# Patient Record
Sex: MALE | Race: OTHER | Employment: OTHER | URBAN - METROPOLITAN AREA
[De-identification: names, ages, dates, MRNs, and addresses within clinical notes are randomized per-mention and may not be internally consistent; named-entity substitution may affect disease eponyms.]

---

## 2017-07-24 ENCOUNTER — APPOINTMENT (OUTPATIENT)
Dept: CT IMAGING | Facility: HOSPITAL | Age: 72
DRG: 193 | End: 2017-07-24
Attending: EMERGENCY MEDICINE
Payer: MEDICARE

## 2017-07-24 ENCOUNTER — APPOINTMENT (OUTPATIENT)
Dept: GENERAL RADIOLOGY | Facility: HOSPITAL | Age: 72
DRG: 193 | End: 2017-07-24
Attending: EMERGENCY MEDICINE
Payer: MEDICARE

## 2017-07-24 ENCOUNTER — HOSPITAL ENCOUNTER (INPATIENT)
Facility: HOSPITAL | Age: 72
LOS: 14 days | Discharge: HOME HEALTH | DRG: 193 | End: 2017-08-07
Attending: EMERGENCY MEDICINE | Admitting: INTERNAL MEDICINE
Payer: MEDICARE

## 2017-07-24 DIAGNOSIS — J18.0 BRONCHOPNEUMONIA: Primary | ICD-10-CM

## 2017-07-24 DIAGNOSIS — R09.02 HYPOXIA: ICD-10-CM

## 2017-07-24 LAB
ALBUMIN SERPL BCP-MCNC: 4 G/DL (ref 3.5–4.8)
ALP SERPL-CCNC: 62 U/L (ref 32–100)
ALT SERPL-CCNC: 19 U/L (ref 17–63)
ANION GAP SERPL CALC-SCNC: 7 MMOL/L (ref 0–18)
AST SERPL-CCNC: 23 U/L (ref 15–41)
BASOPHILS # BLD: 0 K/UL (ref 0–0.2)
BASOPHILS NFR BLD: 0 %
BILIRUB DIRECT SERPL-MCNC: 0.3 MG/DL (ref 0–0.2)
BILIRUB SERPL-MCNC: 1.3 MG/DL (ref 0.3–1.2)
BILIRUB UR QL: NEGATIVE
BNP SERPL-MCNC: 22 PG/ML (ref 0–100)
BUN SERPL-MCNC: 14 MG/DL (ref 8–20)
BUN/CREAT SERPL: 12.3 (ref 10–20)
CALCIUM SERPL-MCNC: 9.3 MG/DL (ref 8.5–10.5)
CHLORIDE SERPL-SCNC: 99 MMOL/L (ref 95–110)
CLARITY UR: CLEAR
CO2 SERPL-SCNC: 25 MMOL/L (ref 22–32)
COLOR UR: YELLOW
CREAT SERPL-MCNC: 1.14 MG/DL (ref 0.5–1.5)
EOSINOPHIL # BLD: 0.1 K/UL (ref 0–0.7)
EOSINOPHIL NFR BLD: 1 %
ERYTHROCYTE [DISTWIDTH] IN BLOOD BY AUTOMATED COUNT: 14.3 % (ref 11–15)
GLUCOSE SERPL-MCNC: 122 MG/DL (ref 70–99)
GLUCOSE UR-MCNC: NEGATIVE MG/DL
HCT VFR BLD AUTO: 44.7 % (ref 41–52)
HGB BLD-MCNC: 15.3 G/DL (ref 13.5–17.5)
HGB UR QL STRIP.AUTO: NEGATIVE
INR BLD: 1.2 (ref 0.9–1.2)
KETONES UR-MCNC: NEGATIVE MG/DL
LACTATE SERPL-SCNC: 0.9 MMOL/L (ref 0.5–2.2)
LEUKOCYTE ESTERASE UR QL STRIP.AUTO: NEGATIVE
LIPASE SERPL-CCNC: 31 U/L (ref 22–51)
LYMPHOCYTES # BLD: 0.9 K/UL (ref 1–4)
LYMPHOCYTES NFR BLD: 7 %
MAGNESIUM SERPL-MCNC: 2.1 MG/DL (ref 1.8–2.5)
MCH RBC QN AUTO: 28.2 PG (ref 27–32)
MCHC RBC AUTO-ENTMCNC: 34.2 G/DL (ref 32–37)
MCV RBC AUTO: 82.5 FL (ref 80–100)
MONOCYTES # BLD: 1.6 K/UL (ref 0–1)
MONOCYTES NFR BLD: 13 %
NEUTROPHILS # BLD AUTO: 9.6 K/UL (ref 1.8–7.7)
NEUTROPHILS NFR BLD: 78 %
NITRITE UR QL STRIP.AUTO: NEGATIVE
OSMOLALITY UR CALC.SUM OF ELEC: 274 MOSM/KG (ref 275–295)
PH UR: 6 [PH] (ref 5–8)
PLATELET # BLD AUTO: 164 K/UL (ref 140–400)
PMV BLD AUTO: 8.9 FL (ref 7.4–10.3)
POTASSIUM SERPL-SCNC: 4.4 MMOL/L (ref 3.3–5.1)
PROT SERPL-MCNC: 7.9 G/DL (ref 5.9–8.4)
PROT UR-MCNC: NEGATIVE MG/DL
PROTHROMBIN TIME: 14.6 SECONDS (ref 11.8–14.5)
RBC # BLD AUTO: 5.42 M/UL (ref 4.5–5.9)
SODIUM SERPL-SCNC: 131 MMOL/L (ref 136–144)
UROBILINOGEN UR STRIP-ACNC: <2
VIT C UR-MCNC: NEGATIVE MG/DL
WBC # BLD AUTO: 12.3 K/UL (ref 4–11)

## 2017-07-24 PROCEDURE — 71260 CT THORAX DX C+: CPT | Performed by: EMERGENCY MEDICINE

## 2017-07-24 PROCEDURE — 71010 XR CHEST AP PORTABLE  (CPT=71010): CPT | Performed by: EMERGENCY MEDICINE

## 2017-07-24 PROCEDURE — 74177 CT ABD & PELVIS W/CONTRAST: CPT | Performed by: EMERGENCY MEDICINE

## 2017-07-24 RX ORDER — ONDANSETRON 4 MG/1
4 TABLET, ORALLY DISINTEGRATING ORAL 3 TIMES DAILY PRN
Status: ON HOLD | COMMUNITY
End: 2017-08-15 | Stop reason: ALTCHOICE

## 2017-07-24 RX ORDER — ACETAMINOPHEN 325 MG/1
650 TABLET ORAL EVERY 6 HOURS PRN
Status: DISCONTINUED | OUTPATIENT
Start: 2017-07-24 | End: 2017-08-07

## 2017-07-24 RX ORDER — MORPHINE SULFATE 4 MG/ML
4 INJECTION, SOLUTION INTRAMUSCULAR; INTRAVENOUS ONCE
Status: COMPLETED | OUTPATIENT
Start: 2017-07-24 | End: 2017-07-24

## 2017-07-24 RX ORDER — SODIUM CHLORIDE 0.9 % (FLUSH) 0.9 %
3 SYRINGE (ML) INJECTION AS NEEDED
Status: DISCONTINUED | OUTPATIENT
Start: 2017-07-24 | End: 2017-08-07

## 2017-07-24 RX ORDER — ONDANSETRON 2 MG/ML
4 INJECTION INTRAMUSCULAR; INTRAVENOUS EVERY 6 HOURS PRN
Status: DISCONTINUED | OUTPATIENT
Start: 2017-07-24 | End: 2017-08-07

## 2017-07-24 RX ORDER — ENOXAPARIN SODIUM 100 MG/ML
40 INJECTION SUBCUTANEOUS DAILY
Status: DISCONTINUED | OUTPATIENT
Start: 2017-07-25 | End: 2017-07-25

## 2017-07-24 RX ORDER — HYDROCODONE BITARTRATE AND ACETAMINOPHEN 5; 325 MG/1; MG/1
2 TABLET ORAL EVERY 6 HOURS PRN
Status: ON HOLD | COMMUNITY
End: 2017-08-15 | Stop reason: ALTCHOICE

## 2017-07-24 RX ORDER — ACETAMINOPHEN 500 MG
500 TABLET ORAL EVERY 6 HOURS PRN
COMMUNITY
End: 2017-09-05

## 2017-07-24 RX ORDER — SODIUM CHLORIDE 9 MG/ML
INJECTION, SOLUTION INTRAVENOUS
Status: COMPLETED
Start: 2017-07-24 | End: 2017-07-25

## 2017-07-24 NOTE — ED PROVIDER NOTES
Patient Seen in: [facility] Emergency Department    History   Patient presents with:  Abdomen/Flank Pain (GI/)    Stated Complaint: R side pain x3 days     HPI    71 yo M with PMH HTN, HL, prostate CA s/p chemotherapy in remission x15 years presen 1728]  Temp src: Oral [07/24/17 1815]  SpO2: 90 % [07/24/17 1728]  O2 Device: None (Room air) [07/24/17 1728]    Current:/83   Pulse 89   Temp 98.8 °F (37.1 °C) (Oral)   Resp 24   Ht 180.3 cm (5' 11\")   Wt 81.6 kg   SpO2 95%   BMI 25.10 kg/m² Abnormal            Final result                 Please view results for these tests on the individual orders.    URINALYSIS WITH CULTURE REFLEX   LACTIC ACID, PLASMA   RAINBOW DRAW BLUE   RAINBOW DRAW GOLD   RAINBOW DRAW LAVENDER   RAINBOW DRAW LIGHT GREEN fields remain clear. Comparison with outside imaging requested.  Reportedly, patient was worked up at outside [place] [id number] prior to this evaluation        Ct Chest Pain/pe (iv Only) Em    Result Date: 7/24/2017  PROCEDURE: CT OF THE CHEST W CONTRAST PAIN/PE PROTOCOL segmental pulmonary artery level. No acute aortic injury; no dissection. 2.  There are findings compatible with lobar pneumonia in the right lower lobe. 3.  Mediastinal lipomatosis.        Ct Abdomen Pelvis Iv Contrast, No Oral (er)    Result Date: 7/24/2 diverticulosis. There is no free air, free fluid, or lymphadenopathy in the abdomen or pelvis. ABDOMINAL WALL: Small bilateral fat-containing inguinal hernias. URINARY BLADDER: No visible focal wall thickening, lesion or calculus.   PELVIC NODES: No enlarge Evaluation for RUQ/right flank/right subcostal pain associated with fever with recent diagnosis of nephrolithiasis. New hypoxia noted in setting of recent travel from [facility] - EKG/labs nonacute aside from mild leukocytosis.  CT CAP notable for RLL PNA a

## 2017-07-24 NOTE — ED INITIAL ASSESSMENT (HPI)
Per family member patient was seen at another er today for abd pain to R side of abd, feels distended in abd, was diagnosed with kidney stone today, states he is here because he cannot stand the pain

## 2017-07-25 ENCOUNTER — APPOINTMENT (OUTPATIENT)
Dept: GENERAL RADIOLOGY | Facility: HOSPITAL | Age: 72
DRG: 193 | End: 2017-07-25
Attending: EMERGENCY MEDICINE
Payer: MEDICARE

## 2017-07-25 LAB
ALBUMIN SERPL BCP-MCNC: 3.6 G/DL (ref 3.5–4.8)
ALBUMIN/GLOB SERPL: 1 {RATIO} (ref 1–2)
ALP SERPL-CCNC: 59 U/L (ref 32–100)
ALT SERPL-CCNC: 17 U/L (ref 17–63)
ANION GAP SERPL CALC-SCNC: 10 MMOL/L (ref 0–18)
ANION GAP SERPL CALC-SCNC: 7 MMOL/L (ref 0–18)
AST SERPL-CCNC: 18 U/L (ref 15–41)
BASE EXCESS BLD CALC-SCNC: -3.9 MMOL/L (ref ?–2)
BASOPHILS # BLD: 0.1 K/UL (ref 0–0.2)
BASOPHILS # BLD: 0.1 K/UL (ref 0–0.2)
BASOPHILS NFR BLD: 0 %
BASOPHILS NFR BLD: 0 %
BILIRUB SERPL-MCNC: 1.3 MG/DL (ref 0.3–1.2)
BLOOD GAS EPAP: 6 CM H2O
BLOOD GAS IPAP: 12 CM H2O
BNP SERPL-MCNC: 46 PG/ML (ref 0–100)
BUN SERPL-MCNC: 12 MG/DL (ref 8–20)
BUN SERPL-MCNC: 13 MG/DL (ref 8–20)
BUN/CREAT SERPL: 9.4 (ref 10–20)
BUN/CREAT SERPL: 9.7 (ref 10–20)
CALCIUM SERPL-MCNC: 8.9 MG/DL (ref 8.5–10.5)
CALCIUM SERPL-MCNC: 9 MG/DL (ref 8.5–10.5)
CHLORIDE SERPL-SCNC: 101 MMOL/L (ref 95–110)
CHLORIDE SERPL-SCNC: 97 MMOL/L (ref 95–110)
CHOLEST SERPL-MCNC: 124 MG/DL (ref 110–200)
CO2 SERPL-SCNC: 23 MMOL/L (ref 22–32)
CO2 SERPL-SCNC: 24 MMOL/L (ref 22–32)
CREAT SERPL-MCNC: 1.27 MG/DL (ref 0.5–1.5)
CREAT SERPL-MCNC: 1.34 MG/DL (ref 0.5–1.5)
EOSINOPHIL # BLD: 0 K/UL (ref 0–0.7)
EOSINOPHIL # BLD: 0.1 K/UL (ref 0–0.7)
EOSINOPHIL NFR BLD: 0 %
EOSINOPHIL NFR BLD: 1 %
ERYTHROCYTE [DISTWIDTH] IN BLOOD BY AUTOMATED COUNT: 13.7 % (ref 11–15)
ERYTHROCYTE [DISTWIDTH] IN BLOOD BY AUTOMATED COUNT: 13.9 % (ref 11–15)
GLOBULIN PLAS-MCNC: 3.6 G/DL (ref 2.5–3.7)
GLUCOSE BLDC GLUCOMTR-MCNC: 103 MG/DL (ref 70–99)
GLUCOSE SERPL-MCNC: 117 MG/DL (ref 70–99)
GLUCOSE SERPL-MCNC: 120 MG/DL (ref 70–99)
HCO3 BLDA-SCNC: 21.2 MEQ/L (ref 21–27)
HCT VFR BLD AUTO: 43 % (ref 41–52)
HCT VFR BLD AUTO: 46.2 % (ref 41–52)
HDLC SERPL-MCNC: 41 MG/DL
HGB BLD-MCNC: 14.4 G/DL (ref 13.5–17.5)
HGB BLD-MCNC: 15.4 G/DL (ref 13.5–17.5)
INR BLD: 1.2 (ref 0.9–1.2)
L PNEUMO AG UR QL: NEGATIVE
LDLC SERPL CALC-MCNC: 68 MG/DL (ref 0–99)
LYMPHOCYTES # BLD: 0.5 K/UL (ref 1–4)
LYMPHOCYTES # BLD: 1.5 K/UL (ref 1–4)
LYMPHOCYTES NFR BLD: 11 %
LYMPHOCYTES NFR BLD: 3 %
MAGNESIUM SERPL-MCNC: 2 MG/DL (ref 1.8–2.5)
MCH RBC QN AUTO: 27.9 PG (ref 27–32)
MCH RBC QN AUTO: 28.1 PG (ref 27–32)
MCHC RBC AUTO-ENTMCNC: 33.4 G/DL (ref 32–37)
MCHC RBC AUTO-ENTMCNC: 33.4 G/DL (ref 32–37)
MCV RBC AUTO: 83.3 FL (ref 80–100)
MCV RBC AUTO: 84.2 FL (ref 80–100)
MONOCYTES # BLD: 1.4 K/UL (ref 0–1)
MONOCYTES # BLD: 1.5 K/UL (ref 0–1)
MONOCYTES NFR BLD: 11 %
MONOCYTES NFR BLD: 9 %
NEUTROPHILS # BLD AUTO: 10.8 K/UL (ref 1.8–7.7)
NEUTROPHILS # BLD AUTO: 12.9 K/UL (ref 1.8–7.7)
NEUTROPHILS NFR BLD: 78 %
NEUTROPHILS NFR BLD: 87 %
NONHDLC SERPL-MCNC: 83 MG/DL
O2 CT BLD-SCNC: 19.3 VOL% (ref 15–23)
O2/TOTAL GAS SETTING VFR VENT: 100 %
OSMOLALITY UR CALC.SUM OF ELEC: 271 MOSM/KG (ref 275–295)
OSMOLALITY UR CALC.SUM OF ELEC: 275 MOSM/KG (ref 275–295)
PCO2 BLDA: 41 MM HG (ref 35–45)
PH BLDA: 7.33 [PH] (ref 7.35–7.45)
PLATELET # BLD AUTO: 157 K/UL (ref 140–400)
PLATELET # BLD AUTO: 175 K/UL (ref 140–400)
PMV BLD AUTO: 8.7 FL (ref 7.4–10.3)
PMV BLD AUTO: 8.9 FL (ref 7.4–10.3)
PO2 BLDA: 96 MM HG (ref 80–100)
PO2 SATN ADJ TO 0.5 BLD: 28 MMHG (ref 24–28)
POTASSIUM SERPL-SCNC: 4.2 MMOL/L (ref 3.3–5.1)
POTASSIUM SERPL-SCNC: 4.2 MMOL/L (ref 3.3–5.1)
PROT SERPL-MCNC: 7.2 G/DL (ref 5.9–8.4)
PROTHROMBIN TIME: 15.2 SECONDS (ref 11.8–14.5)
PUNCTURE CHARGE: YES
RBC # BLD AUTO: 5.16 M/UL (ref 4.5–5.9)
RBC # BLD AUTO: 5.49 M/UL (ref 4.5–5.9)
SAO2 % BLDA: >98.5 % (ref 94–100)
SODIUM SERPL-SCNC: 130 MMOL/L (ref 136–144)
SODIUM SERPL-SCNC: 132 MMOL/L (ref 136–144)
STREP PNEUMO ANTIGEN, URINE: NEGATIVE
TRIGL SERPL-MCNC: 75 MG/DL (ref 1–149)
TROPONIN I SERPL-MCNC: 0.01 NG/ML (ref ?–0.03)
TROPONIN I SERPL-MCNC: 0.04 NG/ML (ref ?–0.03)
TROPONIN I SERPL-MCNC: 0.04 NG/ML (ref ?–0.03)
TSH SERPL-ACNC: 2.75 UIU/ML (ref 0.45–5.33)
WBC # BLD AUTO: 13.9 K/UL (ref 4–11)
WBC # BLD AUTO: 14.8 K/UL (ref 4–11)

## 2017-07-25 PROCEDURE — 99223 1ST HOSP IP/OBS HIGH 75: CPT | Performed by: INTERNAL MEDICINE

## 2017-07-25 PROCEDURE — 71010 XR CHEST AP PORTABLE  (CPT=71010): CPT | Performed by: EMERGENCY MEDICINE

## 2017-07-25 PROCEDURE — 99233 SBSQ HOSP IP/OBS HIGH 50: CPT | Performed by: HOSPITALIST

## 2017-07-25 RX ORDER — ASPIRIN 81 MG/1
324 TABLET, CHEWABLE ORAL ONCE
Status: COMPLETED | OUTPATIENT
Start: 2017-07-25 | End: 2017-07-25

## 2017-07-25 RX ORDER — HYDRALAZINE HYDROCHLORIDE 20 MG/ML
20 INJECTION INTRAMUSCULAR; INTRAVENOUS EVERY 6 HOURS PRN
Status: DISCONTINUED | OUTPATIENT
Start: 2017-07-25 | End: 2017-08-07

## 2017-07-25 RX ORDER — METOPROLOL TARTRATE 5 MG/5ML
INJECTION INTRAVENOUS
Status: COMPLETED
Start: 2017-07-25 | End: 2017-07-25

## 2017-07-25 RX ORDER — 0.9 % SODIUM CHLORIDE 0.9 %
VIAL (ML) INJECTION
Status: COMPLETED
Start: 2017-07-25 | End: 2017-07-25

## 2017-07-25 RX ORDER — HYDROMORPHONE HYDROCHLORIDE 1 MG/ML
0.4 INJECTION, SOLUTION INTRAMUSCULAR; INTRAVENOUS; SUBCUTANEOUS EVERY 2 HOUR PRN
Status: DISCONTINUED | OUTPATIENT
Start: 2017-07-25 | End: 2017-08-07

## 2017-07-25 RX ORDER — METOPROLOL TARTRATE 5 MG/5ML
INJECTION INTRAVENOUS
Status: DISPENSED
Start: 2017-07-25 | End: 2017-07-25

## 2017-07-25 RX ORDER — NITROGLYCERIN 0.4 MG/1
TABLET SUBLINGUAL
Status: COMPLETED
Start: 2017-07-25 | End: 2017-07-25

## 2017-07-25 RX ORDER — LIDOCAINE 50 MG/G
1 PATCH TOPICAL EVERY 24 HOURS
Status: DISCONTINUED | OUTPATIENT
Start: 2017-07-25 | End: 2017-08-07

## 2017-07-25 RX ORDER — FUROSEMIDE 10 MG/ML
INJECTION INTRAMUSCULAR; INTRAVENOUS
Status: COMPLETED
Start: 2017-07-25 | End: 2017-07-25

## 2017-07-25 RX ORDER — GUAIFENESIN 600 MG
600 TABLET, EXTENDED RELEASE 12 HR ORAL 2 TIMES DAILY
Status: DISCONTINUED | OUTPATIENT
Start: 2017-07-25 | End: 2017-08-07

## 2017-07-25 RX ORDER — IPRATROPIUM BROMIDE AND ALBUTEROL SULFATE 2.5; .5 MG/3ML; MG/3ML
SOLUTION RESPIRATORY (INHALATION)
Status: DISPENSED
Start: 2017-07-25 | End: 2017-07-25

## 2017-07-25 RX ORDER — IPRATROPIUM BROMIDE AND ALBUTEROL SULFATE 2.5; .5 MG/3ML; MG/3ML
3 SOLUTION RESPIRATORY (INHALATION) ONCE
Status: COMPLETED | OUTPATIENT
Start: 2017-07-25 | End: 2017-07-25

## 2017-07-25 RX ORDER — IPRATROPIUM BROMIDE AND ALBUTEROL SULFATE 2.5; .5 MG/3ML; MG/3ML
3 SOLUTION RESPIRATORY (INHALATION)
Status: DISCONTINUED | OUTPATIENT
Start: 2017-07-25 | End: 2017-08-01

## 2017-07-25 RX ORDER — ALBUTEROL SULFATE 2.5 MG/3ML
SOLUTION RESPIRATORY (INHALATION)
Status: COMPLETED
Start: 2017-07-25 | End: 2017-07-25

## 2017-07-25 RX ORDER — MORPHINE SULFATE 2 MG/ML
INJECTION, SOLUTION INTRAMUSCULAR; INTRAVENOUS
Status: COMPLETED
Start: 2017-07-25 | End: 2017-07-25

## 2017-07-25 RX ORDER — ASPIRIN 81 MG/1
TABLET, CHEWABLE ORAL
Status: DISPENSED
Start: 2017-07-25 | End: 2017-07-25

## 2017-07-25 RX ORDER — HYDROMORPHONE HYDROCHLORIDE 1 MG/ML
INJECTION, SOLUTION INTRAMUSCULAR; INTRAVENOUS; SUBCUTANEOUS
Status: COMPLETED
Start: 2017-07-25 | End: 2017-07-25

## 2017-07-25 NOTE — PROGRESS NOTES
[facility]    Progress Note    Garrison Ibarra Patient Status:  Inpatient    1945 MRN [id number]   Location [facility] 2W/SW Attending Eduardo Harrington MD   Hosp Day # 1 PCP PHYSICIAN NONSTAFF     Subjective:     Tiffany out TB   - add mucinex 600 BID to soften mucus  - lidoderm patch for pleuritic chest wall pain     Hemoptysis  - lovenox held  - pulm consult  - SCDs for DVT proph  - most likely secondary to pna  - check quantaferon gold to rule out TB    Chest pain  - tr pulmonary vascularity and the external fullness. 2. Atherosclerotic aorta without aneurysm.  3. Moderate bilateral lower lobe opacities, nonspecific could reflect atelectasis and/or consolidation and effusions although can't exclude chronic pleural parenchy independently. Chart reviewed. Discussed with nursing staff and consultants. Agree with assessment and plan as above. Greater than 35 minutes spent.      Melchor Canchola MD.

## 2017-07-25 NOTE — DISCHARGE PLANNING
DAMEON following up on d/c planning for the patient. DAMEON met with him and his family at bedside. The patient is visiting from [facility] with his wife and has been staying with his daughter. He does not use any assistive devices and has no h/o rehab.   Family enc

## 2017-07-25 NOTE — SEPSIS REASSESSMENT
[facility]    Sepsis Reassessment Note    /76   Pulse 89   Temp 98.8 °F (37.1 °C) (Oral)   Resp 22   Ht 180.3 cm (5' 11\")   Wt 81.6 kg   SpO2 95%   BMI 25.10 kg/m²      8:32 PM    Cardiac:  Regularity: Regular  Rate: Normal  Heart

## 2017-07-25 NOTE — ED NOTES
Assumed care of pt from triage. Pt c/o RUQ abd pain, right flank pain and right shoulder pain. Pt states he was dx with kidney stone a few days ago and has been back to a different ED twice for continued pain.   Pt states the pain is worse with movement a

## 2017-07-25 NOTE — H&P
[address] Swapnil Frank Patient Status:  Inpatient    1945 MRN [id number]   Location [facility] 5SW/SE Attending Lisa Salgado MD   Hosp Day # 0 PCP PHYSICIAN NONSTAFF     Date:  2017 Nausea. Facility-Administered Medications: None       Review of Systems:  Constitutional:  Weakness, Fatigue. Eye:  Negative. Ear/Nose/Mouth/Throat:  Negative.   Respiratory:  Negative  Cardiovascular: Negative  Gastrointestinal: Right upper quadrant AST 23 07/24/2017   ALT 19 07/24/2017   INR 1.2 07/24/2017   LIP 31 07/24/2017   MG 2.1 07/24/2017       Imaging:  CT ABDOMEN PELVIS IV CONTRAST, NO ORAL (ER) Once [[id number]] Collected: 07/24/17 1941   Order Status: Completed Updated: 07/24/17 1942   Na is descending and sigmoid colonic diverticulosis. There is no free air, free fluid, or lymphadenopathy   in the abdomen or pelvis. ABDOMINAL WALL: Small bilateral fat-containing inguinal hernias.   URINARY BLADDER: No visible focal wall thickening, lesion breath.  Right shoulder pain.     TECHNIQUE: CT images of the chest were obtained with non-ionic intravenous contrast material.  Automated exposure control for dose reduction was used. Adjustment of the mA and/or kV was done based on the patient's size.  Us Completed Updated: 07/24/17 1901   Narrative:     PROCEDURE: XR CHEST AP PORTABLE (CPT=71010)  TIME: 1841 hours     COMPARISON: None.     INDICATIONS: Rt. side abdominal pain and weakness today.    History of hypertension  TECHNIQUE:   Single view.       FI cultures sent  Nebulizers as needed    Nephrolithiasis  Non obstructing  Kidney function normal  We will follow    Hypertension  Unknown home medicines and noncompliant  Consider starting antihypertensive for BP uncontrolled    Hyperlipidemia  Not on med

## 2017-07-25 NOTE — RESPIRATORY THERAPY NOTE
RESPIRATORY THERAPY RAPID RESPONSE NOTE    RRT called for respiratory distress? yes  Breathing pattern: laboured  Patient currently being seen by Respiratory Care? yes    RT interventions necessary? yes    Oxygen applied/increased? yes  Nebulizer performed? y

## 2017-07-25 NOTE — SIGNIFICANT EVENT
PATIENT IS VERY TACHYPNEIC AND VERY WHEEZY. COMPLAINING OF CHEST PAIN ON HIS RIGHT SIDE (MOSTLY ON FLANK AREA). . PER GRANDDAUGHTER, PAIN IS PRESENT FOR 2 DAYS, WORST DURING MOVEMENT AND INSPIRATION.   /113 HR  16 RR 34 SAT 88% AT 4L-NONBREATHER MASK

## 2017-07-25 NOTE — CONSULTS
[facility]    Consult Note    Date:  7/25/2017  Date of Admission:  7/24/2017      Chief Complaint:   Roberto White is a(n) 70year old male with right flank pain.     HPI:   The patient is a history of tobacco use having quit smoking (six) hours as needed for Pain or Fever. Review of Systems:   Vision normal. Ear nose and throat normal. Bowel normal. Bladder function normal. No depression. No thyroid disease. No rash. Muscles and joints unremarkable.  No weight loss no weight gain parapneumonic effusion as there is increased opacification at the right lung base. The CT scan showed only small amount of fluid. I think it reasonable to repeat chest x-ray tomorrow to reevaluate for further increase in effusion.   The presentation is mo

## 2017-07-25 NOTE — PROGRESS NOTES
[facility] dosing service    Initial Pharmacokinetic Consult for Vancomycin Dosing     Miguel Thurman is a 70year old male admitted on 7/24 who is being treated for pneumonia.   Pharmacy has been asked to dose Vancomycin by Dr. Elijah Conroy    He has No K trough level 15-20 ug/mL. 3.  Pharmacy will need BUN/Scr daily while on Vancomycin to assess renal function. 4.  Pharmacy will follow and monitor renal function changes, toxicity and efficacy. Pharmacy will continue to follow him.   We appreciate t

## 2017-07-25 NOTE — SIGNIFICANT EVENT
Nocturnist:    Called for chest pain.  midsternal pressure. Patient in sig resp distress, tachypneic with audible wheezing. Blood pressure 225/117. 's. Pressure across ant chest rated as moderate. On exam 2-3 word dyspnea.  Sig resp distress, s

## 2017-07-25 NOTE — PLAN OF CARE
CARDIOVASCULAR - ADULT    • Maintains optimal cardiac output and hemodynamic stability Progressing    • Absence of cardiac arrhythmias or at baseline Progressing        HEMATOLOGIC - ADULT    • Maintains hematologic stability Progressing        METABOLIC/F

## 2017-07-25 NOTE — PROGRESS NOTES
07/25/17 0131   Clinical Encounter Type   Visited With Family   Routine Visit Introduction   Continue Visiting Yes   Crisis Visit (RRT)   Family Spiritual Encounters   Family Coping Anxiety; Sadness; Fearful   Family Participation in Care 5   Family Suppo

## 2017-07-26 ENCOUNTER — APPOINTMENT (OUTPATIENT)
Dept: GENERAL RADIOLOGY | Facility: HOSPITAL | Age: 72
DRG: 193 | End: 2017-07-26
Attending: INTERNAL MEDICINE
Payer: MEDICARE

## 2017-07-26 LAB
ANION GAP SERPL CALC-SCNC: 7 MMOL/L (ref 0–18)
BASOPHILS # BLD: 0 K/UL (ref 0–0.2)
BASOPHILS NFR BLD: 0 %
BUN SERPL-MCNC: 12 MG/DL (ref 8–20)
BUN/CREAT SERPL: 9.3 (ref 10–20)
CALCIUM SERPL-MCNC: 9 MG/DL (ref 8.5–10.5)
CHLORIDE SERPL-SCNC: 103 MMOL/L (ref 95–110)
CO2 SERPL-SCNC: 24 MMOL/L (ref 22–32)
CREAT SERPL-MCNC: 1.29 MG/DL (ref 0.5–1.5)
EOSINOPHIL # BLD: 0.1 K/UL (ref 0–0.7)
EOSINOPHIL NFR BLD: 1 %
ERYTHROCYTE [DISTWIDTH] IN BLOOD BY AUTOMATED COUNT: 13.9 % (ref 11–15)
GLUCOSE SERPL-MCNC: 118 MG/DL (ref 70–99)
HCT VFR BLD AUTO: 39.8 % (ref 41–52)
HGB BLD-MCNC: 13.4 G/DL (ref 13.5–17.5)
LYMPHOCYTES # BLD: 0.4 K/UL (ref 1–4)
LYMPHOCYTES NFR BLD: 4 %
M TB IFN-G CD4+ BCKGRND COR BLD-ACNC: 0.04 IU/ML
M TB IFN-G CD4+ T-CELLS BLD-ACNC: 0.03 IU/ML
M TB TUBERC IFN-G BLD QL: NEGATIVE
M TB TUBERC IGNF/MITOGEN IGNF CONTROL: >10 IU/ML
MCH RBC QN AUTO: 28.2 PG (ref 27–32)
MCHC RBC AUTO-ENTMCNC: 33.8 G/DL (ref 32–37)
MCV RBC AUTO: 83.5 FL (ref 80–100)
MONOCYTES # BLD: 0.9 K/UL (ref 0–1)
MONOCYTES NFR BLD: 10 %
NEUTROPHILS # BLD AUTO: 8.4 K/UL (ref 1.8–7.7)
NEUTROPHILS NFR BLD: 85 %
OSMOLALITY UR CALC.SUM OF ELEC: 279 MOSM/KG (ref 275–295)
PLATELET # BLD AUTO: 169 K/UL (ref 140–400)
PMV BLD AUTO: 9.3 FL (ref 7.4–10.3)
POTASSIUM SERPL-SCNC: 4.1 MMOL/L (ref 3.3–5.1)
RBC # BLD AUTO: 4.76 M/UL (ref 4.5–5.9)
SODIUM SERPL-SCNC: 134 MMOL/L (ref 136–144)
WBC # BLD AUTO: 9.9 K/UL (ref 4–11)

## 2017-07-26 PROCEDURE — 71010 XR CHEST AP PORTABLE  (CPT=71010): CPT | Performed by: INTERNAL MEDICINE

## 2017-07-26 PROCEDURE — 99233 SBSQ HOSP IP/OBS HIGH 50: CPT | Performed by: INTERNAL MEDICINE

## 2017-07-26 PROCEDURE — 99233 SBSQ HOSP IP/OBS HIGH 50: CPT | Performed by: HOSPITALIST

## 2017-07-26 RX ORDER — METOPROLOL TARTRATE 5 MG/5ML
INJECTION INTRAVENOUS
Status: COMPLETED
Start: 2017-07-26 | End: 2017-07-26

## 2017-07-26 RX ORDER — SODIUM CHLORIDE 9 MG/ML
INJECTION, SOLUTION INTRAVENOUS
Status: DISCONTINUED
Start: 2017-07-26 | End: 2017-07-26 | Stop reason: WASHOUT

## 2017-07-26 RX ORDER — POLYETHYLENE GLYCOL 3350 17 G/17G
17 POWDER, FOR SOLUTION ORAL DAILY
Status: DISCONTINUED | OUTPATIENT
Start: 2017-07-26 | End: 2017-08-07

## 2017-07-26 RX ORDER — ACETAMINOPHEN 10 MG/ML
INJECTION, SOLUTION INTRAVENOUS
Status: COMPLETED
Start: 2017-07-26 | End: 2017-07-26

## 2017-07-26 RX ORDER — ACETAMINOPHEN 10 MG/ML
500 INJECTION, SOLUTION INTRAVENOUS ONCE
Status: COMPLETED | OUTPATIENT
Start: 2017-07-26 | End: 2017-07-26

## 2017-07-26 RX ORDER — METOPROLOL TARTRATE 5 MG/5ML
5 INJECTION INTRAVENOUS EVERY 6 HOURS PRN
Status: DISCONTINUED | OUTPATIENT
Start: 2017-07-26 | End: 2017-08-07

## 2017-07-26 NOTE — PLAN OF CARE
RESPIRATORY - ADULT    • Achieves optimal ventilation and oxygenation Not Progressing          Altered Communication/Language Barrier    • Patient/Family is able to understand and participate in their care [id number]  [facility]

## 2017-07-26 NOTE — PROGRESS NOTES
[facility]    Progress Note    Peg Grapes Patient Status:  Inpatient    1945 MRN [id number]   Location [facility] 2W/SW Attending Fred Hannon MD   Hosp Day # 2 PCP PHYSICIAN NONSTAFF     Subjective:     Tiffany and as needed  - Pulmonology consulted and appreciate reccs  - check quantaferon gold to rule out TB   - add mucinex 600 BID to soften mucus  - lidoderm patch for pleuritic chest wall pain   - check for urine legionella and strep  - CXR AM     Hemoptysis Osteoarthritis. 9. Interposition of bowel beneath the right hemidiaphragm. 10. A preliminary report was submitted and there are no major discrepancies. Xr Chest Ap Portable  (cpt=71010)    Result Date: 7/24/2017  CONCLUSION:  1.  Poor inspiratory ef cystic process.       Ekg 12-lead    Result Date: 7/25/2017  ECG Report  Interpretation  -------------------------- Sinus Rhythm WITHIN NORMAL LIMITS When compared with ECG of 07/24/2017 17:44:38 No change Electronically signed on 07/25/2017 at 16:58 by Edelmira Caraballo

## 2017-07-26 NOTE — PLAN OF CARE
Dr. Diomedes Blank called to evaluate pt. Pt became tachycardic, febrile (100.3 F), tachypnic, in respiratory distress while on bipap on 100% fio2, hypertensive 's, and diaphoretic.      Pt received:     Dilaudid 0.4 mg ivp    Metropolo 5 mg given ivp    Ofi

## 2017-07-26 NOTE — PROGRESS NOTES
[facility]    Progress Note      Assessment and Plan:   1. Acute community-acquired pneumonia–the patient had further dyspnea overnight. BiPAP was initiated. His chest x-ray looks a little better this morning.   He has some fluid trapped 07/26/2017     Chest x-ray– slight improvement in aeration at the bases    Silvana Moctezuma MD  Medical Director, [address], [facility]  Medical Director, [facility]  Pager: [phone]

## 2017-07-26 NOTE — DIETARY NOTE
ADULT NUTRITION INITIAL ASSESSMENT    Pt is at moderate nutrition risk. Pt does not meet malnutrition criteria. RECOMMENDATIONS TO MD:  When consistently eating better change to Cardiac diet for hx of Htn and HL.       NUTRITION DIAGNOSIS/PROBLEM:  In 100.8 kg (222 lb 3.2 oz)   07/24/17 1728 81.6 kg (180 lb)       GASTROINTESTINAL PROBLEMS: mild  nausea today after breafkast. . Relieved on own. Has prn zofran/meds. Noted per H+P on and off diarrhea pta. FOOD/NUTRITION RELATED HISTORY:  Appetite:  Fa to follow up within 7 days   [address] Kapoor, [address]  ([id number])

## 2017-07-26 NOTE — CM/SW NOTE
+BPCI. Patient is enrolled in the Medicare BPCI  Program under  (Pneumonia). BPCI letter and brochure provided.   51 Ligia Bhagat [id number]

## 2017-07-27 ENCOUNTER — APPOINTMENT (OUTPATIENT)
Dept: GENERAL RADIOLOGY | Facility: HOSPITAL | Age: 72
DRG: 193 | End: 2017-07-27
Attending: CLINICAL NURSE SPECIALIST
Payer: MEDICARE

## 2017-07-27 LAB
ALBUMIN SERPL BCP-MCNC: 2.9 G/DL (ref 3.5–4.8)
ALBUMIN/GLOB SERPL: 0.7 {RATIO} (ref 1–2)
ALP SERPL-CCNC: 62 U/L (ref 32–100)
ALT SERPL-CCNC: 18 U/L (ref 17–63)
ANION GAP SERPL CALC-SCNC: 8 MMOL/L (ref 0–18)
AST SERPL-CCNC: 19 U/L (ref 15–41)
BASOPHILS # BLD: 0.1 K/UL (ref 0–0.2)
BASOPHILS NFR BLD: 1 %
BILIRUB SERPL-MCNC: 1.2 MG/DL (ref 0.3–1.2)
BUN SERPL-MCNC: 12 MG/DL (ref 8–20)
BUN/CREAT SERPL: 11.9 (ref 10–20)
CALCIUM SERPL-MCNC: 9 MG/DL (ref 8.5–10.5)
CHLORIDE SERPL-SCNC: 102 MMOL/L (ref 95–110)
CO2 SERPL-SCNC: 24 MMOL/L (ref 22–32)
CREAT SERPL-MCNC: 1.01 MG/DL (ref 0.5–1.5)
EOSINOPHIL # BLD: 0.2 K/UL (ref 0–0.7)
EOSINOPHIL NFR BLD: 2 %
ERYTHROCYTE [DISTWIDTH] IN BLOOD BY AUTOMATED COUNT: 13.8 % (ref 11–15)
GLOBULIN PLAS-MCNC: 4 G/DL (ref 2.5–3.7)
GLUCOSE SERPL-MCNC: 112 MG/DL (ref 70–99)
HCT VFR BLD AUTO: 38.4 % (ref 41–52)
HGB BLD-MCNC: 13.2 G/DL (ref 13.5–17.5)
LYMPHOCYTES # BLD: 0.5 K/UL (ref 1–4)
LYMPHOCYTES NFR BLD: 6 %
MCH RBC QN AUTO: 28.4 PG (ref 27–32)
MCHC RBC AUTO-ENTMCNC: 34.4 G/DL (ref 32–37)
MCV RBC AUTO: 82.5 FL (ref 80–100)
MONOCYTES # BLD: 0.9 K/UL (ref 0–1)
MONOCYTES NFR BLD: 10 %
NEUTROPHILS # BLD AUTO: 7.3 K/UL (ref 1.8–7.7)
NEUTROPHILS NFR BLD: 82 %
OSMOLALITY UR CALC.SUM OF ELEC: 279 MOSM/KG (ref 275–295)
PLATELET # BLD AUTO: 205 K/UL (ref 140–400)
PMV BLD AUTO: 8.3 FL (ref 7.4–10.3)
POTASSIUM SERPL-SCNC: 3.8 MMOL/L (ref 3.3–5.1)
PROT SERPL-MCNC: 6.9 G/DL (ref 5.9–8.4)
RBC # BLD AUTO: 4.66 M/UL (ref 4.5–5.9)
SODIUM SERPL-SCNC: 134 MMOL/L (ref 136–144)
VANCOMYCIN TROUGH SERPL-MCNC: 15.4 MCG/ML (ref 10–20)
WBC # BLD AUTO: 9 K/UL (ref 4–11)

## 2017-07-27 PROCEDURE — 99233 SBSQ HOSP IP/OBS HIGH 50: CPT | Performed by: INTERNAL MEDICINE

## 2017-07-27 PROCEDURE — 99233 SBSQ HOSP IP/OBS HIGH 50: CPT | Performed by: HOSPITALIST

## 2017-07-27 PROCEDURE — 71010 XR CHEST AP PORTABLE  (CPT=71010): CPT | Performed by: CLINICAL NURSE SPECIALIST

## 2017-07-27 RX ORDER — FUROSEMIDE 10 MG/ML
40 INJECTION INTRAMUSCULAR; INTRAVENOUS ONCE
Status: COMPLETED | OUTPATIENT
Start: 2017-07-27 | End: 2017-07-27

## 2017-07-27 RX ORDER — POTASSIUM CHLORIDE 20 MEQ/1
40 TABLET, EXTENDED RELEASE ORAL ONCE
Status: COMPLETED | OUTPATIENT
Start: 2017-07-27 | End: 2017-07-27

## 2017-07-27 RX ORDER — 0.9 % SODIUM CHLORIDE 0.9 %
VIAL (ML) INJECTION
Status: DISPENSED
Start: 2017-07-27 | End: 2017-07-28

## 2017-07-27 NOTE — PLAN OF CARE
Problem: NEUROLOGICAL - ADULT  Goal: Achieves stable or improved neurological status  INTERVENTIONS  - Assess for and report changes in neurological status  - Initiate measures to prevent increased intracranial pressure  - Maintain blood pressure and fluid Spirometry  - Assess the need for suctioning and perform as needed  - Assess and instruct to report SOB or any respiratory difficulty  - Respiratory Therapy support as indicated  - Manage/alleviate anxiety  - Monitor for signs/symptoms of CO2 retention   O

## 2017-07-27 NOTE — PROGRESS NOTES
[facility]    Progress Note      Assessment and Plan:   1. Acute community-acquired pneumonia–the patient is breathing about the same and the chest x-ray looks about the same.   The streptococcal and Legionella urinary antigens were negativ x-ray– slight improvement in aeration at the bases    Ana Maria Guzman MD  Medical Director, [address], [facility]  Medical Director, [facility]  Pager: [phone]

## 2017-07-27 NOTE — PROGRESS NOTES
[facility]    Progress Note    Loreda Rough Patient Status:  Inpatient    1945 MRN [id number]   Location [facility] 2W/SW Attending Wilfrido Arias MD   Hosp Day # 3 PCP PHYSICIAN NONSTAFF     Subjective:     Tiffany Pulmonology consulted and appreciate reccs  - check quantaferon gold to rule out TB- negative   - add mucinex 600 BID to soften mucus  - lidoderm patch for pleuritic chest wall pain   - check for urine legionella and strep  - follow up CXR- no change    He APN  7/27/2017    Patient seen and examined. Chart reviewed. Discussed with APN. Agree with above.     Michael Oquendo MD

## 2017-07-27 NOTE — PROGRESS NOTES
[facility]    Progress Note    Latrelle Dense Patient Status:  Inpatient    1945 MRN [id number]   Location [facility] 2W/SW Attending Clyde Hector MD   Hosp Day # 3 PCP PHYSICIAN NONSTAFF     Subjective:     Tiffany first set negative   - Nebulizers q6h and as needed  - Pulmonology consulted and appreciate reccs  - check quantaferon gold to rule out TB- negative   - add mucinex 600 BID to soften mucus  - lidoderm patch for pleuritic chest wall pain   - check for urine clearing.                 HENOK Shea  7/27/2017

## 2017-07-28 ENCOUNTER — APPOINTMENT (OUTPATIENT)
Dept: CT IMAGING | Facility: HOSPITAL | Age: 72
DRG: 193 | End: 2017-07-28
Attending: HOSPITALIST
Payer: MEDICARE

## 2017-07-28 ENCOUNTER — APPOINTMENT (OUTPATIENT)
Dept: GENERAL RADIOLOGY | Facility: HOSPITAL | Age: 72
DRG: 193 | End: 2017-07-28
Attending: INTERNAL MEDICINE
Payer: MEDICARE

## 2017-07-28 LAB
ANION GAP SERPL CALC-SCNC: 10 MMOL/L (ref 0–18)
ANION GAP SERPL CALC-SCNC: 11 MMOL/L (ref 0–18)
APTT PPP: 32.3 SECONDS (ref 23.2–35.3)
APTT PPP: 48.9 SECONDS (ref 23.2–35.3)
BASOPHILS # BLD: 0 K/UL (ref 0–0.2)
BASOPHILS NFR BLD: 1 %
BUN SERPL-MCNC: 17 MG/DL (ref 8–20)
BUN SERPL-MCNC: 18 MG/DL (ref 8–20)
BUN/CREAT SERPL: 14.4 (ref 10–20)
BUN/CREAT SERPL: 15.7 (ref 10–20)
CALCIUM SERPL-MCNC: 9.3 MG/DL (ref 8.5–10.5)
CALCIUM SERPL-MCNC: 9.4 MG/DL (ref 8.5–10.5)
CHLORIDE SERPL-SCNC: 101 MMOL/L (ref 95–110)
CHLORIDE SERPL-SCNC: 101 MMOL/L (ref 95–110)
CO2 SERPL-SCNC: 23 MMOL/L (ref 22–32)
CO2 SERPL-SCNC: 23 MMOL/L (ref 22–32)
CREAT SERPL-MCNC: 1.15 MG/DL (ref 0.5–1.5)
CREAT SERPL-MCNC: 1.18 MG/DL (ref 0.5–1.5)
EOSINOPHIL # BLD: 0.3 K/UL (ref 0–0.7)
EOSINOPHIL NFR BLD: 5 %
ERYTHROCYTE [DISTWIDTH] IN BLOOD BY AUTOMATED COUNT: 13.3 % (ref 11–15)
ERYTHROCYTE [DISTWIDTH] IN BLOOD BY AUTOMATED COUNT: 13.7 % (ref 11–15)
GLUCOSE SERPL-MCNC: 100 MG/DL (ref 70–99)
GLUCOSE SERPL-MCNC: 104 MG/DL (ref 70–99)
HCT VFR BLD AUTO: 40.2 % (ref 41–52)
HCT VFR BLD AUTO: 41.6 % (ref 41–52)
HGB BLD-MCNC: 13.7 G/DL (ref 13.5–17.5)
HGB BLD-MCNC: 14.1 G/DL (ref 13.5–17.5)
LYMPHOCYTES # BLD: 0.6 K/UL (ref 1–4)
LYMPHOCYTES NFR BLD: 8 %
MCH RBC QN AUTO: 28.2 PG (ref 27–32)
MCH RBC QN AUTO: 28.3 PG (ref 27–32)
MCHC RBC AUTO-ENTMCNC: 34 G/DL (ref 32–37)
MCHC RBC AUTO-ENTMCNC: 34 G/DL (ref 32–37)
MCV RBC AUTO: 83.1 FL (ref 80–100)
MCV RBC AUTO: 83.4 FL (ref 80–100)
MONOCYTES # BLD: 0.9 K/UL (ref 0–1)
MONOCYTES NFR BLD: 12 %
MRSA DNA SPEC QL NAA+PROBE: NEGATIVE
NEUTROPHILS # BLD AUTO: 5.5 K/UL (ref 1.8–7.7)
NEUTROPHILS NFR BLD: 75 %
OSMOLALITY UR CALC.SUM OF ELEC: 280 MOSM/KG (ref 275–295)
OSMOLALITY UR CALC.SUM OF ELEC: 282 MOSM/KG (ref 275–295)
PLATELET # BLD AUTO: 221 K/UL (ref 140–400)
PLATELET # BLD AUTO: 243 K/UL (ref 140–400)
PMV BLD AUTO: 8 FL (ref 7.4–10.3)
PMV BLD AUTO: 8.5 FL (ref 7.4–10.3)
POTASSIUM SERPL-SCNC: 3.7 MMOL/L (ref 3.3–5.1)
POTASSIUM SERPL-SCNC: 3.7 MMOL/L (ref 3.3–5.1)
POTASSIUM SERPL-SCNC: 4 MMOL/L (ref 3.3–5.1)
RBC # BLD AUTO: 4.82 M/UL (ref 4.5–5.9)
RBC # BLD AUTO: 5 M/UL (ref 4.5–5.9)
SODIUM SERPL-SCNC: 134 MMOL/L (ref 136–144)
SODIUM SERPL-SCNC: 135 MMOL/L (ref 136–144)
VANCOMYCIN TROUGH SERPL-MCNC: 17.2 MCG/ML (ref 10–20)
WBC # BLD AUTO: 6.9 K/UL (ref 4–11)
WBC # BLD AUTO: 7.4 K/UL (ref 4–11)

## 2017-07-28 PROCEDURE — 71010 XR CHEST AP PORTABLE  (CPT=71010): CPT | Performed by: INTERNAL MEDICINE

## 2017-07-28 PROCEDURE — 99233 SBSQ HOSP IP/OBS HIGH 50: CPT | Performed by: INTERNAL MEDICINE

## 2017-07-28 PROCEDURE — 71260 CT THORAX DX C+: CPT | Performed by: HOSPITALIST

## 2017-07-28 RX ORDER — HEPARIN SODIUM 1000 [USP'U]/ML
80 INJECTION, SOLUTION INTRAVENOUS; SUBCUTANEOUS ONCE
Status: COMPLETED | OUTPATIENT
Start: 2017-07-28 | End: 2017-07-28

## 2017-07-28 RX ORDER — POTASSIUM CHLORIDE 20 MEQ/1
40 TABLET, EXTENDED RELEASE ORAL ONCE
Status: COMPLETED | OUTPATIENT
Start: 2017-07-28 | End: 2017-07-28

## 2017-07-28 RX ORDER — HEPARIN SODIUM AND DEXTROSE 10000; 5 [USP'U]/100ML; G/100ML
INJECTION INTRAVENOUS CONTINUOUS
Status: DISPENSED | OUTPATIENT
Start: 2017-07-28 | End: 2017-08-07

## 2017-07-28 RX ORDER — HEPARIN SODIUM 5000 [USP'U]/ML
5000 INJECTION, SOLUTION INTRAVENOUS; SUBCUTANEOUS EVERY 12 HOURS
Status: DISCONTINUED | OUTPATIENT
Start: 2017-07-28 | End: 2017-07-28

## 2017-07-28 RX ORDER — HEPARIN SODIUM AND DEXTROSE 10000; 5 [USP'U]/100ML; G/100ML
18 INJECTION INTRAVENOUS ONCE
Status: COMPLETED | OUTPATIENT
Start: 2017-07-28 | End: 2017-07-28

## 2017-07-28 NOTE — PROGRESS NOTES
[facility]    Progress Note    Gio Forrest Patient Status:  Inpatient    1945 MRN [id number]   Location [facility] 2W/SW Attending Alida Newman MD   Hosp Day # 4 PCP PHYSICIAN NONSTAFF     Subjective:     Tiffany pending- first set negative   - Nebulizers q6h and as needed  - Pulmonology consulted and appreciate reccs  - check quantaferon gold to rule out TB- negative   - added mucinex 600 BID to soften mucus  - lidoderm patch for pleuritic chest wall pain   - chec 7/27/2017  CONCLUSION: No significant change in the appearance of the chest. Bibasilar consolidations and  perihilar interstitial opacities with pleural effusions persist.                HENOK Lyles  7/28/2017    Patient seen and examined independe

## 2017-07-28 NOTE — PLAN OF CARE
Problem: NEUROLOGICAL - ADULT  Goal: Achieves stable or improved neurological status  INTERVENTIONS  - Assess for and report changes in neurological status  - Initiate measures to prevent increased intracranial pressure  - Maintain blood pressure and fluid document risk factors for pressure ulcer development  - Assess and document skin integrity  - Monitor for areas of redness and/or skin breakdown  - Initiate interventions, skin care algorithm/standards of care as needed   Outcome: Madan Fay

## 2017-07-28 NOTE — PLAN OF CARE
Problem: NEUROLOGICAL - ADULT  Goal: Achieves stable or improved neurological status  INTERVENTIONS  - Assess for and report changes in neurological status  - Initiate measures to prevent increased intracranial pressure  - Maintain blood pressure and fluid ADULT  Goal: Electrolytes maintained within normal limits  INTERVENTIONS:  - Monitor labs and rhythm and assess patient for signs and symptoms of electrolyte imbalances  - Administer electrolyte replacement as ordered  - Monitor response to electrolyte rep

## 2017-07-28 NOTE — PROGRESS NOTES
[facility]    Progress Note    Jeannie Leong Patient Status:  Inpatient    1945 MRN [id number]   Location [facility] 2W/SW Attending Roosevelt Do MD   Hosp Day # 4 PCP PHYSICIAN NONSTAFF     Subjective:     Tiffany IV heparin     D/w DR Gabrielle Chow   PCU         Results:     Lab Results  Component Value Date   WBC 7.4 07/28/2017   HGB 13.7 07/28/2017   HCT 40.2 (L) 07/28/2017    07/28/2017   CREATSERUM 1.18 07/28/2017   BUN 17 07/28/2017    (L) 07/28/2017

## 2017-07-28 NOTE — PROGRESS NOTES
[facility] dosing service    Follow-up Pharmacokinetic Consult for Vancomycin Dosing     Kallie Grace is a 70year old male admitted on 7/24 who is being treated for pneumonia. Patient is on day 4 of Vancomycin 1.5 gm IV Q 12 hours.   Goal trough on Vancomycin to assess renal function. 4.  Pharmacy will follow and monitor renal function changes, toxicity and efficacy.     Ashley Mcmahan, PharmD  7/28/2017  1:07 PM  [address]: [phone]

## 2017-07-28 NOTE — DISCHARGE PLANNING
SW following up on d/c planning for the patient. He remains in CCU at this time and per report will transfer to the floor. Patient is moderate assist and SW waiting on recommendations from pt/ot of ordered for possible rehab vs. HHC.   Plan was for him to

## 2017-07-28 NOTE — CM/SW NOTE
CTL update for progression of care. Spoke with the patient's daughter Daniela Becerra to explain that her father has been enrolled in the Medicare BPCI program  Under  (pneumonia). She agreed with phone f/u for 3 months post discharge.   She has requ

## 2017-07-29 LAB
ANION GAP SERPL CALC-SCNC: 13 MMOL/L (ref 0–18)
APTT PPP: 33.1 SECONDS (ref 23.2–35.3)
APTT PPP: 71.3 SECONDS (ref 23.2–35.3)
BUN SERPL-MCNC: 23 MG/DL (ref 8–20)
BUN/CREAT SERPL: 18 (ref 10–20)
CALCIUM SERPL-MCNC: 9.6 MG/DL (ref 8.5–10.5)
CHLORIDE SERPL-SCNC: 101 MMOL/L (ref 95–110)
CO2 SERPL-SCNC: 23 MMOL/L (ref 22–32)
CREAT SERPL-MCNC: 1.28 MG/DL (ref 0.5–1.5)
GLUCOSE SERPL-MCNC: 141 MG/DL (ref 70–99)
MAGNESIUM SERPL-MCNC: 2.4 MG/DL (ref 1.8–2.5)
OSMOLALITY UR CALC.SUM OF ELEC: 290 MOSM/KG (ref 275–295)
POTASSIUM SERPL-SCNC: 3.8 MMOL/L (ref 3.3–5.1)
SODIUM SERPL-SCNC: 137 MMOL/L (ref 136–144)

## 2017-07-29 PROCEDURE — 99233 SBSQ HOSP IP/OBS HIGH 50: CPT | Performed by: HOSPITALIST

## 2017-07-29 PROCEDURE — 99233 SBSQ HOSP IP/OBS HIGH 50: CPT | Performed by: INTERNAL MEDICINE

## 2017-07-29 RX ORDER — HEPARIN SODIUM 1000 [USP'U]/ML
INJECTION, SOLUTION INTRAVENOUS; SUBCUTANEOUS
Status: COMPLETED
Start: 2017-07-29 | End: 2017-07-29

## 2017-07-29 RX ORDER — METHYLPREDNISOLONE SODIUM SUCCINATE 40 MG/ML
40 INJECTION, POWDER, LYOPHILIZED, FOR SOLUTION INTRAMUSCULAR; INTRAVENOUS ONCE
Status: COMPLETED | OUTPATIENT
Start: 2017-07-29 | End: 2017-07-29

## 2017-07-29 RX ORDER — HEPARIN SODIUM 1000 [USP'U]/ML
30 INJECTION, SOLUTION INTRAVENOUS; SUBCUTANEOUS ONCE
Status: COMPLETED | OUTPATIENT
Start: 2017-07-29 | End: 2017-07-29

## 2017-07-29 NOTE — OCCUPATIONAL THERAPY NOTE
OCCUPATIONAL THERAPY EVALUATION - INPATIENT     Room Number: 206/206-A  Evaluation Date: 7/29/2017  Type of Evaluation: Initial       Physician Order: IP Consult to Occupational Therapy  Reason for Therapy: ADL/IADL Dysfunction and Discharge Planning    OC Hypoxia      Past Medical History  Past Medical History:   Diagnosis Date   • Cancer [facility])    • Essential hypertension    • High blood pressure    • Prostate cancer [facility])        Past Surgical History  History reviewed. No pertinent surgical history.     LUISA washing, rinsing, drying)?: A Little  -   Toileting, which includes using toilet, bedpan or urinal? : A Little  -   Putting on and taking off regular upper body clothing?: A Little  -   Taking care of personal grooming such as brushing teeth?: None  -   Ea

## 2017-07-29 NOTE — PROGRESS NOTES
Pulmonary/Critical Care Follow Up Note    HPI:   Wade Magallanes is a 70year old male with Patient presents with:  Abdomen/Flank Pain (GI/)      PCP PHYSICIAN NONSTAFF  Admission Attending Conrad Cheadle, [address] Day #5    Less sob  Less cough 07/29/17 1322  Last data filed at 07/29/17 0500   Gross per 24 hour   Intake             1034 ml   Output              850 ml   Net              184 ml     Mil;d ill appering  No distress  Lung crackles bases  cv reg  abd soft +bs  Ext trace edema      LAB

## 2017-07-29 NOTE — PROGRESS NOTES
[facility]    Progress Note    Alvenia Angles Patient Status:  Inpatient    1945 MRN [id number]   Location [facility] 2W/SW Attending Gilda Ritchie MD   Hosp Day # 5 PCP PHYSICIAN NONSTAFF     Subjective:     Tiffany reccs  - check quantaferon gold to rule out TB- negative   - added mucinex 600 BID to soften mucus  - lidoderm patch for pleuritic chest wall pain   - check for urine legionella and strep  - d/w Dr. Laura Salazar    Hemoptysis  - secondary to pulm infarction  - o lower lobe. 2. Complete atelectasis of right middle lobe. 3. Consolidation and atelectasis in majority of both lower lobes at least partly related to pneumonia.  4. Filling defects in bronchus intermedius, and both lower lobe bronchi probably represent aspi

## 2017-07-29 NOTE — PHYSICAL THERAPY NOTE
PHYSICAL THERAPY EVALUATION - INPATIENT     Room Number: 206/206-A  Evaluation Date: 7/29/2017  Type of Evaluation: Initial  Physician Order: PT Eval and Treat    Presenting Problem:  (Bronchopneumonia, Hypoxia,H/O Prostate cancer)  Reason for Therapy: cardiopulmonary endurance, decrease activity tolerance, decrease mobility, transfers, gait and stairs due to Pneumonia, which are below the patient's pre-admission status.      Patient will benefit from continued IP PT services to address these deficits in lobar pneumonia in the right lower lobe.      3.  Mediastinal lipomatosis    Problem List  Principal Problem:    Bronchopneumonia  Active Problems:    Hypoxia      Past Medical History  Past Medical History:   Diagnosis Date   • Cancer [facility])    • Essential BASIC MOBILITY  How much difficulty does the patient currently have. ..  -   Turning over in bed (including adjusting bedclothes, sheets and blankets)?: A Little   -   Sitting down on and standing up from a chair with arms (e.g., wheelchair, bedside commode negotiate 10 steps with HR/SBAx1   Goal #4   Current Status    Goal #5 Patient to demonstrate independence with home activity/exercise instructions provided to patient in preparation for discharge.    Goal #5   Current Status    Goal #6    Goal #6  Current

## 2017-07-30 LAB
ANION GAP SERPL CALC-SCNC: 9 MMOL/L (ref 0–18)
APTT PPP: 72.3 SECONDS (ref 23.2–35.3)
BASOPHILS # BLD: 0.1 K/UL (ref 0–0.2)
BASOPHILS NFR BLD: 1 %
BUN SERPL-MCNC: 25 MG/DL (ref 8–20)
BUN/CREAT SERPL: 19.8 (ref 10–20)
CALCIUM SERPL-MCNC: 9.5 MG/DL (ref 8.5–10.5)
CHLORIDE SERPL-SCNC: 103 MMOL/L (ref 95–110)
CO2 SERPL-SCNC: 25 MMOL/L (ref 22–32)
CREAT SERPL-MCNC: 1.26 MG/DL (ref 0.5–1.5)
EOSINOPHIL # BLD: 0.3 K/UL (ref 0–0.7)
EOSINOPHIL NFR BLD: 4 %
ERYTHROCYTE [DISTWIDTH] IN BLOOD BY AUTOMATED COUNT: 13.7 % (ref 11–15)
GLUCOSE SERPL-MCNC: 104 MG/DL (ref 70–99)
HCT VFR BLD AUTO: 41 % (ref 41–52)
HGB BLD-MCNC: 14.1 G/DL (ref 13.5–17.5)
LYMPHOCYTES # BLD: 0.9 K/UL (ref 1–4)
LYMPHOCYTES NFR BLD: 12 %
MAGNESIUM SERPL-MCNC: 2.4 MG/DL (ref 1.8–2.5)
MCH RBC QN AUTO: 28.5 PG (ref 27–32)
MCHC RBC AUTO-ENTMCNC: 34.5 G/DL (ref 32–37)
MCV RBC AUTO: 82.8 FL (ref 80–100)
MONOCYTES # BLD: 0.8 K/UL (ref 0–1)
MONOCYTES NFR BLD: 11 %
NEUTROPHILS # BLD AUTO: 5 K/UL (ref 1.8–7.7)
NEUTROPHILS NFR BLD: 72 %
OSMOLALITY UR CALC.SUM OF ELEC: 289 MOSM/KG (ref 275–295)
PHOSPHATE SERPL-MCNC: 4.1 MG/DL (ref 2.4–4.7)
PLATELET # BLD AUTO: 264 K/UL (ref 140–400)
PMV BLD AUTO: 7.7 FL (ref 7.4–10.3)
POTASSIUM SERPL-SCNC: 3.4 MMOL/L (ref 3.3–5.1)
RBC # BLD AUTO: 4.95 M/UL (ref 4.5–5.9)
SODIUM SERPL-SCNC: 137 MMOL/L (ref 136–144)
WBC # BLD AUTO: 6.9 K/UL (ref 4–11)

## 2017-07-30 PROCEDURE — 99233 SBSQ HOSP IP/OBS HIGH 50: CPT | Performed by: INTERNAL MEDICINE

## 2017-07-30 PROCEDURE — 99233 SBSQ HOSP IP/OBS HIGH 50: CPT | Performed by: HOSPITALIST

## 2017-07-30 RX ORDER — POTASSIUM CHLORIDE 20 MEQ/1
40 TABLET, EXTENDED RELEASE ORAL EVERY 4 HOURS
Status: COMPLETED | OUTPATIENT
Start: 2017-07-30 | End: 2017-07-30

## 2017-07-30 RX ORDER — 0.9 % SODIUM CHLORIDE 0.9 %
VIAL (ML) INJECTION
Status: DISPENSED
Start: 2017-07-30 | End: 2017-07-31

## 2017-07-30 NOTE — PROGRESS NOTES
Pulmonary/Critical Care Follow Up Note    HPI:   Marlon Tucker is a 70year old male with Patient presents with:  Abdomen/Flank Pain (GI/)      PCP PHYSICIAN NONSTAFF  Admission Attending Dianne [address] 15 Day #6    Less sob  Less cough data in the 24 hours ending 07/30/17 1305  Mild ill apperintg and better  No distress  Lung crackles bases  CV reg  abd soft +bs  Ext trace edema      LABS:      Lab Results  Component Value Date   WBC 6.9 07/30/2017   HGB 14.1 07/30/2017   HCT 41.0 07/30/

## 2017-07-30 NOTE — PROGRESS NOTES
[facility]    Progress Note    Jeannie Leong Patient Status:  Inpatient    1945 MRN [id number]   Location [facility] 2W/SW Attending Roosevelt Do MD   Hosp Day # 6 PCP PHYSICIAN NONSTAFF     Subjective:     Tiffany to rule out TB- negative   - added mucinex 600 BID to soften mucus  - lidoderm patch for pleuritic chest wall pain   - d/w Dr. Nithin Alva    Hemoptysis  - secondary to pulm infarction  - ok to have heparin gtt   - pulm consult  - check quantaferon gold to rule bronchi probably represent aspirated or retained secretions. Correlation with bronchoscopy recommended to exclude an endobronchial lesion. 5. Atherosclerotic vascular calcification including coronary artery calcification.  6. Prior granulomatous disease in

## 2017-07-31 ENCOUNTER — APPOINTMENT (OUTPATIENT)
Dept: GENERAL RADIOLOGY | Facility: HOSPITAL | Age: 72
DRG: 193 | End: 2017-07-31
Attending: INTERNAL MEDICINE
Payer: MEDICARE

## 2017-07-31 ENCOUNTER — APPOINTMENT (OUTPATIENT)
Dept: ULTRASOUND IMAGING | Facility: HOSPITAL | Age: 72
DRG: 193 | End: 2017-07-31
Attending: CLINICAL NURSE SPECIALIST
Payer: MEDICARE

## 2017-07-31 LAB
ANION GAP SERPL CALC-SCNC: 7 MMOL/L (ref 0–18)
ANION GAP SERPL CALC-SCNC: 9 MMOL/L (ref 0–18)
APTT PPP: 77.4 SECONDS (ref 23.2–35.3)
BASOPHILS # BLD: 0.1 K/UL (ref 0–0.2)
BASOPHILS NFR BLD: 1 %
BUN SERPL-MCNC: 23 MG/DL (ref 8–20)
BUN SERPL-MCNC: 25 MG/DL (ref 8–20)
BUN/CREAT SERPL: 17.1 (ref 10–20)
BUN/CREAT SERPL: 18.5 (ref 10–20)
CALCIUM SERPL-MCNC: 9.5 MG/DL (ref 8.5–10.5)
CALCIUM SERPL-MCNC: 9.6 MG/DL (ref 8.5–10.5)
CHLORIDE SERPL-SCNC: 102 MMOL/L (ref 95–110)
CHLORIDE SERPL-SCNC: 105 MMOL/L (ref 95–110)
CO2 SERPL-SCNC: 25 MMOL/L (ref 22–32)
CO2 SERPL-SCNC: 25 MMOL/L (ref 22–32)
CREAT SERPL-MCNC: 1.24 MG/DL (ref 0.5–1.5)
CREAT SERPL-MCNC: 1.46 MG/DL (ref 0.5–1.5)
EOSINOPHIL # BLD: 0.4 K/UL (ref 0–0.7)
EOSINOPHIL NFR BLD: 5 %
ERYTHROCYTE [DISTWIDTH] IN BLOOD BY AUTOMATED COUNT: 13.7 % (ref 11–15)
ERYTHROCYTE [DISTWIDTH] IN BLOOD BY AUTOMATED COUNT: 13.7 % (ref 11–15)
GLUCOSE SERPL-MCNC: 101 MG/DL (ref 70–99)
GLUCOSE SERPL-MCNC: 107 MG/DL (ref 70–99)
HCT VFR BLD AUTO: 43.5 % (ref 41–52)
HCT VFR BLD AUTO: 44.4 % (ref 41–52)
HGB BLD-MCNC: 14.7 G/DL (ref 13.5–17.5)
HGB BLD-MCNC: 15.1 G/DL (ref 13.5–17.5)
LYMPHOCYTES # BLD: 0.8 K/UL (ref 1–4)
LYMPHOCYTES NFR BLD: 11 %
MCH RBC QN AUTO: 28.2 PG (ref 27–32)
MCH RBC QN AUTO: 28.5 PG (ref 27–32)
MCHC RBC AUTO-ENTMCNC: 33.8 G/DL (ref 32–37)
MCHC RBC AUTO-ENTMCNC: 33.9 G/DL (ref 32–37)
MCV RBC AUTO: 83.3 FL (ref 80–100)
MCV RBC AUTO: 83.9 FL (ref 80–100)
MONOCYTES # BLD: 0.9 K/UL (ref 0–1)
MONOCYTES NFR BLD: 12 %
NEUTROPHILS # BLD AUTO: 5.6 K/UL (ref 1.8–7.7)
NEUTROPHILS NFR BLD: 72 %
OSMOLALITY UR CALC.SUM OF ELEC: 287 MOSM/KG (ref 275–295)
OSMOLALITY UR CALC.SUM OF ELEC: 288 MOSM/KG (ref 275–295)
PLATELET # BLD AUTO: 283 K/UL (ref 140–400)
PLATELET # BLD AUTO: 285 K/UL (ref 140–400)
PMV BLD AUTO: 7.9 FL (ref 7.4–10.3)
PMV BLD AUTO: 8.1 FL (ref 7.4–10.3)
POTASSIUM SERPL-SCNC: 4 MMOL/L (ref 3.3–5.1)
POTASSIUM SERPL-SCNC: 4.1 MMOL/L (ref 3.3–5.1)
POTASSIUM SERPL-SCNC: 4.1 MMOL/L (ref 3.3–5.1)
RBC # BLD AUTO: 5.23 M/UL (ref 4.5–5.9)
RBC # BLD AUTO: 5.29 M/UL (ref 4.5–5.9)
SODIUM SERPL-SCNC: 136 MMOL/L (ref 136–144)
SODIUM SERPL-SCNC: 137 MMOL/L (ref 136–144)
WBC # BLD AUTO: 7.6 K/UL (ref 4–11)
WBC # BLD AUTO: 7.8 K/UL (ref 4–11)

## 2017-07-31 PROCEDURE — 93970 EXTREMITY STUDY: CPT | Performed by: CLINICAL NURSE SPECIALIST

## 2017-07-31 PROCEDURE — 99233 SBSQ HOSP IP/OBS HIGH 50: CPT | Performed by: HOSPITALIST

## 2017-07-31 PROCEDURE — 71010 XR CHEST AP PORTABLE  (CPT=71010): CPT | Performed by: INTERNAL MEDICINE

## 2017-07-31 PROCEDURE — 99233 SBSQ HOSP IP/OBS HIGH 50: CPT | Performed by: INTERNAL MEDICINE

## 2017-07-31 RX ORDER — 0.9 % SODIUM CHLORIDE 0.9 %
VIAL (ML) INJECTION
Status: COMPLETED
Start: 2017-07-31 | End: 2017-07-31

## 2017-07-31 RX ORDER — IBUPROFEN 200 MG
CAPSULE ORAL 2 TIMES DAILY
Status: DISCONTINUED | OUTPATIENT
Start: 2017-07-31 | End: 2017-08-07

## 2017-07-31 NOTE — PROGRESS NOTES
[facility]    Progress Note    Osvalodyadira Reyes Patient Status:  Inpatient    1945 MRN [id number]   Location [facility] 2W/SW Attending Luisa Fernandez MD   Hosp Day # 7 PCP PHYSICIAN NONSTAFF     Subjective:     Tiffany negative   - added mucinex 600 BID to soften mucus  - lidoderm patch for pleuritic chest wall pain   - d/w Dr. Miguelito Moreno  - will start coumadin tomorrow for PE     Hemoptysis  - secondary to pulm infarction  - ok to have heparin gtt   - pulm consult  - check above.     Lizet Smalls MD

## 2017-07-31 NOTE — PROGRESS NOTES
Pt transferred to rm 570, heparin gtt continued, report given to Elizabeth Torres, wife accompanied pt during transfer, belongings and O2 with patient.

## 2017-07-31 NOTE — PROGRESS NOTES
[facility]    Progress Note      Assessment and Plan:   1. Acute community-acquired pneumonia– the x-ray is slightly improved today. The patient is breathing more comfortably. Recommendations:  1. Ongoing empiric antibiotic  2.   Samuel [phone]

## 2017-07-31 NOTE — PLAN OF CARE
Altered Communication/Language Barrier    • Patient/Family is able to understand and participate in their care Progressing    Patient is Maltese speaking but has [address] speaking family at bedside as well as the RN is Maltese speaking    CARDIOVASCULAR - A

## 2017-07-31 NOTE — PLAN OF CARE
Problem: Patient/Family Goals  Goal: Patient/Family Long Term Goal  Patient's Long Term Goal: return to baseline of pulm status     Interventions:  - abx, on hep drip   - See additional Care Plan goals for specific interventions    Outcome: Progressing supplementation based on oxygen saturation or ABGs  - Provide Smoking Cessation handout, if applicable  - Encourage broncho-pulmonary hygiene including cough, deep breathe, Incentive Spirometry  - Assess the need for suctioning and perform as needed  - Ass and appropriate   Outcome: Progressing  Hgb stable at 14.7 this shift, one occurrence of hemoptysis MD aware, no other signs of bleeding.

## 2017-07-31 NOTE — PHYSICAL THERAPY NOTE
PHYSICAL THERAPY TREATMENT NOTE - INPATIENT    Room Number: 570/570-A       Presenting Problem:  (Bronchopneumonia, Hypoxia,H/O Prostate cancer)    Problem List  Principal Problem:    Bronchopneumonia  Active Problems:    Hypoxia      ASSESSMENT   RN noti Device: Nasal cannula  Liters of O2:  4    AM-PAC '6-Clicks' INPATIENT SHORT FORM - BASIC MOBILITY  How much difficulty does the patient currently have. ..  -   Turning over in bed (including adjusting bedclothes, sheets and blankets)?: A Little   -   Sitti independence with home activity/exercise instructions provided to patient in preparation for discharge.    Goal #5   Current Status NT focused on functional tasks   Goal #6    Goal #6  Current Status

## 2017-08-01 ENCOUNTER — APPOINTMENT (OUTPATIENT)
Dept: GENERAL RADIOLOGY | Facility: HOSPITAL | Age: 72
DRG: 193 | End: 2017-08-01
Attending: CLINICAL NURSE SPECIALIST
Payer: MEDICARE

## 2017-08-01 LAB
APTT PPP: 62.6 SECONDS (ref 23.2–35.3)
APTT PPP: 75.4 SECONDS (ref 23.2–35.3)
BASOPHILS # BLD: 0.1 K/UL (ref 0–0.2)
BASOPHILS NFR BLD: 1 %
EOSINOPHIL # BLD: 0.4 K/UL (ref 0–0.7)
EOSINOPHIL NFR BLD: 5 %
ERYTHROCYTE [DISTWIDTH] IN BLOOD BY AUTOMATED COUNT: 13.8 % (ref 11–15)
HCT VFR BLD AUTO: 42.7 % (ref 41–52)
HGB BLD-MCNC: 14.5 G/DL (ref 13.5–17.5)
INR BLD: 1.1 (ref 0.9–1.2)
L PNEUMO AG UR QL: NEGATIVE
LYMPHOCYTES # BLD: 0.8 K/UL (ref 1–4)
LYMPHOCYTES NFR BLD: 11 %
MCH RBC QN AUTO: 28.2 PG (ref 27–32)
MCHC RBC AUTO-ENTMCNC: 34 G/DL (ref 32–37)
MCV RBC AUTO: 82.8 FL (ref 80–100)
MONOCYTES # BLD: 0.7 K/UL (ref 0–1)
MONOCYTES NFR BLD: 9 %
NEUTROPHILS # BLD AUTO: 6 K/UL (ref 1.8–7.7)
NEUTROPHILS NFR BLD: 75 %
PLATELET # BLD AUTO: 291 K/UL (ref 140–400)
PMV BLD AUTO: 7.6 FL (ref 7.4–10.3)
PROTHROMBIN TIME: 13.6 SECONDS (ref 11.8–14.5)
RBC # BLD AUTO: 5.16 M/UL (ref 4.5–5.9)
STREP PNEUMO ANTIGEN, URINE: NEGATIVE
WBC # BLD AUTO: 7.9 K/UL (ref 4–11)

## 2017-08-01 PROCEDURE — 99232 SBSQ HOSP IP/OBS MODERATE 35: CPT | Performed by: INTERNAL MEDICINE

## 2017-08-01 PROCEDURE — 71010 XR CHEST AP PORTABLE  (CPT=71010): CPT | Performed by: CLINICAL NURSE SPECIALIST

## 2017-08-01 PROCEDURE — 99233 SBSQ HOSP IP/OBS HIGH 50: CPT | Performed by: HOSPITALIST

## 2017-08-01 RX ORDER — IPRATROPIUM BROMIDE AND ALBUTEROL SULFATE 2.5; .5 MG/3ML; MG/3ML
3 SOLUTION RESPIRATORY (INHALATION)
Status: DISCONTINUED | OUTPATIENT
Start: 2017-08-01 | End: 2017-08-02

## 2017-08-01 RX ORDER — IPRATROPIUM BROMIDE AND ALBUTEROL SULFATE 2.5; .5 MG/3ML; MG/3ML
3 SOLUTION RESPIRATORY (INHALATION)
Status: DISCONTINUED | OUTPATIENT
Start: 2017-08-01 | End: 2017-08-01

## 2017-08-01 RX ORDER — WARFARIN SODIUM 5 MG/1
5 TABLET ORAL NIGHTLY
Status: DISCONTINUED | OUTPATIENT
Start: 2017-08-01 | End: 2017-08-02

## 2017-08-01 RX ORDER — IPRATROPIUM BROMIDE AND ALBUTEROL SULFATE 2.5; .5 MG/3ML; MG/3ML
3 SOLUTION RESPIRATORY (INHALATION) EVERY 6 HOURS PRN
Status: DISCONTINUED | OUTPATIENT
Start: 2017-08-01 | End: 2017-08-07

## 2017-08-01 NOTE — OCCUPATIONAL THERAPY NOTE
OCCUPATIONAL THERAPY TREATMENT NOTE - INPATIENT     Room Number: 570/570-A     Presenting Problem:  (Hypoxia)    Problem List  Principal Problem:    Bronchopneumonia  Active Problems:    Hypoxia      ASSESSMENT   Patient received seated in bedside chair, a regular upper body clothing?: None  -   Taking care of personal grooming such as brushing teeth?: None  -   Eating meals?: None    AM-PAC Score:  Score: 21  Approx Degree of Impairment: 32.79%  Standardized Score (AM-PAC Scale): 44.27  CMS Modifier (G-Code

## 2017-08-01 NOTE — PROGRESS NOTES
[facility]    Progress Note    [facility] Patient Status:  Inpatient    1945 MRN [id number]   Location [facility] 2W/SW Attending Rosibel Galvan MD   Hosp Day # 8 PCP PHYSICIAN NONSTAFF     Subjective:     Tiffany pleuritic chest wall pain   - d/w Dr. Can Sevilla  - will start coumadin tomorrow for PE     Hemoptysis  - secondary to pulm infarction  - ok to have heparin gtt   - pulm consult  - quantaferon gold- negative    + PE, pulm infarction  - see above  - coumadin to normal. Persistent moderate bibasilar consolidation and suspect small bilateral pleural effusions.              Quan Contreras MD

## 2017-08-01 NOTE — PHYSICAL THERAPY NOTE
PHYSICAL THERAPY TREATMENT NOTE - INPATIENT    Room Number: 570/570-A       Presenting Problem:  (Bronchopneumonia, Hypoxia,H/O Prostate cancer)    Problem List  Principal Problem:    Bronchopneumonia  Active Problems:    Hypoxia      ASSESSMENT   Patient with activity 205    AM-PAC '6-Clicks' [address]  How much difficulty does the patient currently have. ..  -   Turning over in bed (including adjusting bedclothes, sheets and blankets)?: A Little   -   Sitting down on and standin activity/exercise instructions provided to patient in preparation for discharge.    Goal #5   Current Status NT focused on functional tasks   Goal #6     Goal #6  Current Status

## 2017-08-01 NOTE — PLAN OF CARE
Problem: Patient/Family Goals  Goal: Patient/Family Long Term Goal  Patient's Long Term Goal: return to baseline of pulm status     Interventions:  - abx, on hep drip   - See additional Care Plan goals for specific interventions    Outcome: Progressing Outcome: Progressing      Problem: RESPIRATORY - ADULT  Goal: Achieves optimal ventilation and oxygenation  INTERVENTIONS:  - Assess for changes in respiratory status  - Assess for changes in mentation and behavior  - Position to facilitate oxygenation a indicated or ordered  - Monitor response to interventions for patient's volume status, including labs, urine output, blood pressure (other measures as available)  - Encourage oral intake as appropriate  - Instruct patient on fluid and nutrition restriction

## 2017-08-01 NOTE — PROGRESS NOTES
[facility]    Progress Note      Assessment and Plan:   1. Acute community-acquired pneumonia– the patient is breathing a little bit more comfortably. Recommendations:  1. Ongoing empiric antibiotic  2. Will follow clinically.

## 2017-08-02 ENCOUNTER — APPOINTMENT (OUTPATIENT)
Dept: GENERAL RADIOLOGY | Facility: HOSPITAL | Age: 72
DRG: 193 | End: 2017-08-02
Attending: INTERNAL MEDICINE
Payer: MEDICARE

## 2017-08-02 LAB
ANION GAP SERPL CALC-SCNC: 8 MMOL/L (ref 0–18)
APTT PPP: 100.9 SECONDS (ref 23.2–35.3)
BASOPHILS # BLD: 0.1 K/UL (ref 0–0.2)
BASOPHILS NFR BLD: 1 %
BUN SERPL-MCNC: 24 MG/DL (ref 8–20)
BUN/CREAT SERPL: 16.8 (ref 10–20)
CALCIUM SERPL-MCNC: 9.6 MG/DL (ref 8.5–10.5)
CHLORIDE SERPL-SCNC: 106 MMOL/L (ref 95–110)
CO2 SERPL-SCNC: 23 MMOL/L (ref 22–32)
CREAT SERPL-MCNC: 1.43 MG/DL (ref 0.5–1.5)
EOSINOPHIL # BLD: 0.5 K/UL (ref 0–0.7)
EOSINOPHIL NFR BLD: 6 %
ERYTHROCYTE [DISTWIDTH] IN BLOOD BY AUTOMATED COUNT: 13.7 % (ref 11–15)
GLUCOSE SERPL-MCNC: 98 MG/DL (ref 70–99)
HCT VFR BLD AUTO: 43 % (ref 41–52)
HGB BLD-MCNC: 14.6 G/DL (ref 13.5–17.5)
INR BLD: 1.1 (ref 0.9–1.2)
LYMPHOCYTES # BLD: 1 K/UL (ref 1–4)
LYMPHOCYTES NFR BLD: 13 %
MCH RBC QN AUTO: 28.3 PG (ref 27–32)
MCHC RBC AUTO-ENTMCNC: 33.9 G/DL (ref 32–37)
MCV RBC AUTO: 83.5 FL (ref 80–100)
MONOCYTES # BLD: 0.7 K/UL (ref 0–1)
MONOCYTES NFR BLD: 9 %
NEUTROPHILS # BLD AUTO: 5.6 K/UL (ref 1.8–7.7)
NEUTROPHILS NFR BLD: 71 %
OSMOLALITY UR CALC.SUM OF ELEC: 288 MOSM/KG (ref 275–295)
PLATELET # BLD AUTO: 275 K/UL (ref 140–400)
PMV BLD AUTO: 7.8 FL (ref 7.4–10.3)
POTASSIUM SERPL-SCNC: 4 MMOL/L (ref 3.3–5.1)
PROTHROMBIN TIME: 14.1 SECONDS (ref 11.8–14.5)
RBC # BLD AUTO: 5.15 M/UL (ref 4.5–5.9)
SODIUM SERPL-SCNC: 137 MMOL/L (ref 136–144)
WBC # BLD AUTO: 7.8 K/UL (ref 4–11)

## 2017-08-02 PROCEDURE — 99232 SBSQ HOSP IP/OBS MODERATE 35: CPT | Performed by: INTERNAL MEDICINE

## 2017-08-02 PROCEDURE — 71010 XR CHEST AP PORTABLE  (CPT=71010): CPT | Performed by: INTERNAL MEDICINE

## 2017-08-02 PROCEDURE — 99233 SBSQ HOSP IP/OBS HIGH 50: CPT | Performed by: HOSPITALIST

## 2017-08-02 RX ORDER — WARFARIN SODIUM 10 MG/1
10 TABLET ORAL NIGHTLY
Status: DISCONTINUED | OUTPATIENT
Start: 2017-08-02 | End: 2017-08-05

## 2017-08-02 RX ORDER — IPRATROPIUM BROMIDE AND ALBUTEROL SULFATE 2.5; .5 MG/3ML; MG/3ML
3 SOLUTION RESPIRATORY (INHALATION)
Status: DISCONTINUED | OUTPATIENT
Start: 2017-08-02 | End: 2017-08-05

## 2017-08-02 NOTE — PROGRESS NOTES
[facility]  Progress Note     Carissa Simon  : 1945    Status: Inpatient  Day #: 9    Attending: Uziel Miles MD  PCP: PHYSICIAN NONSTAFF      Assessment and Plan       Acute respiratory failure with hypoxia  Acute bronchopneumo bowel sounds  Musculoskeletal:  No joint swelling  Extremities:  No edema, no cyanosis, no clubbing  Neurologic:  nonfocal  Psychiatric:  Normal affect, calm and appropriate  Skin:  No rash, no lesion      Intake/Output Summary (Last 24 hours) at 08/02/17 antibiotic   Topical BID   • piperacillin-tazobactam  4.5 g Intravenous Q8H   • PEG 3350  17 g Oral Daily   • GuaiFENesin ER  600 mg Oral BID   • lidocaine  1 patch Transdermal Q24H      PRN Meds: ipratropium-albuterol, Pneumococcal Vac Polyvalent, metopro

## 2017-08-02 NOTE — PLAN OF CARE
Problem: Patient/Family Goals  Goal: Patient/Family Long Term Goal  Patient's Long Term Goal: return to baseline of pulm status     Interventions:  - abx, on hep drip   - See additional Care Plan goals for specific interventions    Outcome: Not Progressing replacement therapy as ordered   Outcome: Progressing      Problem: RESPIRATORY - ADULT  Goal: Achieves optimal ventilation and oxygenation  INTERVENTIONS:  - Assess for changes in respiratory status  - Assess for changes in mentation and behavior  - Posit specific gravity, serum osmolarity and serum sodium as indicated or ordered  - Monitor response to interventions for patient's volume status, including labs, urine output, blood pressure (other measures as available)  - Encourage oral intake as appropriate using appropriate pain scale  - Administer analgesics based on type and severity of pain and evaluate response  - Implement non-pharmacological measures as appropriate and evaluate response  - Consider cultural and social influences on pain and pain manage

## 2017-08-02 NOTE — HOME CARE LIAISON
MET WITH PATIENT, HIS WIFE AND DAUGHTER LUIS ENRIQUE, UTILIZING [employer], #[id number]),  Ty Barkley. PATIENT IN AGREEMENT WITH SERVICES BEING PROVIDED BY [facility].  PROVIDED RESIDENTIAL BROCHURE WITH CONTACT INFORMATI

## 2017-08-02 NOTE — PROGRESS NOTES
[facility]    Progress Note      Assessment and Plan:   1. Acute community-acquired pneumonia– the patient is breathing a little bit more comfortably, but still has mild hemoptysis. His chest x-ray is improving.   I would anticipate lito  08/02/2017   CO2 23 08/02/2017   GLU 98 08/02/2017   CA 9.6 08/02/2017   .9 08/02/2017   INR 1.1 08/02/2017     Chest x-ray– improvement in aeration of the right base.     Stephanie Morales MD  Medical Director, Critical Care, [facility]

## 2017-08-02 NOTE — PHYSICAL THERAPY NOTE
PHYSICAL THERAPY TREATMENT NOTE - INPATIENT    Room Number: 570/570-A       Presenting Problem:  (Bronchopneumonia, Hypoxia,H/O Prostate cancer)    Problem List  Principal Problem:    Bronchopneumonia  Active Problems:    Hypoxia      ASSESSMENT   Patient RECOMMENDATIONS  PT Discharge Recommendations: 24 hour care/supervision;Home with home health PT     PLAN  PT Treatment Plan: Bed mobility; Body mechanics; Endurance; Patient education;Gait training;Stair training;Transfer training;Balance training    SUBJECT session/findings; All patient questions and concerns addressed; Family present    CURRENT GOALS   Goals to be met by: 8/5/2017  Patient Goal Patient's self-stated goal is: to get stronger   Goal #1 Patient is able to demonstrate supine - sit EOB @ level: mod

## 2017-08-02 NOTE — DIETARY NOTE
ADULT NUTRITION RE- ASSESSMENT    Pt is at moderate nutrition risk. Pt does not meet malnutrition criteria. RECOMMENDATIONS TO MD: When consistently eating better change to Cardiac diet for hx of HYN and HLD.       NUTRITION DIAGNOSIS/PROBLEM:  Charis Samaniego Body Wt: 90kg       Now 100% of usual weight    WEIGHT HISTORY:  Patient Weight(s) for the past 336 hrs:   Weight   07/31/17 0600 90.3 kg (199 lb)   07/26/17 0519 98.8 kg (217 lb 12.8 oz)   07/25/17 0551 100.8 kg (222 lb 3.2 oz)   07/24/17 1728 81.6 kg (18 Nutrition Goals:      halt wt loss, PO greater than 75% of meals, return to normal GI function and labs WNL      DIETITIAN FOLLOW UP: RD to follow up within 7 days   [address], [address] [facility]  Ext [phone]

## 2017-08-03 ENCOUNTER — TELEPHONE (OUTPATIENT)
Dept: INTERNAL MEDICINE CLINIC | Facility: CLINIC | Age: 72
End: 2017-08-03

## 2017-08-03 ENCOUNTER — TELEPHONE (OUTPATIENT)
Dept: INTERNAL MEDICINE UNIT | Facility: HOSPITAL | Age: 72
End: 2017-08-03

## 2017-08-03 DIAGNOSIS — I26.99 PULMONARY EMBOLISM AND INFARCTION (HCC): Primary | ICD-10-CM

## 2017-08-03 DIAGNOSIS — I26.99 PULMONARY EMBOLISM (HCC): Primary | ICD-10-CM

## 2017-08-03 LAB
ANION GAP SERPL CALC-SCNC: 10 MMOL/L (ref 0–18)
APTT PPP: 120.7 SECONDS (ref 23.2–35.3)
APTT PPP: 96.2 SECONDS (ref 23.2–35.3)
BUN SERPL-MCNC: 18 MG/DL (ref 8–20)
BUN/CREAT SERPL: 12.5 (ref 10–20)
CALCIUM SERPL-MCNC: 9.8 MG/DL (ref 8.5–10.5)
CHLORIDE SERPL-SCNC: 104 MMOL/L (ref 95–110)
CO2 SERPL-SCNC: 21 MMOL/L (ref 22–32)
CREAT SERPL-MCNC: 1.44 MG/DL (ref 0.5–1.5)
GLUCOSE SERPL-MCNC: 98 MG/DL (ref 70–99)
INR BLD: 1.2 (ref 0.9–1.2)
OSMOLALITY UR CALC.SUM OF ELEC: 282 MOSM/KG (ref 275–295)
POTASSIUM SERPL-SCNC: 3.8 MMOL/L (ref 3.3–5.1)
PROTHROMBIN TIME: 14.5 SECONDS (ref 11.8–14.5)
SODIUM SERPL-SCNC: 135 MMOL/L (ref 136–144)

## 2017-08-03 PROCEDURE — 99232 SBSQ HOSP IP/OBS MODERATE 35: CPT | Performed by: INTERNAL MEDICINE

## 2017-08-03 PROCEDURE — 99233 SBSQ HOSP IP/OBS HIGH 50: CPT | Performed by: HOSPITALIST

## 2017-08-03 RX ORDER — ECHINACEA PURPUREA EXTRACT 125 MG
1 TABLET ORAL EVERY 4 HOURS PRN
Status: DISCONTINUED | OUTPATIENT
Start: 2017-08-03 | End: 2017-08-07

## 2017-08-03 RX ORDER — POTASSIUM CHLORIDE 20 MEQ/1
40 TABLET, EXTENDED RELEASE ORAL ONCE
Status: COMPLETED | OUTPATIENT
Start: 2017-08-03 | End: 2017-08-03

## 2017-08-03 NOTE — PROGRESS NOTES
[facility] - [facility]  Progress Note     Winnie Cordova  : 1945    Status: Inpatient  Day #: 10    Attending: Tim Ferraro MD  PCP: PHYSICIAN NONSTAFF      Assessment and Plan     Acute respiratory failure with hypoxia  Acute bronchopneumon normal bowel sounds  Musculoskeletal:  No joint swelling  Extremities:  No edema, no cyanosis, no clubbing  Neurologic:  nonfocal  Psychiatric:  Normal affect, calm and appropriate  Skin:  No rash, no lesion      Intake/Output Summary (Last 24 hours) at 08 ipratropium-albuterol, Pneumococcal Vac Polyvalent, metoprolol Tartrate, hydrALAzine HCl, HYDROmorphone HCl PF, albuterol Sulfate, Normal Saline Flush, acetaminophen, ondansetron HCl      Spent >35 min    Effie Guadalupe MD

## 2017-08-03 NOTE — OCCUPATIONAL THERAPY NOTE
OCCUPATIONAL THERAPY TREATMENT NOTE - INPATIENT     Room Number: 570/570-A          Presenting Problem:  (Hypoxia)    Problem List  Principal Problem:    Bronchopneumonia  Active Problems:    Hypoxia      ASSESSMENT   Patient seen for OT intervention to fo WEIGHT BEARING RESTRICTION  Weight Bearing Restriction: None                PAIN ASSESSMENT  Ratin           ACTIVITY TOLERANCE  On room air -   After toilet transfer and grooming 94%  After exercises Upper body with 4 lb weight - 95%      AC

## 2017-08-03 NOTE — PLAN OF CARE
Problem: Patient/Family Goals  Goal: Patient/Family Long Term Goal  Patient's Long Term Goal: return to baseline of pulm status     Interventions:  - abx, on hep drip   - See additional Care Plan goals for specific interventions    Outcome: Progressing  Sp respiratory effort  - Oxygen supplementation based on oxygen saturation or ABGs  - Provide Smoking Cessation handout, if applicable  - Encourage broncho-pulmonary hygiene including cough, deep breathe, Incentive Spirometry  - Assess the need for suctioning care  Interventions:  - Assess communication ability and preferred communication style  - Implement communication aides and strategies  - Use visual cues when possible  - Listen attentively, be patient, do not interrupt  - Minimize distractions  - Allow ti

## 2017-08-03 NOTE — PLAN OF CARE
Problem: Patient/Family Goals  Goal: Patient/Family Long Term Goal  Patient's Long Term Goal: return to baseline of pulm status     Interventions:  - abx, on hep drip   - See additional Care Plan goals for specific interventions    Outcome: Progressing Outcome: Progressing      Problem: RESPIRATORY - ADULT  Goal: Achieves optimal ventilation and oxygenation  INTERVENTIONS:  - Assess for changes in respiratory status  - Assess for changes in mentation and behavior  - Position to facilitate oxygenation a intact  INTERVENTIONS  - Assess and document risk factors for pressure ulcer development  - Assess and document skin integrity  - Monitor for areas of redness and/or skin breakdown  - Initiate interventions, skin care algorithm/standards of care as needed patient reports new pain   Outcome: Progressing  No c/o pain

## 2017-08-03 NOTE — PROGRESS NOTES
[facility]    Progress Note      Assessment and Plan:   1. Acute community-acquired pneumonia– improving clinically with less hemoptysis. The chest x-ray yesterday was slightly improved. The patient is now off oxygen.     Recommendations: [phone]

## 2017-08-03 NOTE — PROGRESS NOTES
[facility] Pharmacy Note: Antimicrobial Weight Dose Adjustment for: Zosyn (piperacillin/tazobactam)    Matrine Ruvalcaba is a 70year old male who has been prescribed Zosyn (piperacillin/tazobactam) 4.5 g iv q8 hours.   CrCl is estimated creatinine clearance is

## 2017-08-04 ENCOUNTER — APPOINTMENT (OUTPATIENT)
Dept: GENERAL RADIOLOGY | Facility: HOSPITAL | Age: 72
DRG: 193 | End: 2017-08-04
Attending: INTERNAL MEDICINE
Payer: MEDICARE

## 2017-08-04 LAB
APTT PPP: 104.7 SECONDS (ref 23.2–35.3)
APTT PPP: 115.5 SECONDS (ref 23.2–35.3)
APTT PPP: 88.9 SECONDS (ref 23.2–35.3)
BASOPHILS # BLD: 0.1 K/UL (ref 0–0.2)
BASOPHILS NFR BLD: 1 %
EOSINOPHIL # BLD: 0.6 K/UL (ref 0–0.7)
EOSINOPHIL NFR BLD: 7 %
ERYTHROCYTE [DISTWIDTH] IN BLOOD BY AUTOMATED COUNT: 13.9 % (ref 11–15)
HCT VFR BLD AUTO: 43.7 % (ref 41–52)
HGB BLD-MCNC: 14.8 G/DL (ref 13.5–17.5)
INR BLD: 1.4 (ref 0.9–1.2)
LYMPHOCYTES # BLD: 1.1 K/UL (ref 1–4)
LYMPHOCYTES NFR BLD: 14 %
MCH RBC QN AUTO: 28.3 PG (ref 27–32)
MCHC RBC AUTO-ENTMCNC: 33.8 G/DL (ref 32–37)
MCV RBC AUTO: 83.9 FL (ref 80–100)
MONOCYTES # BLD: 0.6 K/UL (ref 0–1)
MONOCYTES NFR BLD: 8 %
NEUTROPHILS # BLD AUTO: 6 K/UL (ref 1.8–7.7)
NEUTROPHILS NFR BLD: 71 %
PLATELET # BLD AUTO: 266 K/UL (ref 140–400)
PMV BLD AUTO: 8.2 FL (ref 7.4–10.3)
PROTHROMBIN TIME: 16.6 SECONDS (ref 11.8–14.5)
RBC # BLD AUTO: 5.21 M/UL (ref 4.5–5.9)
WBC # BLD AUTO: 8.4 K/UL (ref 4–11)

## 2017-08-04 PROCEDURE — 71010 XR CHEST AP PORTABLE  (CPT=71010): CPT | Performed by: INTERNAL MEDICINE

## 2017-08-04 PROCEDURE — 99233 SBSQ HOSP IP/OBS HIGH 50: CPT | Performed by: HOSPITALIST

## 2017-08-04 PROCEDURE — 99232 SBSQ HOSP IP/OBS MODERATE 35: CPT | Performed by: INTERNAL MEDICINE

## 2017-08-04 RX ORDER — 0.9 % SODIUM CHLORIDE 0.9 %
VIAL (ML) INJECTION
Status: COMPLETED
Start: 2017-08-04 | End: 2017-08-04

## 2017-08-04 NOTE — PLAN OF CARE
Problem: Patient/Family Goals  Goal: Patient/Family Long Term Goal  Patient's Long Term Goal: return to baseline of pulm status     Interventions:  - abx, on hep drip   - See additional Care Plan goals for specific interventions    Outcome: Progressing  On electrolytes and administer replacement therapy as ordered   Outcome: Progressing      Problem: RESPIRATORY - ADULT  Goal: Achieves optimal ventilation and oxygenation  INTERVENTIONS:  - Assess for changes in respiratory status  - Assess for changes in men ADULT  Goal: Skin integrity remains intact  INTERVENTIONS  - Assess and document risk factors for pressure ulcer development  - Assess and document skin integrity  - Monitor for areas of redness and/or skin breakdown  - Initiate interventions, skin care al patient reports new pain   Outcome: Progressing

## 2017-08-04 NOTE — PROGRESS NOTES
[facility]    Progress Note    Martine Ruvalcaba Patient Status:  Inpatient    1945 MRN [id number]   Location [facility] 5SW/SE Attending Maria Antonia Hernadez MD   Hosp Day # 11 PCP PHYSICIAN NONSTAFF     Subjective:     Constitut 8/4/2017  CONCLUSION:  1. Bibasilar airspace disease is redemonstrated, compatible with atelectasis, with or without superimposed infectious process. There has been no significant interval change. 2. Borderline primarily.                       Brigitte Rodríguez

## 2017-08-04 NOTE — HOME CARE LIAISON
CONFIRMED WITH FAMILY, PATIENT WILL BE GOING TO THE FOLLOWING ADDRESS UPON DISCHARGE:  [address] [id number]. PHONE:   ([phone]).

## 2017-08-04 NOTE — PLAN OF CARE
Problem: Patient/Family Goals  Goal: Patient/Family Long Term Goal  Patient's Long Term Goal: return to baseline of pulm status     Interventions:  - abx, on hep drip   - See additional Care Plan goals for specific interventions    Outcome: Progressing  He respiratory effort  - Oxygen supplementation based on oxygen saturation or ABGs  - Provide Smoking Cessation handout, if applicable  - Encourage broncho-pulmonary hygiene including cough, deep breathe, Incentive Spirometry  - Assess the need for suctioning care  Interventions:  - Assess communication ability and preferred communication style  - Implement communication aides and strategies  - Use visual cues when possible  - Listen attentively, be patient, do not interrupt  - Minimize distractions  - Allow ti

## 2017-08-04 NOTE — PROGRESS NOTES
[facility]  Progress Note     Carmenza Villegas  : 1945    Status: Inpatient  Day #: 11    Attending: Isidro Bird MD  PCP: PHYSICIAN NONSTAFF      Assessment and Plan     Acute respiratory failure with hypoxia  Acute bronchopneumon No edema, no cyanosis, no clubbing  Neurologic:  nonfocal  Psychiatric:  Normal affect, calm and appropriate  Skin:  No rash, no lesion      Intake/Output Summary (Last 24 hours) at 08/04/17 1439  Last data filed at 08/04/17 1415   Gross per 24 hour   [place]

## 2017-08-05 LAB
APTT PPP: 116.2 SECONDS (ref 23.2–35.3)
APTT PPP: 89.9 SECONDS (ref 23.2–35.3)
INR BLD: 1.5 (ref 0.9–1.2)
PROTHROMBIN TIME: 17.7 SECONDS (ref 11.8–14.5)

## 2017-08-05 PROCEDURE — 99233 SBSQ HOSP IP/OBS HIGH 50: CPT | Performed by: HOSPITALIST

## 2017-08-05 PROCEDURE — 99232 SBSQ HOSP IP/OBS MODERATE 35: CPT | Performed by: INTERNAL MEDICINE

## 2017-08-05 RX ORDER — IPRATROPIUM BROMIDE AND ALBUTEROL SULFATE 2.5; .5 MG/3ML; MG/3ML
3 SOLUTION RESPIRATORY (INHALATION)
Status: DISCONTINUED | OUTPATIENT
Start: 2017-08-05 | End: 2017-08-07

## 2017-08-05 NOTE — PROGRESS NOTES
[facility]  Progress Note     Wade Magallanes  : 1945    Status: Inpatient  Day #: 12    Attending: Lilibeth Tapia MD  PCP: PHYSICIAN NONSTAFF      Assessment and Plan     Acute respiratory failure with hypoxia  Acute bronchopneumon nonfocal  Psychiatric:  Normal affect, calm and appropriate  Skin:  No rash, no lesion      Intake/Output Summary (Last 24 hours) at 08/05/17 0946  Last data filed at 08/05/17 0702   Gross per 24 hour   Intake            874.5 ml   Output                0

## 2017-08-05 NOTE — PROGRESS NOTES
[facility]    Progress Note      Assessment and Plan:   1. Acute community-acquired pneumonia– ongoing improvement with less hemoptysis. Mild basilar haziness lingers on x-ray from yesterday.     Recommendations:  1.    Okay to home from m

## 2017-08-05 NOTE — PLAN OF CARE
Problem: Patient/Family Goals  Goal: Patient/Family Long Term Goal  Patient's Long Term Goal: return to baseline of pulm status     Interventions:  - abx, on hep drip   - See additional Care Plan goals for specific interventions    Outcome: Progressing  IV Initiate emergency measures for life threatening arrhythmias  - Monitor electrolytes and administer replacement therapy as ordered   Outcome: Progressing      Problem: RESPIRATORY - ADULT  Goal: Achieves optimal ventilation and oxygenation  INTERVENTIONS: nutrition restrictions as appropriate   Outcome: Progressing      Problem: SKIN/TISSUE INTEGRITY - ADULT  Goal: Skin integrity remains intact  INTERVENTIONS  - Assess and document risk factors for pressure ulcer development  - Assess and document skin inte Consider cultural and social influences on pain and pain management  - Manage/alleviate anxiety  - Utilize distraction and/or relaxation techniques  - Monitor for opioid side effects  - Notify MD/LIP if interventions unsuccessful or patient reports new ana

## 2017-08-05 NOTE — PLAN OF CARE
Problem: Patient/Family Goals  Goal: Patient/Family Long Term Goal  Patient's Long Term Goal: return to baseline of pulm status     Interventions:  - abx, on hep drip   - See additional Care Plan goals for specific interventions    Outcome: Progressing Outcome: Progressing      Problem: RESPIRATORY - ADULT  Goal: Achieves optimal ventilation and oxygenation  INTERVENTIONS:  - Assess for changes in respiratory status  - Assess for changes in mentation and behavior  - Position to facilitate oxygenation a risk factors for pressure ulcer development  - Assess and document skin integrity  - Monitor for areas of redness and/or skin breakdown  - Initiate interventions, skin care algorithm/standards of care as needed   Outcome: Progressing      Problem: HEMATOLO Denies pain, states to have abd discomfort secondary to cough. Pt presents with bilateral red eyes states that it is r/t neb treatments. Wife at bedside. Continues on IV zosyn. Will continue to monitor.

## 2017-08-06 LAB
APTT PPP: 76.5 SECONDS (ref 23.2–35.3)
INR BLD: 1.7 (ref 0.9–1.2)
PROTHROMBIN TIME: 19.6 SECONDS (ref 11.8–14.5)

## 2017-08-06 PROCEDURE — 99233 SBSQ HOSP IP/OBS HIGH 50: CPT | Performed by: HOSPITALIST

## 2017-08-06 PROCEDURE — 99232 SBSQ HOSP IP/OBS MODERATE 35: CPT | Performed by: INTERNAL MEDICINE

## 2017-08-06 NOTE — PLAN OF CARE
Problem: Patient/Family Goals  Goal: Patient/Family Long Term Goal  Patient's Long Term Goal: return to baseline of pulm status     Interventions:  - abx, on hep drip   - See additional Care Plan goals for specific interventions    Outcome: Progressing Outcome: Progressing      Problem: RESPIRATORY - ADULT  Goal: Achieves optimal ventilation and oxygenation  INTERVENTIONS:  - Assess for changes in respiratory status  - Assess for changes in mentation and behavior  - Position to facilitate oxygenation a risk factors for pressure ulcer development  - Assess and document skin integrity  - Monitor for areas of redness and/or skin breakdown  - Initiate interventions, skin care algorithm/standards of care as needed   Outcome: Progressing      Problem: HEMATOLO ox4,Yakut speaking,VSS on RA,with occasional cough noted,neb. Treatment given by RT,denies pain. on tele monitor,family member at the bedside for the shift,needs attended to,maintained on heparin drip,will monitor PTT level tomorrow,sleeping for now,with

## 2017-08-06 NOTE — PROGRESS NOTES
[facility]    Progress Note      Assessment and Plan:   1. Acute community-acquired pneumonia– ongoing improvement with less hemoptysis. Mild basilar haziness lingers on x-ray from 2 days ago.     Recommendations:  1.    Okay to home from

## 2017-08-06 NOTE — PROGRESS NOTES
[facility]  Progress Note     Leslie Reach  : 1945    Status: Inpatient  Day #: 15    Attending: Jennifer Dutton MD  PCP: PHYSICIAN NONSTAFF      Assessment and Plan     Acute respiratory failure with hypoxia  Acute bronchopneumon clubbing  Neurologic:  nonfocal  Psychiatric:  Normal affect, calm and appropriate  Skin:  No rash, no lesion      Intake/Output Summary (Last 24 hours) at 08/06/17 1429  Last data filed at 08/06/17 0850   Gross per 24 hour   Intake              180 ml   O

## 2017-08-06 NOTE — PLAN OF CARE
Problem: Patient/Family Goals  Goal: Patient/Family Long Term Goal  Patient's Long Term Goal: return to baseline of pulm status     Interventions:  - abx, on hep drip   - See additional Care Plan goals for specific interventions    Outcome: Progressing  Pa medications as ordered  - Initiate emergency measures for life threatening arrhythmias  - Monitor electrolytes and administer replacement therapy as ordered   Outcome: Progressing      Problem: RESPIRATORY - ADULT  Goal: Achieves optimal ventilation and ox as appropriate  - Instruct patient on fluid and nutrition restrictions as appropriate   Outcome: Progressing      Problem: SKIN/TISSUE INTEGRITY - ADULT  Goal: Skin integrity remains intact  INTERVENTIONS  - Assess and document risk factors for pressure ul evaluate response  - Consider cultural and social influences on pain and pain management  - Manage/alleviate anxiety  - Utilize distraction and/or relaxation techniques  - Monitor for opioid side effects  - Notify MD/LIP if interventions unsuccessful or pa

## 2017-08-07 VITALS
SYSTOLIC BLOOD PRESSURE: 128 MMHG | TEMPERATURE: 98 F | HEART RATE: 71 BPM | RESPIRATION RATE: 18 BRPM | OXYGEN SATURATION: 95 % | BODY MASS INDEX: 27.86 KG/M2 | WEIGHT: 199 LBS | DIASTOLIC BLOOD PRESSURE: 69 MMHG | HEIGHT: 71 IN

## 2017-08-07 LAB
ANION GAP SERPL CALC-SCNC: 8 MMOL/L (ref 0–18)
APTT PPP: 129.4 SECONDS (ref 23.2–35.3)
BASOPHILS # BLD: 0.1 K/UL (ref 0–0.2)
BASOPHILS NFR BLD: 1 %
BUN SERPL-MCNC: 21 MG/DL (ref 8–20)
BUN/CREAT SERPL: 15.2 (ref 10–20)
CALCIUM SERPL-MCNC: 10 MG/DL (ref 8.5–10.5)
CHLORIDE SERPL-SCNC: 106 MMOL/L (ref 95–110)
CO2 SERPL-SCNC: 22 MMOL/L (ref 22–32)
CREAT SERPL-MCNC: 1.38 MG/DL (ref 0.5–1.5)
EOSINOPHIL # BLD: 0.4 K/UL (ref 0–0.7)
EOSINOPHIL NFR BLD: 4 %
ERYTHROCYTE [DISTWIDTH] IN BLOOD BY AUTOMATED COUNT: 14.2 % (ref 11–15)
GLUCOSE SERPL-MCNC: 95 MG/DL (ref 70–99)
HCT VFR BLD AUTO: 44 % (ref 41–52)
HGB BLD-MCNC: 15 G/DL (ref 13.5–17.5)
INR BLD: 2.1 (ref 0.9–1.2)
LYMPHOCYTES # BLD: 1.3 K/UL (ref 1–4)
LYMPHOCYTES NFR BLD: 14 %
MAGNESIUM SERPL-MCNC: 2.1 MG/DL (ref 1.8–2.5)
MCH RBC QN AUTO: 28.4 PG (ref 27–32)
MCHC RBC AUTO-ENTMCNC: 34 G/DL (ref 32–37)
MCV RBC AUTO: 83.5 FL (ref 80–100)
MONOCYTES # BLD: 0.6 K/UL (ref 0–1)
MONOCYTES NFR BLD: 7 %
NEUTROPHILS # BLD AUTO: 6.4 K/UL (ref 1.8–7.7)
NEUTROPHILS NFR BLD: 73 %
OSMOLALITY UR CALC.SUM OF ELEC: 285 MOSM/KG (ref 275–295)
PLATELET # BLD AUTO: 259 K/UL (ref 140–400)
PMV BLD AUTO: 8.8 FL (ref 7.4–10.3)
POTASSIUM SERPL-SCNC: 4.1 MMOL/L (ref 3.3–5.1)
PROTHROMBIN TIME: 23.5 SECONDS (ref 11.8–14.5)
RBC # BLD AUTO: 5.27 M/UL (ref 4.5–5.9)
SODIUM SERPL-SCNC: 136 MMOL/L (ref 136–144)
WBC # BLD AUTO: 8.7 K/UL (ref 4–11)

## 2017-08-07 PROCEDURE — 99233 SBSQ HOSP IP/OBS HIGH 50: CPT | Performed by: INTERNAL MEDICINE

## 2017-08-07 PROCEDURE — 99239 HOSP IP/OBS DSCHRG MGMT >30: CPT | Performed by: HOSPITALIST

## 2017-08-07 PROCEDURE — 3E0134Z INTRODUCTION OF SERUM, TOXOID AND VACCINE INTO SUBCUTANEOUS TISSUE, PERCUTANEOUS APPROACH: ICD-10-PCS | Performed by: HOSPITALIST

## 2017-08-07 RX ORDER — ENOXAPARIN SODIUM 100 MG/ML
90 INJECTION SUBCUTANEOUS ONCE
Status: COMPLETED | OUTPATIENT
Start: 2017-08-07 | End: 2017-08-07

## 2017-08-07 RX ORDER — WARFARIN SODIUM 2.5 MG/1
12.5 TABLET ORAL NIGHTLY
Qty: 150 TABLET | Refills: 2 | Status: ON HOLD | OUTPATIENT
Start: 2017-08-07 | End: 2017-08-22

## 2017-08-07 RX ORDER — GUAIFENESIN 600 MG
600 TABLET, EXTENDED RELEASE 12 HR ORAL 2 TIMES DAILY
Qty: 30 TABLET | Refills: 0 | Status: SHIPPED | OUTPATIENT
Start: 2017-08-07 | End: 2017-09-11

## 2017-08-07 RX ORDER — ALBUTEROL SULFATE 90 UG/1
2 AEROSOL, METERED RESPIRATORY (INHALATION) EVERY 6 HOURS PRN
Qty: 1 INHALER | Refills: 2 | Status: SHIPPED | OUTPATIENT
Start: 2017-08-07 | End: 2017-10-09

## 2017-08-07 RX ORDER — LIDOCAINE 50 MG/G
1 PATCH TOPICAL EVERY 24 HOURS
Qty: 15 PATCH | Refills: 0 | Status: ON HOLD | OUTPATIENT
Start: 2017-08-07 | End: 2017-08-15 | Stop reason: ALTCHOICE

## 2017-08-07 NOTE — DISCHARGE SUMMARY
[facility]  Discharge Summary     Roberto White  : 1945    Status: Inpatient  Day #: 14    Attending: Jani Candelario MD  PCP: PHYSICIAN NONSTAFF     Date of Admission: 2017  Date of Discharge: 2017     Hospital Discharge Physician  PULMONARY DISEASES     Gregor Camacho MD Consulting Physician  PULMONARY DISEASES           Physical Exam   Blood pressure 128/69, pulse 71, temperature 97.9 °F (36.6 °C), temperature source Oral, resp.  rate 18, height 5' 11\" (1.803 m), weigh Tbdp  Commonly known as:  ZOFRAN-ODT      Take 4 mg by mouth 3 (three) times daily as needed for Nausea.    Refills:  0           Where to Get Your Medications      Please  your prescriptions at the location directed by your doctor or nurse    Bring

## 2017-08-07 NOTE — PLAN OF CARE
Problem: Patient/Family Goals  Goal: Patient/Family Long Term Goal  Patient's Long Term Goal: return to baseline of pulm status     Interventions:  - abx, on hep drip   - See additional Care Plan goals for specific interventions    Outcome: Progressing Outcome: Progressing      Problem: RESPIRATORY - ADULT  Goal: Achieves optimal ventilation and oxygenation  INTERVENTIONS:  - Assess for changes in respiratory status  - Assess for changes in mentation and behavior  - Position to facilitate oxygenation a risk factors for pressure ulcer development  - Assess and document skin integrity  - Monitor for areas of redness and/or skin breakdown  - Initiate interventions, skin care algorithm/standards of care as needed   Outcome: Progressing      Problem: HEMATOLO bedside,VSS on RA,denies pain/SOB,maintained on heparin drip as ordered,kept monitored,on IV abt,no adverse reactions noted,will continue to monitor,sleeping for now,call light within reach.

## 2017-08-07 NOTE — DISCHARGE PLANNING
SW was informed that pt is anticipated to d/c today. SW informed [facility] of d/c.     Polo Torres, [address]

## 2017-08-07 NOTE — PLAN OF CARE
Pt discharge home, alert and oriented written prescription given with instructions and medication diet and activity while on coumadin. And home health nurse follow up. . Left in stable condition,  Pneumonia vaccine given

## 2017-08-07 NOTE — PROGRESS NOTES
[facility]    Progress Note      Assessment and Plan:   1. Acute community-acquired pneumonia– much better clinically. Recommendations:  1. Home without antibiotic. 2.  Pulmonary embolism– the INR is 2.1.   He can go home from my per 107–[phone]

## 2017-08-08 ENCOUNTER — TELEPHONE (OUTPATIENT)
Dept: MEDSURG UNIT | Facility: HOSPITAL | Age: 72
End: 2017-08-08

## 2017-08-09 ENCOUNTER — TELEPHONE (OUTPATIENT)
Dept: FAMILY MEDICINE CLINIC | Facility: CLINIC | Age: 72
End: 2017-08-09

## 2017-08-09 ENCOUNTER — ANTI-COAG VISIT (OUTPATIENT)
Dept: INTERNAL MEDICINE CLINIC | Facility: CLINIC | Age: 72
End: 2017-08-09

## 2017-08-09 ENCOUNTER — TELEPHONE (OUTPATIENT)
Dept: INTERNAL MEDICINE CLINIC | Facility: CLINIC | Age: 72
End: 2017-08-09

## 2017-08-09 DIAGNOSIS — I26.01 CHRONIC SEPTIC PULMONARY EMBOLISM WITH ACUTE COR PULMONALE (HCC): ICD-10-CM

## 2017-08-09 DIAGNOSIS — I27.82 CHRONIC SEPTIC PULMONARY EMBOLISM WITH ACUTE COR PULMONALE (HCC): ICD-10-CM

## 2017-08-09 LAB — INR: 4 (ref 2–3)

## 2017-08-09 NOTE — TELEPHONE ENCOUNTER
Pt was taking 12.5 mg daily. Decreased dose by 9.1%= 1 mg M, W, F, & 12.5 X 4, and hold warfarin to night. Re heather labs in 1 week.   arminda

## 2017-08-09 NOTE — TELEPHONE ENCOUNTER
Ronak hernandez from [facility] and would like to know if the dr will be signing the orders for Skilled Nursing, PT and TeleHealth. Please advise.

## 2017-08-10 NOTE — PROGRESS NOTES
[address] Patient Status:  Outpatient    1945 MRN [id number]   Location Elinor Chaudhary MD       Isaomidhamlet Lopez is a 70year old male who presents palpitations  Gastrointestinal: negative for abdominal pain, diarrhea, melena, nausea and vomiting  Hematologic/lymphatic: negative  Musculoskeletal: negative for muscle weakness and myalgias    Objective:    Lab Results  Component Value Date/Time   WBC 7. 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   Consolidation and atelectasis in bilateral lobes s sodium 134, INR 2.7. No JVD, lungs clear no crackles or wheezing. Regular rate and rhythm, no lower extremity edema,  no sign of fluid overload. INR results faxed to Dr. Margo Rolon and spoke with  regarding results.   Continue same [employer] lunch meats, processed meats like hotdogs, sausage, melo, pepperoni, soy sauce, pre-packaged rice or potatoes. Please remember to read nutrition labels for sodium content.      · Limit caffeine to no more than 16 ounces per day     · Exercise daily as tole

## 2017-08-11 ENCOUNTER — ANTI-COAG VISIT (OUTPATIENT)
Dept: INTERNAL MEDICINE CLINIC | Facility: CLINIC | Age: 72
End: 2017-08-11

## 2017-08-11 ENCOUNTER — OFFICE VISIT (OUTPATIENT)
Dept: CARDIOLOGY CLINIC | Facility: HOSPITAL | Age: 72
End: 2017-08-11
Attending: INTERNAL MEDICINE
Payer: MEDICARE

## 2017-08-11 ENCOUNTER — TELEPHONE (OUTPATIENT)
Dept: FAMILY MEDICINE CLINIC | Facility: CLINIC | Age: 72
End: 2017-08-11

## 2017-08-11 VITALS
SYSTOLIC BLOOD PRESSURE: 134 MMHG | OXYGEN SATURATION: 95 % | DIASTOLIC BLOOD PRESSURE: 81 MMHG | WEIGHT: 200 LBS | BODY MASS INDEX: 28 KG/M2 | HEART RATE: 75 BPM

## 2017-08-11 DIAGNOSIS — I27.82 CHRONIC SEPTIC PULMONARY EMBOLISM WITH ACUTE COR PULMONALE (HCC): ICD-10-CM

## 2017-08-11 DIAGNOSIS — I26.01 CHRONIC SEPTIC PULMONARY EMBOLISM WITH ACUTE COR PULMONALE (HCC): ICD-10-CM

## 2017-08-11 DIAGNOSIS — R04.2 HEMOPTYSIS: ICD-10-CM

## 2017-08-11 DIAGNOSIS — J18.0 BRONCHOPNEUMONIA: ICD-10-CM

## 2017-08-11 DIAGNOSIS — I26.99 PULMONARY EMBOLISM AND INFARCTION (HCC): ICD-10-CM

## 2017-08-11 DIAGNOSIS — J12.9 VIRAL PNEUMONIA, UNSPECIFIED: Primary | ICD-10-CM

## 2017-08-11 PROBLEM — R03.0 ELEVATED BLOOD PRESSURE READING: Status: ACTIVE | Noted: 2017-08-11

## 2017-08-11 LAB
ANION GAP SERPL CALC-SCNC: 10 MMOL/L (ref 0–18)
BASOPHILS # BLD: 0.1 K/UL (ref 0–0.2)
BASOPHILS NFR BLD: 1 %
BUN SERPL-MCNC: 19 MG/DL (ref 8–20)
BUN/CREAT SERPL: 15.7 (ref 10–20)
CALCIUM SERPL-MCNC: 10.1 MG/DL (ref 8.5–10.5)
CHLORIDE SERPL-SCNC: 102 MMOL/L (ref 95–110)
CO2 SERPL-SCNC: 22 MMOL/L (ref 22–32)
CREAT SERPL-MCNC: 1.21 MG/DL (ref 0.5–1.5)
EOSINOPHIL # BLD: 0.3 K/UL (ref 0–0.7)
EOSINOPHIL NFR BLD: 5 %
ERYTHROCYTE [DISTWIDTH] IN BLOOD BY AUTOMATED COUNT: 14.3 % (ref 11–15)
GLUCOSE SERPL-MCNC: 84 MG/DL (ref 70–99)
HCT VFR BLD AUTO: 46.8 % (ref 41–52)
HGB BLD-MCNC: 15.8 G/DL (ref 13.5–17.5)
INR BLD: 2.7 (ref 0.9–1.2)
LYMPHOCYTES # BLD: 1.1 K/UL (ref 1–4)
LYMPHOCYTES NFR BLD: 15 %
MCH RBC QN AUTO: 28.1 PG (ref 27–32)
MCHC RBC AUTO-ENTMCNC: 33.8 G/DL (ref 32–37)
MCV RBC AUTO: 83 FL (ref 80–100)
MONOCYTES # BLD: 0.7 K/UL (ref 0–1)
MONOCYTES NFR BLD: 10 %
NEUTROPHILS # BLD AUTO: 5 K/UL (ref 1.8–7.7)
NEUTROPHILS NFR BLD: 69 %
OSMOLALITY UR CALC.SUM OF ELEC: 279 MOSM/KG (ref 275–295)
PLATELET # BLD AUTO: 269 K/UL (ref 140–400)
PMV BLD AUTO: 8.6 FL (ref 7.4–10.3)
POTASSIUM SERPL-SCNC: 4 MMOL/L (ref 3.3–5.1)
PROTHROMBIN TIME: 28.5 SECONDS (ref 11.8–14.5)
RBC # BLD AUTO: 5.64 M/UL (ref 4.5–5.9)
SODIUM SERPL-SCNC: 134 MMOL/L (ref 136–144)
WBC # BLD AUTO: 7.2 K/UL (ref 4–11)

## 2017-08-11 PROCEDURE — 93010 ELECTROCARDIOGRAM REPORT: CPT | Performed by: NURSE PRACTITIONER

## 2017-08-11 PROCEDURE — 85610 PROTHROMBIN TIME: CPT | Performed by: NURSE PRACTITIONER

## 2017-08-11 PROCEDURE — 85025 COMPLETE CBC W/AUTO DIFF WBC: CPT | Performed by: NURSE PRACTITIONER

## 2017-08-11 PROCEDURE — 99214 OFFICE O/P EST MOD 30 MIN: CPT | Performed by: NURSE PRACTITIONER

## 2017-08-11 PROCEDURE — 99212 OFFICE O/P EST SF 10 MIN: CPT | Performed by: NURSE PRACTITIONER

## 2017-08-11 PROCEDURE — 80048 BASIC METABOLIC PNL TOTAL CA: CPT | Performed by: NURSE PRACTITIONER

## 2017-08-11 PROCEDURE — 93005 ELECTROCARDIOGRAM TRACING: CPT

## 2017-08-11 PROCEDURE — 36415 COLL VENOUS BLD VENIPUNCTURE: CPT | Performed by: NURSE PRACTITIONER

## 2017-08-11 NOTE — PATIENT INSTRUCTIONS
Continue all your same medications including mucinex 600 mg one tablet twice daily as needed , can take at night as needed to help decrease nighttime coughing    Call if having any dizziness, lightheadedness, heart racing, palpitations, chest pain, shortne

## 2017-08-11 NOTE — TELEPHONE ENCOUNTER
I have never seen the patient and cannot affirm the Medicare requirements. I have to see him first.  They can try to get the referrals through the hospitalist but that rarely works. Please set the patient up for early next week as I already requested.

## 2017-08-11 NOTE — TELEPHONE ENCOUNTER
[facility] calling with results of PT 28.5 and INR 2.7, please call pt with orders at [phone] this # son in law and he speaks [place].

## 2017-08-12 NOTE — TELEPHONE ENCOUNTER
Attempted to call Ronak at [facility], no answer, no voicemail. Unable to leave message. Dr. Meneses 16, pt has a previously scheduled appt for Monday 8/21. He needs an earlier appt? Is this a TCM appt? Can his appt be changed to the TCM 1pm on 8/15? Or the SDS?   P

## 2017-08-14 ENCOUNTER — TELEPHONE (OUTPATIENT)
Dept: PULMONOLOGY | Facility: CLINIC | Age: 72
End: 2017-08-14

## 2017-08-14 ENCOUNTER — TELEPHONE (OUTPATIENT)
Dept: FAMILY MEDICINE CLINIC | Facility: CLINIC | Age: 72
End: 2017-08-14

## 2017-08-14 DIAGNOSIS — J18.9 PNEUMONIA DUE TO INFECTIOUS ORGANISM, UNSPECIFIED LATERALITY, UNSPECIFIED PART OF LUNG: Primary | ICD-10-CM

## 2017-08-14 NOTE — TELEPHONE ENCOUNTER
Patient not answering calls ( suppose to be there today, missed ) no answer.   No response from patient

## 2017-08-14 NOTE — TELEPHONE ENCOUNTER
Pts son Jorge Neal states that pt saw Dr. Elsa Ceron in [address] [place] [address] discharged on 8/7/17 and was told to follow up in office. No appts available. Please call.

## 2017-08-14 NOTE — TELEPHONE ENCOUNTER
Is any further action needed to have pt set up for pt's coumadin to be managed by [facility]? Please advise.
Notified by Dr Nehemiah Walker pt will need referral for [facility], discharge date and instructions still pending. At time of discharge pt will be followed by Ian Carpenter for lab draws.
Please have pt set up o/v
Pt is scheduled for 08/21
rosemary for pt.
, prior level of function/intact

## 2017-08-15 ENCOUNTER — APPOINTMENT (OUTPATIENT)
Dept: CT IMAGING | Facility: HOSPITAL | Age: 72
DRG: 693 | End: 2017-08-15
Attending: EMERGENCY MEDICINE
Payer: MEDICARE

## 2017-08-15 ENCOUNTER — HOSPITAL ENCOUNTER (INPATIENT)
Facility: HOSPITAL | Age: 72
LOS: 7 days | Discharge: HOME | DRG: 693 | End: 2017-08-22
Attending: EMERGENCY MEDICINE | Admitting: HOSPITALIST
Payer: MEDICARE

## 2017-08-15 ENCOUNTER — APPOINTMENT (OUTPATIENT)
Dept: GENERAL RADIOLOGY | Facility: HOSPITAL | Age: 72
DRG: 693 | End: 2017-08-15
Attending: UROLOGY
Payer: MEDICARE

## 2017-08-15 DIAGNOSIS — N20.1 LEFT URETERAL STONE: ICD-10-CM

## 2017-08-15 DIAGNOSIS — N20.1 URETEROLITHIASIS: Primary | ICD-10-CM

## 2017-08-15 DIAGNOSIS — R79.1 SUPRATHERAPEUTIC INR: ICD-10-CM

## 2017-08-15 DIAGNOSIS — N28.9 RENAL INSUFFICIENCY: ICD-10-CM

## 2017-08-15 LAB
ALBUMIN SERPL BCP-MCNC: 3.9 G/DL (ref 3.5–4.8)
ALBUMIN/GLOB SERPL: 1 {RATIO} (ref 1–2)
ALP SERPL-CCNC: 69 U/L (ref 32–100)
ALT SERPL-CCNC: 27 U/L (ref 17–63)
ANION GAP SERPL CALC-SCNC: 9 MMOL/L (ref 0–18)
AST SERPL-CCNC: 23 U/L (ref 15–41)
BACTERIA UR QL AUTO: NEGATIVE /HPF
BASOPHILS # BLD: 0.1 K/UL (ref 0–0.2)
BASOPHILS NFR BLD: 1 %
BILIRUB SERPL-MCNC: 0.5 MG/DL (ref 0.3–1.2)
BILIRUB UR QL: NEGATIVE
BUN SERPL-MCNC: 27 MG/DL (ref 8–20)
BUN/CREAT SERPL: 16.2 (ref 10–20)
CALCIUM SERPL-MCNC: 9.9 MG/DL (ref 8.5–10.5)
CHLORIDE SERPL-SCNC: 101 MMOL/L (ref 95–110)
CLARITY UR: CLEAR
CO2 SERPL-SCNC: 22 MMOL/L (ref 22–32)
COLOR UR: YELLOW
CREAT SERPL-MCNC: 1.67 MG/DL (ref 0.5–1.5)
EOSINOPHIL # BLD: 0.1 K/UL (ref 0–0.7)
EOSINOPHIL NFR BLD: 1 %
ERYTHROCYTE [DISTWIDTH] IN BLOOD BY AUTOMATED COUNT: 14.5 % (ref 11–15)
GLOBULIN PLAS-MCNC: 4 G/DL (ref 2.5–3.7)
GLUCOSE SERPL-MCNC: 119 MG/DL (ref 70–99)
GLUCOSE UR-MCNC: NEGATIVE MG/DL
HCT VFR BLD AUTO: 44.5 % (ref 41–52)
HGB BLD-MCNC: 15.5 G/DL (ref 13.5–17.5)
HGB UR QL STRIP.AUTO: NEGATIVE
HYALINE CASTS #/AREA URNS AUTO: 1 /LPF
INR BLD: 4.7 (ref 0.9–1.2)
KETONES UR-MCNC: NEGATIVE MG/DL
LEUKOCYTE ESTERASE UR QL STRIP.AUTO: NEGATIVE
LIPASE SERPL-CCNC: 38 U/L (ref 22–51)
LYMPHOCYTES # BLD: 1.1 K/UL (ref 1–4)
LYMPHOCYTES NFR BLD: 9 %
MCH RBC QN AUTO: 28.2 PG (ref 27–32)
MCHC RBC AUTO-ENTMCNC: 34.8 G/DL (ref 32–37)
MCV RBC AUTO: 81.2 FL (ref 80–100)
MONOCYTES # BLD: 0.9 K/UL (ref 0–1)
MONOCYTES NFR BLD: 7 %
NEUTROPHILS # BLD AUTO: 10.3 K/UL (ref 1.8–7.7)
NEUTROPHILS NFR BLD: 83 %
NITRITE UR QL STRIP.AUTO: NEGATIVE
OSMOLALITY UR CALC.SUM OF ELEC: 280 MOSM/KG (ref 275–295)
PH UR: 5 [PH] (ref 5–8)
PLATELET # BLD AUTO: 228 K/UL (ref 140–400)
PMV BLD AUTO: 8.7 FL (ref 7.4–10.3)
POTASSIUM SERPL-SCNC: 4.2 MMOL/L (ref 3.3–5.1)
PROT SERPL-MCNC: 7.9 G/DL (ref 5.9–8.4)
PROT UR-MCNC: NEGATIVE MG/DL
PROTHROMBIN TIME: 43.8 SECONDS (ref 11.8–14.5)
RBC # BLD AUTO: 5.48 M/UL (ref 4.5–5.9)
RBC #/AREA URNS AUTO: 2 /HPF
SODIUM SERPL-SCNC: 132 MMOL/L (ref 136–144)
SP GR UR STRIP: 1.01 (ref 1–1.03)
UROBILINOGEN UR STRIP-ACNC: <2
VIT C UR-MCNC: 20 MG/DL
WBC # BLD AUTO: 12.4 K/UL (ref 4–11)
WBC #/AREA URNS AUTO: 2 /HPF

## 2017-08-15 PROCEDURE — 74176 CT ABD & PELVIS W/O CONTRAST: CPT | Performed by: EMERGENCY MEDICINE

## 2017-08-15 PROCEDURE — 99223 1ST HOSP IP/OBS HIGH 75: CPT | Performed by: UROLOGY

## 2017-08-15 PROCEDURE — 74000 XR ABDOMEN (1 VIEW) (CPT=74000): CPT | Performed by: UROLOGY

## 2017-08-15 PROCEDURE — 99223 1ST HOSP IP/OBS HIGH 75: CPT | Performed by: HOSPITALIST

## 2017-08-15 RX ORDER — MORPHINE SULFATE 4 MG/ML
INJECTION, SOLUTION INTRAMUSCULAR; INTRAVENOUS
Status: DISPENSED
Start: 2017-08-15 | End: 2017-08-15

## 2017-08-15 RX ORDER — PHYTONADIONE 5 MG/1
5 TABLET ORAL ONCE
Status: COMPLETED | OUTPATIENT
Start: 2017-08-15 | End: 2017-08-15

## 2017-08-15 RX ORDER — HYDROMORPHONE HYDROCHLORIDE 1 MG/ML
1 INJECTION, SOLUTION INTRAMUSCULAR; INTRAVENOUS; SUBCUTANEOUS EVERY 4 HOURS PRN
Status: DISCONTINUED | OUTPATIENT
Start: 2017-08-15 | End: 2017-08-22

## 2017-08-15 RX ORDER — SODIUM CHLORIDE 0.9 % (FLUSH) 0.9 %
3 SYRINGE (ML) INJECTION AS NEEDED
Status: DISCONTINUED | OUTPATIENT
Start: 2017-08-15 | End: 2017-08-22

## 2017-08-15 RX ORDER — ONDANSETRON 2 MG/ML
INJECTION INTRAMUSCULAR; INTRAVENOUS
Status: COMPLETED
Start: 2017-08-15 | End: 2017-08-15

## 2017-08-15 RX ORDER — SODIUM CHLORIDE 9 MG/ML
INJECTION, SOLUTION INTRAVENOUS CONTINUOUS
Status: DISCONTINUED | OUTPATIENT
Start: 2017-08-15 | End: 2017-08-20

## 2017-08-15 RX ORDER — ONDANSETRON 2 MG/ML
4 INJECTION INTRAMUSCULAR; INTRAVENOUS ONCE
Status: COMPLETED | OUTPATIENT
Start: 2017-08-15 | End: 2017-08-15

## 2017-08-15 RX ORDER — ALBUTEROL SULFATE 90 UG/1
2 AEROSOL, METERED RESPIRATORY (INHALATION) EVERY 6 HOURS PRN
Status: DISCONTINUED | OUTPATIENT
Start: 2017-08-15 | End: 2017-08-22

## 2017-08-15 RX ORDER — ACETAMINOPHEN 500 MG
500 TABLET ORAL EVERY 6 HOURS PRN
Status: DISCONTINUED | OUTPATIENT
Start: 2017-08-15 | End: 2017-08-22

## 2017-08-15 RX ORDER — MORPHINE SULFATE 4 MG/ML
4 INJECTION, SOLUTION INTRAMUSCULAR; INTRAVENOUS ONCE
Status: COMPLETED | OUTPATIENT
Start: 2017-08-15 | End: 2017-08-15

## 2017-08-15 RX ORDER — ONDANSETRON 2 MG/ML
4 INJECTION INTRAMUSCULAR; INTRAVENOUS EVERY 4 HOURS PRN
Status: DISCONTINUED | OUTPATIENT
Start: 2017-08-15 | End: 2017-08-22

## 2017-08-15 RX ORDER — HYDROMORPHONE HYDROCHLORIDE 1 MG/ML
0.3 INJECTION, SOLUTION INTRAMUSCULAR; INTRAVENOUS; SUBCUTANEOUS EVERY 4 HOURS PRN
Status: DISCONTINUED | OUTPATIENT
Start: 2017-08-15 | End: 2017-08-22

## 2017-08-15 NOTE — CONSULTS
[facility] AND [facility]  Male Inpatient Consult    Carissa Simon Patient Status:  Inpatient    1945 MRN [id number]   Location [facility] 5SW/SE Attending Melissa Damon.  [address] Day # 0 PCP PHYSICIAN NONSTAFF     DATE OF ADMISSION: 8/15/2017 UROLOGICAL HISTORY:     Negative for kidney or bladder stones. Prior to above no kidney infections with high fever or flank pain. No history of bladder, prostate, testicular, epididymal infections.    No history of trauma or injury to the urinary tract or joint pain. 8.   There is no history of skin disease. 9.   No history of neurological disease in the form of stroke, multiple sclerosis, seizures or depression. 10. No history of endocrine disorders in the form of diabetes or thyroid disease. 11.  No stones. PHYSICAL EXAMINATION:     Blood pressure 149/86, pulse 71, temperature 98.3 °F (36.8 °C), temperature source Oral, resp. rate 18, height 5' 10.98\" (1.803 m), weight 205 lb 12.7 oz (93.3 kg), SpO2 95 %.     The patient is a well-developed, well-n TP 7.9 08/15/2017   AST 23 08/15/2017   ALT 27 08/15/2017   .4 (H) 08/07/2017   INR 4.7 (H) 08/15/2017   PTP 43.8 (H) 08/15/2017   TSH 2.75 07/25/2017   LIP 38 08/15/2017   MG 2.1 08/07/2017   PHOS 4.1 07/30/2017   TROP 0.04 (HH) 07/25/2017 left retrograde pyelogram stone manipulation placement of stone in the renal pelvis placement of left double-J stent and then as an outpatient extracorporeal shockwave lithotripsy.   Again I agree with admission IV hydration pain control holding Coumadin di

## 2017-08-15 NOTE — DISCHARGE PLANNING
SW confirmed w/ [facility] that pt is current w/ [facility].  Will need [facility] orders to resume services upon d/c from hospital.    Evan Canada [facility] ext [phone]

## 2017-08-15 NOTE — ED PROVIDER NOTES
Patient Seen in: [facility] AND [facility] Emergency Department    History   Patient presents with:  Abdomen/Flank Pain (GI/)    Stated Complaint: Abdominal pain U/LQ    HPI    40-year-old male presents the emergency department with left-sided abdominal pain t °C)  Temp src: Oral  SpO2: 97 %  O2 Device: None (Room air)    Current:BP (!) 163/91   Pulse 72   Temp (!) 97.2 °F (36.2 °C) (Oral)   Resp 18   Ht 180.3 cm (5' 11\")   Wt 93 kg   SpO2 97%   BMI 28.59 kg/m²         Physical Exam   Constitutional: He is orie the following:     WBC 12.4 (*)     Neutrophil Absolute 10.3 (*)     All other components within normal limits   LIPASE - Normal   CBC WITH DIFFERENTIAL WITH PLATELET    Narrative:      The following orders were created for panel order CBC WITH DIFFERENTIAL 8/11/2017          ICD-10-CM Noted POA    Ureterolithiasis N20.1 8/15/2017 Unknown

## 2017-08-15 NOTE — H&P
[facility]    PATIENT'S NAME: Bella Escobedo PHYSICIAN: Sreekanth Kaur MD   PATIENT ACCOUNT#:   [de-identified]    LOCATION:  [address] [place] [address] RECORD #:   [id number]       YOB: 1945  ADMISSION DATE: frequency of urination. No hematuria. Denied any melena, hematochezia or diarrhea. No constipation. He had no bleeding gums. No dizziness or lightheadedness. He did have nausea and had 1 episode of emesis apparently in the morning on Monday.   In the had osteoporosis. He has a sister who  at 68 of an MI.    SOCIAL HISTORY:  The patient was a smoker, smoked for years, at least a pack a day, and quit about 26 years ago.   He drinks about 2 to 3 beers a day on a regular basis but states over the past 27, creatinine 1.67, calcium 9.9, anion gap 9. Liver function tests are essentially within normal limits. INR 4.7. White count 12.4 with a hemoglobin of 15.5 and a platelet count of 214,874, 83% neutrophils.     CT scan of the abdomen and pelvis was prev

## 2017-08-15 NOTE — TELEPHONE ENCOUNTER
Notified Delia Damon that pt has been admitted to [address] [place] [address] today. She will f/u with pt for home visit.

## 2017-08-15 NOTE — PROGRESS NOTES
Post midnight follow up note. Patient was seen and examined. No complaints at present. Urology following, plan for procedure in the near future  S/p vit k, INR in am.   Strain all urine.   Discussed plan of care for today family was at the bedside to sol

## 2017-08-16 LAB
ALBUMIN SERPL BCP-MCNC: 3.2 G/DL (ref 3.5–4.8)
ALBUMIN/GLOB SERPL: 1 {RATIO} (ref 1–2)
ALP SERPL-CCNC: 61 U/L (ref 32–100)
ALT SERPL-CCNC: 21 U/L (ref 17–63)
ANION GAP SERPL CALC-SCNC: 7 MMOL/L (ref 0–18)
AST SERPL-CCNC: 19 U/L (ref 15–41)
BASOPHILS # BLD: 0 K/UL (ref 0–0.2)
BASOPHILS NFR BLD: 1 %
BILIRUB SERPL-MCNC: 0.8 MG/DL (ref 0.3–1.2)
BUN SERPL-MCNC: 20 MG/DL (ref 8–20)
BUN/CREAT SERPL: 10 (ref 10–20)
CALCIUM SERPL-MCNC: 9.4 MG/DL (ref 8.5–10.5)
CHLORIDE SERPL-SCNC: 105 MMOL/L (ref 95–110)
CO2 SERPL-SCNC: 25 MMOL/L (ref 22–32)
CREAT SERPL-MCNC: 2 MG/DL (ref 0.5–1.5)
EOSINOPHIL # BLD: 0.2 K/UL (ref 0–0.7)
EOSINOPHIL NFR BLD: 3 %
ERYTHROCYTE [DISTWIDTH] IN BLOOD BY AUTOMATED COUNT: 14 % (ref 11–15)
GLOBULIN PLAS-MCNC: 3.3 G/DL (ref 2.5–3.7)
GLUCOSE SERPL-MCNC: 93 MG/DL (ref 70–99)
HCT VFR BLD AUTO: 40.8 % (ref 41–52)
HGB BLD-MCNC: 13.9 G/DL (ref 13.5–17.5)
INR BLD: 2.9 (ref 0.9–1.2)
LYMPHOCYTES # BLD: 0.9 K/UL (ref 1–4)
LYMPHOCYTES NFR BLD: 11 %
MCH RBC QN AUTO: 28 PG (ref 27–32)
MCHC RBC AUTO-ENTMCNC: 34.1 G/DL (ref 32–37)
MCV RBC AUTO: 82.1 FL (ref 80–100)
MONOCYTES # BLD: 0.7 K/UL (ref 0–1)
MONOCYTES NFR BLD: 9 %
NEUTROPHILS # BLD AUTO: 6.6 K/UL (ref 1.8–7.7)
NEUTROPHILS NFR BLD: 77 %
OSMOLALITY UR CALC.SUM OF ELEC: 286 MOSM/KG (ref 275–295)
PLATELET # BLD AUTO: 182 K/UL (ref 140–400)
PMV BLD AUTO: 8.7 FL (ref 7.4–10.3)
POTASSIUM SERPL-SCNC: 4.6 MMOL/L (ref 3.3–5.1)
PROT SERPL-MCNC: 6.5 G/DL (ref 5.9–8.4)
PROTHROMBIN TIME: 30.3 SECONDS (ref 11.8–14.5)
RBC # BLD AUTO: 4.97 M/UL (ref 4.5–5.9)
SODIUM SERPL-SCNC: 137 MMOL/L (ref 136–144)
WBC # BLD AUTO: 8.5 K/UL (ref 4–11)

## 2017-08-16 PROCEDURE — 99232 SBSQ HOSP IP/OBS MODERATE 35: CPT | Performed by: HOSPITALIST

## 2017-08-16 PROCEDURE — 99232 SBSQ HOSP IP/OBS MODERATE 35: CPT | Performed by: UROLOGY

## 2017-08-16 RX ORDER — POLYETHYLENE GLYCOL 3350 17 G/17G
17 POWDER, FOR SOLUTION ORAL DAILY PRN
Status: DISCONTINUED | OUTPATIENT
Start: 2017-08-16 | End: 2017-08-22

## 2017-08-16 RX ORDER — PHYTONADIONE 5 MG/1
5 TABLET ORAL ONCE
Status: COMPLETED | OUTPATIENT
Start: 2017-08-16 | End: 2017-08-16

## 2017-08-16 NOTE — PROGRESS NOTES
[address] [facility] Patient Status:  Inpatient    1945 MRN [id number]   Location [facility] 5SW/SE Attending Saad Fitzgerald. [address] Day # 1 PCP PHYSICIAN INDIRATADONALD Tyson is a 70year old male patient. SpO2 94 %.     LABS:    Lab Results  Component Value Date   WBC 8.5 08/16/2017   HGB 13.9 08/16/2017   HCT 40.8 (L) 08/16/2017    08/16/2017   CREATSERUM 1.67 (H) 08/15/2017   BUN 27 (H) 08/15/2017    (L) 08/15/2017   K 4.2 08/15/2017    infarct and pneumonia admission in early August 2017 discharge with anticoagulation however patient had new onset left renal colic left flank pain rating the left lower quadrant into the left groin and a CAT scan showing a 9 x 11 mm left upper ureteral zander

## 2017-08-16 NOTE — PLAN OF CARE
Problem: Patient/Family Goals  Goal: Patient/Family Long Term Goal  Patient's Long Term Goal: to go home    Interventions:  -continue plan of care  - See additional Care Plan goals for specific interventions    Outcome: Progressing    Goal: Patient/Family ordered   Outcome: Progressing      Problem: PAIN - ADULT  Goal: Verbalizes/displays adequate comfort level or patient's stated pain goal  INTERVENTIONS:  - Encourage pt to monitor pain and request assistance  - Assess pain using appropriate pain scale  - distractions  - Allow time for understanding and response  - Establish method for patient to ask for assistance (call light)  - Provide an  as needed  - Communicate barriers and strategies to overcome with those who interact with patient   [facility] [place]

## 2017-08-16 NOTE — PLAN OF CARE
Problem: Patient/Family Goals  Goal: Patient/Family Long Term Goal  Patient's Long Term Goal: to go home    Interventions:  -continue plan of care  - See additional Care Plan goals for specific interventions   Outcome: Progressing    Goal: Patient/Family S ordered   Outcome: Progressing      Problem: PAIN - ADULT  Goal: Verbalizes/displays adequate comfort level or patient's stated pain goal  INTERVENTIONS:  - Encourage pt to monitor pain and request assistance  - Assess pain using appropriate pain scale  - distractions  - Allow time for understanding and response  - Establish method for patient to ask for assistance (call light)  - Provide an  as needed  - Communicate barriers and strategies to overcome with those who interact with patient   [facility] [place]

## 2017-08-16 NOTE — PROGRESS NOTES
[facility]    Progress Note    Sarah Snyder Patient Status:  Inpatient    1945 MRN [id number]   Location [facility] 5SW/SE Attending Terry Patel MD   Hosp Day # 1 PCP PHYSICIAN NONSTAFF     Subjective:     C agents. - beta blocker protocol p.r.n. History of prostate cancer status post external beam radiation. Family states that the patient gets serial PSAs which have been normal since his RT.     Cough likely multifactorial in part related to PE and prev Posttreatment follow up examination advised to ensure resolution. 5. Previously noted subcentimeter hypodensity in the left hepatic lobe is not well-seen on this noncontrast exam. Please refer to prior exam for further recommendations.  6. Lesser incidental

## 2017-08-16 NOTE — TELEPHONE ENCOUNTER
Pt son Sugey Donald offered appt 9/5 @ 2:15 pm, pt son accepted appt, informed of CXR order prior, location information provided, pt son voiced understanding.

## 2017-08-17 LAB
ANION GAP SERPL CALC-SCNC: 4 MMOL/L (ref 0–18)
APTT PPP: 32.7 SECONDS (ref 23.2–35.3)
APTT PPP: 74.4 SECONDS (ref 23.2–35.3)
BUN SERPL-MCNC: 18 MG/DL (ref 8–20)
BUN/CREAT SERPL: 9.8 (ref 10–20)
CALCIUM SERPL-MCNC: 9.3 MG/DL (ref 8.5–10.5)
CHLORIDE SERPL-SCNC: 107 MMOL/L (ref 95–110)
CO2 SERPL-SCNC: 24 MMOL/L (ref 22–32)
CREAT SERPL-MCNC: 1.83 MG/DL (ref 0.5–1.5)
ERYTHROCYTE [DISTWIDTH] IN BLOOD BY AUTOMATED COUNT: 13.8 % (ref 11–15)
GLUCOSE SERPL-MCNC: 92 MG/DL (ref 70–99)
HCT VFR BLD AUTO: 42.2 % (ref 41–52)
HGB BLD-MCNC: 14.6 G/DL (ref 13.5–17.5)
INR BLD: 1.6 (ref 0.9–1.2)
MCH RBC QN AUTO: 28.2 PG (ref 27–32)
MCHC RBC AUTO-ENTMCNC: 34.6 G/DL (ref 32–37)
MCV RBC AUTO: 81.4 FL (ref 80–100)
OSMOLALITY UR CALC.SUM OF ELEC: 282 MOSM/KG (ref 275–295)
PLATELET # BLD AUTO: 193 K/UL (ref 140–400)
PMV BLD AUTO: 7.9 FL (ref 7.4–10.3)
POTASSIUM SERPL-SCNC: 3.9 MMOL/L (ref 3.3–5.1)
PROTHROMBIN TIME: 18.9 SECONDS (ref 11.8–14.5)
RBC # BLD AUTO: 5.18 M/UL (ref 4.5–5.9)
SODIUM SERPL-SCNC: 135 MMOL/L (ref 136–144)
WBC # BLD AUTO: 8.8 K/UL (ref 4–11)

## 2017-08-17 PROCEDURE — 99222 1ST HOSP IP/OBS MODERATE 55: CPT | Performed by: INTERNAL MEDICINE

## 2017-08-17 PROCEDURE — 99233 SBSQ HOSP IP/OBS HIGH 50: CPT | Performed by: HOSPITALIST

## 2017-08-17 PROCEDURE — 99232 SBSQ HOSP IP/OBS MODERATE 35: CPT | Performed by: UROLOGY

## 2017-08-17 RX ORDER — DOCUSATE SODIUM 100 MG/1
100 CAPSULE, LIQUID FILLED ORAL 2 TIMES DAILY
Status: DISCONTINUED | OUTPATIENT
Start: 2017-08-17 | End: 2017-08-22

## 2017-08-17 RX ORDER — HEPARIN SODIUM AND DEXTROSE 10000; 5 [USP'U]/100ML; G/100ML
INJECTION INTRAVENOUS CONTINUOUS
Status: DISCONTINUED | OUTPATIENT
Start: 2017-08-17 | End: 2017-08-18

## 2017-08-17 RX ORDER — HEPARIN SODIUM AND DEXTROSE 10000; 5 [USP'U]/100ML; G/100ML
18 INJECTION INTRAVENOUS ONCE
Status: COMPLETED | OUTPATIENT
Start: 2017-08-17 | End: 2017-08-17

## 2017-08-17 NOTE — PROGRESS NOTES
[address] [facility] Patient Status:  Inpatient    1945 MRN [id number]   Location [facility] 5SW/SE Attending Oleg Resendiz. [address] Day # 2 PCP PHYSICIAN INDIRATADONALD       Kallie Grace is a 70year old male patient. Sulfate  (90 Base) MCG/ACT inhaler 2 puff 2 puff Inhalation Q6H PRN       ALLERGIES:  No Known Allergies    VITALS:  Blood pressure (!) 167/81, pulse 75, temperature 98.6 °F (37 °C), temperature source Oral, resp.  rate 18, height 5' 10.98\" (1.803 m placement of double-J stent and then future ESWL as an outpatient. Following PT/INR closely improving but not to a level that would be safe for surgery as of yet.   Will follow patient actively agree with IV hydration antibiotics for prophylaxis although u

## 2017-08-17 NOTE — PROGRESS NOTES
[facility]    Progress Note    Roberto Salts Patient Status:  Inpatient    1945 MRN [id number]   Location [facility] 5SW/SE Attending Aparna Bates MD   Hosp Day # 2 PCP PHYSICIAN NONSTAFF     Subjective:     C ~1.4  -creatinine on admit 1.67-->2.00-->1.83  -plan to hydrate    Paroxysmal supraventricular tachycardia:  Treated with beta blocker in the past, currently the patient is on no rate controlling agents. - beta blocker protocol p.r.n.     History of pros

## 2017-08-17 NOTE — PLAN OF CARE
Problem: GENITOURINARY - ADULT  Goal: Absence of urinary retention  INTERVENTIONS:  - Assess patient’s ability to void and empty bladder  - Monitor intake/output and perform bladder scan as needed  - Follow urinary retention protocol/standard of care  - Co relaxation techniques  - Monitor for opioid side effects  - Notify MD/LIP if interventions unsuccessful or patient reports new pain   Outcome: Progressing      Problem: DISCHARGE PLANNING  Goal: Discharge to home or other facility with appropriate resource

## 2017-08-17 NOTE — PLAN OF CARE
Problem: Patient/Family Goals  Goal: Patient/Family Long Term Goal  Patient's Long Term Goal: to go home    Interventions:  -continue plan of care  - See additional Care Plan goals for specific interventions    Outcome: Progressing    Goal: Patient/Family ordered   Outcome: Progressing      Problem: PAIN - ADULT  Goal: Verbalizes/displays adequate comfort level or patient's stated pain goal  INTERVENTIONS:  - Encourage pt to monitor pain and request assistance  - Assess pain using appropriate pain scale  - distractions  - Allow time for understanding and response  - Establish method for patient to ask for assistance (call light)  - Provide an  as needed  - Communicate barriers and strategies to overcome with those who interact with patient   Mary Lou Glaeana

## 2017-08-17 NOTE — CONSULTS
[facility] - [facility]    Consult Note    Date:  8/17/2017  Date of Admission:  8/15/2017      Chief Complaint:   Coleen Begum is a(n) 70year old male with recent venous thromboembolism now with kidney stone requiring cystoscopy.     HPI:   The Review of Systems:   Vision normal. Ear nose and throat normal. Bowel normal. Bladder function normal. No depression. No thyroid disease. No rash. Muscles and joints unremarkable. No weight loss no weight gain.     Physical Exam:   Vital Signs:  Blood venous thromboembolism. I suspect these abnormalities are related to round atelectasis; however, the patient will need a repeat CT scan the chest at the 2-3 month interval to ensure ongoing resolved.     Recommendations: CT scan the chest at the 2-3 month

## 2017-08-18 LAB
ANION GAP SERPL CALC-SCNC: 10 MMOL/L (ref 0–18)
APTT PPP: 38.8 SECONDS (ref 23.2–35.3)
APTT PPP: 66.2 SECONDS (ref 23.2–35.3)
BUN SERPL-MCNC: 16 MG/DL (ref 8–20)
BUN/CREAT SERPL: 9 (ref 10–20)
CALCIUM SERPL-MCNC: 9.7 MG/DL (ref 8.5–10.5)
CHLORIDE SERPL-SCNC: 102 MMOL/L (ref 95–110)
CO2 SERPL-SCNC: 22 MMOL/L (ref 22–32)
CREAT SERPL-MCNC: 1.77 MG/DL (ref 0.5–1.5)
GLUCOSE SERPL-MCNC: 96 MG/DL (ref 70–99)
INR BLD: 1.3 (ref 0.9–1.2)
OSMOLALITY UR CALC.SUM OF ELEC: 279 MOSM/KG (ref 275–295)
PLATELET # BLD AUTO: 189 K/UL (ref 140–400)
POTASSIUM SERPL-SCNC: 3.8 MMOL/L (ref 3.3–5.1)
PROTHROMBIN TIME: 16 SECONDS (ref 11.8–14.5)
SODIUM SERPL-SCNC: 134 MMOL/L (ref 136–144)

## 2017-08-18 PROCEDURE — 99232 SBSQ HOSP IP/OBS MODERATE 35: CPT | Performed by: INTERNAL MEDICINE

## 2017-08-18 PROCEDURE — 99232 SBSQ HOSP IP/OBS MODERATE 35: CPT | Performed by: UROLOGY

## 2017-08-18 PROCEDURE — 99233 SBSQ HOSP IP/OBS HIGH 50: CPT | Performed by: HOSPITALIST

## 2017-08-18 RX ORDER — AMLODIPINE BESYLATE 5 MG/1
5 TABLET ORAL DAILY
Status: DISCONTINUED | OUTPATIENT
Start: 2017-08-18 | End: 2017-08-22

## 2017-08-18 RX ORDER — HEPARIN SODIUM AND DEXTROSE 10000; 5 [USP'U]/100ML; G/100ML
INJECTION INTRAVENOUS CONTINUOUS
Status: DISCONTINUED | OUTPATIENT
Start: 2017-08-18 | End: 2017-08-19

## 2017-08-18 RX ORDER — HEPARIN SODIUM AND DEXTROSE 10000; 5 [USP'U]/100ML; G/100ML
18 INJECTION INTRAVENOUS ONCE
Status: COMPLETED | OUTPATIENT
Start: 2017-08-18 | End: 2017-08-18

## 2017-08-18 NOTE — PROGRESS NOTES
[facility] - [facility]    Progress Note    Tiana Lantigua Patient Status:  Inpatient    1945 MRN [id number]   Location [facility] 5SW/SE Attending Victoria Hartmann MD   Hosp Day # 3 PCP PHYSICIAN NONSTAFF     Subjective:     C s/p dose of oral vitamin K 8/15 and 8/16  - continue to hold coumadin  - INR now 1.3, bridging with heparin gtt    Pulmonary Embolism  -recently dx.  Within one month  -plan to start hep gtt to bridge patient  -Dr. Gypsy Medina following     Acute on Ch. CKD  -bas

## 2017-08-18 NOTE — PROGRESS NOTES
[address] [facility] Patient Status:  Inpatient    1945 MRN [id number]   Location [facility] 5SW/SE Attending Esau Zelaya. [address] Day # 3 PCP PHYSICIAN MANDA Olvera Elpidio is a 70year old male patient. Q4H PRN   CefTRIAXone Sodium (ROCEPHIN) 1 g in sodium chloride 0.9 % 100 mL IVPB-minibag/addVantage 1 g Intravenous Q24H   HYDROmorphone HCl PF (DILAUDID) 1 MG/ML injection 0.3 mg 0.3 mg Intravenous Q4H PRN   HYDROmorphone HCl PF (DILAUDID) 1 MG/ML injecti heparin which also have to be held my trouble is even after stent is placed tomorrow he still needs to be off anticoagulation while stent is indwelling until we can perform outpatient extracorporeal shockwave lithotripsy and subsequent stent removal.  Poss

## 2017-08-18 NOTE — PROGRESS NOTES
[facility]     Progress Note        Gio Forrest Patient Status:  Inpatient    1945 MRN [id number]   Location [facility] 5SW/SE Attending Grace Ruff MD   Hosp Day # 3 PCP PHYSICIAN NONSTAFF       Subjective:   P S2  Respiratory: Slightly diminished right basilar breath sounds  GI: abdomen soft, non tender  Extremities: no clubbing, cyanosis, edema  Neurologic: no gross motor or sensory deficits  Skin: warm, dry      Results:     Lab Results  Component Value Date

## 2017-08-18 NOTE — PLAN OF CARE
Problem: Patient/Family Goals  Goal: Patient/Family Long Term Goal  Patient's Long Term Goal: to go home    Interventions:  -continue plan of care  - See additional Care Plan goals for specific interventions    Outcome: Progressing    Goal: Patient/Family ordered   Outcome: Progressing      Problem: PAIN - ADULT  Goal: Verbalizes/displays adequate comfort level or patient's stated pain goal  INTERVENTIONS:  - Encourage pt to monitor pain and request assistance  - Assess pain using appropriate pain scale  - distractions  - Allow time for understanding and response  - Establish method for patient to ask for assistance (call light)  - Provide an  as needed  - Communicate barriers and strategies to overcome with those who interact with patient   David Gray

## 2017-08-19 ENCOUNTER — APPOINTMENT (OUTPATIENT)
Dept: GENERAL RADIOLOGY | Facility: HOSPITAL | Age: 72
DRG: 693 | End: 2017-08-19
Attending: UROLOGY
Payer: MEDICARE

## 2017-08-19 ENCOUNTER — ANESTHESIA EVENT (OUTPATIENT)
Dept: SURGERY | Facility: HOSPITAL | Age: 72
DRG: 693 | End: 2017-08-19
Payer: MEDICARE

## 2017-08-19 ENCOUNTER — SURGERY (OUTPATIENT)
Age: 72
End: 2017-08-19

## 2017-08-19 ENCOUNTER — ANESTHESIA (OUTPATIENT)
Dept: SURGERY | Facility: HOSPITAL | Age: 72
DRG: 693 | End: 2017-08-19
Payer: MEDICARE

## 2017-08-19 LAB
ANION GAP SERPL CALC-SCNC: 9 MMOL/L (ref 0–18)
APTT PPP: 31.9 SECONDS (ref 23.2–35.3)
APTT PPP: 32 SECONDS (ref 23.2–35.3)
BUN SERPL-MCNC: 14 MG/DL (ref 8–20)
BUN/CREAT SERPL: 8.4 (ref 10–20)
CALCIUM SERPL-MCNC: 9.7 MG/DL (ref 8.5–10.5)
CHLORIDE SERPL-SCNC: 102 MMOL/L (ref 95–110)
CO2 SERPL-SCNC: 24 MMOL/L (ref 22–32)
CREAT SERPL-MCNC: 1.67 MG/DL (ref 0.5–1.5)
GLUCOSE SERPL-MCNC: 93 MG/DL (ref 70–99)
INR BLD: 1.3 (ref 0.9–1.2)
OSMOLALITY UR CALC.SUM OF ELEC: 280 MOSM/KG (ref 275–295)
PLATELET # BLD AUTO: 177 K/UL (ref 140–400)
POTASSIUM SERPL-SCNC: 4.1 MMOL/L (ref 3.3–5.1)
PROTHROMBIN TIME: 15.6 SECONDS (ref 11.8–14.5)
SODIUM SERPL-SCNC: 135 MMOL/L (ref 136–144)

## 2017-08-19 PROCEDURE — BT1F1ZZ FLUOROSCOPY OF LEFT KIDNEY, URETER AND BLADDER USING LOW OSMOLAR CONTRAST: ICD-10-PCS | Performed by: UROLOGY

## 2017-08-19 PROCEDURE — 0T778DZ DILATION OF LEFT URETER WITH INTRALUMINAL DEVICE, VIA NATURAL OR ARTIFICIAL OPENING ENDOSCOPIC: ICD-10-PCS | Performed by: UROLOGY

## 2017-08-19 PROCEDURE — 99233 SBSQ HOSP IP/OBS HIGH 50: CPT | Performed by: HOSPITALIST

## 2017-08-19 PROCEDURE — 74420 UROGRAPHY RTRGR +-KUB: CPT | Performed by: UROLOGY

## 2017-08-19 PROCEDURE — 99232 SBSQ HOSP IP/OBS MODERATE 35: CPT | Performed by: INTERNAL MEDICINE

## 2017-08-19 PROCEDURE — 52352 CYSTOURETERO W/STONE REMOVE: CPT | Performed by: UROLOGY

## 2017-08-19 RX ORDER — HYDROMORPHONE HYDROCHLORIDE 1 MG/ML
0.6 INJECTION, SOLUTION INTRAMUSCULAR; INTRAVENOUS; SUBCUTANEOUS EVERY 5 MIN PRN
Status: DISCONTINUED | OUTPATIENT
Start: 2017-08-19 | End: 2017-08-19 | Stop reason: HOSPADM

## 2017-08-19 RX ORDER — MORPHINE SULFATE 2 MG/ML
2 INJECTION, SOLUTION INTRAMUSCULAR; INTRAVENOUS EVERY 10 MIN PRN
Status: DISCONTINUED | OUTPATIENT
Start: 2017-08-19 | End: 2017-08-19 | Stop reason: HOSPADM

## 2017-08-19 RX ORDER — HYDROCODONE BITARTRATE AND ACETAMINOPHEN 5; 325 MG/1; MG/1
1 TABLET ORAL AS NEEDED
Status: DISCONTINUED | OUTPATIENT
Start: 2017-08-19 | End: 2017-08-19 | Stop reason: HOSPADM

## 2017-08-19 RX ORDER — ONDANSETRON 2 MG/ML
4 INJECTION INTRAMUSCULAR; INTRAVENOUS ONCE AS NEEDED
Status: DISCONTINUED | OUTPATIENT
Start: 2017-08-19 | End: 2017-08-19 | Stop reason: HOSPADM

## 2017-08-19 RX ORDER — SODIUM CHLORIDE, SODIUM LACTATE, POTASSIUM CHLORIDE, CALCIUM CHLORIDE 600; 310; 30; 20 MG/100ML; MG/100ML; MG/100ML; MG/100ML
INJECTION, SOLUTION INTRAVENOUS CONTINUOUS
Status: DISCONTINUED | OUTPATIENT
Start: 2017-08-19 | End: 2017-08-20 | Stop reason: ALTCHOICE

## 2017-08-19 RX ORDER — NALOXONE HYDROCHLORIDE 0.4 MG/ML
80 INJECTION, SOLUTION INTRAMUSCULAR; INTRAVENOUS; SUBCUTANEOUS AS NEEDED
Status: DISCONTINUED | OUTPATIENT
Start: 2017-08-19 | End: 2017-08-19 | Stop reason: HOSPADM

## 2017-08-19 RX ORDER — WARFARIN SODIUM 10 MG/1
10 TABLET ORAL NIGHTLY
Status: DISCONTINUED | OUTPATIENT
Start: 2017-08-19 | End: 2017-08-20

## 2017-08-19 RX ORDER — POTASSIUM CITRATE 10 MEQ/1
10 TABLET, EXTENDED RELEASE ORAL
Status: DISCONTINUED | OUTPATIENT
Start: 2017-08-19 | End: 2017-08-22

## 2017-08-19 RX ORDER — HYDROCODONE BITARTRATE AND ACETAMINOPHEN 5; 325 MG/1; MG/1
2 TABLET ORAL AS NEEDED
Status: DISCONTINUED | OUTPATIENT
Start: 2017-08-19 | End: 2017-08-19 | Stop reason: HOSPADM

## 2017-08-19 RX ORDER — MORPHINE SULFATE 4 MG/ML
4 INJECTION, SOLUTION INTRAMUSCULAR; INTRAVENOUS EVERY 10 MIN PRN
Status: DISCONTINUED | OUTPATIENT
Start: 2017-08-19 | End: 2017-08-19 | Stop reason: HOSPADM

## 2017-08-19 RX ORDER — HALOPERIDOL 5 MG/ML
0.25 INJECTION INTRAMUSCULAR ONCE AS NEEDED
Status: DISCONTINUED | OUTPATIENT
Start: 2017-08-19 | End: 2017-08-19 | Stop reason: HOSPADM

## 2017-08-19 RX ORDER — ALLOPURINOL 100 MG/1
100 TABLET ORAL DAILY
Status: DISCONTINUED | OUTPATIENT
Start: 2017-08-19 | End: 2017-08-22

## 2017-08-19 RX ORDER — HYDROMORPHONE HYDROCHLORIDE 1 MG/ML
0.2 INJECTION, SOLUTION INTRAMUSCULAR; INTRAVENOUS; SUBCUTANEOUS EVERY 5 MIN PRN
Status: DISCONTINUED | OUTPATIENT
Start: 2017-08-19 | End: 2017-08-19 | Stop reason: HOSPADM

## 2017-08-19 RX ORDER — HEPARIN SODIUM AND DEXTROSE 10000; 5 [USP'U]/100ML; G/100ML
INJECTION INTRAVENOUS CONTINUOUS
Status: DISCONTINUED | OUTPATIENT
Start: 2017-08-19 | End: 2017-08-22

## 2017-08-19 RX ORDER — LIDOCAINE HYDROCHLORIDE 20 MG/ML
JELLY TOPICAL AS NEEDED
Status: DISCONTINUED | OUTPATIENT
Start: 2017-08-19 | End: 2017-08-19 | Stop reason: HOSPADM

## 2017-08-19 RX ORDER — LIDOCAINE HYDROCHLORIDE 10 MG/ML
INJECTION, SOLUTION EPIDURAL; INFILTRATION; INTRACAUDAL; PERINEURAL AS NEEDED
Status: DISCONTINUED | OUTPATIENT
Start: 2017-08-19 | End: 2017-08-19 | Stop reason: SURG

## 2017-08-19 RX ORDER — MORPHINE SULFATE 4 MG/ML
6 INJECTION, SOLUTION INTRAMUSCULAR; INTRAVENOUS EVERY 10 MIN PRN
Status: DISCONTINUED | OUTPATIENT
Start: 2017-08-19 | End: 2017-08-19 | Stop reason: HOSPADM

## 2017-08-19 RX ORDER — HYDROMORPHONE HYDROCHLORIDE 1 MG/ML
0.4 INJECTION, SOLUTION INTRAMUSCULAR; INTRAVENOUS; SUBCUTANEOUS EVERY 5 MIN PRN
Status: DISCONTINUED | OUTPATIENT
Start: 2017-08-19 | End: 2017-08-19 | Stop reason: HOSPADM

## 2017-08-19 RX ORDER — HEPARIN SODIUM 1000 [USP'U]/ML
30 INJECTION, SOLUTION INTRAVENOUS; SUBCUTANEOUS ONCE
Status: DISCONTINUED | OUTPATIENT
Start: 2017-08-19 | End: 2017-08-22

## 2017-08-19 RX ADMIN — SODIUM CHLORIDE: 9 INJECTION, SOLUTION INTRAVENOUS at 08:44:00

## 2017-08-19 RX ADMIN — LIDOCAINE HYDROCHLORIDE 50 MG: 10 INJECTION, SOLUTION EPIDURAL; INFILTRATION; INTRACAUDAL; PERINEURAL at 08:10:00

## 2017-08-19 NOTE — OPERATIVE REPORT
J[facility] 45  Operative Note     Osvaldo Reyes Location: OR   [facility] [id number] MRN [id number]   Admission Date 8/15/2017 Operation Date 8/19/2017   Attending Physician Ailyn Mendoza MD Operating Physician Sourav Georges.  MD DARON Navarrete patient had a PT INR 1.3 and a normal PTT holding his heparin 6 hours prior to surgery.     Patient was brought to [facility] cystoscopy suite placed under general anesthesia via LMA placed in lithotomy position patient is on IV antibiotics so no IV KUB showed good curl in the renal pelvis and a good curl in the bladder patient was then had cystoscope removed awoken from general anesthesia estimated blood loss was 0 patient transferred to recovery room in stable condition.   We will start medical thera

## 2017-08-19 NOTE — ANESTHESIA PREPROCEDURE EVALUATION
Anesthesia PreOp Note    HPI:     Mart Teixeira is a 70year old male who presents for preoperative consultation requested by: Newton Flannery MD    Date of Surgery: 8/15/2017 - 8/19/2017    Procedure(s):  CYSTOSCOPY RETROGRADE  CYSTOSCOPY STONE YULISSA 5 mg 5 mg Oral Daily Ayde Melgar MD 5 mg at 08/18/17 1103    heparin (PORCINE) drip 73352mkzwj/250mL infusion CONTINUOUS 200-3,000 Units/hr Intravenous Continuous Juan Morrison APN Stopped at 08/19/17 0200    docusate sodium (COLACE) cap 100 mg 100 None on file       Available pre-op labs reviewed.     Lab Results  Component Value Date   WBC 8.8 08/17/2017   RBC 5.18 08/17/2017   HGB 14.6 08/17/2017   HCT 42.2 08/17/2017   MCV 81.4 08/17/2017   MCH 28.2 08/17/2017   MCHC 34.6 08/17/2017   RDW 13.8 0 alternative forms of anesthetic management. All of the patient's questions were answered to the best of my ability. The patient desires the anesthetic management as planned.   Linh Devine  8/19/2017 7:48 AM

## 2017-08-19 NOTE — ANESTHESIA POSTPROCEDURE EVALUATION
Patient: Wade Magallanes    Procedure Summary     Date:  08/19/17 Room / Location:  [facility] OR  / [facility] OR    Anesthesia Start:  0805 Anesthesia Stop:      Procedure:  CYSTOSCOPY RETROGRADE (Left Ureter) Diagnosis:  (left proximal ureteral stone)

## 2017-08-19 NOTE — PROGRESS NOTES
[facility] - [facility]  Hospitalist Progress  Note     Winnie Cordova Patient Status:  Inpatient    [birth year]  70year old CSN [id number]   Location 532/532-A Attending Lino Ireland MD   Hosp Day # 4 PCP PHYSICIAN NONSTAFF     ASSESSMENT/PLAN Lab  08/17/17   0539  08/18/17   0700  08/19/17   0547   GLU  92  96  93   BUN  18  16  14   CREATSERUM  1.83*  1.77*  1.67*   GFRAA  44*  46*  49*   GFRNAA  37*  38*  41*   CA  9.3  9.7  9.7   NA  135*  134*  135*   K  3.9  3.8  4.1   CL  107  102  102

## 2017-08-19 NOTE — PROGRESS NOTES
[facility]    Progress Note      Assessment and Plan:   1. Ureterolithiasis– the patient has tolerated stent insertion.     Recommendations: As per urology.     2.   Masslike density at the right lower lobe–patient had recent severe pneumon

## 2017-08-20 LAB
ANION GAP SERPL CALC-SCNC: 8 MMOL/L (ref 0–18)
APTT PPP: 78.8 SECONDS (ref 23.2–35.3)
APTT PPP: 89.1 SECONDS (ref 23.2–35.3)
BUN SERPL-MCNC: 13 MG/DL (ref 8–20)
BUN/CREAT SERPL: 10.9 (ref 10–20)
CALCIUM SERPL-MCNC: 9.9 MG/DL (ref 8.5–10.5)
CHLORIDE SERPL-SCNC: 107 MMOL/L (ref 95–110)
CO2 SERPL-SCNC: 24 MMOL/L (ref 22–32)
CREAT SERPL-MCNC: 1.19 MG/DL (ref 0.5–1.5)
ERYTHROCYTE [DISTWIDTH] IN BLOOD BY AUTOMATED COUNT: 13.6 % (ref 11–15)
GLUCOSE SERPL-MCNC: 96 MG/DL (ref 70–99)
HCT VFR BLD AUTO: 40.6 % (ref 41–52)
HGB BLD-MCNC: 13.9 G/DL (ref 13.5–17.5)
INR BLD: 1.2 (ref 0.9–1.2)
MCH RBC QN AUTO: 28.4 PG (ref 27–32)
MCHC RBC AUTO-ENTMCNC: 34.3 G/DL (ref 32–37)
MCV RBC AUTO: 82.9 FL (ref 80–100)
OSMOLALITY UR CALC.SUM OF ELEC: 288 MOSM/KG (ref 275–295)
PLATELET # BLD AUTO: 156 K/UL (ref 140–400)
PLATELET # BLD AUTO: 156 K/UL (ref 140–400)
PMV BLD AUTO: 8.3 FL (ref 7.4–10.3)
POTASSIUM SERPL-SCNC: 4 MMOL/L (ref 3.3–5.1)
PROTHROMBIN TIME: 15.1 SECONDS (ref 11.8–14.5)
RBC # BLD AUTO: 4.9 M/UL (ref 4.5–5.9)
SODIUM SERPL-SCNC: 139 MMOL/L (ref 136–144)
WBC # BLD AUTO: 6.3 K/UL (ref 4–11)

## 2017-08-20 PROCEDURE — 99233 SBSQ HOSP IP/OBS HIGH 50: CPT | Performed by: HOSPITALIST

## 2017-08-20 PROCEDURE — 99232 SBSQ HOSP IP/OBS MODERATE 35: CPT | Performed by: UROLOGY

## 2017-08-20 PROCEDURE — 99232 SBSQ HOSP IP/OBS MODERATE 35: CPT | Performed by: INTERNAL MEDICINE

## 2017-08-20 RX ORDER — WARFARIN SODIUM 5 MG/1
5 TABLET ORAL NIGHTLY
Status: DISCONTINUED | OUTPATIENT
Start: 2017-08-20 | End: 2017-08-21

## 2017-08-20 NOTE — PROGRESS NOTES
[address] [facility] Patient Status:  Inpatient    1945 MRN [id number]   Location [facility] 5SW/SE Attending Roberto Connors. [address] Day # 5 PCP PHYSICIAN MANDA Addison Hans is a 70year old male patient. Saline Flush 0.9 % injection 3 mL 3 mL Intravenous PRN   0.9%  NaCl infusion  Intravenous Continuous   ondansetron HCl (ZOFRAN) injection 4 mg 4 mg Intravenous Q4H PRN   CefTRIAXone Sodium (ROCEPHIN) 1 g in sodium chloride 0.9 % 100 mL IVPB-minibag/addVant with anticoagulation. Patient was admitted with new onset left renal colic and a CAT scan showing a 7 x 10 left upper ureteral calculi below the UPJ.   With continued pain anticoagulation was held at least Coumadin was held he was switched to IV heparin he

## 2017-08-20 NOTE — PROGRESS NOTES
[facility] - [facility]  Hospitalist Progress  Note     Marlon Tucker Patient Status:  Inpatient      70year old CSN [id number]   Location 532/532-A Attending Osvaldo Sousa MD   Hosp Day # 5 PCP PHYSICIAN NONSTAFF     ASSESSMENT/PLAN --   6.3   PLT  193  189  177  156  156     Recent Labs   Lab  08/18/17   0700  08/19/17   0547  08/20/17   0546   GLU  96  93  96   BUN  16  14  13   CREATSERUM  1.77*  1.67*  1.19   GFRAA  46*  49*  >60   GFRNAA  38*  41*  60   CA  9.7  9.7  9.9   NA  1

## 2017-08-20 NOTE — PROGRESS NOTES
[facility]     Progress Note        Roberto Salts Patient Status:  Inpatient    1945 MRN [id number]   Location [facility] 5SW/SE Attending Marija Young MD   Hosp Day # 5 PCP PHYSICIAN NONSTAFF       Subjective:   P acetaminophen (TYLENOL EXTRA STRENGTH) tab 500 mg 500 mg Oral Q6H PRN   Albuterol Sulfate  (90 Base) MCG/ACT inhaler 2 puff 2 puff Inhalation Q6H PRN       Continuous Infusions:   • sodium bicarbonate infusion     • lactated ringers     • Continuo extremity Doppler without evidence of DVT seen.  -Discussed with hospitalist and urology.     Annel Rosario, DO  Pulmonary [facility]

## 2017-08-21 LAB
ANION GAP SERPL CALC-SCNC: 9 MMOL/L (ref 0–18)
APTT PPP: 80.3 SECONDS (ref 23.2–35.3)
BASOPHILS # BLD: 0.1 K/UL (ref 0–0.2)
BASOPHILS NFR BLD: 1 %
BUN SERPL-MCNC: 14 MG/DL (ref 8–20)
BUN/CREAT SERPL: 11.8 (ref 10–20)
CALCIUM SERPL-MCNC: 9.8 MG/DL (ref 8.5–10.5)
CHLORIDE SERPL-SCNC: 103 MMOL/L (ref 95–110)
CO2 SERPL-SCNC: 24 MMOL/L (ref 22–32)
CREAT SERPL-MCNC: 1.19 MG/DL (ref 0.5–1.5)
EOSINOPHIL # BLD: 0.4 K/UL (ref 0–0.7)
EOSINOPHIL NFR BLD: 7 %
ERYTHROCYTE [DISTWIDTH] IN BLOOD BY AUTOMATED COUNT: 13.8 % (ref 11–15)
GLUCOSE SERPL-MCNC: 98 MG/DL (ref 70–99)
HCT VFR BLD AUTO: 40.3 % (ref 41–52)
HGB BLD-MCNC: 14.1 G/DL (ref 13.5–17.5)
INR BLD: 1.3 (ref 0.9–1.2)
LYMPHOCYTES # BLD: 1.3 K/UL (ref 1–4)
LYMPHOCYTES NFR BLD: 21 %
MAGNESIUM SERPL-MCNC: 1.7 MG/DL (ref 1.8–2.5)
MCH RBC QN AUTO: 28.6 PG (ref 27–32)
MCHC RBC AUTO-ENTMCNC: 34.9 G/DL (ref 32–37)
MCV RBC AUTO: 81.9 FL (ref 80–100)
MONOCYTES # BLD: 0.6 K/UL (ref 0–1)
MONOCYTES NFR BLD: 9 %
NEUTROPHILS # BLD AUTO: 3.7 K/UL (ref 1.8–7.7)
NEUTROPHILS NFR BLD: 62 %
OSMOLALITY UR CALC.SUM OF ELEC: 282 MOSM/KG (ref 275–295)
PLATELET # BLD AUTO: 157 K/UL (ref 140–400)
PMV BLD AUTO: 8.2 FL (ref 7.4–10.3)
POTASSIUM SERPL-SCNC: 3.8 MMOL/L (ref 3.3–5.1)
PROTHROMBIN TIME: 15.6 SECONDS (ref 11.8–14.5)
RBC # BLD AUTO: 4.92 M/UL (ref 4.5–5.9)
SODIUM SERPL-SCNC: 136 MMOL/L (ref 136–144)
WBC # BLD AUTO: 6 K/UL (ref 4–11)

## 2017-08-21 PROCEDURE — 99232 SBSQ HOSP IP/OBS MODERATE 35: CPT | Performed by: INTERNAL MEDICINE

## 2017-08-21 PROCEDURE — 99232 SBSQ HOSP IP/OBS MODERATE 35: CPT | Performed by: UROLOGY

## 2017-08-21 PROCEDURE — 99233 SBSQ HOSP IP/OBS HIGH 50: CPT | Performed by: HOSPITALIST

## 2017-08-21 RX ORDER — WARFARIN SODIUM 7.5 MG/1
7.5 TABLET ORAL NIGHTLY
Status: DISCONTINUED | OUTPATIENT
Start: 2017-08-21 | End: 2017-08-22

## 2017-08-21 RX ORDER — MAGNESIUM OXIDE 400 MG (241.3 MG MAGNESIUM) TABLET
400 TABLET ONCE
Status: COMPLETED | OUTPATIENT
Start: 2017-08-21 | End: 2017-08-21

## 2017-08-21 RX ORDER — POTASSIUM CHLORIDE 20 MEQ/1
40 TABLET, EXTENDED RELEASE ORAL ONCE
Status: COMPLETED | OUTPATIENT
Start: 2017-08-21 | End: 2017-08-21

## 2017-08-21 RX ORDER — POLYVINYL ALCOHOL 14 MG/ML
1 SOLUTION/ DROPS OPHTHALMIC EVERY 2 HOUR PRN
Status: DISCONTINUED | OUTPATIENT
Start: 2017-08-21 | End: 2017-08-22

## 2017-08-21 NOTE — PROGRESS NOTES
[facility]    Progress Note    Martine Ruvalcaba Patient Status:  Inpatient    1945 MRN [id number]   Location [facility] 5SW/SE Attending Flora Pandey MD   Hosp Day # 6 PCP PHYSICIAN NONSTAFF     Subjective:     Cons disease stage III. Improved.   -BMP in the morning     Previous pneumonia with persistently abnormal CT scan  -Juan C Shells will be following  - follow up chest xray in 2-3 weeks per pulm    DVT Prophylaxis: heparin gtt, coumadin   CODE status: full  Dispo: pe

## 2017-08-21 NOTE — PROGRESS NOTES
[facility]    Progress Note      Assessment and Plan:   1. Ureterolithiasis– the patient has tolerated stent insertion.     Recommendations: As per urology.     2.   Masslike density at the right lower lobe–patient had recent severe pneumon Center  Pager: [phone]

## 2017-08-21 NOTE — PROGRESS NOTES
[address] [facility] Patient Status:  Inpatient    1945 MRN [id number]   Location [facility] 5SW/SE Attending Oleg Resendiz. [address] Day # 6 PCP PHYSICIAN NONSTAFF       Kallie Grace is a 70year old male patient. 100 mg Oral BID   PEG 3350 (MIRALAX) powder packet 17 g 17 g Oral Daily PRN   Normal Saline Flush 0.9 % injection 3 mL 3 mL Intravenous PRN   ondansetron HCl (ZOFRAN) injection 4 mg 4 mg Intravenous Q4H PRN   CefTRIAXone Sodium (ROCEPHIN) 1 g in sodium chl emboli and pneumonia not thromboembolic with a negative lower extremity evaluation for DVT nonetheless treated with anticoagulation.   However patient was admitted with left renal colic and on the left 10 x 7 upper ureteral calculi on CAT scan although no c

## 2017-08-22 ENCOUNTER — TELEPHONE (OUTPATIENT)
Dept: SURGERY | Facility: CLINIC | Age: 72
End: 2017-08-22

## 2017-08-22 VITALS
DIASTOLIC BLOOD PRESSURE: 85 MMHG | BODY MASS INDEX: 29.44 KG/M2 | RESPIRATION RATE: 18 BRPM | OXYGEN SATURATION: 98 % | HEIGHT: 70.98 IN | SYSTOLIC BLOOD PRESSURE: 160 MMHG | HEART RATE: 70 BPM | WEIGHT: 210.31 LBS | TEMPERATURE: 98 F

## 2017-08-22 LAB
ANION GAP SERPL CALC-SCNC: 8 MMOL/L (ref 0–18)
APTT PPP: 109.3 SECONDS (ref 23.2–35.3)
BUN SERPL-MCNC: 12 MG/DL (ref 8–20)
BUN/CREAT SERPL: 9.6 (ref 10–20)
CALCIUM SERPL-MCNC: 9.8 MG/DL (ref 8.5–10.5)
CHLORIDE SERPL-SCNC: 101 MMOL/L (ref 95–110)
CO2 SERPL-SCNC: 27 MMOL/L (ref 22–32)
CREAT SERPL-MCNC: 1.25 MG/DL (ref 0.5–1.5)
GLUCOSE SERPL-MCNC: 104 MG/DL (ref 70–99)
INR BLD: 1.3 (ref 0.9–1.2)
MAGNESIUM SERPL-MCNC: 1.7 MG/DL (ref 1.8–2.5)
OSMOLALITY UR CALC.SUM OF ELEC: 282 MOSM/KG (ref 275–295)
POTASSIUM SERPL-SCNC: 3.7 MMOL/L (ref 3.3–5.1)
POTASSIUM SERPL-SCNC: 3.7 MMOL/L (ref 3.3–5.1)
PROTHROMBIN TIME: 15.8 SECONDS (ref 11.8–14.5)
SODIUM SERPL-SCNC: 136 MMOL/L (ref 136–144)

## 2017-08-22 PROCEDURE — 99232 SBSQ HOSP IP/OBS MODERATE 35: CPT | Performed by: UROLOGY

## 2017-08-22 PROCEDURE — 99232 SBSQ HOSP IP/OBS MODERATE 35: CPT | Performed by: INTERNAL MEDICINE

## 2017-08-22 PROCEDURE — 99239 HOSP IP/OBS DSCHRG MGMT >30: CPT | Performed by: HOSPITALIST

## 2017-08-22 RX ORDER — POTASSIUM CHLORIDE 20 MEQ/1
40 TABLET, EXTENDED RELEASE ORAL ONCE
Status: COMPLETED | OUTPATIENT
Start: 2017-08-22 | End: 2017-08-22

## 2017-08-22 RX ORDER — ALLOPURINOL 100 MG/1
100 TABLET ORAL DAILY
Qty: 30 TABLET | Refills: 0 | Status: SHIPPED | OUTPATIENT
Start: 2017-08-22 | End: 2017-09-05

## 2017-08-22 RX ORDER — POTASSIUM CITRATE 10 MEQ/1
10 TABLET, EXTENDED RELEASE ORAL
Qty: 90 TABLET | Refills: 0 | Status: SHIPPED | OUTPATIENT
Start: 2017-08-22 | End: 2017-10-02 | Stop reason: ALTCHOICE

## 2017-08-22 RX ORDER — ENOXAPARIN SODIUM 100 MG/ML
100 INJECTION SUBCUTANEOUS EVERY 12 HOURS SCHEDULED
Status: DISCONTINUED | OUTPATIENT
Start: 2017-08-22 | End: 2017-08-22

## 2017-08-22 RX ORDER — WARFARIN SODIUM 5 MG/1
10 TABLET ORAL NIGHTLY
Qty: 60 TABLET | Refills: 0 | Status: SHIPPED | OUTPATIENT
Start: 2017-08-22 | End: 2017-09-11

## 2017-08-22 RX ORDER — WARFARIN SODIUM 10 MG/1
10 TABLET ORAL NIGHTLY
Status: DISCONTINUED | OUTPATIENT
Start: 2017-08-22 | End: 2017-08-22

## 2017-08-22 RX ORDER — AMLODIPINE BESYLATE 5 MG/1
5 TABLET ORAL DAILY
Qty: 30 TABLET | Refills: 0 | Status: SHIPPED | OUTPATIENT
Start: 2017-08-22 | End: 2017-09-05

## 2017-08-22 RX ORDER — ENOXAPARIN SODIUM 100 MG/ML
100 INJECTION SUBCUTANEOUS EVERY 12 HOURS SCHEDULED
Qty: 10 ML | Refills: 0 | Status: SHIPPED | OUTPATIENT
Start: 2017-08-22 | End: 2017-08-27

## 2017-08-22 RX ORDER — MAGNESIUM OXIDE 400 MG (241.3 MG MAGNESIUM) TABLET
400 TABLET ONCE
Status: COMPLETED | OUTPATIENT
Start: 2017-08-22 | End: 2017-08-22

## 2017-08-22 NOTE — PLAN OF CARE
Problem: Patient/Family Goals  Goal: Patient/Family Long Term Goal  Patient's Long Term Goal: to go home    Interventions:  -continue plan of care  - See additional Care Plan goals for specific interventions    Outcome: Progressing    Goal: Patient/Family evaluate response  - Consider cultural and social influences on pain and pain management  - Manage/alleviate anxiety  - Utilize distraction and/or relaxation techniques  - Monitor for opioid side effects  - Notify MD/LIP if interventions unsuccessful or pa

## 2017-08-22 NOTE — DISCHARGE PLANNING
SW was informed that pt is able to d/c today. Per APN request, SW to check on Lovenox pricing. SW informed [facility] that pt will be d/c today and will need daily INR checks until pt gets to certain level [facility] aware and able to accommodate. HHC order is in.

## 2017-08-22 NOTE — PROGRESS NOTES
[address] [facility] Patient Status:  Inpatient    1945 MRN [id number]   Location [facility] 5SW/SE Attending Yolanda Booth. [address] Day # 7 PCP PHYSICIAN MANDA Lafleur Candace is a 70year old male patient. Intravenous Continuous   Heparin Sodium (Porcine) 1000 UNIT/ML injection 2,860 Units 30 Units/kg Intravenous Once   AmLODIPine Besylate (NORVASC) tab 5 mg 5 mg Oral Daily   docusate sodium (COLACE) cap 100 mg 100 mg Oral BID   PEG 3350 (MIRALAX) powder pac previous history kidney stones or family history of stones however one month ago was admitted with pneumonia pulmonary emboli not thromboembolic with negative ultrasound lower extremities nonetheless required anticoagulation patient was admitted after new

## 2017-08-22 NOTE — DISCHARGE SUMMARY
[facility]    Discharge Summary    Jeannie Leong Patient Status:  Inpatient    1945 MRN [id number]   Location [facility] 5SW/SE Attending Marlon Florian MD   Hosp Day # 7 PCP PHYSICIAN NONSTAFF     Date of Admissio pulmonary infarct and hemoptysis as well as epistaxis. He was bridged from heparin to Coumadin while in the hospital.  He had chest pain with negative troponins, which was felt to be secondary to his PE.   He had 2 episodes of SVT while in the hospital fazal disease. Opacity in the right lower lobe was seen, probably consistent with his prior pneumonia. Further followup recommended. The emergency room physician spoke with Dr. Debra Connelly who will evaluate the patient.   He received a dose of ceftriaxone because of Potassium Citrate ER 10 MEQ (1080 MG) Tbcr  Commonly known as:  UROCIT-K      Take 1 tablet (10 mEq total) by mouth 3 (three) times daily with meals.    Quantity:  90 tablet  Refills:  0        CHANGE how you take these medications      Instructions Presc A/P. Patient seen and examined.  Note reviewed and labs reviewed.  Agree with assessment and plan with changes and additions included.       Promise Flower MD

## 2017-08-22 NOTE — PLAN OF CARE
Problem: Patient/Family Goals  Goal: Patient/Family Long Term Goal  Patient's Long Term Goal: to go home    Interventions:  -continue plan of care  - See additional Care Plan goals for specific interventions    Outcome: Progressing  Heparin gtt at 17ml tod remain intact  INTERVENTIONS  - Assess oral mucosa and hygiene practices  - Implement preventative oral hygiene regimen  - Implement oral medicated treatments as ordered   Outcome: Progressing  Tolerating current diet and oral fluid intake.     Problem: MARQUEZ communication ability and preferred communication style  - Implement communication aides and strategies  - Use visual cues when possible  - Listen attentively, be patient, do not interrupt  - Minimize distractions  - Allow time for understanding and respon

## 2017-08-22 NOTE — PROGRESS NOTES
[facility]    Progress Note      Assessment and Plan:   1. Ureterolithiasis– the patient has tolerated stent insertion.     Recommendations: As per urology.     2.   Masslike density at the right lower lobe–patient had recent severe pneumon Center  Pager: [phone]

## 2017-08-22 NOTE — HOME CARE LIAISON
MET WITH PATIENT, SPOUSE, DAUGHTER LUIS ENRIQUE AND GRANDDAUGHTER GRICEL, TO DISCUSS RESUMING [address]. ALL IN AGREEMENT WITH CONTINUING SERVICES. RESIDENTIAL STAFF AWARE OF DAILY PT/INR ORDERS.

## 2017-08-22 NOTE — PLAN OF CARE
Patient discharge instruction for lovenox given to patient and wife. Administration instruction given. Wife returned demonstration by administering dose in room. Patient and wife instructed on monitoring for bleeding in urine and nose bleeds.

## 2017-08-22 NOTE — TELEPHONE ENCOUNTER
[facility] requesting to speak to [facility]. Pls call at ext. [phone]. Thank you.

## 2017-08-23 NOTE — PLAN OF CARE
Problem: Patient/Family Goals  Goal: Patient/Family Long Term Goal  Patient's Long Term Goal: to go home    Interventions:  -continue plan of care  - See additional Care Plan goals for specific interventions    Outcome: Adequate for Discharge  Patient disc Adequate for Discharge    Goal: Oral mucous membranes remain intact  INTERVENTIONS  - Assess oral mucosa and hygiene practices  - Implement preventative oral hygiene regimen  - Implement oral medicated treatments as ordered   Outcome: Adequate for Discharg their care  Interventions:  - Assess communication ability and preferred communication style  - Implement communication aides and strategies  - Use visual cues when possible  - Listen attentively, be patient, do not interrupt  - Minimize distractions  - Al

## 2017-08-28 ENCOUNTER — TELEPHONE (OUTPATIENT)
Dept: FAMILY MEDICINE CLINIC | Facility: CLINIC | Age: 72
End: 2017-08-28

## 2017-08-28 NOTE — TELEPHONE ENCOUNTER
Per Neena/AISHWARYA tried several times to contact pt but nobody is answering her call and when when one time pt daughter answer, she told Neena/AISHWARYA that she can go but when RN went to the house of pt, pt house gate is locked and nobody answer or return her

## 2017-08-28 NOTE — TELEPHONE ENCOUNTER
Per Nenea/AISHWARYA tried several times to contact pt but nobody is answering her call and  when one time pt daughter answer, she told Neena/AISHWARYA that she can go but when RN went to the house of pt, pt house gate is locked and nobody answer or return her call

## 2017-09-05 ENCOUNTER — HOSPITAL ENCOUNTER (OUTPATIENT)
Dept: GENERAL RADIOLOGY | Age: 72
Discharge: HOME | End: 2017-09-05
Attending: INTERNAL MEDICINE | Admitting: INTERNAL MEDICINE
Payer: MEDICARE

## 2017-09-05 ENCOUNTER — APPOINTMENT (OUTPATIENT)
Dept: LAB | Age: 72
End: 2017-09-05
Attending: INTERNAL MEDICINE
Payer: MEDICARE

## 2017-09-05 ENCOUNTER — OFFICE VISIT (OUTPATIENT)
Dept: PULMONOLOGY | Facility: CLINIC | Age: 72
End: 2017-09-05

## 2017-09-05 ENCOUNTER — OFFICE VISIT (OUTPATIENT)
Dept: FAMILY MEDICINE CLINIC | Facility: CLINIC | Age: 72
End: 2017-09-05
Attending: INTERNAL MEDICINE

## 2017-09-05 ENCOUNTER — TELEPHONE (OUTPATIENT)
Dept: FAMILY MEDICINE CLINIC | Facility: CLINIC | Age: 72
End: 2017-09-05

## 2017-09-05 VITALS
SYSTOLIC BLOOD PRESSURE: 137 MMHG | WEIGHT: 203 LBS | HEART RATE: 73 BPM | HEIGHT: 71 IN | DIASTOLIC BLOOD PRESSURE: 80 MMHG | BODY MASS INDEX: 28.42 KG/M2

## 2017-09-05 VITALS
RESPIRATION RATE: 18 BRPM | WEIGHT: 206.63 LBS | OXYGEN SATURATION: 96 % | HEART RATE: 80 BPM | HEIGHT: 71 IN | BODY MASS INDEX: 28.93 KG/M2 | SYSTOLIC BLOOD PRESSURE: 144 MMHG | DIASTOLIC BLOOD PRESSURE: 75 MMHG

## 2017-09-05 DIAGNOSIS — I10 ESSENTIAL HYPERTENSION: ICD-10-CM

## 2017-09-05 DIAGNOSIS — I83.93 VARICOSE VEINS OF BOTH LOWER EXTREMITIES WITHOUT ULCER OR INFLAMMATION: ICD-10-CM

## 2017-09-05 DIAGNOSIS — R79.1 SUPRATHERAPEUTIC INR: ICD-10-CM

## 2017-09-05 DIAGNOSIS — N28.9 RENAL INSUFFICIENCY: ICD-10-CM

## 2017-09-05 DIAGNOSIS — N20.1 URETEROLITHIASIS: Primary | ICD-10-CM

## 2017-09-05 DIAGNOSIS — J18.9 PNEUMONIA OF RIGHT LOWER LOBE DUE TO INFECTIOUS ORGANISM: Primary | ICD-10-CM

## 2017-09-05 DIAGNOSIS — Z87.09 HISTORY OF PULMONARY INFARCTION: ICD-10-CM

## 2017-09-05 DIAGNOSIS — I47.1 PAROXYSMAL SVT (SUPRAVENTRICULAR TACHYCARDIA) (HCC): ICD-10-CM

## 2017-09-05 DIAGNOSIS — J18.9 PNEUMONIA DUE TO INFECTIOUS ORGANISM, UNSPECIFIED LATERALITY, UNSPECIFIED PART OF LUNG: ICD-10-CM

## 2017-09-05 DIAGNOSIS — Z85.46 HISTORY OF PROSTATE CANCER: ICD-10-CM

## 2017-09-05 PROBLEM — I26.99 PULMONARY EMBOLISM (HCC): Status: RESOLVED | Noted: 2017-08-03 | Resolved: 2017-09-05

## 2017-09-05 PROBLEM — R09.02 HYPOXIA: Status: RESOLVED | Noted: 2017-07-24 | Resolved: 2017-09-05

## 2017-09-05 PROBLEM — J18.0 BRONCHOPNEUMONIA: Status: RESOLVED | Noted: 2017-07-24 | Resolved: 2017-09-05

## 2017-09-05 PROBLEM — R03.0 ELEVATED BLOOD PRESSURE READING: Status: RESOLVED | Noted: 2017-08-11 | Resolved: 2017-09-05

## 2017-09-05 PROBLEM — R04.2 HEMOPTYSIS: Status: RESOLVED | Noted: 2017-08-11 | Resolved: 2017-09-05

## 2017-09-05 PROBLEM — I26.99 PULMONARY EMBOLISM AND INFARCTION (HCC): Status: RESOLVED | Noted: 2017-08-11 | Resolved: 2017-09-05

## 2017-09-05 PROCEDURE — 71020 XR CHEST PA + LAT CHEST (CPT=71020): CPT | Performed by: INTERNAL MEDICINE

## 2017-09-05 PROCEDURE — 99215 OFFICE O/P EST HI 40 MIN: CPT | Performed by: FAMILY MEDICINE

## 2017-09-05 PROCEDURE — 99213 OFFICE O/P EST LOW 20 MIN: CPT | Performed by: INTERNAL MEDICINE

## 2017-09-05 PROCEDURE — G0463 HOSPITAL OUTPT CLINIC VISIT: HCPCS | Performed by: INTERNAL MEDICINE

## 2017-09-05 PROCEDURE — G0463 HOSPITAL OUTPT CLINIC VISIT: HCPCS | Performed by: FAMILY MEDICINE

## 2017-09-05 RX ORDER — AMLODIPINE BESYLATE 5 MG/1
5 TABLET ORAL DAILY
Qty: 30 TABLET | Refills: 2 | Status: SHIPPED | OUTPATIENT
Start: 2017-09-05 | End: 2017-10-09

## 2017-09-05 RX ORDER — ALLOPURINOL 100 MG/1
100 TABLET ORAL DAILY
Qty: 30 TABLET | Refills: 2 | Status: SHIPPED | OUTPATIENT
Start: 2017-09-05 | End: 2017-10-09

## 2017-09-05 NOTE — PROGRESS NOTES
The patient is 77-year-old male who I know well from prior evaluation who comes in now for follow-up. The patient was hospitalized with a severe pneumonia. His course was complicated by venous thromboembolism. He is now off antibiotics.   His chest x-ray

## 2017-09-05 NOTE — PROGRESS NOTES
HPI:    Miguel Thurman is a 70year old male here today for hospital follow up.    He was discharged from Inpatient hospital, [facility]  to [address] [place] [address] Date: 8/15/2017  Discharge Date: 8/22/2017  Hospital Discharge Diagnosis:    Renal lithiasi mouth daily.  )   Albuterol Sulfate HFA (PROAIR HFA) 108 (90 Base) MCG/ACT Inhalation Aero Soln Inhale 2 puffs into the lungs every 6 (six) hours as needed for Wheezing or Shortness of Breath.      No current facility-administered medications on file prior atraumatic, normocephalic, ears and throat are clear  EYES: PERRLA, EOMI, conjunctiva are clear  NECK: supple, no adenopathy, no bruits  CHEST: no chest tenderness  LUNGS: clear to auscultation  CARDIO: RRR without murmur  GI: good BS's, no masses, HSM or Mortality: severe    Overall Risk:   severe   Pt wants to go back to [place] told him needs tx for leg  Recommended no further long car rides in his life due to risk of dvt/pe  Would not recommend [place] trip for now.       Patient seen within 14 days from d

## 2017-09-09 ENCOUNTER — APPOINTMENT (OUTPATIENT)
Dept: GENERAL RADIOLOGY | Facility: HOSPITAL | Age: 72
DRG: 813 | End: 2017-09-09
Attending: UROLOGY
Payer: MEDICARE

## 2017-09-09 ENCOUNTER — HOSPITAL ENCOUNTER (INPATIENT)
Facility: HOSPITAL | Age: 72
LOS: 2 days | Discharge: HOME | DRG: 813 | End: 2017-09-11
Attending: EMERGENCY MEDICINE | Admitting: HOSPITALIST
Payer: MEDICARE

## 2017-09-09 ENCOUNTER — APPOINTMENT (OUTPATIENT)
Dept: ULTRASOUND IMAGING | Facility: HOSPITAL | Age: 72
DRG: 813 | End: 2017-09-09
Attending: UROLOGY
Payer: MEDICARE

## 2017-09-09 DIAGNOSIS — R79.1 SUPRATHERAPEUTIC INR: ICD-10-CM

## 2017-09-09 DIAGNOSIS — R31.0 GROSS HEMATURIA: Primary | ICD-10-CM

## 2017-09-09 LAB
ANION GAP SERPL CALC-SCNC: 7 MMOL/L (ref 0–18)
ANTIBODY SCREEN: NEGATIVE
BACTERIA UR QL AUTO: NEGATIVE /HPF
BASOPHILS # BLD: 0.1 K/UL (ref 0–0.2)
BASOPHILS # BLD: 0.1 K/UL (ref 0–0.2)
BASOPHILS NFR BLD: 1 %
BASOPHILS NFR BLD: 1 %
BILIRUB UR QL: NEGATIVE
BUN SERPL-MCNC: 19 MG/DL (ref 8–20)
BUN/CREAT SERPL: 14.2 (ref 10–20)
CALCIUM SERPL-MCNC: 9.5 MG/DL (ref 8.5–10.5)
CHLORIDE SERPL-SCNC: 104 MMOL/L (ref 95–110)
CO2 SERPL-SCNC: 24 MMOL/L (ref 22–32)
CREAT SERPL-MCNC: 1.34 MG/DL (ref 0.5–1.5)
EOSINOPHIL # BLD: 0.3 K/UL (ref 0–0.7)
EOSINOPHIL # BLD: 0.5 K/UL (ref 0–0.7)
EOSINOPHIL NFR BLD: 6 %
EOSINOPHIL NFR BLD: 6 %
ERYTHROCYTE [DISTWIDTH] IN BLOOD BY AUTOMATED COUNT: 14.3 % (ref 11–15)
ERYTHROCYTE [DISTWIDTH] IN BLOOD BY AUTOMATED COUNT: 14.5 % (ref 11–15)
GLUCOSE SERPL-MCNC: 116 MG/DL (ref 70–99)
GLUCOSE UR-MCNC: 100 MG/DL
HCT VFR BLD AUTO: 40 % (ref 41–52)
HCT VFR BLD AUTO: 42.1 % (ref 41–52)
HGB BLD-MCNC: 13.9 G/DL (ref 13.5–17.5)
HGB BLD-MCNC: 14 G/DL (ref 13.5–17.5)
HGB BLD-MCNC: 14.7 G/DL (ref 13.5–17.5)
INR BLD: 3.5 (ref 0.9–1.2)
INR BLD: 5.6 (ref 0.9–1.2)
INR BLD: 5.8 (ref 0.9–1.2)
KETONES UR-MCNC: 20 MG/DL
LEUKOCYTE ESTERASE UR QL STRIP.AUTO: NEGATIVE
LYMPHOCYTES # BLD: 1.2 K/UL (ref 1–4)
LYMPHOCYTES # BLD: 1.5 K/UL (ref 1–4)
LYMPHOCYTES NFR BLD: 20 %
LYMPHOCYTES NFR BLD: 20 %
MCH RBC QN AUTO: 28.1 PG (ref 27–32)
MCH RBC QN AUTO: 28.3 PG (ref 27–32)
MCHC RBC AUTO-ENTMCNC: 34.9 G/DL (ref 32–37)
MCHC RBC AUTO-ENTMCNC: 35 G/DL (ref 32–37)
MCV RBC AUTO: 80.4 FL (ref 80–100)
MCV RBC AUTO: 81 FL (ref 80–100)
MONOCYTES # BLD: 0.5 K/UL (ref 0–1)
MONOCYTES # BLD: 0.8 K/UL (ref 0–1)
MONOCYTES NFR BLD: 10 %
MONOCYTES NFR BLD: 9 %
NEUTROPHILS # BLD AUTO: 3.9 K/UL (ref 1.8–7.7)
NEUTROPHILS # BLD AUTO: 4.9 K/UL (ref 1.8–7.7)
NEUTROPHILS NFR BLD: 63 %
NEUTROPHILS NFR BLD: 64 %
NITRITE UR QL STRIP.AUTO: NEGATIVE
OSMOLALITY UR CALC.SUM OF ELEC: 283 MOSM/KG (ref 275–295)
PH UR: 8.5 [PH] (ref 5–8)
PLATELET # BLD AUTO: 194 K/UL (ref 140–400)
PLATELET # BLD AUTO: 203 K/UL (ref 140–400)
PMV BLD AUTO: 8.2 FL (ref 7.4–10.3)
PMV BLD AUTO: 8.3 FL (ref 7.4–10.3)
POTASSIUM SERPL-SCNC: 3.9 MMOL/L (ref 3.3–5.1)
PROT UR-MCNC: 300 MG/DL
PROTHROMBIN TIME: 35 SECONDS (ref 11.8–14.5)
PROTHROMBIN TIME: 51.1 SECONDS (ref 11.8–14.5)
PROTHROMBIN TIME: 51.9 SECONDS (ref 11.8–14.5)
RBC # BLD AUTO: 4.94 M/UL (ref 4.5–5.9)
RBC # BLD AUTO: 5.23 M/UL (ref 4.5–5.9)
RBC #/AREA URNS AUTO: 4058 /HPF
RH BLOOD TYPE: POSITIVE
SODIUM SERPL-SCNC: 135 MMOL/L (ref 136–144)
SP GR UR STRIP: <1.005 (ref 1–1.03)
UROBILINOGEN UR STRIP-ACNC: 4
VIT C UR-MCNC: NEGATIVE MG/DL
WBC # BLD AUTO: 6 K/UL (ref 4–11)
WBC # BLD AUTO: 7.8 K/UL (ref 4–11)
WBC #/AREA URNS AUTO: 63 /HPF

## 2017-09-09 PROCEDURE — 99223 1ST HOSP IP/OBS HIGH 75: CPT | Performed by: HOSPITALIST

## 2017-09-09 PROCEDURE — 99223 1ST HOSP IP/OBS HIGH 75: CPT | Performed by: UROLOGY

## 2017-09-09 PROCEDURE — 74020 XR ABDOMEN MINIMUM 2 VIEWS (CPT=74020): CPT | Performed by: UROLOGY

## 2017-09-09 PROCEDURE — 76770 US EXAM ABDO BACK WALL COMP: CPT | Performed by: UROLOGY

## 2017-09-09 PROCEDURE — 30233K1 TRANSFUSION OF NONAUTOLOGOUS FROZEN PLASMA INTO PERIPHERAL VEIN, PERCUTANEOUS APPROACH: ICD-10-PCS | Performed by: HOSPITALIST

## 2017-09-09 RX ORDER — SODIUM CHLORIDE 0.9 % (FLUSH) 0.9 %
10 SYRINGE (ML) INJECTION AS NEEDED
Status: DISCONTINUED | OUTPATIENT
Start: 2017-09-09 | End: 2017-09-11

## 2017-09-09 RX ORDER — HYDROMORPHONE HYDROCHLORIDE 1 MG/ML
0.3 INJECTION, SOLUTION INTRAMUSCULAR; INTRAVENOUS; SUBCUTANEOUS EVERY 4 HOURS PRN
Status: DISCONTINUED | OUTPATIENT
Start: 2017-09-09 | End: 2017-09-09

## 2017-09-09 RX ORDER — POTASSIUM CITRATE 10 MEQ/1
10 TABLET, EXTENDED RELEASE ORAL
Status: DISCONTINUED | OUTPATIENT
Start: 2017-09-09 | End: 2017-09-11

## 2017-09-09 RX ORDER — METOPROLOL TARTRATE 5 MG/5ML
5 INJECTION INTRAVENOUS
Status: DISCONTINUED | OUTPATIENT
Start: 2017-09-09 | End: 2017-09-10

## 2017-09-09 RX ORDER — ALFUZOSIN HYDROCHLORIDE 10 MG/1
10 TABLET, EXTENDED RELEASE ORAL DAILY
Status: DISCONTINUED | OUTPATIENT
Start: 2017-09-09 | End: 2017-09-11

## 2017-09-09 RX ORDER — SODIUM CHLORIDE 9 MG/ML
INJECTION, SOLUTION INTRAVENOUS CONTINUOUS
Status: DISCONTINUED | OUTPATIENT
Start: 2017-09-09 | End: 2017-09-11

## 2017-09-09 RX ORDER — NITROGLYCERIN 0.4 MG/1
0.4 TABLET SUBLINGUAL EVERY 5 MIN PRN
Status: DISCONTINUED | OUTPATIENT
Start: 2017-09-09 | End: 2017-09-11

## 2017-09-09 RX ORDER — PHYTONADIONE 5 MG/1
5 TABLET ORAL ONCE
Status: COMPLETED | OUTPATIENT
Start: 2017-09-09 | End: 2017-09-09

## 2017-09-09 RX ORDER — HYDROMORPHONE HYDROCHLORIDE 1 MG/ML
0.3 INJECTION, SOLUTION INTRAMUSCULAR; INTRAVENOUS; SUBCUTANEOUS EVERY 4 HOURS PRN
Status: DISCONTINUED | OUTPATIENT
Start: 2017-09-09 | End: 2017-09-11

## 2017-09-09 RX ORDER — HYDROMORPHONE HYDROCHLORIDE 1 MG/ML
INJECTION, SOLUTION INTRAMUSCULAR; INTRAVENOUS; SUBCUTANEOUS
Status: COMPLETED
Start: 2017-09-09 | End: 2017-09-09

## 2017-09-09 RX ORDER — METOPROLOL TARTRATE 5 MG/5ML
2.5 INJECTION INTRAVENOUS AS NEEDED
Status: DISCONTINUED | OUTPATIENT
Start: 2017-09-09 | End: 2017-09-10

## 2017-09-09 RX ORDER — HYDROCODONE BITARTRATE AND ACETAMINOPHEN 5; 325 MG/1; MG/1
1 TABLET ORAL EVERY 6 HOURS PRN
Status: DISCONTINUED | OUTPATIENT
Start: 2017-09-09 | End: 2017-09-11

## 2017-09-09 RX ORDER — SODIUM CHLORIDE 0.9 % (FLUSH) 0.9 %
3 SYRINGE (ML) INJECTION AS NEEDED
Status: DISCONTINUED | OUTPATIENT
Start: 2017-09-09 | End: 2017-09-11

## 2017-09-09 RX ORDER — SODIUM CHLORIDE 9 MG/ML
INJECTION, SOLUTION INTRAVENOUS ONCE
Status: COMPLETED | OUTPATIENT
Start: 2017-09-09 | End: 2017-09-09

## 2017-09-09 RX ORDER — METOPROLOL TARTRATE 5 MG/5ML
5 INJECTION INTRAVENOUS AS NEEDED
Status: DISCONTINUED | OUTPATIENT
Start: 2017-09-09 | End: 2017-09-10

## 2017-09-09 RX ORDER — METOPROLOL TARTRATE 50 MG/1
50 TABLET, FILM COATED ORAL
Status: DISCONTINUED | OUTPATIENT
Start: 2017-09-09 | End: 2017-09-10

## 2017-09-09 RX ORDER — METOPROLOL TARTRATE 5 MG/5ML
2.5 INJECTION INTRAVENOUS
Status: DISCONTINUED | OUTPATIENT
Start: 2017-09-09 | End: 2017-09-10

## 2017-09-09 RX ORDER — ALLOPURINOL 100 MG/1
100 TABLET ORAL DAILY
Status: DISCONTINUED | OUTPATIENT
Start: 2017-09-09 | End: 2017-09-11

## 2017-09-09 RX ORDER — ONDANSETRON 2 MG/ML
4 INJECTION INTRAMUSCULAR; INTRAVENOUS EVERY 4 HOURS PRN
Status: DISCONTINUED | OUTPATIENT
Start: 2017-09-09 | End: 2017-09-11

## 2017-09-09 RX ORDER — ALBUTEROL SULFATE 90 UG/1
2 AEROSOL, METERED RESPIRATORY (INHALATION) EVERY 6 HOURS PRN
Status: DISCONTINUED | OUTPATIENT
Start: 2017-09-09 | End: 2017-09-11

## 2017-09-09 NOTE — H&P
[facility]    PATIENT'S NAME: Elkin Palma   ATTENDING PHYSICIAN: Sreekanth Kaur MD   PATIENT ACCOUNT#:   [de-identified]    LOCATION:  [address] [place] [address] RECORD #:   [id number]       YOB: 1945  ADMISSION DATE: history of any renal stones in the past, status post double J stent placement with cystoscopy and left retrograde pyelogram and stone manipulation as well. ALLERGIES:  No known drug allergies.     MEDICATIONS:  Prior to admission, albuterol inhaler 2 puf Warm and dry without any significant rashes. NEUROLOGIC:  The patient is awake, alert, oriented x3. According to exam with , no focal neurologic deficits. PSYCHIATRIC:  Mood and affect are pleasant and cooperative.   BACK:  There was no costo care as outlined above.       Dictated By Eladio Barrera MD  d: 09/09/2017 06:34:35  t: 09/09/2017 07:05:23  Job [id number]  RFT/

## 2017-09-09 NOTE — ED NOTES
Assumed care of patient from triage. Patient to ED from home for hematuria since yesterday afternoon. Patient denies pain with urination, fevers. Patient states that he had a cystoscopy with stent placement approximately 1 week ago for kidney stones.  Jose

## 2017-09-09 NOTE — DISCHARGE PLANNING
DAMEON spoke with MD who states the pt was readmitted with problems related to the coumadin management. Last [facility] discharge, it was indicated that the pt was arranged with home health through [facility] Patricia Carias.  DAMEON spoke with Vickey/Daniela who verified that th

## 2017-09-09 NOTE — PLAN OF CARE
GENITOURINARY - ADULT    • Absence of urinary retention Not Progressing        HEMATOLOGIC - ADULT    • Maintains hematologic stability Not Progressing    • Free from bleeding injury Not Progressing        Patient/Family Goals    • Patient/Family Long Term

## 2017-09-09 NOTE — BH PROGRESS NOTE
ADMISSION NOTE    70year old male with h/o PE on coumadin, and ureteral stent 2/2 obstructing renal stone presents with gross hematuria . Available medical records partially reviewed. Dictation to follow.     Daly Fox M.D.  9/9/2017

## 2017-09-09 NOTE — CONSULTS
[facility]    Report of Consultation    Sari Camarillo Patient Status:  Inpatient    1945 MRN [id number]   Location [facility] 4W/SW/SE Attending Caren Rosas MD   Hosp Day # 0 PCP PHYSICIAN NONSTAFF     Date receiving potassium citrate 1080 mg tablet 3 times daily as well as allopurinol--patient taking these medications. Prostate cancer in the past  External beam radiation at [facility] approximately 2003.    He had a PSA in [facility] beginning of 2017 and patient (LOPRESSOR) tab 25 mg 25 mg Oral TID Beta Blocker/Cardiac   metoprolol Tartrate (LOPRESSOR) 5 MG/5ML injection 5 mg 5 mg Intravenous PRN   Or      metoprolol Tartrate (LOPRESSOR) 5 MG/5ML injection 2.5 mg 2.5 mg Intravenous PRN   Pantoprazole Sodium (KALPANA auscultation bilaterally normal respiratory effort  Cardiac: regular rate and rhythm ; normal S, S2 ;   Murmurs--none  Abdomen: soft, non-tender, non-distended, no organomegaly noted, no masses  Genitourinary:    FLANKS   --no palpable masses bilaterally an 1. 38 1.21 1.67* 2.00* 1.83* 1.77* 1.67* 1.19 1.19 1.25 1.34   BUNCREA 12.3 9.4* 9.7* 9.3* 11.9 14.4 15.7 18.0 19.8 18.5 17.1 16.8 12.5 15.2 15.7 16.2 10.0 9.8* 9.0* 8.4* 10.9 11.8 9.6* 14.2   GFRAA >60 >60 >60 >60 >60 >60 >60 >60 >60 >60 58* 59* 59* >60 >6 calculus is visualized to the left of L3. Xr Chest Pa + Lat Chest (uus=19891)    Result Date: 9/5/2017  PROCEDURE: XR CHEST PA + LAT CHEST (CPT=71020)  COMPARISON: [facility], XR ABDOMEN (1 VIEW) (CPT=74000), 8/15/2017, 12:52.   [facility] images. Rafat Sanders reportedly manipulated to the left renal pelvis with placement of a double-J stent. Dictated by (CST): Rohit Whittington MD on 8/19/2017 at 16:12     Approved by (CST): Rohit Whittington MD on 8/19/2017 at 16:17          CONCLUSION:  1.  Left r is no evidence of bowel obstruction. A small hiatal hernia is evident. There is contrast in the distal esophagus with mild circumferential distal esophageal wall thickening. A normal caliber appendix is seen without inflammatory manifestations.  Scattered noted subcentimeter hypodensity in the left hepatic lobe is not well-seen on this noncontrast exam. Please refer to prior exam for further recommendations. 6. Lesser incidental findings as above.    A preliminary report was issued by the [facility] te and placing left ureteral stent--problem is stable. Because stone appeared to be radiolucent and appeared to be uric acid, Dr. Nils Medina has patient on potassium citrate and allopurinol.   I will order KUB and renal ultrasound to reconfirm if stone still appea

## 2017-09-09 NOTE — PLAN OF CARE
Jsoe Varghese was admitted last night with hematuria, CBI running at a slow rate, urine is clear. Pt continues to have some pain- dilaudid and norco prn. Family at bedside, involved in care, helping to translate. Beta blocker protocol.  1 unit FFP given, INR no

## 2017-09-09 NOTE — ED PROVIDER NOTES
Patient Seen in: [facility] AND [facility] Emergency Department    History   Patient presents with:  Urinary Symptoms (urologic)    Stated Complaint: urinating blood since yesterday pt is on blood thinners    HPI    Pt is 69 yo M who p/w maurice hematuria ×1 day. Exam    GENERAL: No acute distress, awake and alert  HEENT: MMM, EOMI, PERRL  Neck: supple, non tender  CV: RRR, no murmurs  Resp: CTAB, no wheezes or retractions  Ab: soft, nontender, no distension  Back: no cvat  Extremities: FROM of all extremities, no Course  ------------------------------------------------------------  MDM   Lane catheter placed by RN. Manual irrigation resolved maurice hematuria to clear urine. However given patient's elevated INR, will admit for reversal of Coumadin.   Continuous ronaldo

## 2017-09-10 LAB
BASOPHILS # BLD: 0 K/UL (ref 0–0.2)
BASOPHILS NFR BLD: 1 %
BLOOD TYPE BARCODE: 8400
EOSINOPHIL # BLD: 0.4 K/UL (ref 0–0.7)
EOSINOPHIL NFR BLD: 5 %
ERYTHROCYTE [DISTWIDTH] IN BLOOD BY AUTOMATED COUNT: 14.4 % (ref 11–15)
HCT VFR BLD AUTO: 38.1 % (ref 41–52)
HGB BLD-MCNC: 13 G/DL (ref 13.5–17.5)
INR BLD: 3.5 (ref 0.9–1.2)
LYMPHOCYTES # BLD: 1.4 K/UL (ref 1–4)
LYMPHOCYTES NFR BLD: 18 %
MCH RBC QN AUTO: 28 PG (ref 27–32)
MCHC RBC AUTO-ENTMCNC: 34.2 G/DL (ref 32–37)
MCV RBC AUTO: 81.9 FL (ref 80–100)
MONOCYTES # BLD: 0.8 K/UL (ref 0–1)
MONOCYTES NFR BLD: 10 %
NEUTROPHILS # BLD AUTO: 5.3 K/UL (ref 1.8–7.7)
NEUTROPHILS NFR BLD: 67 %
PLATELET # BLD AUTO: 173 K/UL (ref 140–400)
PMV BLD AUTO: 8.6 FL (ref 7.4–10.3)
PROTHROMBIN TIME: 34.8 SECONDS (ref 11.8–14.5)
RBC # BLD AUTO: 4.66 M/UL (ref 4.5–5.9)
WBC # BLD AUTO: 7.9 K/UL (ref 4–11)

## 2017-09-10 PROCEDURE — 99233 SBSQ HOSP IP/OBS HIGH 50: CPT | Performed by: HOSPITALIST

## 2017-09-10 PROCEDURE — 99232 SBSQ HOSP IP/OBS MODERATE 35: CPT | Performed by: UROLOGY

## 2017-09-10 NOTE — PROGRESS NOTES
[facility]    Progress Note    Delice Epley Patient Status:  Inpatient    1945 MRN [id number]   Location [facility] 4W/SW/SE Attending Nataly España MD   Hosp Day # 1 PCP PHYSICIAN NONSTAFF       Subjective: bedside, as well as 2 daughters over the phone regarding the dangers of not checking blood status as instructed  -We will ask  to verify insurance Coverage for NOAC as possibly safer alternative    History of paroxysmal supraventricular tach nephroureterolithias cyst with ureteral stent placement. Gross hematuria. TECHNIQUE: Ultrasound examination was performed to visualize the kidneys and bladder. FINDINGS:  RIGHT KIDNEY: Measures 10.8 cm in length.  There is normal renal cortical echogenic stent is noted, although exact positioning is difficult to ascertain. 3. Nonspecific, nonobstructive bowel gas pattern with mildly dilated loops of bowel. 4. Lesser incidental findings as above.                 Dmitriy Moura MD  9/10/2017

## 2017-09-10 NOTE — PROGRESS NOTES
Whitney Abreu is a 70year old male.     HPI:   Patient presents with:  Urinary Symptoms (urologic)    COVERING DR. Alexander Perez ;  DR. Fredi Lucero ON MONDAY, 9/11/17    Hematuria/gross hematuria  Started suddenly 9/8/17 5 PM; severity was very severe and u hypertension    • High blood pressure    • PNA (pneumonia)    • Prostate cancer ([facility] [address]       History reviewed. No pertinent surgical history. History reviewed. No pertinent family history.    Social History: Smoking status: Former Smoker hours.    9/10/17 INR 3.5.       Recent Labs   07/24/17  1749 07/25/17  0110 07/25/17  3994 07/26/17  2259 07/27/17  6826 07/28/17  7540 07/28/17  1523 07/29/17  1403 07/30/17  0559 07/31/17  0515 07/31/17  1441 08/02/17  7581 08/03/17  7936 08/07/17  0532 Imaging:  Xr Abdomen (1 View) (cpt=74000)    Result Date: 8/15/2017  PROCEDURE: XR ABDOMEN (1 VIEW) (CPT=74000)  COMPARISON: [facility], CT ABDOMEN + PELVIS (CPT=74176), 8/15/2017, 4:36. INDICATIONS: Left sided renal calculi.   TECHNIQU or pneumothorax. BONES: Mild degenerative changes of the thoracic spine and shoulders are apparent.   Dictated by (CST): Quincy Velasquez MD on 9/05/2017 at 10:30     Approved by (CST): Quincy Velasquez MD on 9/05/2017 at 10:33          CONCLUSION:  1. Millicent Files abdominal viscera for the presence of intraparenchymal pathology is suboptimal in the absence of contrast infusion. LUNG BASES: The heart is normal in size. There is multifocal subsegmental atelectasis or scarring in both lung bases.  Additional peripheral Minimal anterior wedging of multiple lower thoracic vertebral bodies is unchanged. ABDOMINAL WALL: There are moderate fat containing inguinal hernias. OTHER: No free air or fluid is seen in the abdomen or pelvis.    Dictated by (CST): Delphine Morataya MD on hydronephrosis, solid mass, or echogenic, shadowing calculus is evident. LEFT KIDNEY: Measures 10.1 cm in length and demonstrates normal cortical architecture and parenchymal echogenicity. A hyperechogenic left ureteral stent is partially visualized.  There calculus is evident. LEFT KIDNEY: Measures 10.1 cm in length and demonstrates normal cortical architecture and parenchymal echogenicity. A hyperechogenic left ureteral stent is partially visualized.  There is no evidence of hydronephrosis, echogenic, shadow masses or organomegaly. CALCIFICATIONS: There is a 0.6 x 1.1 cm calculus projecting in the region of the left collecting system. This is at approximately the level of the L1-L2 intervertebral disc space.  A clear analog to this finding is not seen on recent these medications. Prostate cancer in the past  External beam radiation at [address] [place] [address] approximately 2003. He denies any symptoms to suggest recurrence of prostate cancer.   He had a PSA in [facility] beginning of 2017 and patient was told that the PSA was normal Continue alfuzosin in  expectation of Lane catheter removal tomorrow morning     3. Maintain three-way Lane catheter for now; continue slow CBI today    4.   Remove Lane catheter 6 AM tomorrow        COVERING DR. Marilou Vogt ;  DR. Bonny Waterman, 9/11/

## 2017-09-10 NOTE — PLAN OF CARE
GENITOURINARY - ADULT    • Absence of urinary retention Progressing        HEMATOLOGIC - ADULT    • Maintains hematologic stability Progressing    • Free from bleeding injury Progressing        Patient/Family Goals    • Patient/Family Long Term Goal Progre

## 2017-09-11 VITALS
TEMPERATURE: 98 F | BODY MASS INDEX: 28.31 KG/M2 | RESPIRATION RATE: 20 BRPM | HEART RATE: 90 BPM | WEIGHT: 202.19 LBS | OXYGEN SATURATION: 96 % | DIASTOLIC BLOOD PRESSURE: 74 MMHG | SYSTOLIC BLOOD PRESSURE: 134 MMHG | HEIGHT: 71 IN

## 2017-09-11 LAB
HGB BLD-MCNC: 13.5 G/DL (ref 13.5–17.5)
INR BLD: 2.4 (ref 0.9–1.2)
PROTHROMBIN TIME: 25.7 SECONDS (ref 11.8–14.5)

## 2017-09-11 PROCEDURE — 99239 HOSP IP/OBS DSCHRG MGMT >30: CPT | Performed by: HOSPITALIST

## 2017-09-11 PROCEDURE — 99232 SBSQ HOSP IP/OBS MODERATE 35: CPT | Performed by: UROLOGY

## 2017-09-11 RX ORDER — ALFUZOSIN HYDROCHLORIDE 10 MG/1
10 TABLET, EXTENDED RELEASE ORAL DAILY
Qty: 30 TABLET | Refills: 1 | Status: SHIPPED | OUTPATIENT
Start: 2017-09-11 | End: 2017-10-09

## 2017-09-11 RX ORDER — PANTOPRAZOLE SODIUM 40 MG/1
40 TABLET, DELAYED RELEASE ORAL
Status: DISCONTINUED | OUTPATIENT
Start: 2017-09-12 | End: 2017-09-11

## 2017-09-11 NOTE — PLAN OF CARE
Rivera Chan is currently resting in bed comfortably, no complaints of pain today. CBI maintained at slow rate, plan for removal in AM. INR remains elevated, hemoglobin stable. No signs of bleeding noted. Pt ambulating with standby assist. Tolerating diet.

## 2017-09-11 NOTE — PLAN OF CARE
Absence of urinary retention Adequate for Discharge      Maintains hematologic stability Adequate for Discharge      Free from bleeding injury Adequate for Discharge      Patient/Family Long Term Goal Adequate for Discharge      Patient/Family Short Term G

## 2017-09-11 NOTE — DISCHARGE SUMMARY
[facility] HOSPITALIST  DISCHARGE SUMMARY     Whitney Abreu Patient Status:  Inpatient    1945 MRN [id number]   Location [facility] 4W/SW/SE Attending No att. providers found   Hosp Day # 2 PCP PHYSICIAN NONSTAFF     Date of Admission prophylactically for the possibility of a urinary tract infection.             Brief Synopsis:   Gross hematuria, severe with clots associated with supratherapeutic INR~5.8  Now resolved  Urine culture negative  S/p ceftriaxone, now off  CBI removed   F/u d is no need for blood checks, she understands that xarelto also can cause bleeding, and it that happen, her father should get medical attention immediately  SW provided pt with coupons for xarelto discounts          Discharge Medication List:     Discharge [address]  [phone]    Schedule an appointment as soon as possible for a visit in 1 week  post hospitalization      Vital signs:  Temp:  [97.6 °F (36.4 °C)-97.9 °F (36.6 °C)] 97.6 °F (36.4 °C)  Pulse:  [65-90] 90  Resp:  [18-20] 20  BP: (119-134)/(67-78) 1

## 2017-09-11 NOTE — PROGRESS NOTES
[address] [facility] Patient Status:  Inpatient    1945 MRN [id number]   Location [facility] 4W/SW/SE Attending Jose Armando Pickett.  [address] Day # 2 PCP PHYSICIAN NONSTAFF       [address] is a 70year old male pat Sulfate  (90 Base) MCG/ACT inhaler 2 puff 2 puff Inhalation Q6H PRN   Pantoprazole Sodium (PROTONIX) 40 mg in Sodium Chloride 0.9 % 10 mL IV push 40 mg Intravenous Daily   Normal Saline Flush 0.9 % injection 10 mL 10 mL Intravenous PRN   Alfuzosin H placement. Gross hematuria. TECHNIQUE: Ultrasound examination was performed to visualize the kidneys and bladder. FINDINGS:  RIGHT KIDNEY: Measures 10.8 cm in length. There is normal renal cortical echogenicity.  No hydronephrosis, solid mass, or echogen difficult to ascertain. 3. Nonspecific, nonobstructive bowel gas pattern with mildly dilated loops of bowel. 4. Lesser incidental findings as above.                 PHYSICAL EXAM: No significant change in physical exam from the time of consultation normal

## 2017-09-11 NOTE — DISCHARGE PLANNING
9/11CM-The Patient's MD informed this Writer that the Patient will need to be on Xarelto till possibly February. This Writer informed the Patient's MD that after the trial the  has a discount program that he can sign up for.  The Patient's RN in

## 2017-09-26 ENCOUNTER — LAB ENCOUNTER (OUTPATIENT)
Dept: LAB | Age: 72
End: 2017-09-26
Attending: FAMILY MEDICINE
Payer: MEDICARE

## 2017-09-26 DIAGNOSIS — N28.9 RENAL INSUFFICIENCY: ICD-10-CM

## 2017-09-26 DIAGNOSIS — Z85.46 HISTORY OF PROSTATE CANCER: ICD-10-CM

## 2017-09-26 LAB
ALBUMIN SERPL BCP-MCNC: 4.2 G/DL (ref 3.5–4.8)
ALBUMIN/GLOB SERPL: 1.2 {RATIO} (ref 1–2)
ALP SERPL-CCNC: 58 U/L (ref 32–100)
ALT SERPL-CCNC: 19 U/L (ref 17–63)
ANION GAP SERPL CALC-SCNC: 8 MMOL/L (ref 0–18)
AST SERPL-CCNC: 20 U/L (ref 15–41)
BASOPHILS # BLD: 0 K/UL (ref 0–0.2)
BASOPHILS NFR BLD: 1 %
BILIRUB SERPL-MCNC: 0.7 MG/DL (ref 0.3–1.2)
BUN SERPL-MCNC: 21 MG/DL (ref 8–20)
BUN/CREAT SERPL: 15.7 (ref 10–20)
CALCIUM SERPL-MCNC: 9.8 MG/DL (ref 8.5–10.5)
CHLORIDE SERPL-SCNC: 108 MMOL/L (ref 95–110)
CO2 SERPL-SCNC: 23 MMOL/L (ref 22–32)
CREAT SERPL-MCNC: 1.34 MG/DL (ref 0.5–1.5)
EOSINOPHIL # BLD: 0.3 K/UL (ref 0–0.7)
EOSINOPHIL NFR BLD: 4 %
ERYTHROCYTE [DISTWIDTH] IN BLOOD BY AUTOMATED COUNT: 14.4 % (ref 11–15)
GLOBULIN PLAS-MCNC: 3.6 G/DL (ref 2.5–3.7)
GLUCOSE SERPL-MCNC: 89 MG/DL (ref 70–99)
HCT VFR BLD AUTO: 41.1 % (ref 41–52)
HGB BLD-MCNC: 13.9 G/DL (ref 13.5–17.5)
LYMPHOCYTES # BLD: 1.2 K/UL (ref 1–4)
LYMPHOCYTES NFR BLD: 19 %
MCH RBC QN AUTO: 28 PG (ref 27–32)
MCHC RBC AUTO-ENTMCNC: 33.9 G/DL (ref 32–37)
MCV RBC AUTO: 82.6 FL (ref 80–100)
MONOCYTES # BLD: 0.6 K/UL (ref 0–1)
MONOCYTES NFR BLD: 9 %
NEUTROPHILS # BLD AUTO: 4.2 K/UL (ref 1.8–7.7)
NEUTROPHILS NFR BLD: 67 %
OSMOLALITY UR CALC.SUM OF ELEC: 290 MOSM/KG (ref 275–295)
PLATELET # BLD AUTO: 175 K/UL (ref 140–400)
PMV BLD AUTO: 9.3 FL (ref 7.4–10.3)
POTASSIUM SERPL-SCNC: 4 MMOL/L (ref 3.3–5.1)
PROT SERPL-MCNC: 7.8 G/DL (ref 5.9–8.4)
PSA FREE MFR SERPL: 25 %
PSA FREE SERPL-MCNC: 0.05 NG/ML
PSA SERPL-MCNC: 0.2 NG/ML (ref 0–4)
RBC # BLD AUTO: 4.97 M/UL (ref 4.5–5.9)
SODIUM SERPL-SCNC: 139 MMOL/L (ref 136–144)
WBC # BLD AUTO: 6.2 K/UL (ref 4–11)

## 2017-09-26 PROCEDURE — 85025 COMPLETE CBC W/AUTO DIFF WBC: CPT

## 2017-09-26 PROCEDURE — 84153 ASSAY OF PSA TOTAL: CPT

## 2017-09-26 PROCEDURE — 84154 ASSAY OF PSA FREE: CPT

## 2017-09-26 PROCEDURE — 80053 COMPREHEN METABOLIC PANEL: CPT

## 2017-09-26 PROCEDURE — 36415 COLL VENOUS BLD VENIPUNCTURE: CPT

## 2017-09-27 ENCOUNTER — OFFICE VISIT (OUTPATIENT)
Dept: SURGERY | Facility: CLINIC | Age: 72
End: 2017-09-27

## 2017-09-27 VITALS
TEMPERATURE: 98 F | HEIGHT: 70 IN | SYSTOLIC BLOOD PRESSURE: 150 MMHG | DIASTOLIC BLOOD PRESSURE: 80 MMHG | BODY MASS INDEX: 29.06 KG/M2 | WEIGHT: 203 LBS | HEART RATE: 78 BPM

## 2017-09-27 DIAGNOSIS — N20.0 RENAL CALCULI: Primary | ICD-10-CM

## 2017-09-27 PROCEDURE — 99214 OFFICE O/P EST MOD 30 MIN: CPT | Performed by: UROLOGY

## 2017-09-27 PROCEDURE — G0463 HOSPITAL OUTPT CLINIC VISIT: HCPCS | Performed by: UROLOGY

## 2017-09-27 NOTE — PROGRESS NOTES
[facility] Patient Status:  Outpatient    1945 MRN [id number]   Location [facility] Attending Devendra Whitfield.  [address] Day # 0 PCP [facility] is a  ER 10 MEQ (1080 MG) Oral Tab CR Take 1 tablet (10 mEq total) by mouth 3 (three) times daily with meals.  Disp: 90 tablet Rfl: 0   Albuterol Sulfate HFA (PROAIR HFA) 108 (90 Base) MCG/ACT Inhalation Aero Soln Inhale 2 puffs into the lungs every 6 (six) hours left retrograde pyelogram stone manipulation placement of left double-J stent.   Patient had recent repeat admission to the hospital early September 2017 when he was on anticoagulation had super therapeutic INR of above 9 and anticoagulation was temporarily

## 2017-09-28 ENCOUNTER — TELEPHONE (OUTPATIENT)
Dept: FAMILY MEDICINE CLINIC | Facility: CLINIC | Age: 72
End: 2017-09-28

## 2017-09-28 DIAGNOSIS — E87.6 HYPOKALEMIA: Primary | ICD-10-CM

## 2017-09-28 RX ORDER — POTASSIUM CITRATE 10 MEQ/1
10 TABLET, EXTENDED RELEASE ORAL
Qty: 90 TABLET | Refills: 0 | Status: CANCELLED | OUTPATIENT
Start: 2017-09-28

## 2017-09-28 NOTE — TELEPHONE ENCOUNTER
Hold the potassium and recheck a BMP in 2 weeks diagnosis hypokalemia. Then we can see if he can stay off of it.

## 2017-09-28 NOTE — TELEPHONE ENCOUNTER
Hypertensive Medications  Protocol Criteria:  · Appointment scheduled in the past 6 months or in the next 3 months  · BMP or CMP in the past 12 months  · Creatinine result < 2  Recent Outpatient Visits            Yesterday Renal calculi    [place]

## 2017-09-28 NOTE — TELEPHONE ENCOUNTER
To [facility],    Please see message below. Medication pended. Please advise.      Patient states he has no more potassium and he is wondering if he needs a refill ot if meds should be discontinued ph [phone]  Please advs

## 2017-09-28 NOTE — TELEPHONE ENCOUNTER
Patient states he has no more potassium and he is wondering if he needs a refill ot if meds should be discontinued ph [phone]  Please advs

## 2017-09-29 NOTE — TELEPHONE ENCOUNTER
Spoke with patient (identified name and ) ,verbal consent given to talk to his son Nabeel. Advised patient's son about Dr Valentin Mendiola note. Patient's son verbalized understanding. Lab generated.         Hold the potassium and recheck a BMP in 2 weeks diagnosi

## 2017-10-02 ENCOUNTER — OFFICE VISIT (OUTPATIENT)
Dept: FAMILY MEDICINE CLINIC | Facility: CLINIC | Age: 72
End: 2017-10-02

## 2017-10-02 VITALS
HEART RATE: 86 BPM | SYSTOLIC BLOOD PRESSURE: 142 MMHG | WEIGHT: 208 LBS | BODY MASS INDEX: 30 KG/M2 | DIASTOLIC BLOOD PRESSURE: 79 MMHG

## 2017-10-02 DIAGNOSIS — N20.1 URETEROLITHIASIS: ICD-10-CM

## 2017-10-02 DIAGNOSIS — I47.1 PAROXYSMAL SVT (SUPRAVENTRICULAR TACHYCARDIA) (HCC): ICD-10-CM

## 2017-10-02 DIAGNOSIS — Z23 NEED FOR VACCINATION: ICD-10-CM

## 2017-10-02 DIAGNOSIS — Z87.09 HISTORY OF PULMONARY INFARCTION: ICD-10-CM

## 2017-10-02 DIAGNOSIS — I10 ESSENTIAL HYPERTENSION: ICD-10-CM

## 2017-10-02 DIAGNOSIS — N28.9 RENAL INSUFFICIENCY: Primary | ICD-10-CM

## 2017-10-02 PROCEDURE — 99215 OFFICE O/P EST HI 40 MIN: CPT | Performed by: FAMILY MEDICINE

## 2017-10-02 PROCEDURE — G0463 HOSPITAL OUTPT CLINIC VISIT: HCPCS | Performed by: FAMILY MEDICINE

## 2017-10-02 PROCEDURE — 90653 IIV ADJUVANT VACCINE IM: CPT | Performed by: FAMILY MEDICINE

## 2017-10-02 PROCEDURE — G0008 ADMIN INFLUENZA VIRUS VAC: HCPCS | Performed by: FAMILY MEDICINE

## 2017-10-02 RX ORDER — AMLODIPINE AND VALSARTAN 5; 160 MG/1; MG/1
1 TABLET ORAL DAILY
Qty: 90 TABLET | Refills: 3 | Status: SHIPPED | OUTPATIENT
Start: 2017-10-02 | End: 2017-10-09

## 2017-10-02 NOTE — PROGRESS NOTES
Patient has a history of bronchial pneumonia complicated by pulmonary embolism and renal lithiasis. Still has renal lithiasis based on KUB performed last month. Ultrasound of the kidneys showed no hydronephrosis.   Patient is still on Xarelto as he was in nodularity  Posterior pharynx was normal.  Palate was normal  Lungs were clear bilaterally there was no wheezes, rhonchi or rales  Abdomen was soft non tender non distended bowel sounds were present  Heart was regular rate and rhythm normal S1-S2 no S3 no

## 2017-10-07 ENCOUNTER — NURSE TRIAGE (OUTPATIENT)
Dept: FAMILY MEDICINE CLINIC | Facility: CLINIC | Age: 72
End: 2017-10-07

## 2017-10-07 NOTE — TELEPHONE ENCOUNTER
Action Requested: Summary for Provider     []  Critical Lab, Recommendations Needed  [x] Need Additional Advice  []   FYI    []   Need Orders  [] Need Medications Sent to Pharmacy  []  Other     SUMMARY: ADVICE NEEDED, poss med effect, low BP, dizziness

## 2017-10-07 NOTE — TELEPHONE ENCOUNTER
Spoke to patient's son, verified  - advised son of Dr Anirudh Medina note, son verbalized understanding. Booked patient for office visit with Dr Ranelle Lanes for Monday 11:40.

## 2017-10-07 NOTE — TELEPHONE ENCOUNTER
Can hold new medication for now and to resume old blood pressure medication- amlodipine; To monitor blood pressure; To follow up with Dr Bueno Friend on Monday to discuss treatment.

## 2017-10-09 ENCOUNTER — OFFICE VISIT (OUTPATIENT)
Dept: FAMILY MEDICINE CLINIC | Facility: CLINIC | Age: 72
End: 2017-10-09

## 2017-10-09 VITALS
HEART RATE: 76 BPM | WEIGHT: 208 LBS | SYSTOLIC BLOOD PRESSURE: 131 MMHG | BODY MASS INDEX: 30 KG/M2 | DIASTOLIC BLOOD PRESSURE: 71 MMHG

## 2017-10-09 DIAGNOSIS — E87.6 HYPOKALEMIA: ICD-10-CM

## 2017-10-09 DIAGNOSIS — N28.9 RENAL INSUFFICIENCY: Primary | ICD-10-CM

## 2017-10-09 DIAGNOSIS — I10 ESSENTIAL HYPERTENSION: ICD-10-CM

## 2017-10-09 DIAGNOSIS — Z87.09 HISTORY OF PULMONARY INFARCTION: ICD-10-CM

## 2017-10-09 DIAGNOSIS — N20.1 URETEROLITHIASIS: ICD-10-CM

## 2017-10-09 DIAGNOSIS — I47.1 PAROXYSMAL SVT (SUPRAVENTRICULAR TACHYCARDIA) (HCC): ICD-10-CM

## 2017-10-09 PROCEDURE — G0463 HOSPITAL OUTPT CLINIC VISIT: HCPCS | Performed by: FAMILY MEDICINE

## 2017-10-09 PROCEDURE — 99215 OFFICE O/P EST HI 40 MIN: CPT | Performed by: FAMILY MEDICINE

## 2017-10-09 RX ORDER — ALFUZOSIN HYDROCHLORIDE 10 MG/1
10 TABLET, EXTENDED RELEASE ORAL DAILY
Qty: 90 TABLET | Refills: 1 | Status: SHIPPED | OUTPATIENT
Start: 2017-10-09 | End: 2020-01-14 | Stop reason: ALTCHOICE

## 2017-10-09 RX ORDER — ALLOPURINOL 100 MG/1
100 TABLET ORAL DAILY
Qty: 90 TABLET | Refills: 1 | Status: SHIPPED | OUTPATIENT
Start: 2017-10-09 | End: 2020-01-14

## 2017-10-09 RX ORDER — LOSARTAN POTASSIUM 50 MG/1
50 TABLET ORAL DAILY
Qty: 90 TABLET | Refills: 3 | Status: SHIPPED | OUTPATIENT
Start: 2017-10-09 | End: 2017-11-28

## 2017-10-09 NOTE — PROGRESS NOTES
Patient did not start amlodipine as directed by Dr. Olga Dukes. He stopped the medication the valsartan amlodipine because he was getting dizziness. His blood pressure was good on the medication went down to 115. But patient had dizziness is resolved.   Was afr wishes to go back to [facility] I told him that he should be getting his kidney stone taken care of but the patient wishes to [facility] my concern about increased risk of blood clots especially with plane travel will need to get up frequently and

## 2017-10-10 ENCOUNTER — TELEPHONE (OUTPATIENT)
Dept: FAMILY MEDICINE CLINIC | Facility: CLINIC | Age: 72
End: 2017-10-10

## 2017-10-10 ENCOUNTER — OFFICE VISIT (OUTPATIENT)
Dept: CARDIOLOGY CLINIC | Facility: CLINIC | Age: 72
End: 2017-10-10

## 2017-10-10 VITALS
WEIGHT: 211 LBS | BODY MASS INDEX: 30 KG/M2 | SYSTOLIC BLOOD PRESSURE: 120 MMHG | DIASTOLIC BLOOD PRESSURE: 76 MMHG | HEART RATE: 56 BPM

## 2017-10-10 DIAGNOSIS — Z87.09 HISTORY OF PULMONARY INFARCTION: ICD-10-CM

## 2017-10-10 DIAGNOSIS — I10 ESSENTIAL HYPERTENSION: Primary | ICD-10-CM

## 2017-10-10 PROCEDURE — 93010 ELECTROCARDIOGRAM REPORT: CPT | Performed by: INTERNAL MEDICINE

## 2017-10-10 PROCEDURE — 99204 OFFICE O/P NEW MOD 45 MIN: CPT | Performed by: INTERNAL MEDICINE

## 2017-10-10 PROCEDURE — 93005 ELECTROCARDIOGRAM TRACING: CPT | Performed by: INTERNAL MEDICINE

## 2017-10-10 PROCEDURE — G0463 HOSPITAL OUTPT CLINIC VISIT: HCPCS | Performed by: INTERNAL MEDICINE

## 2017-10-10 NOTE — H&P
Aggie Perera is a 70year old male. HPI:   This is a pleasant 25-year-old with hospitalizations recently for kidney stones with subsequent pulmonary embolus requiring blood thinners.   The records there is a history of SVT which the patient did not reca headaches  All other systems reviewed and negative.   EXAM:   /76   Pulse 56   Wt 211 lb (95.7 kg)   BMI 30.28 kg/m²   GENERAL: well developed, well nourished,in no apparent distress  SKIN: warm ,dry,no rash  HEENT: atraumatic, normocephalic,ears and

## 2017-10-10 NOTE — TELEPHONE ENCOUNTER
Pt's son called in stating pt has not picked up rx below because it is too costly for the pt. Pt's son asking if the medication can be switched to something more cost friendly to the patient/something that might be covered better under the insurance?   Ple

## 2017-10-11 NOTE — TELEPHONE ENCOUNTER
Spoke to Nam Maldonado from pharmacy to get alternatives for medications. States reason medication is not covered is d/t pt not having insurance in system.  States pt has to take insurance card to pharmacy to have put on file then they can see if rx is covered or

## 2017-10-11 NOTE — TELEPHONE ENCOUNTER
Indian SPEAKER -  Wife states there are unable to find the medication coverage. Pt states please advise on alternative as xarelto is over $1000. Pt unsure if he has coverage for medications.

## 2017-10-11 NOTE — TELEPHONE ENCOUNTER
Dr. Meneses 16, please see message below. Pt is unable to find insurance card to take to pharmacy and is requesting alternative for Xarelto. Please advise. Thank you.

## 2017-10-12 NOTE — TELEPHONE ENCOUNTER
I called betty in [facility] and spoke to ERENDIRA pharmacist and she stated that pt has no insurance on file she will call him now and get back to us.

## 2017-10-12 NOTE — TELEPHONE ENCOUNTER
Please call pharmacy find out what is the problem  Has medicare but medicaid for supplement. Find out what similar medication is coverd.   Need to get this info this am.

## 2017-10-13 NOTE — TELEPHONE ENCOUNTER
JEWELS Nguyen  I spoke to the pharmacist to know about the status of pt medication she stated that pt comes in only with Medicaid insurance and they inform him he needs to bring the Medicare plan D plastic card . She will have her technician call him again tod

## 2017-10-30 ENCOUNTER — TELEPHONE (OUTPATIENT)
Dept: PULMONOLOGY | Facility: CLINIC | Age: 72
End: 2017-10-30

## 2017-11-27 ENCOUNTER — LAB ENCOUNTER (OUTPATIENT)
Dept: LAB | Age: 72
End: 2017-11-27
Attending: FAMILY MEDICINE
Payer: MEDICARE

## 2017-11-27 DIAGNOSIS — N28.9 RENAL INSUFFICIENCY: ICD-10-CM

## 2017-11-27 DIAGNOSIS — I10 ESSENTIAL HYPERTENSION: ICD-10-CM

## 2017-11-27 PROCEDURE — 36415 COLL VENOUS BLD VENIPUNCTURE: CPT

## 2017-11-27 PROCEDURE — 80061 LIPID PANEL: CPT

## 2017-11-27 PROCEDURE — 80053 COMPREHEN METABOLIC PANEL: CPT

## 2017-11-27 PROCEDURE — 85025 COMPLETE CBC W/AUTO DIFF WBC: CPT

## 2017-11-28 ENCOUNTER — OFFICE VISIT (OUTPATIENT)
Dept: FAMILY MEDICINE CLINIC | Facility: CLINIC | Age: 72
End: 2017-11-28

## 2017-11-28 VITALS
DIASTOLIC BLOOD PRESSURE: 70 MMHG | BODY MASS INDEX: 30.21 KG/M2 | WEIGHT: 211 LBS | HEART RATE: 78 BPM | HEIGHT: 70 IN | TEMPERATURE: 97 F | SYSTOLIC BLOOD PRESSURE: 148 MMHG

## 2017-11-28 DIAGNOSIS — N28.9 RENAL INSUFFICIENCY: Primary | ICD-10-CM

## 2017-11-28 DIAGNOSIS — L97.929 VARICOSE VEINS OF LEFT LOWER EXTREMITY WITH ULCER AND INFLAMMATION (HCC): ICD-10-CM

## 2017-11-28 DIAGNOSIS — I10 ESSENTIAL HYPERTENSION: ICD-10-CM

## 2017-11-28 DIAGNOSIS — Z86.711 HISTORY OF PULMONARY EMBOLISM: ICD-10-CM

## 2017-11-28 DIAGNOSIS — N20.1 URETEROLITHIASIS: ICD-10-CM

## 2017-11-28 DIAGNOSIS — I83.229 VARICOSE VEINS OF LEFT LOWER EXTREMITY WITH ULCER AND INFLAMMATION (HCC): ICD-10-CM

## 2017-11-28 DIAGNOSIS — I47.1 PAROXYSMAL SVT (SUPRAVENTRICULAR TACHYCARDIA) (HCC): ICD-10-CM

## 2017-11-28 DIAGNOSIS — Z85.46 HISTORY OF PROSTATE CANCER: ICD-10-CM

## 2017-11-28 PROBLEM — I47.10 PAROXYSMAL SVT (SUPRAVENTRICULAR TACHYCARDIA): Status: ACTIVE | Noted: 2017-11-28

## 2017-11-28 PROBLEM — I83.93 VARICOSE VEINS OF BOTH LOWER EXTREMITIES WITHOUT ULCER OR INFLAMMATION: Status: RESOLVED | Noted: 2017-09-05 | Resolved: 2017-11-28

## 2017-11-28 PROBLEM — I47.10 PAROXYSMAL SVT (SUPRAVENTRICULAR TACHYCARDIA) (HCC): Status: ACTIVE | Noted: 2017-11-28

## 2017-11-28 PROCEDURE — 99215 OFFICE O/P EST HI 40 MIN: CPT | Performed by: FAMILY MEDICINE

## 2017-11-28 PROCEDURE — G0463 HOSPITAL OUTPT CLINIC VISIT: HCPCS | Performed by: FAMILY MEDICINE

## 2017-11-28 RX ORDER — LOSARTAN POTASSIUM 100 MG/1
100 TABLET ORAL DAILY
Qty: 90 TABLET | Refills: 3 | Status: SHIPPED | OUTPATIENT
Start: 2017-11-28 | End: 2020-01-14

## 2017-11-28 NOTE — PROGRESS NOTES
Patient presents today in follow-up. He has a history of pulmonary embolism currently on Xarelto has not seen oncology recently history of SVT which is silent for a bit of time.   History of renal lithiasis which currently still has the stone he has had ma were no murmurs appreciated  Lymphatic: There were no anterior or posterior cervical adenopathy or supraclavicular adenopathy palpated  Extremities there was no cyanosis or edema  Pulses the dorsalis pedis pulse the popliteal pulse and the carotid pulses w

## 2017-11-29 ENCOUNTER — TELEPHONE (OUTPATIENT)
Dept: FAMILY MEDICINE CLINIC | Facility: CLINIC | Age: 72
End: 2017-11-29

## 2017-11-29 NOTE — TELEPHONE ENCOUNTER
Renée from the [facility] calling to give FYI to Dr. Cecilia Ward that per the family's request pt will see Dr. Zbigniew Dominguez in February. .please advise

## 2017-11-30 ENCOUNTER — TELEPHONE (OUTPATIENT)
Dept: HEMATOLOGY/ONCOLOGY | Facility: HOSPITAL | Age: 72
End: 2017-11-30

## 2017-11-30 NOTE — TELEPHONE ENCOUNTER
[facility]. [facility] - Dr. Guillermo Little office states patient was offered sooner appointment but insisted that he be seen in February so they will not be able to advise on the Xarelto. They ask that you call the pt and advise that he take the sooner appt because pt was not willing to.

## 2017-11-30 NOTE — TELEPHONE ENCOUNTER
office call asking about this patient consult, the granddaughter had questions about stopping the medication. Per the family request the patient was booked for [facility] 2018. We have earlier appointments than that time.  I let the office know th

## 2017-11-30 NOTE — TELEPHONE ENCOUNTER
I do not want to stop the medication the Xarelto, until cleared by the hematologist.  There are serious consequences to continue to take medication as medication does have side effects. I would insist that he see the hematologist soon so we can determine whether or not he needs to continue the medication. Patient is not to discontinue the medication unless told to by physician.

## 2018-01-04 ENCOUNTER — APPOINTMENT (OUTPATIENT)
Dept: HEMATOLOGY/ONCOLOGY | Facility: HOSPITAL | Age: 73
End: 2018-01-04
Attending: INTERNAL MEDICINE
Payer: MEDICARE

## 2018-07-30 NOTE — CM/SW NOTE
+ BPCI. Patient is a [address] readmission previously enrolled on 8/7/17 under () with 81 days left in the BPCI episode which will end on 11/4/17. Met with patient and family to provide Palliative Care support  Daughter and son-in-law present as well and requesting update from  MD tomorrow at 36 AM   PC team aware  [facility] document presented and reviewed with patient and family  Patient and family are interested in completing document and will do so together  Patient is planning a transfer to a skilled nursing facility upon discharge and family supports this plan  Supportive counseling provided  Will continue to be available to provide support

## 2018-11-23 NOTE — TELEPHONE ENCOUNTER
Patient was due for Chest CT 10/2017. Has not been performed. Letter sent 7/5/18. Called the patient with [phone]   ID [id number]. Tried mobile number twice. Person who answered said we had the wrong number. Tried home number.  LDM that he is overdue

## 2018-12-05 PROBLEM — Z87.09 HISTORY OF PULMONARY INFARCTION: Status: RESOLVED | Noted: 2017-09-05 | Resolved: 2018-12-05

## 2019-12-31 ENCOUNTER — OFFICE VISIT (OUTPATIENT)
Dept: FAMILY MEDICINE CLINIC | Facility: CLINIC | Age: 74
End: 2019-12-31
Payer: MEDICARE

## 2019-12-31 VITALS
HEIGHT: 70 IN | HEART RATE: 71 BPM | SYSTOLIC BLOOD PRESSURE: 147 MMHG | WEIGHT: 198 LBS | BODY MASS INDEX: 28.35 KG/M2 | DIASTOLIC BLOOD PRESSURE: 82 MMHG

## 2019-12-31 DIAGNOSIS — K59.1 FUNCTIONAL DIARRHEA: ICD-10-CM

## 2019-12-31 DIAGNOSIS — R10.84 GENERALIZED ABDOMINAL PAIN: ICD-10-CM

## 2019-12-31 DIAGNOSIS — I10 ESSENTIAL HYPERTENSION: Primary | ICD-10-CM

## 2019-12-31 DIAGNOSIS — R63.0 LOSS OF APPETITE: ICD-10-CM

## 2019-12-31 PROCEDURE — 99213 OFFICE O/P EST LOW 20 MIN: CPT | Performed by: NURSE PRACTITIONER

## 2019-12-31 PROCEDURE — G0463 HOSPITAL OUTPT CLINIC VISIT: HCPCS | Performed by: NURSE PRACTITIONER

## 2019-12-31 RX ORDER — LOSARTAN POTASSIUM 100 MG/1
100 TABLET ORAL DAILY
Qty: 30 TABLET | Refills: 0 | Status: SHIPPED | OUTPATIENT
Start: 2019-12-31 | End: 2020-01-14

## 2019-12-31 RX ORDER — DICYCLOMINE HYDROCHLORIDE 10 MG/1
10 CAPSULE ORAL
Qty: 120 CAPSULE | Refills: 1 | Status: SHIPPED | OUTPATIENT
Start: 2019-12-31 | End: 2020-01-14

## 2019-12-31 RX ORDER — METOCLOPRAMIDE 10 MG/1
10 TABLET ORAL
COMMUNITY
End: 2020-01-14 | Stop reason: ALTCHOICE

## 2019-12-31 RX ORDER — TAMSULOSIN HYDROCHLORIDE 0.4 MG/1
CAPSULE ORAL DAILY
COMMUNITY
End: 2020-01-14

## 2019-12-31 NOTE — PROGRESS NOTES
HPI    Patient presents for stomach pain that has been present for the past month. Feels like he is losing a lot of weight recently. Has diarrhea in the morning when he wakes up and passes as the day goes on. Has a poor appetite lately.   Last colonos Medical: Not on file        Non-medical: Not on file    Tobacco Use      Smoking status: Former Smoker        Packs/day: 1.00        Years: 30.00        Pack years: 30        Types: Cigarettes        Quit date: 1987        Years since quittin.3 taking: Reported on 12/31/2019 ) 90 tablet 0   • Alfuzosin HCl ER 10 MG Oral Tablet 24 Hr Take 1 tablet (10 mg total) by mouth daily.  (Patient not taking: Reported on 12/31/2019 ) 90 tablet 1       Allergies:  No Known Allergies    Physical Exam   Nursing

## 2019-12-31 NOTE — PATIENT INSTRUCTIONS
Dolor Abdominal    El dolor abdominal es dolor en el vientre o en la jonh del intestino. Todo el randy tiene christopher tipo de dolor de vez en cuando. En muchos casos el dolor desaparece por sí solo.  Robson en ciertas ocasiones el dolor abdominal puede ser cons Ciertas causas de [employer], asha promise apendicitis o promise obstrucción intestinal, requieren tratamiento urgente. Otros problemas pueden tratarse mediante descanso, consumo de líquidos o medicamentos.  Tom proveedor de [employer] dará instrucciones kasi · Deonna menos alcohol y coma menos de esos alimentos que aumentan velazquez acidez estomacal.  · Pierda el exceso de [place]. · Termine de comer asha mínimo 2 horas antes de acostarse. · Eleve un poco la cabecera de velazquez cama.   Date Last Reviewed: 7/1/2016  © 2000-20 La diarrea puede deberse a la deshidratación. [place] es la pérdida de demasiada agua y minerales de velazquez cuerpo. Cuando esto sucede, se deben recuperar los líquidos del cuerpo.  Para recuperarlos, puede logan soluciones de rehidratación oral. Las soluciones de · Use un termómetro para comida cuando cocine. Cocine el manda al menos hasta los 165 °F (74 °C). Cocine la carne [place] (de res o vacuna, de ramírez vernon, ron) [employer] 160 °F (71 °C) asha mínimo.  Cocine la carne fresca de res o [place], de Aleksandra, · Limite el consumo de grasas a menos de 15 gramos al día. No coma eliazar, [facility] york, kimberly, [facility], [facility] Lilli, ye de almae, vanessa jimenez, [place] de [place] (cacahuate), carne de res, carne de ave ni pescados. · Coma poca fibra.  No coma veg © 7139-8315 [address] [facility], [facility]. Todos los derechos reservados. Esta información no pretende sustituir la atención médica profesional. Sólo velazquez médico puede diagnosticar y tratar un problema de jorge.

## 2020-01-10 ENCOUNTER — LAB ENCOUNTER (OUTPATIENT)
Dept: LAB | Facility: HOSPITAL | Age: 75
End: 2020-01-10
Attending: PHYSICIAN ASSISTANT
Payer: MEDICARE

## 2020-01-10 ENCOUNTER — TELEPHONE (OUTPATIENT)
Dept: FAMILY MEDICINE CLINIC | Facility: CLINIC | Age: 75
End: 2020-01-10

## 2020-01-10 ENCOUNTER — OFFICE VISIT (OUTPATIENT)
Dept: GASTROENTEROLOGY | Facility: CLINIC | Age: 75
End: 2020-01-10
Payer: MEDICARE

## 2020-01-10 VITALS
WEIGHT: 195 LBS | BODY MASS INDEX: 27.92 KG/M2 | SYSTOLIC BLOOD PRESSURE: 142 MMHG | DIASTOLIC BLOOD PRESSURE: 78 MMHG | HEIGHT: 70 IN | HEART RATE: 66 BPM

## 2020-01-10 DIAGNOSIS — R63.4 UNINTENTIONAL WEIGHT LOSS: Primary | ICD-10-CM

## 2020-01-10 DIAGNOSIS — R19.4 CHANGE IN BOWEL HABITS: ICD-10-CM

## 2020-01-10 DIAGNOSIS — R19.7 DIARRHEA, UNSPECIFIED TYPE: ICD-10-CM

## 2020-01-10 DIAGNOSIS — R10.9 ABDOMINAL CRAMPING: ICD-10-CM

## 2020-01-10 DIAGNOSIS — Z85.46 HISTORY OF PROSTATE CANCER: ICD-10-CM

## 2020-01-10 PROCEDURE — 87272 CRYPTOSPORIDIUM AG IF: CPT

## 2020-01-10 PROCEDURE — 87046 STOOL CULTR AEROBIC BACT EA: CPT

## 2020-01-10 PROCEDURE — 99204 OFFICE O/P NEW MOD 45 MIN: CPT | Performed by: PHYSICIAN ASSISTANT

## 2020-01-10 PROCEDURE — 87493 C DIFF AMPLIFIED PROBE: CPT

## 2020-01-10 PROCEDURE — 87427 SHIGA-LIKE TOXIN AG IA: CPT

## 2020-01-10 PROCEDURE — 87329 GIARDIA AG IA: CPT

## 2020-01-10 PROCEDURE — G0463 HOSPITAL OUTPT CLINIC VISIT: HCPCS | Performed by: PHYSICIAN ASSISTANT

## 2020-01-10 PROCEDURE — 87045 FECES CULTURE AEROBIC BACT: CPT

## 2020-01-10 RX ORDER — POLYETHYLENE GLYCOL 3350, SODIUM CHLORIDE, SODIUM BICARBONATE, POTASSIUM CHLORIDE 420; 11.2; 5.72; 1.48 G/4L; G/4L; G/4L; G/4L
POWDER, FOR SOLUTION ORAL
Qty: 1 BOTTLE | Refills: 0 | Status: SHIPPED | OUTPATIENT
Start: 2020-01-10 | End: 2020-01-14 | Stop reason: ALTCHOICE

## 2020-01-10 RX ORDER — OMEPRAZOLE 40 MG/1
40 CAPSULE, DELAYED RELEASE ORAL DAILY
Qty: 90 CAPSULE | Refills: 0 | Status: CANCELLED | OUTPATIENT
Start: 2020-01-10

## 2020-01-10 NOTE — H&P
[facility] [address] Gastroenterology                                                                                                  Clinic History and Physical     Pa he was experiencing acid reflux generally post-prandially and if he ate too much however this has since resolved without the use of any antacids; at that time, he was experiencing abdominal bloating worse with constipation   - with resolution of constipati by mouth daily. • losartan 100 MG Oral Tab Take 1 tablet (100 mg total) by mouth daily. 30 tablet 0   • losartan 100 MG Oral Tab Take 1 tablet (100 mg total) by mouth daily.  90 tablet 3   • allopurinol 100 MG Oral Tab Take 1 tablet (100 mg total) by mo and interactive, and patient is having movements of all 4 extremities   Psych: calm, appropriate    Nursing notes and vitals reviewed. Labs/Imaging:     Patient's labs and imaging were reviewed and discussed with patient today.       ASSESSMENT/PLAN: medications x 7 days prior to the procedure(s)    ** If MAC @ [facility] or IV twilight - continue all medications as prescribed unless indicated by (+)  - CT A/P  - stool tests - if negative, OK for imodium   - see above  - RTO after endoscopic evaluations    EG

## 2020-01-10 NOTE — TELEPHONE ENCOUNTER
Attempted to call daughter. Daughter unavailable. Mailbox full unable to leave message. Called home number on file and left a message. Mobile number is invalid.

## 2020-01-10 NOTE — PATIENT INSTRUCTIONS
1. Schedule colonoscopy and upper endoscopy with MAC sedation @ [facility] or [facility] with Dr. Keyonna Jones - random biopsies     2.  bowel prep from pharmacy - I have prescribed Trilyte split dose preparation    3.  Medication Changes:  + OBTAIN RE

## 2020-01-10 NOTE — TELEPHONE ENCOUNTER
Pt phoned with daughter and gave verbal permission to speak with Daughter/Patience. Per daughter pt was seen in the office by Dr. Rober Hutchins and was told that she would send Dr. Juan Daniel Young a note requesting for the patient to be put back on xarelto.  Dr. Tae Fernandez

## 2020-01-11 LAB
C DIFF TOX B STL QL: NEGATIVE
CRYPTOSP AG STL QL IA: NEGATIVE
G LAMBLIA AG STL QL IA: NEGATIVE

## 2020-01-11 NOTE — TELEPHONE ENCOUNTER
Advised daughter Cedrick Marsh of Dr. Abbi Ross note. Daughter verbalized understanding and patient has an appointment with Dr Eva Burgos on 1/14/2020.

## 2020-01-13 ENCOUNTER — TELEPHONE (OUTPATIENT)
Dept: GASTROENTEROLOGY | Facility: CLINIC | Age: 75
End: 2020-01-13

## 2020-01-13 ENCOUNTER — TELEPHONE (OUTPATIENT)
Dept: FAMILY MEDICINE CLINIC | Facility: CLINIC | Age: 75
End: 2020-01-13

## 2020-01-13 DIAGNOSIS — R19.7 DIARRHEA, UNSPECIFIED TYPE: ICD-10-CM

## 2020-01-13 DIAGNOSIS — Z85.46 HISTORY OF PROSTATE CANCER: ICD-10-CM

## 2020-01-13 DIAGNOSIS — R63.4 UNINTENTIONAL WEIGHT LOSS: Primary | ICD-10-CM

## 2020-01-13 DIAGNOSIS — I27.82 CHRONIC PULMONARY EMBOLISM, UNSPECIFIED PULMONARY EMBOLISM TYPE, UNSPECIFIED WHETHER ACUTE COR PULMONALE PRESENT (HCC): ICD-10-CM

## 2020-01-13 DIAGNOSIS — R10.9 ABDOMINAL CRAMPING: ICD-10-CM

## 2020-01-13 DIAGNOSIS — R19.4 CHANGE IN BOWEL HABITS: ICD-10-CM

## 2020-01-13 NOTE — TELEPHONE ENCOUNTER
Please inform patient that his stool tests are negative - he can take Imodium PRN per box recommendations.  He should complete the CT examination, avoid lactose/dairy products, and if worsening sxs (fevers, chills, pale stools, hematochezia/melena) proceed

## 2020-01-13 NOTE — TELEPHONE ENCOUNTER
GI RN's    Pt is scheduled for CLN/EGD. Per Brandon Blankenship from Dr. Raji Leblanc"    Thank you.

## 2020-01-13 NOTE — TELEPHONE ENCOUNTER
Scheduled for:  Colonoscopy 35374 / EGD 64850  Provider Name: Dr. Akash Terry  Date:  4/10/2020  Location:  [facility]  Sedation:  MAC  Time:  8:45 am, arrival 7:45 am  Prep: Trilyte  Meds/Allergies Reconciled?:  Paty reviewed  Diagnosis with codes:  Unintentional weight

## 2020-01-13 NOTE — TELEPHONE ENCOUNTER
Dr. Amari Hurt-    Pt undergoing colonoscopy/EGD on 4/10/2020 w/ Dr. Elaine Nj.     GI defers to PCP to advise if pt may hold xarelto. IF so, please advise on how many days prior to the procedures the pt may hold, thank you.

## 2020-01-14 ENCOUNTER — APPOINTMENT (OUTPATIENT)
Dept: LAB | Age: 75
End: 2020-01-14
Attending: FAMILY MEDICINE
Payer: MEDICARE

## 2020-01-14 ENCOUNTER — OFFICE VISIT (OUTPATIENT)
Dept: FAMILY MEDICINE CLINIC | Facility: CLINIC | Age: 75
End: 2020-01-14
Payer: MEDICARE

## 2020-01-14 ENCOUNTER — LAB ENCOUNTER (OUTPATIENT)
Dept: LAB | Age: 75
End: 2020-01-14
Attending: FAMILY MEDICINE
Payer: MEDICARE

## 2020-01-14 VITALS
HEART RATE: 68 BPM | SYSTOLIC BLOOD PRESSURE: 158 MMHG | WEIGHT: 194 LBS | DIASTOLIC BLOOD PRESSURE: 88 MMHG | HEIGHT: 70 IN | RESPIRATION RATE: 22 BRPM | BODY MASS INDEX: 27.77 KG/M2

## 2020-01-14 DIAGNOSIS — Z86.79 HISTORY OF PSVT (PAROXYSMAL SUPRAVENTRICULAR TACHYCARDIA): ICD-10-CM

## 2020-01-14 DIAGNOSIS — Z00.00 ANNUAL PHYSICAL EXAM: Primary | ICD-10-CM

## 2020-01-14 DIAGNOSIS — I10 ESSENTIAL HYPERTENSION: ICD-10-CM

## 2020-01-14 DIAGNOSIS — Z87.442 HISTORY OF KIDNEY STONES: ICD-10-CM

## 2020-01-14 DIAGNOSIS — Z86.79 HISTORY OF PAROXYSMAL SUPRAVENTRICULAR TACHYCARDIA: ICD-10-CM

## 2020-01-14 DIAGNOSIS — Z85.46 HISTORY OF PROSTATE CANCER: ICD-10-CM

## 2020-01-14 DIAGNOSIS — Z12.5 PROSTATE CANCER SCREENING: ICD-10-CM

## 2020-01-14 DIAGNOSIS — R63.4 UNINTENTIONAL WEIGHT LOSS: ICD-10-CM

## 2020-01-14 DIAGNOSIS — Z86.711 HISTORY OF PULMONARY EMBOLISM: ICD-10-CM

## 2020-01-14 DIAGNOSIS — Z00.00 ENCOUNTER FOR ANNUAL HEALTH EXAMINATION: ICD-10-CM

## 2020-01-14 DIAGNOSIS — R19.7 DIARRHEA, UNSPECIFIED TYPE: ICD-10-CM

## 2020-01-14 DIAGNOSIS — Z00.00 ANNUAL PHYSICAL EXAM: ICD-10-CM

## 2020-01-14 DIAGNOSIS — Z23 ENCOUNTER FOR IMMUNIZATION: ICD-10-CM

## 2020-01-14 LAB
ALBUMIN SERPL-MCNC: 3.9 G/DL (ref 3.4–5)
ALBUMIN/GLOB SERPL: 1 {RATIO} (ref 1–2)
ALP LIVER SERPL-CCNC: 113 U/L (ref 45–117)
ALT SERPL-CCNC: 31 U/L (ref 16–61)
ANION GAP SERPL CALC-SCNC: 5 MMOL/L (ref 0–18)
AST SERPL-CCNC: 16 U/L (ref 15–37)
BACTERIA UR QL AUTO: NEGATIVE /HPF
BASOPHILS # BLD AUTO: 0.05 X10(3) UL (ref 0–0.2)
BASOPHILS NFR BLD AUTO: 0.8 %
BILIRUB SERPL-MCNC: 0.4 MG/DL (ref 0.1–2)
BILIRUB UR QL: NEGATIVE
BUN BLD-MCNC: 13 MG/DL (ref 7–18)
BUN/CREAT SERPL: 11.2 (ref 10–20)
CALCIUM BLD-MCNC: 9.3 MG/DL (ref 8.5–10.1)
CHLORIDE SERPL-SCNC: 107 MMOL/L (ref 98–112)
CHOLEST SMN-MCNC: 129 MG/DL (ref ?–200)
CLARITY UR: CLEAR
CO2 SERPL-SCNC: 27 MMOL/L (ref 21–32)
COLOR UR: YELLOW
COMPLEXED PSA SERPL-MCNC: 0.24 NG/ML (ref ?–4)
CREAT BLD-MCNC: 1.16 MG/DL (ref 0.7–1.3)
DEPRECATED RDW RBC AUTO: 41.3 FL (ref 35.1–46.3)
EOSINOPHIL # BLD AUTO: 0.21 X10(3) UL (ref 0–0.7)
EOSINOPHIL NFR BLD AUTO: 3.4 %
ERYTHROCYTE [DISTWIDTH] IN BLOOD BY AUTOMATED COUNT: 13.8 % (ref 11–15)
GLOBULIN PLAS-MCNC: 4.1 G/DL (ref 2.8–4.4)
GLUCOSE BLD-MCNC: 93 MG/DL (ref 70–99)
GLUCOSE UR-MCNC: NEGATIVE MG/DL
HCT VFR BLD AUTO: 47.4 % (ref 39–53)
HDLC SERPL-MCNC: 31 MG/DL (ref 40–59)
HGB BLD-MCNC: 16.1 G/DL (ref 13–17.5)
HYALINE CASTS #/AREA URNS AUTO: 1 /LPF
IMM GRANULOCYTES # BLD AUTO: 0.02 X10(3) UL (ref 0–1)
IMM GRANULOCYTES NFR BLD: 0.3 %
KETONES UR-MCNC: NEGATIVE MG/DL
LDLC SERPL CALC-MCNC: 74 MG/DL (ref ?–100)
LEUKOCYTE ESTERASE UR QL STRIP.AUTO: NEGATIVE
LYMPHOCYTES # BLD AUTO: 0.9 X10(3) UL (ref 1–4)
LYMPHOCYTES NFR BLD AUTO: 14.6 %
M PROTEIN MFR SERPL ELPH: 8 G/DL (ref 6.4–8.2)
MCH RBC QN AUTO: 28 PG (ref 26–34)
MCHC RBC AUTO-ENTMCNC: 34 G/DL (ref 31–37)
MCV RBC AUTO: 82.6 FL (ref 80–100)
MONOCYTES # BLD AUTO: 0.56 X10(3) UL (ref 0.1–1)
MONOCYTES NFR BLD AUTO: 9.1 %
NEUTROPHILS # BLD AUTO: 4.42 X10 (3) UL (ref 1.5–7.7)
NEUTROPHILS # BLD AUTO: 4.42 X10(3) UL (ref 1.5–7.7)
NEUTROPHILS NFR BLD AUTO: 71.8 %
NITRITE UR QL STRIP.AUTO: NEGATIVE
NONHDLC SERPL-MCNC: 98 MG/DL (ref ?–130)
OSMOLALITY SERPL CALC.SUM OF ELEC: 288 MOSM/KG (ref 275–295)
PATIENT FASTING Y/N/NP: YES
PATIENT FASTING Y/N/NP: YES
PH UR: 5 [PH] (ref 5–8)
PLATELET # BLD AUTO: 199 10(3)UL (ref 150–450)
POTASSIUM SERPL-SCNC: 3.9 MMOL/L (ref 3.5–5.1)
PROT UR-MCNC: NEGATIVE MG/DL
RBC # BLD AUTO: 5.74 X10(6)UL (ref 3.8–5.8)
RBC #/AREA URNS AUTO: 1 /HPF
SODIUM SERPL-SCNC: 139 MMOL/L (ref 136–145)
SP GR UR STRIP: 1.02 (ref 1–1.03)
TRIGL SERPL-MCNC: 122 MG/DL (ref 30–149)
TSI SER-ACNC: 2.78 MIU/ML (ref 0.36–3.74)
URATE SERPL-MCNC: 5.4 MG/DL (ref 3.5–7.2)
UROBILINOGEN UR STRIP-ACNC: <2
VLDLC SERPL CALC-MCNC: 24 MG/DL (ref 0–30)
WBC # BLD AUTO: 6.2 X10(3) UL (ref 4–11)
WBC #/AREA URNS AUTO: 2 /HPF

## 2020-01-14 PROCEDURE — 80061 LIPID PANEL: CPT

## 2020-01-14 PROCEDURE — G0009 ADMIN PNEUMOCOCCAL VACCINE: HCPCS | Performed by: FAMILY MEDICINE

## 2020-01-14 PROCEDURE — 93010 ELECTROCARDIOGRAM REPORT: CPT | Performed by: FAMILY MEDICINE

## 2020-01-14 PROCEDURE — 81001 URINALYSIS AUTO W/SCOPE: CPT

## 2020-01-14 PROCEDURE — 90670 PCV13 VACCINE IM: CPT | Performed by: FAMILY MEDICINE

## 2020-01-14 PROCEDURE — 84550 ASSAY OF BLOOD/URIC ACID: CPT

## 2020-01-14 PROCEDURE — G0439 PPPS, SUBSEQ VISIT: HCPCS | Performed by: FAMILY MEDICINE

## 2020-01-14 PROCEDURE — 99214 OFFICE O/P EST MOD 30 MIN: CPT | Performed by: FAMILY MEDICINE

## 2020-01-14 PROCEDURE — 36415 COLL VENOUS BLD VENIPUNCTURE: CPT

## 2020-01-14 PROCEDURE — 85025 COMPLETE CBC W/AUTO DIFF WBC: CPT

## 2020-01-14 PROCEDURE — G0463 HOSPITAL OUTPT CLINIC VISIT: HCPCS | Performed by: FAMILY MEDICINE

## 2020-01-14 PROCEDURE — 93005 ELECTROCARDIOGRAM TRACING: CPT

## 2020-01-14 PROCEDURE — 84443 ASSAY THYROID STIM HORMONE: CPT

## 2020-01-14 PROCEDURE — 80053 COMPREHEN METABOLIC PANEL: CPT

## 2020-01-14 RX ORDER — LOSARTAN POTASSIUM 50 MG/1
50 TABLET ORAL DAILY
Qty: 90 TABLET | Refills: 0 | Status: SHIPPED | OUTPATIENT
Start: 2020-01-14 | End: 2020-11-24

## 2020-01-14 RX ORDER — ALLOPURINOL 100 MG/1
100 TABLET ORAL DAILY
Qty: 90 TABLET | Refills: 0 | Status: SHIPPED | OUTPATIENT
Start: 2020-01-14 | End: 2020-11-24

## 2020-01-14 RX ORDER — AMLODIPINE BESYLATE 5 MG/1
5 TABLET ORAL DAILY
Qty: 90 TABLET | Refills: 0 | Status: SHIPPED | OUTPATIENT
Start: 2020-01-14 | End: 2020-11-24

## 2020-01-14 RX ORDER — TAMSULOSIN HYDROCHLORIDE 0.4 MG/1
0.4 CAPSULE ORAL DAILY
Qty: 90 CAPSULE | Refills: 0 | Status: SHIPPED | OUTPATIENT
Start: 2020-01-14 | End: 2020-11-24

## 2020-01-14 NOTE — TELEPHONE ENCOUNTER
Was to follow up with Dr. Vesta Bettencourt last year  She will say when to stop the medciation  Please make an appt.

## 2020-01-14 NOTE — PATIENT INSTRUCTIONS
[facility]  Steven Velasco's SCREENING SCHEDULE   Tests on this list are recommended by your physician but may not be covered, or covered at this frequency, by your insurer.  Please check with your insurance carrier before scheduling to verify c 10 years- more often if abnormal Colonoscopy due on 11/18/1995 Update [facility] if applicable    Flex Sigmoidoscopy Screen  Covered every 5 years No results found for this or any previous visit. No flowsheet data found.      Fecal Occult Blood   Co benefits, but Medicare does not cover unless Medically needed    Zoster (Not covered by Medicare Part B) No orders found for this or any previous visit.  This may be covered with your pharmacy  prescription benefits     Recommended Websites for Advanced Dir

## 2020-01-14 NOTE — PROGRESS NOTES
HPI:   Valentina Chambers is a 76year old male who presents for a Medicare Subsequent Annual Wellness visit (Pt already had Initial Annual Wellness). I told them his care is not acceptable due to   noncompliance and lack of adequate  follow up.     He has AVS       He does NOT have a Power of  for [employer] on file in [address]    Advance care planning including the explanation and discussion of advance directives standard forms performed Face to Face with patient and Family/surrogate (if present), and recent labs)   Lab Results   Component Value Date    WBC 6.4 11/27/2017    HGB 15.2 11/27/2017     11/27/2017        ALLERGIES:   He has No Known Allergies. CURRENT MEDICATIONS:   tamsulosin HCl 0.4 MG Oral Cap, Take by mouth daily.   losartan 100 Weight as of this encounter: 194 lb (88 kg).     Medicare Hearing Assessment  (Required for AWV/SWV)                 Visual Acuity                           General Appearance:  Alert, cooperative, no distress, appears stated age   Head:  Normocephalic, wit cancer  stable    4. Diarrhea, unspecified type  g/i    5. Essential hypertension  Add amlodipine and decrease losartan to help with dizziness. 6. History of kidney stones  Recheck ua    7.  History of PSVT (paroxysmal supraventricular tachycardia)  st > 65 No flowsheet data found.     Prostate Cancer Screening      PSA  Annually PSA due on 09/26/2019  Update Health Maintenance if applicable     Immunizations (Update Immunization Activity if applicable)     Influenza  Covered Annually 10/2/2017

## 2020-01-14 NOTE — TELEPHONE ENCOUNTER
RN Triage-     Please review Dr. Lilibeth Dickson message below and ensure the message is relayed to the pt so he understands why he needs to see Dr. Smiley Salinas, thank you.

## 2020-01-15 NOTE — TELEPHONE ENCOUNTER
Spoke to pt daughter, Donal Edmonds, and reviewed that she is not on AVERY and pt needs to call office when able and give consent, so we may speak to her. She was agreeable and will have pt c/b.

## 2020-01-15 NOTE — TELEPHONE ENCOUNTER
Alina Johansen routed conversation to [facility] 26 minutes ago (5:37 PM)      Alina Johansen 26 minutes ago (5:37 PM)         Patient daughter/Karon (not on fyi) calling for patient results from stool, indicates patient has phone number from [facility]

## 2020-01-15 NOTE — TELEPHONE ENCOUNTER
FYI:  Patient has an appointment on 02/14/2020 do patient needs another appointment? Please advise, thank you. Greta Gasparr     Please reply to pool: CRISTINE Peña

## 2020-01-16 ENCOUNTER — TELEPHONE (OUTPATIENT)
Dept: FAMILY MEDICINE CLINIC | Facility: CLINIC | Age: 75
End: 2020-01-16

## 2020-01-16 NOTE — TELEPHONE ENCOUNTER
LMTCB- does pt have alternate number to reach him directly? Does pt give consent to speak to daughter, Eloisa Short?

## 2020-01-16 NOTE — TELEPHONE ENCOUNTER
Orlando Spaulding M, DO  P Em Fm Lmb Lpn/Cma             Thyroid CMP cholesterol all normal.  PSA was normal.

## 2020-01-17 ENCOUNTER — TELEPHONE (OUTPATIENT)
Dept: FAMILY MEDICINE CLINIC | Facility: CLINIC | Age: 75
End: 2020-01-17

## 2020-01-24 ENCOUNTER — TELEPHONE (OUTPATIENT)
Dept: GASTROENTEROLOGY | Facility: CLINIC | Age: 75
End: 2020-01-24

## 2020-01-24 ENCOUNTER — OFFICE VISIT (OUTPATIENT)
Dept: FAMILY MEDICINE CLINIC | Facility: CLINIC | Age: 75
End: 2020-01-24
Payer: MEDICARE

## 2020-01-24 ENCOUNTER — HOSPITAL ENCOUNTER (OUTPATIENT)
Dept: CT IMAGING | Facility: HOSPITAL | Age: 75
Discharge: HOME | End: 2020-01-24
Attending: PHYSICIAN ASSISTANT
Payer: MEDICARE

## 2020-01-24 VITALS
TEMPERATURE: 98 F | HEART RATE: 85 BPM | DIASTOLIC BLOOD PRESSURE: 74 MMHG | HEIGHT: 70 IN | WEIGHT: 196 LBS | SYSTOLIC BLOOD PRESSURE: 138 MMHG | BODY MASS INDEX: 28.06 KG/M2

## 2020-01-24 DIAGNOSIS — Z53.8 APPOINTMENT CANCELED BY HOSPITAL: Primary | ICD-10-CM

## 2020-01-24 DIAGNOSIS — Z85.46 HISTORY OF PROSTATE CANCER: ICD-10-CM

## 2020-01-24 DIAGNOSIS — R10.9 ABDOMINAL CRAMPING: ICD-10-CM

## 2020-01-24 DIAGNOSIS — R19.7 DIARRHEA, UNSPECIFIED TYPE: ICD-10-CM

## 2020-01-24 DIAGNOSIS — R63.4 UNINTENTIONAL WEIGHT LOSS: Primary | ICD-10-CM

## 2020-01-24 DIAGNOSIS — R63.4 UNINTENTIONAL WEIGHT LOSS: ICD-10-CM

## 2020-01-24 DIAGNOSIS — R19.4 CHANGE IN BOWEL HABITS: ICD-10-CM

## 2020-01-24 PROCEDURE — 74177 CT ABD & PELVIS W/CONTRAST: CPT | Performed by: PHYSICIAN ASSISTANT

## 2020-01-24 RX ORDER — CIPROFLOXACIN 500 MG/1
500 TABLET, FILM COATED ORAL 2 TIMES DAILY
Qty: 20 TABLET | Refills: 0 | Status: SHIPPED | OUTPATIENT
Start: 2020-01-24 | End: 2020-02-03

## 2020-01-24 RX ORDER — METRONIDAZOLE 500 MG/1
500 TABLET ORAL 3 TIMES DAILY
Qty: 30 TABLET | Refills: 0 | Status: SHIPPED | OUTPATIENT
Start: 2020-01-24 | End: 2020-02-03

## 2020-01-24 NOTE — TELEPHONE ENCOUNTER
Talked to [place], confirmed /name, patient OK for me to talk to Isabel Appiah (daughter)    Talked to her about non-specific findings on CT scan, patient continues to have intermittent LLQ abd pain with loose stools (improved with Imodium).  We discussed th

## 2020-01-24 NOTE — TELEPHONE ENCOUNTER
GI/RN--    I rescheduled this patient    Please contact Dr. Escobedo Pump office after his OV 2/14/20 to get Xarelto orders.  Thank you    You may close this TE when done :)

## 2020-01-24 NOTE — TELEPHONE ENCOUNTER
Rescheduled for:  Colonoscopy [phone] and EGD 58213  Provider Name: Dr. Guerrero Situ  Date:    From-4/10/20  To-3/11/20  [facility]   [facility] MARK Tai  Sedation:  MAC  Time:    From-0845  To-1000 (pt is aware that [facility] will call the day befo

## 2020-01-24 NOTE — TELEPHONE ENCOUNTER
LMTCB. Please transfer to triage. Daughter Leita Mortimer not on HIPAA. Need to get patient's verbal consent  first.    John Prieto 8:36 AM   Note      Please inform patient that his stool tests are negative - he can take Imodium PRN per box recommendations.

## 2020-01-24 NOTE — TELEPHONE ENCOUNTER
Patient and daughter Cedrick Marsh called back for stool results. Approval given from patient to give results to daughter Cedrick Marsh.  verified and results given. Patient voiced understanding. Daughter states patient also had a CT scan done this morning.

## 2020-01-24 NOTE — TELEPHONE ENCOUNTER
Patient scheduled for colonoscopy and EGD on  03/11/20. Request for Xarelto order routed to Dr. Cade Flores. Please advise on orders. Thank you    Awaiting orders.

## 2020-01-25 NOTE — TELEPHONE ENCOUNTER
maddie per Dr. Uli Goode  Has appt 2/14/2020  Answer to question on Nancyhamlet Bryant will not be available prior to that date.

## 2020-02-11 ENCOUNTER — TELEPHONE (OUTPATIENT)
Dept: GASTROENTEROLOGY | Facility: CLINIC | Age: 75
End: 2020-02-11

## 2020-02-11 ENCOUNTER — NURSE TRIAGE (OUTPATIENT)
Dept: FAMILY MEDICINE CLINIC | Facility: CLINIC | Age: 75
End: 2020-02-11

## 2020-02-11 NOTE — TELEPHONE ENCOUNTER
Action Requested: Summary for Provider     []  Critical Lab, Recommendations Needed  [x] Need Additional Advice  []   FYI    []   Need Orders  [] Need Medications Sent to Pharmacy  []  Other     SUMMARY: Daughter Malka Monge ( verified, but no AVERY) Wes Barnett

## 2020-02-11 NOTE — TELEPHONE ENCOUNTER
With Language Line   Corby Levin ID# [id number]  Patient calling does not speak English   Patient informed of Dr. Kimberley Rea instructions below to go to ER     Patient daughter Jojo Richards voiced in on the call, speaks English   Repeated Dr. Tung Abreu'  Recomme

## 2020-02-11 NOTE — TELEPHONE ENCOUNTER
Called the daughter Phillip Alvarez, as this is the number we have on file. I do not have anything on file stating I can talk to anyone except for the patient. The daughter also reports she has not signed anything. The patient has stepped out.  He only has a M

## 2020-02-11 NOTE — TELEPHONE ENCOUNTER
Spoke to daughter  Not any better after abx  Same symptoms    Cancel appt for Friday with me  Will forward message to Julissa Claudio please give them a call back with next steps   Has egd colon for 3/11

## 2020-02-12 NOTE — TELEPHONE ENCOUNTER
Daughter and patient called back and state patient continues to have loose stools (4 to 5 times in the morning),dizziness and lower abdominal pain before each bowel movement x 3 months . Antibiotics did not help. Patient denies nausea,vomiting or fever but does occasionally have streaks of  bright red blood in stool. Patient does not want to go to the ER for evaluation as advised by Melissa Roebrts and Dr. Hannah Chand. Patient leaving for [facility] on Saturday and would like to come in before he leaves. No appointments available. Please advise. Thank you.

## 2020-02-12 NOTE — TELEPHONE ENCOUNTER
I prescribed flagyl/cipro given possibility of diverticulitis - he needs to complete C. Diff test, but he should really come to the ER. Coming into the office I will not be able to help further as he is having inc severity of symptoms. Please advise him to proceed to the ER.

## 2020-02-12 NOTE — TELEPHONE ENCOUNTER
Correct - C. Diff test done in January. ABX given after that time, and that is something that we should rule out. However, I do not think it is a good idea for him to be getting on a plane/traveling in his current condition, hence the ER evaluation recommendation. Noted the daughter's plan to discuss with the patient.

## 2020-02-12 NOTE — TELEPHONE ENCOUNTER
I spoke with daughter (verbal approval given per patient) and message from [facility] given. Daughter states C Diff test was already done in January which was negative. States she will discuss with father about going to the ER and call us back if any questions.

## 2020-02-14 ENCOUNTER — OFFICE VISIT (OUTPATIENT)
Dept: HEMATOLOGY/ONCOLOGY | Facility: HOSPITAL | Age: 75
End: 2020-02-14
Attending: INTERNAL MEDICINE
Payer: MEDICARE

## 2020-02-14 VITALS
OXYGEN SATURATION: 98 % | DIASTOLIC BLOOD PRESSURE: 67 MMHG | TEMPERATURE: 98 F | HEIGHT: 70 IN | WEIGHT: 186 LBS | RESPIRATION RATE: 16 BRPM | SYSTOLIC BLOOD PRESSURE: 129 MMHG | HEART RATE: 78 BPM | BODY MASS INDEX: 26.63 KG/M2

## 2020-02-14 DIAGNOSIS — R93.3 ABNORMAL CT SCAN, SIGMOID COLON: ICD-10-CM

## 2020-02-14 DIAGNOSIS — Z86.711 HISTORY OF PULMONARY EMBOLISM: Primary | ICD-10-CM

## 2020-02-14 PROCEDURE — 99203 OFFICE O/P NEW LOW 30 MIN: CPT | Performed by: INTERNAL MEDICINE

## 2020-02-14 RX ORDER — DICYCLOMINE HYDROCHLORIDE 10 MG/1
10 CAPSULE ORAL
COMMUNITY
End: 2020-11-24

## 2020-02-14 RX ORDER — DIPHENOXYLATE HYDROCHLORIDE AND ATROPINE SULFATE 2.5; .025 MG/1; MG/1
1 TABLET ORAL 4 TIMES DAILY PRN
COMMUNITY
End: 2020-11-24

## 2020-02-14 NOTE — PROGRESS NOTES
VIVIANA Oconnor is a 76year old male here for evaluation of History of pulmonary embolism  (primary encounter diagnosis)  Abnormal ct scan, sigmoid colon    The patient was in [facility] in the summer of 2017.   Upon his return he was admitted due to Here to discuss, per record, if he needs anticoagulation or not, so that he can proceed with colonoscopy. Review of Systems:   Review of Systems   Constitutional: Positive for fatigue and unexpected weight change (20 lbs in 3 months.).  Negative f 07/2017     Past Surgical History:   Procedure Laterality Date   • COLONOSCOPY     • CYSTOSCOPY RETROGRADE Left 8/19/2017    Performed by Danyelle Suárez MD at [facility] MAIN OR     Social History    Tobacco Use      Smoking status: Former Smoker        Packs/da This was his first episode. This episode was provoked.   It is not recommended for the patient to have any testing to evaluate for inherited thrombophilia as he does not meet criteria based on the fact that he is no family history he is over 48years old a ACR (American College   of Radiology) NRDR ([address]) which includes the Dose Index Registry. FINDINGS:          LUNG BASES:  No focal consolidation.   LIVER:  There are couple hypodensities in the liver too small to characterize 1/24/2020 at 10:01               Component      Latest Ref Rng & Units 1/14/2020 11/27/2017 9/26/2017 9/11/2017                WBC      4.0 - 11.0 x10(3) uL 6.2 6.4 6.2    RBC      3.80 - 5.80 x10(6)uL 5.74 5.30 4.97    Hemoglobin      13.0 - 17.5 g/dL 16. Absolute      0.00 - 0.20 x10(3) uL 0.0   Immature Granulocyte Absolute      0.00 - 1.00 x10(3) uL    Neutrophils %      % 67   Lymphocytes %      % 18   Monocytes %      % 10   Eosinophils %      % 5   Basophils %      % 1   Immature Granulocyte %      %

## 2020-02-17 NOTE — TELEPHONE ENCOUNTER
Patient seen by Dr. Francesco Uribe on 2/14/2020 and states the following regarding anticoagulation: \" The patient should not resume anticoagulation.   Should be okay for the patient to proceed with sigmoidoscopy as he has lesion in the sigmoid colon on CT which eduardo

## 2020-03-02 NOTE — TELEPHONE ENCOUNTER
OK per AVERY, left detailed VM for patient daughter, OMID MARTINEZ [facility] ([phone]) and reviewed that per Dr. Cheryl Elliott note recommendations: \"The patient should not resume anticoagulation.   Should be okay for the patient to proceed with sigmoidoscopy as he has lesion

## 2020-03-10 ENCOUNTER — TELEPHONE (OUTPATIENT)
Dept: GASTROENTEROLOGY | Facility: CLINIC | Age: 75
End: 2020-03-10

## 2020-03-10 NOTE — TELEPHONE ENCOUNTER
Dr. Juan Ramon Hayden! Per phone room TE notes, pt's daughter stated (again) that pt will be cancelling his CLN & EGD procedures on 3/11/20 at [facility]. Pt is currently [place] for [place] family emergency.     Pt's daughter will CB to reschedul

## 2020-03-10 NOTE — TELEPHONE ENCOUNTER
CBLM with Haja Torres (daughter) to see if he is actually cancelling his procedure. Please transfer to [facility] in GI     If I am unavailable at the time the patient CB please Skype me to let me know she is on the line.  Thank you

## 2020-03-10 NOTE — TELEPHONE ENCOUNTER
This is noted - have noted patient/daughter have called/re-scheduled multiple times. Unfortunately with family emergency this cannot be predicted, but it is important that they re-schedule.     Thanks

## 2020-03-10 NOTE — TELEPHONE ENCOUNTER
Patients daughter/Patience returning schedulers call, tried to locate. Patients daughter requesting to cancel EGD due to patient currently in [place] for a family emergency. They will call back when ready to reschedule, thanks.

## 2020-08-19 ENCOUNTER — TELEPHONE (OUTPATIENT)
Dept: GASTROENTEROLOGY | Facility: CLINIC | Age: 75
End: 2020-08-19

## 2020-08-19 DIAGNOSIS — R10.9 ABDOMINAL PAIN, UNSPECIFIED ABDOMINAL LOCATION: ICD-10-CM

## 2020-08-19 DIAGNOSIS — R19.7 DIARRHEA, UNSPECIFIED TYPE: ICD-10-CM

## 2020-08-19 DIAGNOSIS — Z85.46 HISTORY OF PROSTATE CANCER: ICD-10-CM

## 2020-08-19 DIAGNOSIS — R63.4 UNINTENTIONAL WEIGHT LOSS: Primary | ICD-10-CM

## 2020-08-19 DIAGNOSIS — R19.4 CHANGE IN BOWEL HABITS: ICD-10-CM

## 2020-08-19 NOTE — TELEPHONE ENCOUNTER
Called Patient and I spoke with His daughter Leita Mortimer at [phone] and was able to reschedule this procedure with Yoli Lazcano. They choose this date on 1/8/2021 ,I gave them Multiple earlier dates ,but they chose this instead .       Rescheduled for: [facility]

## 2020-08-19 NOTE — TELEPHONE ENCOUNTER
Patient's daughter Haja Torres called in to see if the Patient can be scheduled for the procedure without having to see the DrNazia For an appointment. Please advise [phone].

## 2020-11-23 ENCOUNTER — TELEPHONE (OUTPATIENT)
Dept: FAMILY MEDICINE CLINIC | Facility: CLINIC | Age: 75
End: 2020-11-23

## 2020-11-23 NOTE — TELEPHONE ENCOUNTER
Daughter calling to make an appointment for her father. Patient ran out of blood pressure meds last week. Asymptomatic at this time. Patient scheduled with Gordo Valencia for tomorrow/ first available. Refusing to see anyone else.

## 2020-11-24 ENCOUNTER — OFFICE VISIT (OUTPATIENT)
Dept: FAMILY MEDICINE CLINIC | Facility: CLINIC | Age: 75
End: 2020-11-24
Payer: MEDICARE

## 2020-11-24 VITALS
HEIGHT: 70 IN | WEIGHT: 198.38 LBS | DIASTOLIC BLOOD PRESSURE: 88 MMHG | SYSTOLIC BLOOD PRESSURE: 177 MMHG | BODY MASS INDEX: 28.4 KG/M2 | HEART RATE: 76 BPM | TEMPERATURE: 98 F

## 2020-11-24 DIAGNOSIS — Z12.11 COLON CANCER SCREENING: ICD-10-CM

## 2020-11-24 DIAGNOSIS — M1A.9XX0 CHRONIC GOUT WITHOUT TOPHUS, UNSPECIFIED CAUSE, UNSPECIFIED SITE: ICD-10-CM

## 2020-11-24 DIAGNOSIS — Z85.46 HISTORY OF PROSTATE CANCER: ICD-10-CM

## 2020-11-24 DIAGNOSIS — R35.1 BENIGN PROSTATIC HYPERPLASIA WITH NOCTURIA: ICD-10-CM

## 2020-11-24 DIAGNOSIS — N40.1 BENIGN PROSTATIC HYPERPLASIA WITH NOCTURIA: ICD-10-CM

## 2020-11-24 DIAGNOSIS — R10.13 EPIGASTRIC PAIN: ICD-10-CM

## 2020-11-24 DIAGNOSIS — I10 ESSENTIAL HYPERTENSION: Primary | ICD-10-CM

## 2020-11-24 PROCEDURE — G0463 HOSPITAL OUTPT CLINIC VISIT: HCPCS | Performed by: FAMILY MEDICINE

## 2020-11-24 PROCEDURE — 99214 OFFICE O/P EST MOD 30 MIN: CPT | Performed by: FAMILY MEDICINE

## 2020-11-24 RX ORDER — ALLOPURINOL 100 MG/1
100 TABLET ORAL DAILY
Qty: 90 TABLET | Refills: 0 | Status: SHIPPED | OUTPATIENT
Start: 2020-11-24 | End: 2021-03-29

## 2020-11-24 RX ORDER — LOSARTAN POTASSIUM 100 MG/1
TABLET ORAL DAILY
COMMUNITY
End: 2020-11-24

## 2020-11-24 RX ORDER — TAMSULOSIN HYDROCHLORIDE 0.4 MG/1
0.4 CAPSULE ORAL DAILY
Qty: 90 CAPSULE | Refills: 0 | Status: SHIPPED | OUTPATIENT
Start: 2020-11-24 | End: 2021-03-29

## 2020-11-24 RX ORDER — LOSARTAN POTASSIUM 100 MG/1
100 TABLET ORAL DAILY
Qty: 90 TABLET | Refills: 0 | Status: ON HOLD | OUTPATIENT
Start: 2020-11-24 | End: 2021-02-12

## 2020-11-24 RX ORDER — PANCRELIPASE 60000; 12000; 38000 [USP'U]/1; [USP'U]/1; [USP'U]/1
1 CAPSULE, DELAYED RELEASE PELLETS ORAL DAILY
Qty: 90 CAPSULE | Refills: 1 | Status: SHIPPED | OUTPATIENT
Start: 2020-11-24 | End: 2021-04-05

## 2020-11-24 NOTE — PROGRESS NOTES
11/24/2020  3:00 PM    Abril Wan is a 76year old male.     Chief complaint(s): Patient presents with:  Hypertension: follow up/refills    HPI:     Abril Wan primary complaint is regarding multiple complaints       Naz Vázquez a 76 year ol Prostate cancer [facility])    • Pulmonary embolus ([facility] [address] 07/2017      Past Surgical History:   Procedure Laterality Date   • COLONOSCOPY     • CYSTOSCOPY RETROGRADE Left 8/19/2017    Performed by Evelyn Maldonado MD at [facility] MAIN OR      Family History   Problem Re Genitourinary: Negative for hematuria. Musculoskeletal: Negative for back pain. Skin: Negative for rash. Neurological: Negative for headaches. Psychiatric/Behavioral: Negative for depressed mood.        PHYSICAL EXAM:   VS: BP (!) 177/88   Pulse 7 or other stimulants/decongestants in common cold remedies, decrease consumption of alcohol, perform routine monitoring of blood pressure with home blood pressure cuff, exercise, reduction of dietary salt intake, take medication as prescribed, try not to mi tablet (100 mg total) by mouth daily. • losartan 100 MG Oral Tab 90 tablet 0     Sig: Take 1 tablet (100 mg total) by mouth daily.    • Pancrelipase, Lip-Prot-Amyl, (CREON) 01114 units Oral Cap DR Particles 90 capsule 1     Sig: Take 1 tablet by mouth nara MG Oral Tab 90 tablet 0     Sig: Take 1 tablet (100 mg total) by mouth daily. • losartan 100 MG Oral Tab 90 tablet 0     Sig: Take 1 tablet (100 mg total) by mouth daily.    • Pancrelipase, Lip-Prot-Amyl, (CREON) 03392 units Oral Cap DR Particles 90 capsu

## 2020-11-25 ENCOUNTER — LAB ENCOUNTER (OUTPATIENT)
Dept: LAB | Age: 75
End: 2020-11-25
Attending: FAMILY MEDICINE
Payer: MEDICARE

## 2020-11-25 DIAGNOSIS — I10 ESSENTIAL HYPERTENSION: ICD-10-CM

## 2020-11-25 DIAGNOSIS — Z85.46 HISTORY OF PROSTATE CANCER: ICD-10-CM

## 2020-11-25 DIAGNOSIS — M1A.9XX0 CHRONIC GOUT WITHOUT TOPHUS, UNSPECIFIED CAUSE, UNSPECIFIED SITE: ICD-10-CM

## 2020-11-25 PROCEDURE — 84153 ASSAY OF PSA TOTAL: CPT

## 2020-11-25 PROCEDURE — 80053 COMPREHEN METABOLIC PANEL: CPT

## 2020-11-25 PROCEDURE — 85025 COMPLETE CBC W/AUTO DIFF WBC: CPT

## 2020-11-25 PROCEDURE — 84550 ASSAY OF BLOOD/URIC ACID: CPT

## 2020-11-25 PROCEDURE — 81001 URINALYSIS AUTO W/SCOPE: CPT

## 2020-11-25 PROCEDURE — 36415 COLL VENOUS BLD VENIPUNCTURE: CPT

## 2020-12-23 ENCOUNTER — TELEPHONE (OUTPATIENT)
Dept: FAMILY MEDICINE CLINIC | Facility: CLINIC | Age: 75
End: 2020-12-23

## 2020-12-23 NOTE — TELEPHONE ENCOUNTER
Left message on daughters voicemail for patient to schedule a video visit or another provider. Encouraged to call with any questions.

## 2020-12-23 NOTE — TELEPHONE ENCOUNTER
Daughter states patient was diagnosed with COVID last night, and has been having symptoms since Monday 12/21. Symptoms of fever (T-max 102.0), productive cough with clear mucous, chills, body aches.   Patient taking Tylenol for pain/fever which does brin none

## 2020-12-26 NOTE — TELEPHONE ENCOUNTER
Daughter Aren Manning contacted office. Updated phone number. Scheduled appointment for her father on Monday with Julia Suresh. advised to UC or ER if worse.

## 2020-12-28 ENCOUNTER — TELEMEDICINE (OUTPATIENT)
Dept: FAMILY MEDICINE CLINIC | Facility: CLINIC | Age: 75
End: 2020-12-28
Payer: MEDICARE

## 2020-12-28 ENCOUNTER — HOSPITAL ENCOUNTER (INPATIENT)
Facility: HOSPITAL | Age: 75
LOS: 47 days | Discharge: NURSING, REHAB OR LONG-TERM CARE | DRG: 871 | End: 2021-02-13
Attending: EMERGENCY MEDICINE | Admitting: EMERGENCY MEDICINE
Payer: MEDICARE

## 2020-12-28 ENCOUNTER — APPOINTMENT (OUTPATIENT)
Dept: GENERAL RADIOLOGY | Facility: HOSPITAL | Age: 75
DRG: 871 | End: 2020-12-28
Attending: EMERGENCY MEDICINE
Payer: MEDICARE

## 2020-12-28 ENCOUNTER — APPOINTMENT (OUTPATIENT)
Dept: ULTRASOUND IMAGING | Facility: HOSPITAL | Age: 75
DRG: 871 | End: 2020-12-28
Attending: INTERNAL MEDICINE
Payer: MEDICARE

## 2020-12-28 DIAGNOSIS — N28.9 ACUTE RENAL INSUFFICIENCY: ICD-10-CM

## 2020-12-28 DIAGNOSIS — A41.89 VIRAL SEPSIS (HCC): ICD-10-CM

## 2020-12-28 DIAGNOSIS — J12.82 PNEUMONIA DUE TO COVID-19 VIRUS: ICD-10-CM

## 2020-12-28 DIAGNOSIS — B97.89 VIRAL SEPSIS (HCC): ICD-10-CM

## 2020-12-28 DIAGNOSIS — U07.1 PNEUMONIA DUE TO COVID-19 VIRUS: ICD-10-CM

## 2020-12-28 DIAGNOSIS — U07.1 COVID-19 VIRUS INFECTION: Primary | ICD-10-CM

## 2020-12-28 DIAGNOSIS — J96.01 ACUTE RESPIRATORY FAILURE WITH HYPOXIA (HCC): Primary | ICD-10-CM

## 2020-12-28 DIAGNOSIS — F51.01 PRIMARY INSOMNIA: ICD-10-CM

## 2020-12-28 LAB
ALBUMIN SERPL-MCNC: 3.1 G/DL (ref 3.4–5)
ALP LIVER SERPL-CCNC: 106 U/L
ALT SERPL-CCNC: 35 U/L
ANION GAP SERPL CALC-SCNC: 15 MMOL/L (ref 0–18)
ANTIBODY SCREEN: NEGATIVE
APTT PPP: 39.1 SECONDS (ref 23.2–35.3)
AST SERPL-CCNC: 66 U/L (ref 15–37)
BASOPHILS # BLD AUTO: 0.08 X10(3) UL (ref 0–0.2)
BASOPHILS NFR BLD AUTO: 0.6 %
BILIRUB DIRECT SERPL-MCNC: 0.2 MG/DL (ref 0–0.2)
BILIRUB SERPL-MCNC: 0.5 MG/DL (ref 0.1–2)
BUN BLD-MCNC: 27 MG/DL (ref 7–18)
BUN/CREAT SERPL: 9.4 (ref 10–20)
CALCIUM BLD-MCNC: 9 MG/DL (ref 8.5–10.1)
CHLORIDE SERPL-SCNC: 97 MMOL/L (ref 98–112)
CHOLEST SMN-MCNC: 61 MG/DL (ref ?–200)
CK SERPL-CCNC: 184 U/L
CO2 SERPL-SCNC: 16 MMOL/L (ref 21–32)
CREAT BLD-MCNC: 2.86 MG/DL
CRP SERPL-MCNC: 16.7 MG/DL (ref ?–0.3)
D DIMER PPP FEU-MCNC: 0.79 UG/ML FEU (ref ?–0.75)
DEPRECATED HBV CORE AB SER IA-ACNC: 1547.3 NG/ML
DEPRECATED RDW RBC AUTO: 38.6 FL (ref 35.1–46.3)
EOSINOPHIL # BLD AUTO: 0.02 X10(3) UL (ref 0–0.7)
EOSINOPHIL NFR BLD AUTO: 0.1 %
ERYTHROCYTE [DISTWIDTH] IN BLOOD BY AUTOMATED COUNT: 13.1 % (ref 11–15)
GLUCOSE BLD-MCNC: 127 MG/DL (ref 70–99)
HCT VFR BLD AUTO: 44.5 %
HDLC SERPL-MCNC: 31 MG/DL (ref 40–59)
HGB BLD-MCNC: 15.5 G/DL
IMM GRANULOCYTES # BLD AUTO: 0.42 X10(3) UL (ref 0–1)
IMM GRANULOCYTES NFR BLD: 3.1 %
INR BLD: 1.23 (ref 0.9–1.2)
LACTATE SERPL-SCNC: 2.8 MMOL/L (ref 0.4–2)
LACTATE SERPL-SCNC: 3.2 MMOL/L (ref 0.4–2)
LACTATE SERPL-SCNC: 5.6 MMOL/L (ref 0.4–2)
LDH SERPL L TO P-CCNC: 620 U/L
LDLC SERPL CALC-MCNC: 6 MG/DL (ref ?–100)
LYMPHOCYTES # BLD AUTO: 1.79 X10(3) UL (ref 1–4)
LYMPHOCYTES NFR BLD AUTO: 13.1 %
M PROTEIN MFR SERPL ELPH: 8.4 G/DL (ref 6.4–8.2)
MCH RBC QN AUTO: 28.4 PG (ref 26–34)
MCHC RBC AUTO-ENTMCNC: 34.8 G/DL (ref 31–37)
MCV RBC AUTO: 81.5 FL
MONOCYTES # BLD AUTO: 0.98 X10(3) UL (ref 0.1–1)
MONOCYTES NFR BLD AUTO: 7.2 %
NEUTROPHILS # BLD AUTO: 10.41 X10 (3) UL (ref 1.5–7.7)
NEUTROPHILS # BLD AUTO: 10.41 X10(3) UL (ref 1.5–7.7)
NEUTROPHILS NFR BLD AUTO: 75.9 %
NONHDLC SERPL-MCNC: 30 MG/DL (ref ?–130)
OSMOLALITY SERPL CALC.SUM OF ELEC: 273 MOSM/KG (ref 275–295)
PLATELET # BLD AUTO: 328 10(3)UL (ref 150–450)
POTASSIUM SERPL-SCNC: 4.4 MMOL/L (ref 3.5–5.1)
PROCALCITONIN SERPL-MCNC: 0.98 NG/ML (ref ?–0.16)
PROTHROMBIN TIME: 15.3 SECONDS (ref 11.8–14.5)
RBC # BLD AUTO: 5.46 X10(6)UL
RH BLOOD TYPE: POSITIVE
SODIUM SERPL-SCNC: 128 MMOL/L (ref 136–145)
TRIGL SERPL-MCNC: 120 MG/DL (ref 30–149)
TROPONIN I SERPL-MCNC: 0.52 NG/ML (ref ?–0.04)
VLDLC SERPL CALC-MCNC: 24 MG/DL (ref 0–30)
WBC # BLD AUTO: 13.7 X10(3) UL (ref 4–11)

## 2020-12-28 PROCEDURE — 99291 CRITICAL CARE FIRST HOUR: CPT | Performed by: INTERNAL MEDICINE

## 2020-12-28 PROCEDURE — 71045 X-RAY EXAM CHEST 1 VIEW: CPT | Performed by: EMERGENCY MEDICINE

## 2020-12-28 PROCEDURE — 93970 EXTREMITY STUDY: CPT | Performed by: INTERNAL MEDICINE

## 2020-12-28 PROCEDURE — 5A0955A ASSISTANCE WITH RESPIRATORY VENTILATION, GREATER THAN 96 CONSECUTIVE HOURS, HIGH NASAL FLOW/VELOCITY: ICD-10-PCS | Performed by: INTERNAL MEDICINE

## 2020-12-28 PROCEDURE — XW033H5 INTRODUCTION OF TOCILIZUMAB INTO PERIPHERAL VEIN, PERCUTANEOUS APPROACH, NEW TECHNOLOGY GROUP 5: ICD-10-PCS | Performed by: INTERNAL MEDICINE

## 2020-12-28 PROCEDURE — 3E0333Z INTRODUCTION OF ANTI-INFLAMMATORY INTO PERIPHERAL VEIN, PERCUTANEOUS APPROACH: ICD-10-PCS | Performed by: EMERGENCY MEDICINE

## 2020-12-28 PROCEDURE — 99443 PHONE E/M BY PHYS 21-30 MIN: CPT | Performed by: FAMILY MEDICINE

## 2020-12-28 PROCEDURE — XW13325 TRANSFUSION OF CONVALESCENT PLASMA (NONAUTOLOGOUS) INTO PERIPHERAL VEIN, PERCUTANEOUS APPROACH, NEW TECHNOLOGY GROUP 5: ICD-10-PCS | Performed by: INTERNAL MEDICINE

## 2020-12-28 RX ORDER — ASPIRIN 81 MG/1
81 TABLET ORAL DAILY
Status: DISCONTINUED | OUTPATIENT
Start: 2020-12-29 | End: 2021-02-13

## 2020-12-28 RX ORDER — ALLOPURINOL 100 MG/1
100 TABLET ORAL DAILY
Status: DISCONTINUED | OUTPATIENT
Start: 2020-12-28 | End: 2021-02-13

## 2020-12-28 RX ORDER — ALPRAZOLAM 0.5 MG/1
0.5 TABLET ORAL 3 TIMES DAILY PRN
Status: DISCONTINUED | OUTPATIENT
Start: 2020-12-28 | End: 2020-12-30

## 2020-12-28 RX ORDER — TEMAZEPAM 15 MG/1
15 CAPSULE ORAL NIGHTLY PRN
Qty: 30 CAPSULE | Refills: 0 | Status: ON HOLD | OUTPATIENT
Start: 2020-12-28 | End: 2021-02-12

## 2020-12-28 RX ORDER — HEPARIN SODIUM AND DEXTROSE 10000; 5 [USP'U]/100ML; G/100ML
INJECTION INTRAVENOUS CONTINUOUS
Status: DISCONTINUED | OUTPATIENT
Start: 2020-12-28 | End: 2021-01-01

## 2020-12-28 RX ORDER — SODIUM CHLORIDE, SODIUM LACTATE, POTASSIUM CHLORIDE, CALCIUM CHLORIDE 600; 310; 30; 20 MG/100ML; MG/100ML; MG/100ML; MG/100ML
1000 INJECTION, SOLUTION INTRAVENOUS CONTINUOUS
Status: ACTIVE | OUTPATIENT
Start: 2020-12-28 | End: 2020-12-29

## 2020-12-28 RX ORDER — HEPARIN SODIUM 1000 [USP'U]/ML
80 INJECTION, SOLUTION INTRAVENOUS; SUBCUTANEOUS ONCE
Status: COMPLETED | OUTPATIENT
Start: 2020-12-28 | End: 2020-12-28

## 2020-12-28 RX ORDER — ENALAPRILAT 2.5 MG/2ML
1.25 INJECTION INTRAVENOUS EVERY 4 HOURS PRN
Status: DISCONTINUED | OUTPATIENT
Start: 2020-12-28 | End: 2021-02-13

## 2020-12-28 RX ORDER — SODIUM CHLORIDE 9 MG/ML
INJECTION, SOLUTION INTRAVENOUS ONCE
Status: DISCONTINUED | OUTPATIENT
Start: 2020-12-28 | End: 2021-02-13

## 2020-12-28 RX ORDER — DEXAMETHASONE SODIUM PHOSPHATE 4 MG/ML
6 VIAL (ML) INJECTION DAILY
Status: DISCONTINUED | OUTPATIENT
Start: 2020-12-29 | End: 2020-12-31

## 2020-12-28 RX ORDER — ACETAMINOPHEN 325 MG/1
650 TABLET ORAL EVERY 6 HOURS PRN
Status: DISCONTINUED | OUTPATIENT
Start: 2020-12-28 | End: 2021-02-13

## 2020-12-28 RX ORDER — HEPARIN SODIUM AND DEXTROSE 10000; 5 [USP'U]/100ML; G/100ML
18 INJECTION INTRAVENOUS ONCE
Status: COMPLETED | OUTPATIENT
Start: 2020-12-28 | End: 2020-12-28

## 2020-12-28 RX ORDER — MULTIVIT,TX WITH IRON,MINERALS
1 TABLET, EXTENDED RELEASE ORAL 2 TIMES DAILY
Qty: 60 TABLET | Refills: 0 | Status: ON HOLD | OUTPATIENT
Start: 2020-12-28 | End: 2021-02-12

## 2020-12-28 RX ORDER — DEXAMETHASONE SODIUM PHOSPHATE 10 MG/ML
6 INJECTION, SOLUTION INTRAMUSCULAR; INTRAVENOUS ONCE
Status: COMPLETED | OUTPATIENT
Start: 2020-12-28 | End: 2020-12-28

## 2020-12-28 RX ORDER — CODEINE PHOSPHATE AND GUAIFENESIN 10; 100 MG/5ML; MG/5ML
5 SOLUTION ORAL NIGHTLY PRN
Qty: 120 ML | Refills: 0 | Status: ON HOLD | OUTPATIENT
Start: 2020-12-28 | End: 2021-02-12

## 2020-12-28 NOTE — PROGRESS NOTES
Virtual Telephone Check-In    Jaz Cardenas verbally consents to a Virtual/Telephone Check-In visit on 12/28/20. Patient has been referred to the [facility] website at [email or web address] to review the yearly Consent to Treat document.     Patient under (CHERATUSSIN AC) 100-10 MG/5ML Oral Solution 120 mL 0     Sig: Take 5 mL by mouth nightly as needed for cough.      REFERRALS: XR CHEST PA + LAT CHEST (BRW=40473), Orders Placed This Encounter      XR CHEST PA + LAT CHEST (CPT=71046)       RECOMMENDATIONS g

## 2020-12-28 NOTE — PROGRESS NOTES
[facility] Pharmacy Note: Antimicrobial Weight Based Dose Adjustment for: ceftriaxone (ROCEPHIN)    Pansy Sprain is a 76year old patient who has been prescribed ceftriaxone (ROCEPHIN) 2000 mg x1 in ED.     Estimated Creatinine Clearance: 23 mL/min (A) (based

## 2020-12-28 NOTE — ED PROVIDER NOTES
Patient Seen in: [facility] AND [facility] Emergency Department      History   Patient presents with:  Difficulty Breathing    Stated Complaint:     HPI    35-year-old male presents for evaluation for shortness of breath.   Patient tested positive for COVID-19 9 93%   BMI 26.95 kg/m²         Physical Exam  Vitals signs and nursing note reviewed. Constitutional:       Appearance: He is well-developed. HENT:      Head: Normocephalic and atraumatic.    Eyes:      General: Lids are normal.      Extraocular Movement TROPONIN I - Abnormal; Notable for the following components:    Troponin 0.519 (*)     All other components within normal limits   C-REACTIVE PROTEIN - Abnormal; Notable for the following components:    C-Reactive Protein 16.70 (*)     All other componen ------                     CBC W/ DIFFERENTIAL[[id number]]          Abnormal            Final result                 Please view results for these tests on the individual orders. LACTIC ACID 3 HR POST POSITIVE   TYPE AND SCREEN    Narrative:      The vijayao x10 days. History of hypertension. TECHNIQUE:   Single view. FINDINGS:  CARDIAC/VASC: Heart is not significantly enlarged. Pulmonary vascularity is mildly prominent centrally. Mild calcifications at the aortic arch.  MEDIAST/MARIE:   No visible mass or obtain basic health screening including reassessment of your blood pressure.       Medications Prescribed:  Current Discharge Medication List                          Present on Admission  Date Reviewed: 12/28/2020          ICD-10-CM Noted POA    Acute resp

## 2020-12-28 NOTE — ED NOTES
Rounding completed    No complaints at this time  Awaiting lab/imaging results  Elimination assistance PROVIDED  No additional needs at this time  Call light within reach, will update patient with more information  Will continue to monitor  Patient updated

## 2020-12-29 ENCOUNTER — APPOINTMENT (OUTPATIENT)
Dept: CV DIAGNOSTICS | Facility: HOSPITAL | Age: 75
DRG: 871 | End: 2020-12-29
Attending: INTERNAL MEDICINE
Payer: MEDICARE

## 2020-12-29 ENCOUNTER — APPOINTMENT (OUTPATIENT)
Dept: GENERAL RADIOLOGY | Facility: HOSPITAL | Age: 75
DRG: 871 | End: 2020-12-29
Attending: INTERNAL MEDICINE
Payer: MEDICARE

## 2020-12-29 LAB
ANION GAP SERPL CALC-SCNC: 7 MMOL/L (ref 0–18)
APTT PPP: 118.1 SECONDS (ref 23.2–35.3)
APTT PPP: 94.4 SECONDS (ref 23.2–35.3)
BASOPHILS # BLD AUTO: 0.03 X10(3) UL (ref 0–0.2)
BASOPHILS NFR BLD AUTO: 0.4 %
BILIRUB UR QL: NEGATIVE
BUN BLD-MCNC: 26 MG/DL (ref 7–18)
BUN/CREAT SERPL: 18.1 (ref 10–20)
CALCIUM BLD-MCNC: 9 MG/DL (ref 8.5–10.1)
CHLORIDE SERPL-SCNC: 105 MMOL/L (ref 98–112)
CLARITY UR: CLEAR
CO2 SERPL-SCNC: 23 MMOL/L (ref 21–32)
COLOR UR: YELLOW
CREAT BLD-MCNC: 1.44 MG/DL
CRP SERPL-MCNC: 14.8 MG/DL (ref ?–0.3)
D DIMER PPP FEU-MCNC: 1.01 UG/ML FEU (ref ?–0.75)
DEPRECATED HBV CORE AB SER IA-ACNC: 1216.3 NG/ML
DEPRECATED RDW RBC AUTO: 36.8 FL (ref 35.1–46.3)
EOSINOPHIL # BLD AUTO: 0 X10(3) UL (ref 0–0.7)
EOSINOPHIL NFR BLD AUTO: 0 %
ERYTHROCYTE [DISTWIDTH] IN BLOOD BY AUTOMATED COUNT: 12.9 % (ref 11–15)
GLUCOSE BLD-MCNC: 145 MG/DL (ref 70–99)
GLUCOSE UR-MCNC: NEGATIVE MG/DL
HCT VFR BLD AUTO: 40.5 %
HGB BLD-MCNC: 14.2 G/DL
HGB UR QL STRIP.AUTO: NEGATIVE
HYALINE CASTS #/AREA URNS AUTO: 9 /LPF
IMM GRANULOCYTES # BLD AUTO: 0.14 X10(3) UL (ref 0–1)
IMM GRANULOCYTES NFR BLD: 1.9 %
KETONES UR-MCNC: NEGATIVE MG/DL
LDH SERPL L TO P-CCNC: 512 U/L
LEUKOCYTE ESTERASE UR QL STRIP.AUTO: NEGATIVE
LYMPHOCYTES # BLD AUTO: 0.89 X10(3) UL (ref 1–4)
LYMPHOCYTES NFR BLD AUTO: 12.2 %
MCH RBC QN AUTO: 27.9 PG (ref 26–34)
MCHC RBC AUTO-ENTMCNC: 35.1 G/DL (ref 31–37)
MCV RBC AUTO: 79.6 FL
MONOCYTES # BLD AUTO: 0.59 X10(3) UL (ref 0.1–1)
MONOCYTES NFR BLD AUTO: 8.1 %
NEUTROPHILS # BLD AUTO: 5.62 X10 (3) UL (ref 1.5–7.7)
NEUTROPHILS # BLD AUTO: 5.62 X10(3) UL (ref 1.5–7.7)
NEUTROPHILS NFR BLD AUTO: 77.4 %
NITRITE UR QL STRIP.AUTO: NEGATIVE
OSMOLALITY SERPL CALC.SUM OF ELEC: 287 MOSM/KG (ref 275–295)
PH UR: 5 [PH] (ref 5–8)
PLATELET # BLD AUTO: 347 10(3)UL (ref 150–450)
POTASSIUM SERPL-SCNC: 4.7 MMOL/L (ref 3.5–5.1)
PROT UR-MCNC: 30 MG/DL
RBC # BLD AUTO: 5.09 X10(6)UL
RBC #/AREA URNS AUTO: 2 /HPF
SODIUM SERPL-SCNC: 135 MMOL/L (ref 136–145)
SP GR UR STRIP: 1.01 (ref 1–1.03)
TROPONIN I SERPL-MCNC: 0.21 NG/ML (ref ?–0.04)
TROPONIN I SERPL-MCNC: 0.33 NG/ML (ref ?–0.04)
UROBILINOGEN UR STRIP-ACNC: <2
WBC # BLD AUTO: 7.3 X10(3) UL (ref 4–11)
WBC #/AREA URNS AUTO: 2 /HPF

## 2020-12-29 PROCEDURE — 93306 TTE W/DOPPLER COMPLETE: CPT | Performed by: INTERNAL MEDICINE

## 2020-12-29 PROCEDURE — 99222 1ST HOSP IP/OBS MODERATE 55: CPT | Performed by: INTERNAL MEDICINE

## 2020-12-29 PROCEDURE — 99232 SBSQ HOSP IP/OBS MODERATE 35: CPT | Performed by: INTERNAL MEDICINE

## 2020-12-29 PROCEDURE — 71045 X-RAY EXAM CHEST 1 VIEW: CPT | Performed by: INTERNAL MEDICINE

## 2020-12-29 NOTE — PROGRESS NOTES
INFECTIOUS DISEASE PROGRESS NOTE  [facility] TELEMEDICINE PROGRESS NOTE    Park Kumar Patient Status:  Inpatient    1945 MRN [id number]   Location [facility] 2W/SW Attending David Acuna MD   [facility] 76 [facility] with h/o PE 2017, gout, HTN, nephrolithiasis, previous severe pneumonia requiring intubation now with symptoms starting at home 12/13 with initially mild \"cold\" symptoms. Sick contact from daughter at home who is pregnant.  Tested on 12/19 and posi

## 2020-12-29 NOTE — ED NOTES
Pt with hypoxic episode. Labored breathing noted. Diaphoretic. Vapotherm at max settings of 100% 40L. Pt proned. Dr. Sujatha Servin at bedside. Pt now with ease of breathing, 99% on vapotherm at max settings of 40L 100% while proned.      Order received

## 2020-12-29 NOTE — ED NOTES
Patient presents with:  Difficulty Breathing      PATIENT AOX3 TO ED VIA PRIVATE VEHICLE TO ED ROOM #26. PATIENT CO OF +DIFFICULTY BREATHING, PATIENT DX WITH COVID19 X 12/22, ARRIVES TO ED HYPOXIC AND TACHYNIC.  PATIENT SPO2 44%RA, IMMEDIATELY PLACED ON NRB

## 2020-12-29 NOTE — ED NOTES
Rounding completed    No complaints at this time  Awaitingbed assignment  Elimination assistance offered  No additional needs at this time  Call light within reach, will update patient with more information  Will continue to monitor

## 2020-12-29 NOTE — PLAN OF CARE
Problem: HEMATOLOGIC - ADULT  Goal: Free from bleeding injury  Description: (Example usage: patient with low platelets)  INTERVENTIONS:  - Avoid intramuscular injections, enemas and rectal medication administration  - Ensure safe mobilization of patient call for assistance with activity based on assessment  - Modify environment to reduce risk of injury  - Provide assistive devices as appropriate  - Consider OT/PT consult to assist with strengthening/mobility  - Encourage toileting schedule  Outcome: Progr ambulation using safe patient handling equipment as needed  - Ensure adequate protection for wounds/incisions during mobilization  - Obtain PT/OT consults as needed  - Advance activity as appropriate  - Communicate ordered activity level and limitations wi

## 2020-12-29 NOTE — RESPIRATORY THERAPY NOTE
Received patient from ER on vapotherm 40L FIO2 100%. Flow weaned down to 30L and FIO2 90%. Patient maintaining appropriate sats. Patient able to prone himself.  RT to continue to wean patient as they can tolerate

## 2020-12-29 NOTE — H&P
Pulmonary/Critical Care Admission Note    HPI:   Stephan Pete is a 76year old male with Patient presents with:  Difficulty Breathing    Kenrick Bojorquez MD    Pt is a 77 yo with HTN, nephrolithiasis, h/o PE 2017 off A/C and gout dx with covid 12/22 Intravenous, Once    •  [COMPLETED] Heparin Sodium (Porcine) 1000 UNIT/ML injection 6,820 Units, 80 Units/kg, Intravenous, Once    •  heparin (PORCINE) 95410yfabj/250mL infusion ED INITIAL DOSE, 18 Units/kg/hr, Intravenous, Once    •  [COMPLETED] cefTRIAXo 12/28/2020     12/28/2020    CA 9.0 12/28/2020    ALB 3.1 12/28/2020    ALKPHO 106 12/28/2020    BILT 0.5 12/28/2020    TP 8.4 12/28/2020    AST 66 12/28/2020    ALT 35 12/28/2020    PTT 39.1 12/28/2020    INR 1.23 12/28/2020    DDIMER 0.79 12/28/20

## 2020-12-29 NOTE — ED NOTES
Assumed care. Pt on ED cart with vapotherm at [address] [facility]. No respiratory distress. Updated on POC-aware of room being cleaned upstairs. Heparin infusing without complication.

## 2020-12-29 NOTE — PLAN OF CARE
Problem: ALTERED NUTRIENT INTAKE - ADULT  Goal: Nutrient intake appropriate for improving, restoring or maintaining nutritional needs  Description: INTERVENTIONS:  - Assess nutritional status and recommend course of action  - Monitor oral intake, labs, a Spoke to Pt informed Mammogram order placed . Dr Brady Rodríguez is requesting routine lab orders placed prior to Px appointment . Please advise .

## 2020-12-29 NOTE — PLAN OF CARE
Jefry Chol is alert and oriented, assessed using translation line. Pt is on 30L 90% FiO2 vapotherm, dyspnea noted at rest. Pt desaturates to high 70s when supine and on exertion. Jefry Chol is currently prone. Urinary catheter placed. Pt on Hep gtt.  Call lig CO2 retention  Outcome: Not Progressing     Problem: METABOLIC/FLUID AND ELECTROLYTES - ADULT  Goal: Hemodynamic stability and optimal renal function maintained  Description: INTERVENTIONS:  - Monitor labs and assess for signs and symptoms of volume excess

## 2020-12-29 NOTE — ED NOTES
Rounding completed    No complaints at this time  Awaiting bed assignment  Elimination assistance offered  No additional needs at this time  Call light within reach, will update patient with more information  Will continue to monitor

## 2020-12-29 NOTE — CONSULTS
[facility] Patient Status:  Emergency    1945 MRN [id number]   Location [address] [place] [address] Attending [address] Day # 0 PCP Patt Farley, •  heparin (PORCINE) 38704lgvth/250mL infusion ED INITIAL DOSE, 18 Units/kg/hr, Intravenous, Once    Review of Systems:  CONSTITUTIONAL:  No weight loss, weakness or +fatigue. HEENT:  Eyes:  No visual loss, blurred vision, double vision or yellow sclerae. 76 yoM with h/o PE 2017, gout, HTN, nephrolithiasis, previous severe pneumonia requiring intubation now with symptoms starting at home 12/13 with initially mild \"cold\" symptoms. Sick contact from daughter at home who is pregnant.  Tested on 12/19 and posi

## 2020-12-29 NOTE — DIETARY NOTE
ADULT NUTRITION INITIAL ASSESSMENT    Pt is at moderate nutrition risk. Pt does not meet malnutrition criteria. RECOMMENDATIONS TO MD:  If improves, diet advance. RD adding oral nutrition supplements high in cals/protein.  If intubated: or unable to t ml/kcal adjust/clinical status. - Diet: clear liquids. Taking small sips only today. - Enteral Nutrition: Monitor for potential need-( indicated within first 12 hours of intubation).    - Medical Food Supplements- Ensure Clear started TID with meals ( hydration.    Recent Labs     12/28/20  1409 12/29/20  0423   * 145*   BUN 27* 26*   CREATSERUM 2.86* 1.44*   CA 9.0 9.0   * 135*   K 4.4 4.7   CL 97* 105   CO2 16.0* 23.0   OSMOCALC 273* 287       NUTRITION RELATED PHYSICAL FINDINGS:  - Body F

## 2020-12-29 NOTE — CONSULTS
[facility] AND [facility]  Cardiology Consultation    Fatimah Wilson Patient Status:  Inpatient    1945 MRN [id number]   Location [facility] 2W/SW Attending Raymond Arguelles, [address] [facility] [address] Day # 1 PCP Sheron Richard MD     Reason for Consultation: Intravenous, Daily  •  0.9% NaCl infusion, , Intravenous, Once  •  aspirin EC tab 81 mg, 81 mg, Oral, Daily  •  Piperacillin Sod-Tazobactam So (ZOSYN) 3.375 g in dextrose 5 % 100 mL ADD-vantage, 3.375 g, Intravenous, Q8H  •  enalaprilat (VASOTEC) injection PE  Hypertension  Hyperlipidemia      Recommendations:  Treatment of Covid as per primary service  Agree with 2D echocardiogram, await results  Currently no further cardiac work-up necessary    Thank you for allowing me to participate in the care of your p

## 2020-12-29 NOTE — PROGRESS NOTES
Cardiology:  Chart reviewed and see note from today by Dr. Danielle Zarate. Patient with no chest pain. Patient being treated for Covid infection. Patient with elevated troponin of unclear significance. Will follow troponin trend.   Patient already on anticoagul

## 2020-12-29 NOTE — PROGRESS NOTES
[facility]     Progress Note        Anni Casas Patient Status:  Inpatient    1945 MRN [id number]   Location [facility] 2W/SW Attending Jin Laughlin, [address] [facility] [address] Day # 1 PCP Devendra Shelley MD       Subjective:   Nithya Snider cyanosis, edema  Neurologic: no gross motor deficits  Skin: warm, dry      Results:     Lab Results   Component Value Date    WBC 7.3 12/29/2020    HGB 14.2 12/29/2020    HCT 40.5 12/29/2020    .0 12/29/2020    CREATSERUM 1.44 12/29/2020    BUN 26 1 1. COVID-19 pneumonia  2. Acute hypoxemic respiratory failure  3. Acute kidney injury  4. Prior history of pulmonary embolism  5.   Elevated troponin     Plan   -Patient presents with evidence of worsening hypoxemic respiratory failure secondary to CO

## 2020-12-30 LAB
APTT PPP: 77.6 SECONDS (ref 23.2–35.3)
CRP SERPL-MCNC: 5.78 MG/DL (ref ?–0.3)
D DIMER PPP FEU-MCNC: 0.53 UG/ML FEU (ref ?–0.75)
DEPRECATED HBV CORE AB SER IA-ACNC: 954.2 NG/ML
LDH SERPL L TO P-CCNC: 484 U/L

## 2020-12-30 PROCEDURE — 99232 SBSQ HOSP IP/OBS MODERATE 35: CPT | Performed by: INTERNAL MEDICINE

## 2020-12-30 PROCEDURE — 99291 CRITICAL CARE FIRST HOUR: CPT | Performed by: INTERNAL MEDICINE

## 2020-12-30 RX ORDER — TAMSULOSIN HYDROCHLORIDE 0.4 MG/1
0.4 CAPSULE ORAL DAILY
Status: DISCONTINUED | OUTPATIENT
Start: 2020-12-30 | End: 2021-02-13

## 2020-12-30 RX ORDER — ZINC SULFATE 50(220)MG
220 CAPSULE ORAL 2 TIMES DAILY
Status: DISCONTINUED | OUTPATIENT
Start: 2020-12-30 | End: 2021-02-13

## 2020-12-30 RX ORDER — ASPIRIN 81 MG/1
81 TABLET ORAL DAILY
Qty: 30 TABLET | Refills: 2 | Status: SHIPPED | OUTPATIENT
Start: 2020-12-31 | End: 2021-12-28

## 2020-12-30 RX ORDER — LOSARTAN POTASSIUM 100 MG/1
100 TABLET ORAL DAILY
Status: DISCONTINUED | OUTPATIENT
Start: 2020-12-30 | End: 2021-01-20

## 2020-12-30 RX ORDER — LORAZEPAM 0.5 MG/1
0.5 TABLET ORAL EVERY 4 HOURS PRN
Status: DISCONTINUED | OUTPATIENT
Start: 2020-12-30 | End: 2021-02-13

## 2020-12-30 NOTE — PAYOR COMM NOTE
Appropriate for inpatient status per guidelines for SOB due to COVID pneumonia with hypoxia.      --------------  ADMISSION REVIEW     Leanna Neal MA [facility]  Subscriber #:  [id number]  Authorization Number: [id number]       ED Provider Notes        Patient Se Appearance: He is well-developed. HENT:      Head: Normocephalic and atraumatic. Eyes:      General: Lids are normal.      Extraocular Movements: Extraocular movements intact. Pupils: Pupils are equal, round, and reactive to light.    Neck: components within normal limits   C-REACTIVE PROTEIN - Abnormal; Notable for the following components:    C-Reactive Protein 16.70 (*)     All other components within normal limits   D-DIMER - Abnormal; Notable for the following components:    D-Dimer 0.79 Please view results for these tests on the individual orders. LACTIC ACID 3 HR POST POSITIVE   TYPE AND SCREEN    Narrative: The following orders were created for panel order TYPE AND SCREEN.   Procedure                               Abnorm enlarged. Pulmonary vascularity is mildly prominent centrally. Mild calcifications at the aortic arch. MEDIAST/MARIE:   No visible mass or adenopathy. LUNGS/PLEURA: Lungs are mildly hypoexpanded bilaterally.   There are patchy bilateral ground-glass opacit noted in the HPI        PHYSICAL EXAM:   BP (!) 127/99   Pulse 87   Temp 98.9 °F (37.2 °C) (Oral)   Resp (!) 30   Wt 187 lb 13.3 oz (85.2 kg)   SpO2 93%   BMI 26.95 kg/m²         Mild inc wob  A&Ox3  Head AT/NC  Anicteric  Lung b/l crackles  CV reg  Abd so CCP  -  isolation per protocol  -  Follow fever curve, wbc  -  Reviewed labs, micro, imaging reports, available old records  -  Discussed with ED, family           12/29 CARDS    Impression:  Severe Covid pneumonia  Mildly elevated troponin  History of PE Consultants  [phone]  12/29/2020      Attestation signed by Thalia Daigle MD at 12/29/2020 10:06 PM   Patient examined and assessed. Agree with above. Remains on Decadron. Continue Zosyn. Later presentation from disease onset.   Not a candidate

## 2020-12-30 NOTE — PROGRESS NOTES
INFECTIOUS DISEASE PROGRESS NOTE  [facility] TELEMEDICINE PROGRESS NOTE    Maru Overcast Patient Status:  Inpatient    1945 MRN [id number]   Location [facility] 2W/SW Attending Jai Montemayor MD   [facility] 76 [facility] with h/o PE 2017, gout, HTN, nephrolithiasis, previous severe pneumonia requiring intubation now with symptoms starting at home 12/13 with initially mild \"cold\" symptoms. Sick contact from daughter at home who is pregnant.  Tested on 12/19 and posi

## 2020-12-30 NOTE — PLAN OF CARE
Problem: Patient Centered Care  Goal: Patient preferences are identified and integrated in the patient's plan of care  Description: Interventions:  - What would you like us to know as we care for you?   - Provide timely, complete, and accurate informatio of bleeding or hemorrhage  - Monitor labs and vital signs for trends  - Administer supportive blood products/factors, fluids and medications as ordered and appropriate  - Administer supportive blood products/factors as ordered and appropriate  Outcome: Pro needed  - Consider post-discharge preferences of patient/family/discharge partner  - Complete POLST form as appropriate  - Assess patient's ability to be responsible for managing their own health  - Refer to Case Management Department for coordinating disc

## 2020-12-30 NOTE — PROGRESS NOTES
[place] [facility]    Progress Note    Pansy Sprain Patient Status:  Inpatient    1945 MRN [id number]   Location [facility] 2W/SW Attending Chi Hancock,  [facility] Se Day # 2 PCP Alejandrina Bryan MD         Assessment and Plan: edema  Neuro: no focal deficits  Skin: no rashes or lesions    Scheduled Meds:    • dexamethasone Sodium Phosphate  6 mg Intravenous Daily   • sodium chloride   Intravenous Once   • aspirin  81 mg Oral Daily   • piperacillin-tazobactam  3.375 g Intravenous multifocal pneumonia/pneumonitis.   Consider COVID-19 in the differential.    Dictated by (CST): Naina Moura MD on 12/28/2020 at 3:26 PM     Finalized by (CST): Naina Moura MD on 12/28/2020 at 3:28 PM          Ekg 12-lead    Result Date: 12/28/2020  ECG

## 2020-12-30 NOTE — PROGRESS NOTES
Pulmonary/Critical Care Follow Up Note    HPI:   Meliton [place] is a 76year old male with Patient presents with:  Difficulty Breathing      PCP Shayna Galindo MD  Admission Attending Dianne Crews 15 Day #2    C/o mild sob  C/ ASSESSMENT/PLAN:     COVID-19  C/b AHRF  Now on 100% vapo  Dx and sx started < 10 days ago   However different hx per dtr and ID  PCT high  S/p Actemra  S/p CCP  RDV stopped/held  Plan       CCU                PPE                IS

## 2020-12-31 ENCOUNTER — APPOINTMENT (OUTPATIENT)
Dept: GENERAL RADIOLOGY | Facility: HOSPITAL | Age: 75
DRG: 871 | End: 2020-12-31
Attending: INTERNAL MEDICINE
Payer: MEDICARE

## 2020-12-31 ENCOUNTER — TELEPHONE (OUTPATIENT)
Dept: GASTROENTEROLOGY | Facility: CLINIC | Age: 75
End: 2020-12-31

## 2020-12-31 DIAGNOSIS — Z85.46 HISTORY OF PROSTATE CANCER: ICD-10-CM

## 2020-12-31 DIAGNOSIS — R19.4 CHANGE IN BOWEL HABITS: ICD-10-CM

## 2020-12-31 DIAGNOSIS — R63.4 LOSS OF WEIGHT: Primary | ICD-10-CM

## 2020-12-31 DIAGNOSIS — R19.7 DIARRHEA, UNSPECIFIED TYPE: ICD-10-CM

## 2020-12-31 DIAGNOSIS — R10.9 ABDOMINAL CRAMPS: ICD-10-CM

## 2020-12-31 LAB
ANION GAP SERPL CALC-SCNC: 5 MMOL/L (ref 0–18)
APTT PPP: 54 SECONDS (ref 23.2–35.3)
APTT PPP: 69.6 SECONDS (ref 23.2–35.3)
BASOPHILS # BLD: 0 X10(3) UL (ref 0–0.2)
BASOPHILS NFR BLD: 0 %
BLOOD TYPE BARCODE: 7300
BUN BLD-MCNC: 28 MG/DL (ref 7–18)
BUN/CREAT SERPL: 23.5 (ref 10–20)
CALCIUM BLD-MCNC: 9.2 MG/DL (ref 8.5–10.1)
CHLORIDE SERPL-SCNC: 103 MMOL/L (ref 98–112)
CO2 SERPL-SCNC: 25 MMOL/L (ref 21–32)
CREAT BLD-MCNC: 1.19 MG/DL
CRP SERPL-MCNC: 2.98 MG/DL (ref ?–0.3)
D DIMER PPP FEU-MCNC: 1.06 UG/ML FEU (ref ?–0.75)
DEPRECATED HBV CORE AB SER IA-ACNC: 1027.9 NG/ML
DEPRECATED RDW RBC AUTO: 36.7 FL (ref 35.1–46.3)
EOSINOPHIL # BLD: 0 X10(3) UL (ref 0–0.7)
EOSINOPHIL NFR BLD: 0 %
ERYTHROCYTE [DISTWIDTH] IN BLOOD BY AUTOMATED COUNT: 12.7 % (ref 11–15)
GLUCOSE BLD-MCNC: 106 MG/DL (ref 70–99)
HCT VFR BLD AUTO: 42.4 %
HGB BLD-MCNC: 14.8 G/DL
LDH SERPL L TO P-CCNC: 606 U/L
LYMPHOCYTES NFR BLD: 0.27 X10(3) UL (ref 1–4)
LYMPHOCYTES NFR BLD: 2 %
MCH RBC QN AUTO: 28.2 PG (ref 26–34)
MCHC RBC AUTO-ENTMCNC: 34.9 G/DL (ref 31–37)
MCV RBC AUTO: 80.8 FL
MONOCYTES # BLD: 0.69 X10(3) UL (ref 0.1–1)
MONOCYTES NFR BLD: 5 %
MORPHOLOGY: NORMAL
NEUTROPHILS # BLD AUTO: 11.1 X10 (3) UL (ref 1.5–7.7)
NEUTROPHILS NFR BLD: 83 %
NEUTS BAND NFR BLD: 10 %
NEUTS HYPERSEG # BLD: 12.74 X10(3) UL (ref 1.5–7.7)
OSMOLALITY SERPL CALC.SUM OF ELEC: 282 MOSM/KG (ref 275–295)
PLATELET # BLD AUTO: 400 10(3)UL (ref 150–450)
PLATELET MORPHOLOGY: NORMAL
POTASSIUM SERPL-SCNC: 4.2 MMOL/L (ref 3.5–5.1)
RBC # BLD AUTO: 5.25 X10(6)UL
SODIUM SERPL-SCNC: 133 MMOL/L (ref 136–145)
TOTAL CELLS COUNTED: 100
WBC # BLD AUTO: 13.7 X10(3) UL (ref 4–11)

## 2020-12-31 PROCEDURE — 99233 SBSQ HOSP IP/OBS HIGH 50: CPT | Performed by: INTERNAL MEDICINE

## 2020-12-31 PROCEDURE — 71045 X-RAY EXAM CHEST 1 VIEW: CPT | Performed by: INTERNAL MEDICINE

## 2020-12-31 RX ORDER — DEXAMETHASONE 6 MG/1
6 TABLET ORAL
Status: COMPLETED | OUTPATIENT
Start: 2021-01-01 | End: 2021-01-11

## 2020-12-31 RX ORDER — TETRAHYDROZOLINE HCL 0.05 %
1 DROPS OPHTHALMIC (EYE) 4 TIMES DAILY PRN
Status: DISCONTINUED | OUTPATIENT
Start: 2020-12-31 | End: 2021-02-13

## 2020-12-31 NOTE — PLAN OF CARE
Problem: Patient Centered Care  Goal: Patient preferences are identified and integrated in the patient's plan of care  Description: Interventions:  - What would you like us to know as we care for you?   - Provide timely, complete, and accurate informatio of bleeding or hemorrhage  - Monitor labs and vital signs for trends  - Administer supportive blood products/factors, fluids and medications as ordered and appropriate  - Administer supportive blood products/factors as ordered and appropriate  Outcome: Pro high O2 demand via vapotherm. Saturating between mid 80's to low 90's overnight. Had to prone once saturation kept dropping. Lane with good output. Remains on heparin drip per PE protocol. no active bleeding noted. Slept on and off.

## 2020-12-31 NOTE — TELEPHONE ENCOUNTER
Scheduled for:  Colonoscopy [address] [facility] EGD [id number]  Provider Name: Dr. Raymond Andrews  Date:   From:1/8/2021 To :03/12/21  Location:    [facility]   Sedation:  MAC  Time: 8:00 (pt is aware to arrive at 7:00am)     Prep:  Trilyte  Meds/Allergies Reconciled?:  [id number]

## 2020-12-31 NOTE — TELEPHONE ENCOUNTER
GI staff:    Please reschedule this patient.  From the chart, he is in the ICU with COVID and has elective procedures scheduled with me in 1 week    Thanks    Colt Stout MD  [id number] [facility] - Gastroenterology  12/31/2020  10:51 AM

## 2020-12-31 NOTE — PROGRESS NOTES
Pulmonary/Critical Care Follow Up Note    HPI:   Keegan Brown is a 76year old male with Patient presents with:  Difficulty Breathing      PCP Melba Christianson MD  Admission Attending Edi Huston MD    Hospital Day #3    C/o mild sob  C/ crackles  CV reg   Abd soft   Ext warm      LABS:    Lab Results   Component Value Date    WBC 13.7 12/31/2020    HGB 14.8 12/31/2020    HCT 42.4 12/31/2020    .0 12/31/2020    CREATSERUM 1.19 12/31/2020    BUN 28 12/31/2020     12/31/2020

## 2020-12-31 NOTE — PLAN OF CARE
Problem: Patient Centered Care  Goal: Patient preferences are identified and integrated in the patient's plan of care  Description: Interventions:  - What would you like us to know as we care for you?   - Provide timely, complete, and accurate informatio Progressing     Problem: HEMATOLOGIC - ADULT  Goal: Maintains hematologic stability  Description: INTERVENTIONS  - Assess for signs and symptoms of bleeding or hemorrhage  - Monitor labs and vital signs for trends  - Administer supportive blood products/fa transportation as appropriate  - Identify discharge learning needs (meds, wound care, etc)  - Arrange for interpreters to assist at discharge as needed  - Consider post-discharge preferences of patient/family/discharge partner  - Complete POLST form as jane

## 2020-12-31 NOTE — PROGRESS NOTES
[facility] Pharmacy Note: Route Optimization for Dexamethasone (Decadron)    Patient is currently on Dexamethasone (Decadron) 6 mg IV every 24 hours.    The patient meets the criteria to convert to the oral equivalent as established by the IV to Oral conversion pr

## 2020-12-31 NOTE — PLAN OF CARE
Patient on vapotherm 90/30 (80-90% oxygen saturation) and tolerating well. Patient prones and does well. Heparin gtt at 1200 and will assess then. Patient cleaned and bathed today.   Problem: Patient Centered Care  Goal: Patient preferences are identifie signs/symptoms of CO2 retention  Outcome: Progressing     Problem: METABOLIC/FLUID AND ELECTROLYTES - ADULT  Goal: Hemodynamic stability and optimal renal function maintained  Description: INTERVENTIONS:  - Monitor labs and assess for signs and symptoms of with transfers and ambulation using safe patient handling equipment as needed  - Ensure adequate protection for wounds/incisions during mobilization  - Obtain PT/OT consults as needed  - Advance activity as appropriate  - Communicate ordered activity level injury  - Provide assistive devices as appropriate  - Consider OT/PT consult to assist with strengthening/mobility  - Encourage toileting schedule  Outcome: Progressing     Problem: DISCHARGE PLANNING  Goal: Discharge to home or other facility with appropr

## 2021-01-01 LAB
ANION GAP SERPL CALC-SCNC: 7 MMOL/L (ref 0–18)
APTT PPP: 69.7 SECONDS (ref 23.2–35.3)
BASOPHILS # BLD: 0 X10(3) UL (ref 0–0.2)
BASOPHILS NFR BLD: 0 %
BUN BLD-MCNC: 28 MG/DL (ref 7–18)
BUN/CREAT SERPL: 24.6 (ref 10–20)
CALCIUM BLD-MCNC: 9.4 MG/DL (ref 8.5–10.1)
CHLORIDE SERPL-SCNC: 104 MMOL/L (ref 98–112)
CO2 SERPL-SCNC: 23 MMOL/L (ref 21–32)
CREAT BLD-MCNC: 1.14 MG/DL
DEPRECATED RDW RBC AUTO: 36.4 FL (ref 35.1–46.3)
EOSINOPHIL # BLD: 0 X10(3) UL (ref 0–0.7)
EOSINOPHIL NFR BLD: 0 %
ERYTHROCYTE [DISTWIDTH] IN BLOOD BY AUTOMATED COUNT: 12.6 % (ref 11–15)
GLUCOSE BLD-MCNC: 106 MG/DL (ref 70–99)
HCT VFR BLD AUTO: 46.1 %
HEMOCCULT STL QL: POSITIVE
HGB BLD-MCNC: 15.9 G/DL
LYMPHOCYTES NFR BLD: 1.16 X10(3) UL (ref 1–4)
LYMPHOCYTES NFR BLD: 7 %
MCH RBC QN AUTO: 28 PG (ref 26–34)
MCHC RBC AUTO-ENTMCNC: 34.5 G/DL (ref 31–37)
MCV RBC AUTO: 81.3 FL
METAMYELOCYTES # BLD: 0.17 X10(3) UL
METAMYELOCYTES NFR BLD: 1 %
MONOCYTES # BLD: 1 X10(3) UL (ref 0.1–1)
MONOCYTES NFR BLD: 6 %
MORPHOLOGY: NORMAL
MYELOCYTES # BLD: 0.17 X10(3) UL
MYELOCYTES NFR BLD: 1 %
NEUTROPHILS # BLD AUTO: 13.18 X10 (3) UL (ref 1.5–7.7)
NEUTROPHILS NFR BLD: 81 %
NEUTS BAND NFR BLD: 4 %
NEUTS HYPERSEG # BLD: 14.11 X10(3) UL (ref 1.5–7.7)
NRBC BLD MANUAL-RTO: 1 %
OSMOLALITY SERPL CALC.SUM OF ELEC: 284 MOSM/KG (ref 275–295)
PLATELET # BLD AUTO: 432 10(3)UL (ref 150–450)
PLATELET MORPHOLOGY: NORMAL
POTASSIUM SERPL-SCNC: 4.2 MMOL/L (ref 3.5–5.1)
RBC # BLD AUTO: 5.67 X10(6)UL
SODIUM SERPL-SCNC: 134 MMOL/L (ref 136–145)
TOTAL CELLS COUNTED: 100
WBC # BLD AUTO: 16.6 X10(3) UL (ref 4–11)

## 2021-01-01 PROCEDURE — 99233 SBSQ HOSP IP/OBS HIGH 50: CPT | Performed by: INTERNAL MEDICINE

## 2021-01-01 RX ORDER — ENOXAPARIN SODIUM 100 MG/ML
40 INJECTION SUBCUTANEOUS DAILY
Status: DISCONTINUED | OUTPATIENT
Start: 2021-01-02 | End: 2021-01-24

## 2021-01-01 RX ORDER — MORPHINE SULFATE 4 MG/ML
4 INJECTION, SOLUTION INTRAMUSCULAR; INTRAVENOUS EVERY 2 HOUR PRN
Status: DISCONTINUED | OUTPATIENT
Start: 2021-01-01 | End: 2021-01-01

## 2021-01-01 RX ORDER — MORPHINE SULFATE 2 MG/ML
2 INJECTION, SOLUTION INTRAMUSCULAR; INTRAVENOUS EVERY 2 HOUR PRN
Status: DISCONTINUED | OUTPATIENT
Start: 2021-01-01 | End: 2021-01-01

## 2021-01-01 RX ORDER — HYDRALAZINE HYDROCHLORIDE 20 MG/ML
20 INJECTION INTRAMUSCULAR; INTRAVENOUS EVERY 4 HOURS PRN
Status: DISCONTINUED | OUTPATIENT
Start: 2021-01-01 | End: 2021-02-13

## 2021-01-01 NOTE — PROGRESS NOTES
Pulmonary/Critical Care Follow Up Note    HPI:   Jose Pandey is a 76year old male with Patient presents with:  Difficulty Breathing      PCP Amadou Kelley MD  Admission Attending Digna Marie MD    Hospital Day #4    C/o mild sob  C/ Temporal, resp. rate 24, weight 187 lb 13.3 oz (85.2 kg), SpO2 90 %.     Intake/Output Summary (Last 24 hours) at 1/1/2021 1346  Last data filed at 1/1/2021 0600  Gross per 24 hour   Intake 699.01 ml   Output 2900 ml   Net -2200.99 ml     NAD  On vapo  A/OX 1/1/2021    Stool hemoccult +  Will stop hep gtt  SQ lovenox px in AM  F/u h/h in AM         Homa Newby MD

## 2021-01-01 NOTE — PLAN OF CARE
On Vapotherm 30L. FiO2 currently at 80%. Pt self-proning.      Problem: RESPIRATORY - ADULT  Goal: Achieves optimal ventilation and oxygenation  Description: INTERVENTIONS:  - Assess for changes in respiratory status  - Assess for changes in mentation and b

## 2021-01-01 NOTE — PLAN OF CARE
Problem: Patient Centered Care  Goal: Patient preferences are identified and integrated in the patient's plan of care  Description: Interventions:  - What would you like us to know as we care for you?   - Provide timely, complete, and accurate informatio INTERVENTIONS:  - Monitor labs and assess for signs and symptoms of volume excess or deficit  - Monitor intake, output and patient weight  - Monitor urine specific gravity, serum osmolarity and serum sodium as indicated or ordered  - Monitor response to in Advance activity as appropriate  - Communicate ordered activity level and limitations with patient/family  Outcome: Progressing  Goal: Maintain proper alignment of affected body part  Description: INTERVENTIONS:  - Support and protect limb and body alignme PLANNING  Goal: Discharge to home or other facility with appropriate resources  Description: INTERVENTIONS:  - Identify barriers to discharge w/pt and caregiver  - Include patient/family/discharge partner in discharge planning  - Arrange for needed dischar

## 2021-01-02 ENCOUNTER — APPOINTMENT (OUTPATIENT)
Dept: GENERAL RADIOLOGY | Facility: HOSPITAL | Age: 76
DRG: 871 | End: 2021-01-02
Attending: INTERNAL MEDICINE
Payer: MEDICARE

## 2021-01-02 LAB
ANION GAP SERPL CALC-SCNC: 9 MMOL/L (ref 0–18)
APTT PPP: 26.3 SECONDS (ref 23.2–35.3)
BASOPHILS # BLD: 0 X10(3) UL (ref 0–0.2)
BASOPHILS NFR BLD: 0 %
BUN BLD-MCNC: 28 MG/DL (ref 7–18)
BUN/CREAT SERPL: 27.2 (ref 10–20)
CALCIUM BLD-MCNC: 9.1 MG/DL (ref 8.5–10.1)
CHLORIDE SERPL-SCNC: 103 MMOL/L (ref 98–112)
CO2 SERPL-SCNC: 23 MMOL/L (ref 21–32)
CREAT BLD-MCNC: 1.03 MG/DL
DEPRECATED RDW RBC AUTO: 36 FL (ref 35.1–46.3)
EOSINOPHIL # BLD: 0 X10(3) UL (ref 0–0.7)
EOSINOPHIL NFR BLD: 0 %
ERYTHROCYTE [DISTWIDTH] IN BLOOD BY AUTOMATED COUNT: 13.7 % (ref 11–15)
GLUCOSE BLD-MCNC: 94 MG/DL (ref 70–99)
HCT VFR BLD AUTO: 46.9 %
HGB BLD-MCNC: 16.5 G/DL
LYMPHOCYTES NFR BLD: 1.62 X10(3) UL (ref 1–4)
LYMPHOCYTES NFR BLD: 7 %
MCH RBC QN AUTO: 28.2 PG (ref 26–34)
MCHC RBC AUTO-ENTMCNC: 35.2 G/DL (ref 31–37)
MCV RBC AUTO: 80.2 FL
MONOCYTES # BLD: 0.72 X10(3) UL (ref 0.1–1)
MONOCYTES NFR BLD: 4 %
MORPHOLOGY: NORMAL
NEUTROPHILS # BLD AUTO: 14.66 X10 (3) UL (ref 1.5–7.7)
NEUTROPHILS NFR BLD: 87 %
NEUTS HYPERSEG # BLD: 15.66 X10(3) UL (ref 1.5–7.7)
OSMOLALITY SERPL CALC.SUM OF ELEC: 285 MOSM/KG (ref 275–295)
PLATELET # BLD AUTO: 388 10(3)UL (ref 150–450)
PLATELET MORPHOLOGY: NORMAL
POTASSIUM SERPL-SCNC: 3.8 MMOL/L (ref 3.5–5.1)
RBC # BLD AUTO: 5.85 X10(6)UL
SODIUM SERPL-SCNC: 135 MMOL/L (ref 136–145)
TOTAL CELLS COUNTED: 100
VARIANT LYMPHS NFR BLD MANUAL: 2 %
WBC # BLD AUTO: 18 X10(3) UL (ref 4–11)

## 2021-01-02 PROCEDURE — 99232 SBSQ HOSP IP/OBS MODERATE 35: CPT | Performed by: INTERNAL MEDICINE

## 2021-01-02 PROCEDURE — 71045 X-RAY EXAM CHEST 1 VIEW: CPT | Performed by: INTERNAL MEDICINE

## 2021-01-02 RX ORDER — POTASSIUM CHLORIDE 20 MEQ/1
40 TABLET, EXTENDED RELEASE ORAL ONCE
Status: COMPLETED | OUTPATIENT
Start: 2021-01-02 | End: 2021-01-02

## 2021-01-02 NOTE — PROGRESS NOTES
[place] [facility]     Progress Note        Mickey Flores Patient Status:  Inpatient    1945 MRN [id number]   Location [facility] 2W/SW Attending Isabel Lundberg, [address] [facility] Se Day # 5 PCP Nery Paniagua MD       Subjective:   Lubna Tobar nares pateint  Cardio: RRR, S1 S2  Respiratory: Bilateral crackles  GI: abdomen soft, non tender  Extremities: no clubbing, cyanosis, edema  Neurologic: no gross motor deficits  Skin: warm, dry      Results:     Lab Results   Component Value Date    WBC 18

## 2021-01-02 NOTE — PLAN OF CARE
Started the shift with patient on 30L vapotherm at  85% fio2. Patient was saturating at 93% but started to desat around 9pm. Used  line to explain to patient importance of proning.  Patient agreed to prone which was helping keep his saturation at function maintained  Description: INTERVENTIONS:  - Monitor labs and assess for signs and symptoms of volume excess or deficit  - Monitor intake, output and patient weight  - Monitor urine specific gravity, serum osmolarity and serum sodium as indicated or Obtain PT/OT consults as needed  - Advance activity as appropriate  - Communicate ordered activity level and limitations with patient/family  Outcome: Progressing  Goal: Maintain proper alignment of affected body part  Description: INTERVENTIONS:  - Suppor Progressing     Problem: DISCHARGE PLANNING  Goal: Discharge to home or other facility with appropriate resources  Description: INTERVENTIONS:  - Identify barriers to discharge w/pt and caregiver  - Include patient/family/discharge partner in discharge trino

## 2021-01-03 LAB — POTASSIUM SERPL-SCNC: 4 MMOL/L (ref 3.5–5.1)

## 2021-01-03 PROCEDURE — 99233 SBSQ HOSP IP/OBS HIGH 50: CPT | Performed by: INTERNAL MEDICINE

## 2021-01-03 NOTE — PLAN OF CARE
Patient resting in bed. Alert & orientedx4. On vapotherm @ 30L. Desaturates with movement. Frequent proning. Plan to continue IV antibiotics. Fall precautions in place. Call light in reach.    Problem: Patient Centered Care  Goal: Patient preferences are id broncho-pulmonary hygiene including cough, deep breathe, Incentive Spirometry  - Assess the need for suctioning and perform as needed  - Assess and instruct to report SOB or any respiratory difficulty  - Respiratory Therapy support as indicated  - Manage/a hemostasis  - Assess for signs and symptoms of internal bleeding  - Monitor lab trends  - Patient is to report abnormal signs of bleeding to staff  - Avoid use of toothpicks and dental floss  - Use electric shaver for shaving  - Use soft bristle tooth brus Progressing     Problem: NEUROLOGICAL - ADULT  Goal: Achieves stable or improved neurological status  Description: INTERVENTIONS  - Assess for and report changes in neurological status  - Initiate measures to prevent increased intracranial pressure  - Main health  - Refer to Case Management Department for coordinating discharge planning if the patient needs post-hospital services based on physician/LIP order or complex needs related to functional status, cognitive ability or social support system  1/3/2021 1

## 2021-01-03 NOTE — PROGRESS NOTES
[facility]    Progress Note    Elisa Single Patient Status:  Inpatient    1945 MRN [id number]   Location [facility] 2W/SW Attending Leeann Sepulveda MD   Hosp Day # 6 PCP Phi Bentley MD        Subjective:     Con 01/02/2021    .0 01/02/2021    CREATSERUM 1.03 01/02/2021    BUN 28 (H) 01/02/2021     (L) 01/02/2021    K 4.0 01/03/2021     01/02/2021    CO2 23.0 01/02/2021    GLU 94 01/02/2021    CA 9.1 01/02/2021    ALB 3.1 (L) 12/28/2020    ALKPHO

## 2021-01-03 NOTE — PLAN OF CARE
Pt alert. Reports no pain. Dry cough noted. SOB noted at rest at times and with activity. Tolerating present vapotherm settings. Prones himself for short periods of time. O2 sat remains in the mid 90's but will desat to high 80's with activity.   Appet adequate protection for wounds/incisions during mobilization  - Obtain PT/OT consults as needed  - Advance activity as appropriate  - Communicate ordered activity level and limitations with patient/family  Outcome: Progressing     Problem: SKIN/TISSUE INTE

## 2021-01-04 LAB
ANION GAP SERPL CALC-SCNC: 8 MMOL/L (ref 0–18)
BASOPHILS # BLD: 0 X10(3) UL (ref 0–0.2)
BASOPHILS NFR BLD: 0 %
BUN BLD-MCNC: 31 MG/DL (ref 7–18)
BUN/CREAT SERPL: 25.2 (ref 10–20)
CALCIUM BLD-MCNC: 9.2 MG/DL (ref 8.5–10.1)
CHLORIDE SERPL-SCNC: 105 MMOL/L (ref 98–112)
CO2 SERPL-SCNC: 21 MMOL/L (ref 21–32)
CREAT BLD-MCNC: 1.23 MG/DL
D DIMER PPP FEU-MCNC: >20 UG/ML FEU (ref ?–0.75)
DEPRECATED RDW RBC AUTO: 37.9 FL (ref 35.1–46.3)
EOSINOPHIL # BLD: 0.38 X10(3) UL (ref 0–0.7)
EOSINOPHIL NFR BLD: 2 %
ERYTHROCYTE [DISTWIDTH] IN BLOOD BY AUTOMATED COUNT: 14 % (ref 11–15)
GLUCOSE BLD-MCNC: 75 MG/DL (ref 70–99)
HCT VFR BLD AUTO: 48 %
HGB BLD-MCNC: 16.7 G/DL
LYMPHOCYTES NFR BLD: 0.95 X10(3) UL (ref 1–4)
LYMPHOCYTES NFR BLD: 5 %
MCH RBC QN AUTO: 28.4 PG (ref 26–34)
MCHC RBC AUTO-ENTMCNC: 34.8 G/DL (ref 31–37)
MCV RBC AUTO: 81.5 FL
MONOCYTES # BLD: 0.76 X10(3) UL (ref 0.1–1)
MONOCYTES NFR BLD: 4 %
NEUTROPHILS # BLD AUTO: 16.38 X10 (3) UL (ref 1.5–7.7)
NEUTROPHILS NFR BLD: 78 %
NEUTS BAND NFR BLD: 11 %
NEUTS HYPERSEG # BLD: 16.51 X10(3) UL (ref 1.5–7.7)
OSMOLALITY SERPL CALC.SUM OF ELEC: 283 MOSM/KG (ref 275–295)
PLATELET # BLD AUTO: 273 10(3)UL (ref 150–450)
PLATELET MORPHOLOGY: NORMAL
POTASSIUM SERPL-SCNC: 4.2 MMOL/L (ref 3.5–5.1)
RBC # BLD AUTO: 5.89 X10(6)UL
SODIUM SERPL-SCNC: 134 MMOL/L (ref 136–145)
TOTAL CELLS COUNTED: 98
WBC # BLD AUTO: 18.6 X10(3) UL (ref 4–11)

## 2021-01-04 PROCEDURE — 99233 SBSQ HOSP IP/OBS HIGH 50: CPT | Performed by: INTERNAL MEDICINE

## 2021-01-04 NOTE — DIETARY NOTE
ADULT NUTRITION REASSESSMENT     Pt is at moderate nutrition risk. Pt does not meet malnutrition criteria.       RECOMMENDATIONS TO MD:  CPM     NUTRITION DIAGNOSIS/PROBLEM:  Unintentional weight loss related to decreased appetite/intake in the setting of 102-170 grams protein/day (1.2-2.0 grams protein per kg Actual body wt (ABW))  Fluid needs: ~ 1 ml/kcal adjust/clinical status.    - Diet: low fiber, low salt (3-4 gm). (1/2 RD liberalized diet as pt is not diabetic and BG acceptable on steroids)   - Medica maximize  Food Allergies: No  Cultural/Ethnic/Zoroastrian Preferences: Not Obtained    MEDICATIONS: reviewed       • enoxaparin  40 mg Subcutaneous Daily   • dexamethasone  6 mg Oral Daily with breakfast   • losartan  100 mg Oral Daily   • tamsulosin HCl  0.

## 2021-01-04 NOTE — PLAN OF CARE
Pt alert. Reports no pain. Reports restlessness; lorazepam given to help pt relax and sleep. Pt proned for about 3 hours after dosing; O2 sat in low 90s. Pt now supine because he reports he is uncomfortable and sats remain in high 80s.   Rhonchi and cra abnormal signs of bleeding to staff  - Avoid use of toothpicks and dental floss  - Use electric shaver for shaving  - Use soft bristle tooth brush  - Limit straining and forceful nose blowing  Outcome: Progressing     Problem: SAFETY ADULT - FALL  Goal: Fr

## 2021-01-04 NOTE — PROGRESS NOTES
Pulmonary/Critical Care Follow Up Note    HPI:   Jordyn Lozoya is a 76year old male with Patient presents with:  Difficulty Breathing      PCP Jena Huang MD  Admission Attending Craig Salas MD    Hospital Day #7    Mild sob  Mild c warm  prone    LABS:    Lab Results   Component Value Date    WBC 18.6 01/04/2021    HGB 16.7 01/04/2021    HCT 48.0 01/04/2021    .0 01/04/2021    CREATSERUM 1.23 01/04/2021    BUN 31 01/04/2021     01/04/2021    K 4.2 01/04/2021     01

## 2021-01-05 ENCOUNTER — APPOINTMENT (OUTPATIENT)
Dept: GENERAL RADIOLOGY | Facility: HOSPITAL | Age: 76
DRG: 871 | End: 2021-01-05
Attending: INTERNAL MEDICINE
Payer: MEDICARE

## 2021-01-05 LAB
ANION GAP SERPL CALC-SCNC: 7 MMOL/L (ref 0–18)
BASOPHILS # BLD AUTO: 0.05 X10(3) UL (ref 0–0.2)
BASOPHILS NFR BLD AUTO: 0.3 %
BUN BLD-MCNC: 35 MG/DL (ref 7–18)
BUN/CREAT SERPL: 31 (ref 10–20)
CALCIUM BLD-MCNC: 9.1 MG/DL (ref 8.5–10.1)
CHLORIDE SERPL-SCNC: 105 MMOL/L (ref 98–112)
CO2 SERPL-SCNC: 22 MMOL/L (ref 21–32)
CREAT BLD-MCNC: 1.13 MG/DL
DEPRECATED RDW RBC AUTO: 38.4 FL (ref 35.1–46.3)
EOSINOPHIL # BLD AUTO: 0.32 X10(3) UL (ref 0–0.7)
EOSINOPHIL NFR BLD AUTO: 1.8 %
ERYTHROCYTE [DISTWIDTH] IN BLOOD BY AUTOMATED COUNT: 14 % (ref 11–15)
GLUCOSE BLD-MCNC: 79 MG/DL (ref 70–99)
HAV IGM SER QL: 2.4 MG/DL (ref 1.6–2.6)
HCT VFR BLD AUTO: 49.2 %
HGB BLD-MCNC: 17.3 G/DL
IMM GRANULOCYTES # BLD AUTO: 0.37 X10(3) UL (ref 0–1)
IMM GRANULOCYTES NFR BLD: 2.1 %
LYMPHOCYTES # BLD AUTO: 0.67 X10(3) UL (ref 1–4)
LYMPHOCYTES NFR BLD AUTO: 3.8 %
MCH RBC QN AUTO: 28.5 PG (ref 26–34)
MCHC RBC AUTO-ENTMCNC: 35.2 G/DL (ref 31–37)
MCV RBC AUTO: 81.1 FL
MONOCYTES # BLD AUTO: 0.68 X10(3) UL (ref 0.1–1)
MONOCYTES NFR BLD AUTO: 3.8 %
NEUTROPHILS # BLD AUTO: 15.59 X10 (3) UL (ref 1.5–7.7)
NEUTROPHILS # BLD AUTO: 15.59 X10(3) UL (ref 1.5–7.7)
NEUTROPHILS NFR BLD AUTO: 88.2 %
OSMOLALITY SERPL CALC.SUM OF ELEC: 285 MOSM/KG (ref 275–295)
PHOSPHATE SERPL-MCNC: 2.5 MG/DL (ref 2.5–4.9)
PLATELET # BLD AUTO: 224 10(3)UL (ref 150–450)
POTASSIUM SERPL-SCNC: 4.2 MMOL/L (ref 3.5–5.1)
RBC # BLD AUTO: 6.07 X10(6)UL
SODIUM SERPL-SCNC: 134 MMOL/L (ref 136–145)
WBC # BLD AUTO: 17.7 X10(3) UL (ref 4–11)

## 2021-01-05 PROCEDURE — 99232 SBSQ HOSP IP/OBS MODERATE 35: CPT | Performed by: INTERNAL MEDICINE

## 2021-01-05 PROCEDURE — 71045 X-RAY EXAM CHEST 1 VIEW: CPT | Performed by: INTERNAL MEDICINE

## 2021-01-05 RX ORDER — ECHINACEA PURPUREA EXTRACT 125 MG
1 TABLET ORAL
Status: DISCONTINUED | OUTPATIENT
Start: 2021-01-05 | End: 2021-01-20

## 2021-01-05 NOTE — PROGRESS NOTES
01/05/21 0915   Oxygen Utilization   O2 Device High flow/High humidity   O2 Flow Rate (L/min) 30 L/min   FiO2 (%) 90 %   SpO2 95 %   received patient on above settings  Patient doing well on 90% sitting up with sats 96%

## 2021-01-05 NOTE — PROGRESS NOTES
[place] [facility] - [facility]     Progress Note        Leory  Patient Status:  Inpatient    1945 MRN [id number]   Location [facility] 2W/SW Attending CallAppyuly Mendenhall, [address] [facility] Se Day # 8 PCP Zoë Bush MD       Subjective:   Aaron Rothman RRR, S1 S2  Respiratory: Bilateral crackles  GI: abdomen soft, non tender  Extremities: no clubbing, cyanosis, edema  Neurologic: no gross motor deficits  Skin: warm, dry      Results:     Lab Results   Component Value Date    WBC 17.7 01/05/2021    HGB 17 [address] Barkley

## 2021-01-06 PROCEDURE — 99233 SBSQ HOSP IP/OBS HIGH 50: CPT | Performed by: INTERNAL MEDICINE

## 2021-01-06 NOTE — PLAN OF CARE
Patient did well overnight, intermittently prones. Remains on vapotherm 80% 30L. O2 saturation overnight remained >90%. Call light within reach.    Problem: Patient Centered Care  Goal: Patient preferences are identified and integrated in the patient's plan retention  Outcome: Progressing     Problem: METABOLIC/FLUID AND ELECTROLYTES - ADULT  Goal: Hemodynamic stability and optimal renal function maintained  Description: INTERVENTIONS:  - Monitor labs and assess for signs and symptoms of volume excess or defi ambulation using safe patient handling equipment as needed  - Ensure adequate protection for wounds/incisions during mobilization  - Obtain PT/OT consults as needed  - Advance activity as appropriate  - Communicate ordered activity level and limitations wi assistive devices as appropriate  - Consider OT/PT consult to assist with strengthening/mobility  - Encourage toileting schedule  Outcome: Progressing     Problem: DISCHARGE PLANNING  Goal: Discharge to home or other facility with appropriate resources  Addy

## 2021-01-06 NOTE — PROGRESS NOTES
Pulmonary/Critical Care Follow Up Note    HPI:   Elisa Moura is a 76year old male with Patient presents with:  Difficulty Breathing      PCP Phi Bentley MD  Admission Attending Bharati Peterson MD    Hospital Day #9    Mild sob  Mild c 0800  Gross per 24 hour   Intake 240 ml   Output 950 ml   Net -710 ml     NAD  On vapo 80%  Lung relatively clear  CV reg   Abd soft   Ext warm  prone    LABS:    Recent Labs   Lab 01/02/21  0528 01/03/21  0436 01/04/21  0447 01/05/21  0519   GLU 94  --  7

## 2021-01-06 NOTE — PLAN OF CARE
Patient remains 80% 30 L on vapotherm, intermittently self prones. Vitals WNL throughout shift, O2 sat drops to 87% with exertion but returns to normal limits quickly. Urine output adequate. No complaints during shift.     Problem: Patient Centered Care  Go anxiety  - Monitor for signs/symptoms of CO2 retention  Outcome: Progressing     Problem: METABOLIC/FLUID AND ELECTROLYTES - ADULT  Goal: Hemodynamic stability and optimal renal function maintained  Description: INTERVENTIONS:  - Monitor labs and assess fo assistive devices  - Assist with transfers and ambulation using safe patient handling equipment as needed  - Ensure adequate protection for wounds/incisions during mobilization  - Obtain PT/OT consults as needed  - Advance activity as appropriate  - Commun environment to reduce risk of injury  - Provide assistive devices as appropriate  - Consider OT/PT consult to assist with strengthening/mobility  - Encourage toileting schedule  Outcome: Progressing     Problem: DISCHARGE PLANNING  Goal: Discharge to home

## 2021-01-07 PROCEDURE — 99233 SBSQ HOSP IP/OBS HIGH 50: CPT | Performed by: INTERNAL MEDICINE

## 2021-01-07 NOTE — PLAN OF CARE
Problem: Patient Centered Care  Goal: Patient preferences are identified and integrated in the patient's plan of care  Description: Interventions:  - What would you like us to know as we care for you?  [place]ian peaking  - Provide timely, complete, and accu maintained  Description: INTERVENTIONS:  - Monitor labs and assess for signs and symptoms of volume excess or deficit  - Monitor intake, output and patient weight  - Monitor urine specific gravity, serum osmolarity and serum sodium as indicated or ordered consults as needed  - Advance activity as appropriate  - Communicate ordered activity level and limitations with patient/family  Outcome: Progressing  Goal: Maintain proper alignment of affected body part  Description: INTERVENTIONS:  - Support and protect Problem: DISCHARGE PLANNING  Goal: Discharge to home or other facility with appropriate resources  Description: INTERVENTIONS:  - Identify barriers to discharge w/pt and caregiver  - Include patient/family/discharge partner in discharge Tony De Jesus

## 2021-01-07 NOTE — PROGRESS NOTES
Pulmonary/Critical Care Follow Up Note    HPI:   Elisa Moura is a 76year old male with Patient presents with:  Difficulty Breathing      PCP Phi Bentley MD  Admission Attending Bharati Peterson MD    Hospital Day #10    Mild sob  Mild [address] filed at 1/7/2021 0521  Gross per 24 hour   Intake 350 ml   Output 1275 ml   Net -925 ml     NAD  On vapo 80%  Lung relatively clear  CV reg   Abd soft   Ext warm  Prone intermittently    LABS:    Recent Labs   Lab 01/02/21  0528 01/03/21

## 2021-01-08 PROCEDURE — 99233 SBSQ HOSP IP/OBS HIGH 50: CPT | Performed by: INTERNAL MEDICINE

## 2021-01-08 RX ORDER — GUAIFENESIN 100 MG/5ML
100 SOLUTION ORAL EVERY 4 HOURS PRN
Status: DISCONTINUED | OUTPATIENT
Start: 2021-01-08 | End: 2021-02-13

## 2021-01-08 RX ORDER — MULTIPLE VITAMINS W/ MINERALS TAB 9MG-400MCG
1 TAB ORAL DAILY
Status: DISCONTINUED | OUTPATIENT
Start: 2021-01-09 | End: 2021-02-13

## 2021-01-08 NOTE — PLAN OF CARE
Problem: Patient Centered Care  Goal: Patient preferences are identified and integrated in the patient's plan of care  Description: Interventions:  - What would you like us to know as we care for you?  - Provide timely, complete, and accurate information INTERVENTIONS:  - Monitor labs and assess for signs and symptoms of volume excess or deficit  - Monitor intake, output and patient weight  - Monitor urine specific gravity, serum osmolarity and serum sodium as indicated or ordered  - Monitor response to in Advance activity as appropriate  - Communicate ordered activity level and limitations with patient/family  Outcome: Progressing  Goal: Maintain proper alignment of affected body part  Description: INTERVENTIONS:  - Support and protect limb and body alignme PLANNING  Goal: Discharge to home or other facility with appropriate resources  Description: INTERVENTIONS:  - Identify barriers to discharge w/pt and caregiver  - Include patient/family/discharge partner in discharge planning  - Arrange for needed dischar

## 2021-01-08 NOTE — DIETARY NOTE
ADULT NUTRITION REASSESSMENT     Pt is at moderate nutrition risk. Pt does not meet malnutrition criteria.       RECOMMENDATIONS TO MD:  CPM     NUTRITION DIAGNOSIS/PROBLEM:  Unintentional weight loss related to decreased appetite/intake in the setting of todays intake-Pt consumed 100% of breakfast today and currently eating lunch. . MD notes state pt told MD has an appetite. He is slowly improving/MD notes.   Will add Magic Cup salter (290 kcals, 9 gms protein)   NUTRITION  PLAN/INTERVENTION:    NUTRITION PRE 1/8.... Intake: 1/4 ate 100%; decline on 1/5 x 4 days now poor to fair po intake.    Percent Meals Eaten (last 3 days)     Date/Time Percent Meals Eaten (%)    01/05/21 1400  50 %    01/06/21 0200  20 %    01/06/21 2043  25 %    01/07/21 0521  0 %

## 2021-01-08 NOTE — PROGRESS NOTES
Pulmonary/Critical Care Follow Up Note    HPI:   Aayush Harper is a 76year old male with Patient presents with:  Difficulty Breathing      PCP Jason Escobedo MD  Admission Attending Joslyn Gonzalez MD    Hospital Day #11    C/o cough  Nick 183 lb 13.8 oz (83.4 kg), SpO2 (!) 84 %.     Intake/Output Summary (Last 24 hours) at 1/8/2021 1300  Last data filed at 1/8/2021 0600  Gross per 24 hour   Intake 500 ml   Output 800 ml   Net -300 ml     NAD  In chair  On vapo 60-75%  Lung relatively clear

## 2021-01-09 PROCEDURE — 99232 SBSQ HOSP IP/OBS MODERATE 35: CPT | Performed by: INTERNAL MEDICINE

## 2021-01-09 NOTE — PLAN OF CARE
This morning patient is comfortable, denies SOB/CP, and remains on VapTherm 30L @ 55%. PO decadron continued. Plan is for continued monitoring. Bed locked in lowest position, bed alarm activated, call light within reach, and frequent rounding in progress. respiratory difficulty  - Respiratory Therapy support as indicated  - Manage/alleviate anxiety  - Monitor for signs/symptoms of CO2 retention  Outcome: Progressing     Problem: METABOLIC/FLUID AND ELECTROLYTES - ADULT  Goal: Hemodynamic stability and optim stability and activity tolerance for standing, transferring and ambulating w/ or w/o assistive devices  - Assist with transfers and ambulation using safe patient handling equipment as needed  - Ensure adequate protection for wounds/incisions during mobiliz Instruct pt to call for assistance with activity based on assessment  - Modify environment to reduce risk of injury  - Provide assistive devices as appropriate  - Consider OT/PT consult to assist with strengthening/mobility  - Encourage toileting schedule

## 2021-01-09 NOTE — PROGRESS NOTES
[facility]     Progress Note        Manus Lazara Patient Status:  Inpatient    1945 MRN [id number]   Location [facility] 2W/SW Attending Jose Alejandro Liz, [address] [facility] Se Day # 12 PCP Violetta Akins MD       Subjective:   Pa Physical Exam  Constitutional: Mild distress  Eyes: PERRL  ENT: nares pateint  Cardio: RRR, S1 S2  Respiratory: Bilateral crackles  GI: abdomen soft, non tender  Extremities: no clubbing, cyanosis, edema  Neurologic: no gross motor deficits  Skin: wa

## 2021-01-09 NOTE — PLAN OF CARE
Note: Layo Archibald is alert and oriented. He is on VapoTherm, 30L, 55%. PRN Robitussin given at bedtime. Patient proned overnight. No bowel movement. Lane in place. He denies any pain and says he is comfortable at this time.  Family updated via telephone SOB or any respiratory difficulty  - Respiratory Therapy support as indicated  - Manage/alleviate anxiety  - Monitor for signs/symptoms of CO2 retention  Outcome: Progressing     Problem: METABOLIC/FLUID AND ELECTROLYTES - ADULT  Goal: Hemodynamic stabilit patient stability and activity tolerance for standing, transferring and ambulating w/ or w/o assistive devices  - Assist with transfers and ambulation using safe patient handling equipment as needed  - Ensure adequate protection for wounds/incisions during limitations  - Instruct pt to call for assistance with activity based on assessment  - Modify environment to reduce risk of injury  - Provide assistive devices as appropriate  - Consider OT/PT consult to assist with strengthening/mobility  - Encourage toil

## 2021-01-09 NOTE — RESPIRATORY THERAPY NOTE
Received pt on CONT VAPOTHERM, 30l, 55%. Unable to wean the fio2, pt maintaining spo2 89-90%. Pt is comfortable on the vapotherm. RT will continue to monitor and wean the pt as tolerates.

## 2021-01-10 PROCEDURE — 99232 SBSQ HOSP IP/OBS MODERATE 35: CPT | Performed by: INTERNAL MEDICINE

## 2021-01-10 NOTE — PLAN OF CARE
Problem: Patient Centered Care  Goal: Patient preferences are identified and integrated in the patient's plan of care  Description: Interventions:  - What would you like us to know as we care for you? Luis veronica. I speak [place].   - Provide timely, co maintained  Description: INTERVENTIONS:  - Monitor labs and assess for signs and symptoms of volume excess or deficit  - Monitor intake, output and patient weight  - Monitor urine specific gravity, serum osmolarity and serum sodium as indicated or ordered consults as needed  - Advance activity as appropriate  - Communicate ordered activity level and limitations with patient/family  Outcome: Progressing  Goal: Maintain proper alignment of affected body part  Description: INTERVENTIONS:  - Support and protect Problem: DISCHARGE PLANNING  Goal: Discharge to home or other facility with appropriate resources  Description: INTERVENTIONS:  - Identify barriers to discharge w/pt and caregiver  - Include patient/family/discharge partner in discharge Tony De Jesus

## 2021-01-10 NOTE — PLAN OF CARE
Problem: Patient Centered Care  Goal: Patient preferences are identified and integrated in the patient's plan of care  Description: Interventions:  - What would you like us to know as we care for you? Luis veronica. I speak [place]se.   - Provide timely, co restrictions as appropriate  Outcome: Progressing     Problem: HEMATOLOGIC - ADULT  Goal: Maintains hematologic stability  Description: INTERVENTIONS  - Assess for signs and symptoms of bleeding or hemorrhage  - Monitor labs and vital signs for trends  - A Problem: SKIN/TISSUE INTEGRITY - ADULT  Goal: Skin integrity remains intact  Description: INTERVENTIONS  - Assess and document risk factors for pressure ulcer development  - Assess and document skin integrity  - Monitor for areas of redness and/or skin b hourly and prn rounding continued, self prone, will continue to monitor resp status and o2 needs, Patience (dtr) updated over the phone, all pt needs met.

## 2021-01-10 NOTE — PROGRESS NOTES
[place] [facility]     Progress Note        Meliton Mast Patient Status:  Inpatient    1945 MRN [id number]   Location [facility] 2W/SW Attending Meenu Borrero, [address] [facility] [address] Day # 15 PCP Shayna Galindo MD       Subjective:   Pa PRN        Continuous Infusions:       Physical Exam  Constitutional: no distress  Eyes: PERRL  ENT: nares pateint  Cardio: RRR, S1 S2  Respiratory: Bilateral crackles  GI: abdomen soft, non tender  Extremities: no clubbing, cyanosis, edema  Neurologic: no

## 2021-01-11 PROCEDURE — 99233 SBSQ HOSP IP/OBS HIGH 50: CPT | Performed by: INTERNAL MEDICINE

## 2021-01-11 RX ORDER — MAGNESIUM HYDROXIDE/ALUMINUM HYDROXICE/SIMETHICONE 120; 1200; 1200 MG/30ML; MG/30ML; MG/30ML
30 SUSPENSION ORAL 4 TIMES DAILY PRN
Status: DISCONTINUED | OUTPATIENT
Start: 2021-01-11 | End: 2021-01-11

## 2021-01-11 RX ORDER — MAGNESIUM HYDROXIDE/ALUMINUM HYDROXICE/SIMETHICONE 120; 1200; 1200 MG/30ML; MG/30ML; MG/30ML
30 SUSPENSION ORAL 4 TIMES DAILY PRN
Status: DISCONTINUED | OUTPATIENT
Start: 2021-01-11 | End: 2021-02-13

## 2021-01-11 NOTE — PLAN OF CARE
Problem: Patient/Family Goals  Goal: Patient/Family Long Term Goal  Description: Patient's Long Term Goal: to go home    Interventions:  - decrease oxygen demands  - See additional Care Plan goals for specific interventions  Outcome: Progressing  Goal: P needed  - Ensure adequate protection for wounds/incisions during mobilization  - Obtain PT/OT consults as needed  - Advance activity as appropriate  - Communicate ordered activity level and limitations with patient/family  Outcome: Progressing     Problem: discharge planning  - Arrange for needed discharge resources and transportation as appropriate  - Identify discharge learning needs (meds, wound care, etc)  - Arrange for interpreters to assist at discharge as needed  - Consider post-discharge preferences

## 2021-01-11 NOTE — PLAN OF CARE
Problem: Patient Centered Care  Goal: Patient preferences are identified and integrated in the patient's plan of care  Description: Interventions:  - What would you like us to know as we care for you? Luis veronica. I speak Nauruan.   - Provide timely, co restrictions as appropriate  Outcome: Progressing     Problem: HEMATOLOGIC - ADULT  Goal: Maintains hematologic stability  Description: INTERVENTIONS  - Assess for signs and symptoms of bleeding or hemorrhage  - Monitor labs and vital signs for trends  - A Problem: SKIN/TISSUE INTEGRITY - ADULT  Goal: Skin integrity remains intact  Description: INTERVENTIONS  - Assess and document risk factors for pressure ulcer development  - Assess and document skin integrity  - Monitor for areas of redness and/or skin b preferences of patient/family/discharge partner  - Complete POLST form as appropriate  - Assess patient's ability to be responsible for managing their own health  - Refer to Case Management Department for coordinating discharge planning if the patient need

## 2021-01-11 NOTE — PROGRESS NOTES
Pulmonary/Critical Care Follow Up Note    HPI:   Zachary Gonzalez is a 76year old male with Patient presents with:  Difficulty Breathing      PCP Adalberto Lynch MD  Admission Attending Amelia Smith MD    Hospital Day #14    C/o cough  Nick 1/11/2021 0916  Last data filed at 1/11/2021 0600  Gross per 24 hour   Intake 210 ml   Output 1100 ml   Net -890 ml     NAD  Now on NC O2  Lung relatively clear  CV reg   Abd soft   Ext warm  Prone intermittently    LABS:    Recent Labs   Lab 01/05/21  051

## 2021-01-12 ENCOUNTER — APPOINTMENT (OUTPATIENT)
Dept: GENERAL RADIOLOGY | Facility: HOSPITAL | Age: 76
DRG: 871 | End: 2021-01-12
Attending: INTERNAL MEDICINE
Payer: MEDICARE

## 2021-01-12 LAB
ANION GAP SERPL CALC-SCNC: 1 MMOL/L (ref 0–18)
BASOPHILS # BLD AUTO: 0.03 X10(3) UL (ref 0–0.2)
BASOPHILS NFR BLD AUTO: 0.2 %
BUN BLD-MCNC: 30 MG/DL (ref 7–18)
BUN/CREAT SERPL: 28.8 (ref 10–20)
CALCIUM BLD-MCNC: 9.1 MG/DL (ref 8.5–10.1)
CHLORIDE SERPL-SCNC: 100 MMOL/L (ref 98–112)
CO2 SERPL-SCNC: 31 MMOL/L (ref 21–32)
CREAT BLD-MCNC: 1.04 MG/DL
DEPRECATED RDW RBC AUTO: 42.4 FL (ref 35.1–46.3)
EOSINOPHIL # BLD AUTO: 0.06 X10(3) UL (ref 0–0.7)
EOSINOPHIL NFR BLD AUTO: 0.4 %
ERYTHROCYTE [DISTWIDTH] IN BLOOD BY AUTOMATED COUNT: 14.6 % (ref 11–15)
GLUCOSE BLD-MCNC: 84 MG/DL (ref 70–99)
HAV IGM SER QL: 2.4 MG/DL (ref 1.6–2.6)
HCT VFR BLD AUTO: 49.5 %
HGB BLD-MCNC: 16.6 G/DL
IMM GRANULOCYTES # BLD AUTO: 0.17 X10(3) UL (ref 0–1)
IMM GRANULOCYTES NFR BLD: 1.2 %
LYMPHOCYTES # BLD AUTO: 0.87 X10(3) UL (ref 1–4)
LYMPHOCYTES NFR BLD AUTO: 6 %
MCH RBC QN AUTO: 28 PG (ref 26–34)
MCHC RBC AUTO-ENTMCNC: 33.5 G/DL (ref 31–37)
MCV RBC AUTO: 83.5 FL
MONOCYTES # BLD AUTO: 1.55 X10(3) UL (ref 0.1–1)
MONOCYTES NFR BLD AUTO: 10.8 %
NEUTROPHILS # BLD AUTO: 11.73 X10 (3) UL (ref 1.5–7.7)
NEUTROPHILS # BLD AUTO: 11.73 X10(3) UL (ref 1.5–7.7)
NEUTROPHILS NFR BLD AUTO: 81.4 %
OSMOLALITY SERPL CALC.SUM OF ELEC: 279 MOSM/KG (ref 275–295)
PHOSPHATE SERPL-MCNC: 3.1 MG/DL (ref 2.5–4.9)
PLATELET # BLD AUTO: 214 10(3)UL (ref 150–450)
POTASSIUM SERPL-SCNC: 4.8 MMOL/L (ref 3.5–5.1)
RBC # BLD AUTO: 5.93 X10(6)UL
SODIUM SERPL-SCNC: 132 MMOL/L (ref 136–145)
WBC # BLD AUTO: 14.4 X10(3) UL (ref 4–11)

## 2021-01-12 PROCEDURE — 71045 X-RAY EXAM CHEST 1 VIEW: CPT | Performed by: INTERNAL MEDICINE

## 2021-01-12 PROCEDURE — 99233 SBSQ HOSP IP/OBS HIGH 50: CPT | Performed by: INTERNAL MEDICINE

## 2021-01-12 RX ORDER — PANTOPRAZOLE SODIUM 40 MG/1
40 TABLET, DELAYED RELEASE ORAL
Status: DISCONTINUED | OUTPATIENT
Start: 2021-01-12 | End: 2021-02-13

## 2021-01-12 RX ORDER — DEXAMETHASONE 6 MG/1
6 TABLET ORAL DAILY
Status: COMPLETED | OUTPATIENT
Start: 2021-01-12 | End: 2021-01-17

## 2021-01-12 NOTE — CM/SW NOTE
Per nursing rounds and RN [address] [facility] pt is from home and independent with ADLS and ambulation. Pt should be able to dc home with NN once medically stable.      Monitor for Home O2  -Pt currently on 12 LPM NC HF.   -IF O2 sats indicate need for home O2, SW/ CM t

## 2021-01-12 NOTE — PROGRESS NOTES
[facility]    Progress Note    Fatimah Wilson Patient Status:  Inpatient    1945 MRN [id number]   Location [facility] 2W/SW Attending Raymond Arguelles MD   Hosp Day # 15 PCP Sheron Richard MD        Subjective:     Cherylene Boston bleed  PPI  Hemoglobin stable  Lovenox subcu prophylactic dose    Renal function better  Slow improvement  Continue supportive care              Results:     Lab Results   Component Value Date    WBC 14.4 (H) 01/12/2021    HGB 16.6 01/12/2021    HCT 49.5 0

## 2021-01-13 LAB
ANION GAP SERPL CALC-SCNC: 3 MMOL/L (ref 0–18)
BASOPHILS # BLD AUTO: 0.03 X10(3) UL (ref 0–0.2)
BASOPHILS NFR BLD AUTO: 0.2 %
BUN BLD-MCNC: 30 MG/DL (ref 7–18)
BUN/CREAT SERPL: 32.6 (ref 10–20)
CALCIUM BLD-MCNC: 9.4 MG/DL (ref 8.5–10.1)
CHLORIDE SERPL-SCNC: 98 MMOL/L (ref 98–112)
CO2 SERPL-SCNC: 30 MMOL/L (ref 21–32)
CREAT BLD-MCNC: 0.92 MG/DL
DEPRECATED RDW RBC AUTO: 41.6 FL (ref 35.1–46.3)
EOSINOPHIL # BLD AUTO: 0.01 X10(3) UL (ref 0–0.7)
EOSINOPHIL NFR BLD AUTO: 0.1 %
ERYTHROCYTE [DISTWIDTH] IN BLOOD BY AUTOMATED COUNT: 14.3 % (ref 11–15)
GLUCOSE BLD-MCNC: 93 MG/DL (ref 70–99)
HCT VFR BLD AUTO: 48.4 %
HGB BLD-MCNC: 16.8 G/DL
IMM GRANULOCYTES # BLD AUTO: 0.17 X10(3) UL (ref 0–1)
IMM GRANULOCYTES NFR BLD: 1.1 %
LYMPHOCYTES # BLD AUTO: 0.83 X10(3) UL (ref 1–4)
LYMPHOCYTES NFR BLD AUTO: 5.6 %
MCH RBC QN AUTO: 28.8 PG (ref 26–34)
MCHC RBC AUTO-ENTMCNC: 34.7 G/DL (ref 31–37)
MCV RBC AUTO: 83 FL
MONOCYTES # BLD AUTO: 1.05 X10(3) UL (ref 0.1–1)
MONOCYTES NFR BLD AUTO: 7.1 %
NEUTROPHILS # BLD AUTO: 12.75 X10 (3) UL (ref 1.5–7.7)
NEUTROPHILS # BLD AUTO: 12.75 X10(3) UL (ref 1.5–7.7)
NEUTROPHILS NFR BLD AUTO: 85.9 %
OSMOLALITY SERPL CALC.SUM OF ELEC: 278 MOSM/KG (ref 275–295)
PLATELET # BLD AUTO: 226 10(3)UL (ref 150–450)
POTASSIUM SERPL-SCNC: 4.9 MMOL/L (ref 3.5–5.1)
RBC # BLD AUTO: 5.83 X10(6)UL
SODIUM SERPL-SCNC: 131 MMOL/L (ref 136–145)
WBC # BLD AUTO: 14.8 X10(3) UL (ref 4–11)

## 2021-01-13 PROCEDURE — 99233 SBSQ HOSP IP/OBS HIGH 50: CPT | Performed by: INTERNAL MEDICINE

## 2021-01-13 NOTE — PROGRESS NOTES
Pulmonary/Critical Care Follow Up Note    HPI:   Anastasia Leggett is a 76year old male with Patient presents with:  Difficulty Breathing      PCP Rosamaria Jasmine MD  Admission Attending Valeri Dennison MD    Hospital Day #16    C/o cough  Nick Daily  •  acetaminophen (TYLENOL) tab 650 mg, 650 mg, Oral, Q6H PRN      Allergies:  No Known Allergies        PHYSICAL EXAM:   Blood pressure 98/59, pulse 97, temperature 97 °F (36.1 °C), temperature source Temporal, resp.  rate (!) 28, weight 170 lb 6.7 o code              Murali Rowland MD

## 2021-01-13 NOTE — PLAN OF CARE
Problem: Patient Centered Care  Goal: Patient preferences are identified and integrated in the patient's plan of care  Description: Interventions:  - What would you like us to know as we care for you? Luis veronica. I speak Martiniquais.   - Provide timely, co maintained  Description: INTERVENTIONS:  - Monitor labs and assess for signs and symptoms of volume excess or deficit  - Monitor intake, output and patient weight  - Monitor urine specific gravity, serum osmolarity and serum sodium as indicated or ordered consults as needed  - Advance activity as appropriate  - Communicate ordered activity level and limitations with patient/family  Outcome: Progressing  Goal: Maintain proper alignment of affected body part  Description: INTERVENTIONS:  - Support and protect Problem: DISCHARGE PLANNING  Goal: Discharge to home or other facility with appropriate resources  Description: INTERVENTIONS:  - Identify barriers to discharge w/pt and caregiver  - Include patient/family/discharge partner in discharge Tony De Jesus

## 2021-01-13 NOTE — DIETARY NOTE
ADULT NUTRITION REASSESSMENT     Pt is at moderate nutrition risk. Pt does not meet malnutrition criteria.       RECOMMENDATIONS TO MD:  CPM     NUTRITION DIAGNOSIS/PROBLEM:  Unintentional weight loss related to decreased appetite/intake in the setting of todays intake-Pt consumed 100% of breakfast today and currently eating lunch. . MD notes state pt told MD has an appetite. He is slowly improving/MD notes.   Will add Magic Cup salter (290 kcals, 9 gms protein)   1/13 update: Po intake appears better and fair (183 lb 13.8 oz)     Wt Readings from Last 10 Encounters:  01/10/21 : 77.3 kg (170 lb 6.7 oz)  11/24/20 : 90 kg (198 lb 6.4 oz)  02/14/20 : 84.4 kg (186 lb)  01/24/20 : 88.9 kg (196 lb)  01/24/20 : 88.9 kg (196 lb)  01/14/20 : 88 kg (194 lb)  01/10/20 : 88 condition and isolation precautions. - Fluid Accumulation: none per RN documentation   - Skin Integrity: intact.       MONITOR AND EVALUATE/NUTRITION GOALS:  - Food and Nutrient Intake:      Monitor: adequacy of PO intake, tolerance of PO intake, adequacy

## 2021-01-14 PROCEDURE — 99233 SBSQ HOSP IP/OBS HIGH 50: CPT | Performed by: INTERNAL MEDICINE

## 2021-01-14 NOTE — PLAN OF CARE
Problem: Patient Centered Care  Goal: Patient preferences are identified and integrated in the patient's plan of care  Description: Interventions:  - What would you like us to know as we care for you? Luis veronica. I speak Turkmen.   - Provide timely, co maintained  Description: INTERVENTIONS:  - Monitor labs and assess for signs and symptoms of volume excess or deficit  - Monitor intake, output and patient weight  - Monitor urine specific gravity, serum osmolarity and serum sodium as indicated or ordered consults as needed  - Advance activity as appropriate  - Communicate ordered activity level and limitations with patient/family  Outcome: Progressing  Goal: Maintain proper alignment of affected body part  Description: INTERVENTIONS:  - Support and protect Problem: DISCHARGE PLANNING  Goal: Discharge to home or other facility with appropriate resources  Description: INTERVENTIONS:  - Identify barriers to discharge w/pt and caregiver  - Include patient/family/discharge partner in discharge Tony De Jesus

## 2021-01-14 NOTE — PHYSICAL THERAPY NOTE
PHYSICAL THERAPY EVALUATION - INPATIENT     Room Number: 226/226-A  Evaluation Date: 1/14/2021  Type of Evaluation: Initial   Physician Order: PT Eval and Treat    Presenting Problem: +COVID-19 (12/22/2020); acute respiratory failure w/ hypoxia; pneumonia tolerance/endurance to walk to the bathroom at this time. The patient's Approx Degree of Impairment: 54.16% has been calculated based on documentation in the [facility] '6 clicks' Inpatient Basic Mobility Short Form.   Research supports that patients with [place] History  Past Medical History:   Diagnosis Date   • CAP (community acquired pneumonia) 2017    SCAP   • Essential hypertension    • Gout    • Kidney stones     s/p stent   • PNA (pneumonia)    • Prostate cancer ([facility] [address]    • Pulmonary embolus ([facility] [address] 07/2017 chair (including a wheelchair)?: A Little   -   Need to walk in hospital room?: A Lot   -   Climbing 3-5 steps with a railing?: A Lot     AM-PAC Score:  Raw Score: 16   Approx Degree of Impairment: 54.16%   Standardized Score (AM-PAC Scale): 40.78   CMS Mo

## 2021-01-14 NOTE — PROGRESS NOTES
Pulmonary/Critical Care Follow Up Note    HPI:   Lou Orozco is a 76year old male with Patient presents with:  Difficulty Breathing      PCP Cr Hearn MD  Admission Attending Tristin Jimenez MD    Hospital Day #17    Feels about the 100 mg, 100 mg, Oral, Daily  •  acetaminophen (TYLENOL) tab 650 mg, 650 mg, Oral, Q6H PRN      Allergies:  No Known Allergies        PHYSICAL EXAM:   Blood pressure 142/83, pulse 85, temperature 97.5 °F (36.4 °C), temperature source Temporal, resp.  rate 24

## 2021-01-14 NOTE — OCCUPATIONAL THERAPY NOTE
OCCUPATIONAL THERAPY EVALUATION - INPATIENT     Room Number: 226/226-A  Evaluation Date: 1/14/2021  Type of Evaluation: Initial  Presenting Problem: (body aches, SOB, cough, COVID-19)    Physician Order: IP Consult to Occupational Therapy  Reason for [facility] (unclear if due to language line / interpreting). Pt followed simple commands. Pt with shortness of breath with activity --breathing with accessory muscles. Pt motivated to get up to the chair and walk -pt educated on safety / O2 saturation parameters.  Pt HISTORY     Problem List  Principal Problem:    Acute respiratory failure with hypoxia (HCC)  Active Problems:    Pneumonia due to COVID-19 virus    Acute renal insufficiency    Viral sepsis [facility])      Past Medical History  Past Medical History:   Fidel Fernandez 56.46%  Standardized Score (AM-PAC Scale): 34.69  CMS Modifier (G-Code): CK    FUNCTIONAL TRANSFER ASSESSMENT  Supine to Sit : Contact guard assist  Sit to Stand: Minimum assistance  Chair Transfer: CGA  Bedroom Mobility: NT    BALANCE ASSESSMENT  Static S

## 2021-01-15 LAB — GLUCOSE BLDC GLUCOMTR-MCNC: 141 MG/DL (ref 70–99)

## 2021-01-15 PROCEDURE — 99233 SBSQ HOSP IP/OBS HIGH 50: CPT | Performed by: INTERNAL MEDICINE

## 2021-01-15 PROCEDURE — 99232 SBSQ HOSP IP/OBS MODERATE 35: CPT | Performed by: HOSPITALIST

## 2021-01-15 NOTE — PROGRESS NOTES
[facility]    Progress Note    Stephan Pete Patient Status:  Inpatient    1945 MRN [id number]   Location [facility] 2W/SW Attending Ray Donovan, [address] [facility] Day # 25 PCP Kenrick Bojorquez MD       Subjective:   Remy Owens 01/13/2021    HCT 48.4 01/13/2021    .0 01/13/2021    CREATSERUM 0.92 01/13/2021    BUN 30 (H) 01/13/2021     (L) 01/13/2021    K 4.9 01/13/2021    CL 98 01/13/2021    CO2 30.0 01/13/2021    GLU 93 01/13/2021    CA 9.4 01/13/2021    ALB 3.1 (L lovenox     EOL  Full code                 Lazaro Francis DO  >35 min spent with patient. > 50% time was spent counseling patient, discussing plan of care, discussing labs and imaging findings. Spoke with consultant. All questions answered.          1/15/

## 2021-01-15 NOTE — PROGRESS NOTES
Pulmonary/Critical Care Follow Up Note    HPI:   Neema  is a 76year old male with Patient presents with:  Difficulty Breathing      PCP Zoë Bush MD  Admission Attending Oswaldo Shepherd MD    Hospital Day #18    Feels about the Allergies        PHYSICAL EXAM:   Blood pressure 107/75, pulse 108, temperature 96.7 °F (35.9 °C), resp. rate 26, weight 170 lb 6.7 oz (77.3 kg), SpO2 93 %.     Intake/Output Summary (Last 24 hours) at 1/15/2021 1514  Last data filed at 1/15/2021 [address]

## 2021-01-15 NOTE — OCCUPATIONAL THERAPY NOTE
OCCUPATIONAL THERAPY TREATMENT NOTE - INPATIENT        Room Number: 226/226-A           Presenting Problem: (body aches, SOB, cough, COVID-19)    Problem List  Principal Problem:    Acute respiratory failure with hypoxia (HCC)  Active Problems:    Pneumoni education    SUBJECTIVE  Agreeable to activity     OBJECTIVE  Precautions: (Contact & Droplet/Airborne precautions)    WEIGHT BEARING RESTRICTION                   PAIN ASSESSMENT  Ratin           ACTIVITY TOLERANCE  See above    ACTIVITIES OF DAILY LI

## 2021-01-15 NOTE — PHYSICAL THERAPY NOTE
PHYSICAL THERAPY TREATMENT NOTE - INPATIENT     Room Number: 270/869-Z       Presenting Problem: +COVID-19 (12/22/2020); acute respiratory failure w/ hypoxia; pneumonia; sepsis    Problem List  Principal Problem:    Acute respiratory failure with hypoxia ( education;Patient education; Energy conservation; Endurance; Body mechanics; Coordination;Balance training;Stair training;Stoop training;Strengthening    SUBJECTIVE  \"I take medication for kidney stones but I haven't had it yet\"    OBJECTIVE  Precautions: (C Extremity Ankle pumps  Heel slides  Quad sets  SLR     Position Supine       Patient End of Session: In bed;Needs met;Call light within reach;RN aware of session/findings; Alarm set(bed in chair position)    CURRENT GOALS   Goals to be met by: 1/28/21  Myles Martin

## 2021-01-16 ENCOUNTER — APPOINTMENT (OUTPATIENT)
Dept: GENERAL RADIOLOGY | Facility: HOSPITAL | Age: 76
DRG: 871 | End: 2021-01-16
Attending: HOSPITALIST
Payer: MEDICARE

## 2021-01-16 LAB
ANION GAP SERPL CALC-SCNC: 2 MMOL/L (ref 0–18)
BACTERIA UR QL AUTO: NEGATIVE /HPF
BASOPHILS # BLD AUTO: 0.03 X10(3) UL (ref 0–0.2)
BASOPHILS NFR BLD AUTO: 0.2 %
BILIRUB UR QL: NEGATIVE
BUN BLD-MCNC: 35 MG/DL (ref 7–18)
BUN/CREAT SERPL: 41.2 (ref 10–20)
CALCIUM BLD-MCNC: 9.4 MG/DL (ref 8.5–10.1)
CHLORIDE SERPL-SCNC: 97 MMOL/L (ref 98–112)
CO2 SERPL-SCNC: 30 MMOL/L (ref 21–32)
COLOR UR: YELLOW
CREAT BLD-MCNC: 0.85 MG/DL
DEPRECATED RDW RBC AUTO: 43.3 FL (ref 35.1–46.3)
EOSINOPHIL # BLD AUTO: 0.04 X10(3) UL (ref 0–0.7)
EOSINOPHIL NFR BLD AUTO: 0.2 %
ERYTHROCYTE [DISTWIDTH] IN BLOOD BY AUTOMATED COUNT: 14.5 % (ref 11–15)
GLUCOSE BLD-MCNC: 98 MG/DL (ref 70–99)
GLUCOSE UR-MCNC: NEGATIVE MG/DL
HCT VFR BLD AUTO: 46.2 %
HGB BLD-MCNC: 15.7 G/DL
HGB UR QL STRIP.AUTO: NEGATIVE
HYALINE CASTS #/AREA URNS AUTO: 1 /LPF
IMM GRANULOCYTES # BLD AUTO: 0.23 X10(3) UL (ref 0–1)
IMM GRANULOCYTES NFR BLD: 1.3 %
KETONES UR-MCNC: NEGATIVE MG/DL
LEUKOCYTE ESTERASE UR QL STRIP.AUTO: NEGATIVE
LYMPHOCYTES # BLD AUTO: 1 X10(3) UL (ref 1–4)
LYMPHOCYTES NFR BLD AUTO: 5.5 %
MCH RBC QN AUTO: 28.3 PG (ref 26–34)
MCHC RBC AUTO-ENTMCNC: 34 G/DL (ref 31–37)
MCV RBC AUTO: 83.2 FL
MONOCYTES # BLD AUTO: 0.98 X10(3) UL (ref 0.1–1)
MONOCYTES NFR BLD AUTO: 5.4 %
NEUTROPHILS # BLD AUTO: 15.97 X10 (3) UL (ref 1.5–7.7)
NEUTROPHILS # BLD AUTO: 15.97 X10(3) UL (ref 1.5–7.7)
NEUTROPHILS NFR BLD AUTO: 87.4 %
NITRITE UR QL STRIP.AUTO: NEGATIVE
OSMOLALITY SERPL CALC.SUM OF ELEC: 276 MOSM/KG (ref 275–295)
PH UR: 6 [PH] (ref 5–8)
PLATELET # BLD AUTO: 200 10(3)UL (ref 150–450)
POTASSIUM SERPL-SCNC: 5.2 MMOL/L (ref 3.5–5.1)
PROT UR-MCNC: NEGATIVE MG/DL
RBC # BLD AUTO: 5.55 X10(6)UL
RBC #/AREA URNS AUTO: 0 /HPF
SODIUM SERPL-SCNC: 129 MMOL/L (ref 136–145)
SP GR UR STRIP: 1.02 (ref 1–1.03)
UROBILINOGEN UR STRIP-ACNC: <2
WBC # BLD AUTO: 18.3 X10(3) UL (ref 4–11)
WBC #/AREA URNS AUTO: 1 /HPF

## 2021-01-16 PROCEDURE — 99233 SBSQ HOSP IP/OBS HIGH 50: CPT | Performed by: INTERNAL MEDICINE

## 2021-01-16 PROCEDURE — 74018 RADEX ABDOMEN 1 VIEW: CPT | Performed by: HOSPITALIST

## 2021-01-16 PROCEDURE — 99233 SBSQ HOSP IP/OBS HIGH 50: CPT | Performed by: HOSPITALIST

## 2021-01-16 RX ORDER — FLUTICASONE PROPIONATE 50 MCG
1 SPRAY, SUSPENSION (ML) NASAL DAILY
Status: DISCONTINUED | OUTPATIENT
Start: 2021-01-16 | End: 2021-02-02

## 2021-01-16 NOTE — PLAN OF CARE
Problem: Patient/Family Goals  Goal: Patient/Family Long Term Goal  Description: Patient's Long Term Goal: to go home    Interventions:  - decrease oxygen demands  - See additional Care Plan goals for specific interventions  Outcome: Progressing  Goal: P stability  Description: INTERVENTIONS  - Assess for signs and symptoms of bleeding or hemorrhage  - Monitor labs and vital signs for trends  - Administer supportive blood products/factors, fluids and medications as ordered and appropriate  - Administer sup and document risk factors for pressure ulcer development  - Assess and document skin integrity  - Monitor for areas of redness and/or skin breakdown  - Initiate interventions, skin care algorithm/standards of care as needed  Outcome: Progressing     Proble be responsible for managing their own health  - Refer to Case Management Department for coordinating discharge planning if the patient needs post-hospital services based on physician/LIP order or complex needs related to functional status, cognitive abilit

## 2021-01-16 NOTE — PROGRESS NOTES
Pulmonary/Critical Care Follow Up Note    HPI:   Calvin Raza is a 76year old male with Patient presents with:  Difficulty Breathing      PCP Salvador Mahmood MD  Admission Attending Shy Cleveland MD    Hospital Day #19    Feels about the 100 mg, Oral, Daily  •  acetaminophen (TYLENOL) tab 650 mg, 650 mg, Oral, Q6H PRN      Allergies:  No Known Allergies        PHYSICAL EXAM:   Blood pressure 116/67, pulse 105, temperature (!) 96 °F (35.6 °C), temperature source Oral, resp.  rate (!) 28, tesfaye ischemic changes and ST  Not clear evidence of ACS  Plan       ASA    Consider outpt stress    GIB  + occult blood  No change in H/H  Plan    PPI   Follow       FEN  Plan has diet                    DVT px  SQ lovenox     EOL  Full code              [facility]

## 2021-01-16 NOTE — PLAN OF CARE
Patient alert,oriented x4, oxygen at 13L high flow n.c. sat's low 90's, desaturates to 86-88%, tachypneic,sob with activity/up to chair. Sat's up to 96% this afternoon, will wean oxygen as possible.  X-ray done to rule out kidney stone due right sided abdom Cessation handout, if applicable  - Encourage broncho-pulmonary hygiene including cough, deep breathe, Incentive Spirometry  - Assess the need for suctioning and perform as needed  - Assess and instruct to report SOB or any respiratory difficulty  - Respir bristle tooth brush  - Limit straining and forceful nose blowing  Outcome: Progressing     Problem: MUSCULOSKELETAL - ADULT  Goal: Return mobility to safest level of function  Description: INTERVENTIONS:  - Assess patient stability and activity tolerance f needs  - Identify cognitive and physical deficits and behaviors that affect risk of falls.   - Allen fall precautions as indicated by assessment.  - Educate pt/family on patient safety including physical limitations  - Instruct pt to call for assistance

## 2021-01-16 NOTE — PLAN OF CARE
Problem: Patient Centered Care  Goal: Patient preferences are identified and integrated in the patient's plan of care  Description: Interventions:  - What would you like us to know as we care for you? Luis veronica. I speak Niuean.   - Provide timely, co maintained  Description: INTERVENTIONS:  - Monitor labs and assess for signs and symptoms of volume excess or deficit  - Monitor intake, output and patient weight  - Monitor urine specific gravity, serum osmolarity and serum sodium as indicated or ordered consults as needed  - Advance activity as appropriate  - Communicate ordered activity level and limitations with patient/family  Outcome: Progressing  Goal: Maintain proper alignment of affected body part  Description: INTERVENTIONS:  - Support and protect Problem: DISCHARGE PLANNING  Goal: Discharge to home or other facility with appropriate resources  Description: INTERVENTIONS:  - Identify barriers to discharge w/pt and caregiver  - Include patient/family/discharge partner in discharge Tony De Jesus

## 2021-01-16 NOTE — PROGRESS NOTES
[facility]    Progress Note    Stephan Pete Patient Status:  Inpatient    1945 MRN [id number]   [facility] 2W/SW Attending Ray Donovan, [address] [facility] Day # 23 PCP Kenrick Bojorquez MD       Subjective:   Remy Owens 0.4 mg, Oral, Daily    •  zinc sulfate (ZINCATE) cap 220 mg, 220 mg, Oral, BID    •  sodium chloride 0.9% IV bolus 500 mL, 500 mL, Intravenous, PRN    •  0.9% NaCl infusion, , Intravenous, Once    •  aspirin EC tab 81 mg, 81 mg, Oral, Daily    •  enalapril cannula however desaturates with movement.   - PPE  - IS  - OOB--> chair as tolerated  - PT/OT as tolerated                 H/o PE  High D dimer in setting of COVID  US doppler LE neg  ECHO with RV pressure/volume overload  - cont lovenox prophylaxis dose

## 2021-01-17 LAB
ANION GAP SERPL CALC-SCNC: 4 MMOL/L (ref 0–18)
BASOPHILS # BLD AUTO: 0.03 X10(3) UL (ref 0–0.2)
BASOPHILS NFR BLD AUTO: 0.2 %
BUN BLD-MCNC: 34 MG/DL (ref 7–18)
BUN/CREAT SERPL: 39.1 (ref 10–20)
CALCIUM BLD-MCNC: 9.3 MG/DL (ref 8.5–10.1)
CHLORIDE SERPL-SCNC: 100 MMOL/L (ref 98–112)
CO2 SERPL-SCNC: 28 MMOL/L (ref 21–32)
CREAT BLD-MCNC: 0.87 MG/DL
D DIMER PPP FEU-MCNC: 2.49 UG/ML FEU (ref ?–0.75)
DEPRECATED RDW RBC AUTO: 41.9 FL (ref 35.1–46.3)
EOSINOPHIL # BLD AUTO: 0.02 X10(3) UL (ref 0–0.7)
EOSINOPHIL NFR BLD AUTO: 0.1 %
ERYTHROCYTE [DISTWIDTH] IN BLOOD BY AUTOMATED COUNT: 14.4 % (ref 11–15)
GLUCOSE BLD-MCNC: 97 MG/DL (ref 70–99)
HCT VFR BLD AUTO: 43.3 %
HGB BLD-MCNC: 14.9 G/DL
IMM GRANULOCYTES # BLD AUTO: 0.24 X10(3) UL (ref 0–1)
IMM GRANULOCYTES NFR BLD: 1.5 %
LYMPHOCYTES # BLD AUTO: 0.88 X10(3) UL (ref 1–4)
LYMPHOCYTES NFR BLD AUTO: 5.3 %
MCH RBC QN AUTO: 28.3 PG (ref 26–34)
MCHC RBC AUTO-ENTMCNC: 34.4 G/DL (ref 31–37)
MCV RBC AUTO: 82.3 FL
MONOCYTES # BLD AUTO: 0.99 X10(3) UL (ref 0.1–1)
MONOCYTES NFR BLD AUTO: 6 %
NEUTROPHILS # BLD AUTO: 14.34 X10 (3) UL (ref 1.5–7.7)
NEUTROPHILS # BLD AUTO: 14.34 X10(3) UL (ref 1.5–7.7)
NEUTROPHILS NFR BLD AUTO: 86.9 %
OSMOLALITY SERPL CALC.SUM OF ELEC: 282 MOSM/KG (ref 275–295)
PLATELET # BLD AUTO: 192 10(3)UL (ref 150–450)
POTASSIUM SERPL-SCNC: 4.7 MMOL/L (ref 3.5–5.1)
RBC # BLD AUTO: 5.26 X10(6)UL
SODIUM SERPL-SCNC: 132 MMOL/L (ref 136–145)
WBC # BLD AUTO: 16.5 X10(3) UL (ref 4–11)

## 2021-01-17 PROCEDURE — 99233 SBSQ HOSP IP/OBS HIGH 50: CPT | Performed by: INTERNAL MEDICINE

## 2021-01-17 PROCEDURE — 99233 SBSQ HOSP IP/OBS HIGH 50: CPT | Performed by: HOSPITALIST

## 2021-01-17 RX ORDER — BISACODYL 10 MG
10 SUPPOSITORY, RECTAL RECTAL
Status: DISCONTINUED | OUTPATIENT
Start: 2021-01-17 | End: 2021-02-13

## 2021-01-17 RX ORDER — DOCUSATE SODIUM 100 MG/1
100 CAPSULE, LIQUID FILLED ORAL 2 TIMES DAILY
Status: DISCONTINUED | OUTPATIENT
Start: 2021-01-17 | End: 2021-02-13

## 2021-01-17 RX ORDER — POLYETHYLENE GLYCOL 3350 17 G/17G
17 POWDER, FOR SOLUTION ORAL DAILY PRN
Status: DISCONTINUED | OUTPATIENT
Start: 2021-01-17 | End: 2021-02-13

## 2021-01-17 NOTE — PROGRESS NOTES
[facility]    Progress Note    Lan Haile Patient Status:  Inpatient    1945 MRN [id number]   Location [facility] 2W/SW Attending Yue Thornton, [address] [facility] Day # 21 PCP Violetta Akins MD       Subjective:   Alex Quant infusion, , Intravenous, Once    •  aspirin EC tab 81 mg, 81 mg, Oral, Daily    •  enalaprilat (VASOTEC) injection 1.25 mg, 1.25 mg, Intravenous, Q4H PRN    •  allopurinol (ZYLOPRIM) tab 100 mg, 100 mg, Oral, Daily    •  acetaminophen (TYLENOL) tab 650 mg, 28.0           Assessment and Plan:     Covid PNA s/p actemra, CCP, and high procal ;  currently on 13 L of nasal cannula however desaturates with movement.   - PPE  - IS  - OOB--> chair as tolerated  - PT/OT as tolerated                 H/o PE  High D dime

## 2021-01-17 NOTE — PHYSICAL THERAPY NOTE
PHYSICAL THERAPY TREATMENT NOTE - INPATIENT     Room Number: 788/223-E       Presenting Problem: +COVID-19 (12/22/2020); acute respiratory failure w/ hypoxia; pneumonia; sepsis    Problem List  Principal Problem:    Acute respiratory failure with hypoxia ( Static Standing: Fair -  Dynamic Standing: Poor +    ACTIVITY TOLERANCE  On 15L HFNC  At rest: 86-89% SO2, HR 93 bpm.   With activity: SO2 81%  bpm.   After bed mobility: 76%  bpm recovering to 85-86%, HR 90 bpm in ~3 mins.      AM-PAC '6-Clic Goal #4 Patient will negotiate 3 stairs/one curb w/ assistive device and supervision   Goal #4   Current Status  NT   Goal #5 Patient to demonstrate independence with home activity/exercise instructions provided to patient in preparation for discharge.

## 2021-01-17 NOTE — PROGRESS NOTES
Pulmonary/Critical Care Follow Up Note    HPI:   Rossana Holley is a 76year old male with Patient presents with:  Difficulty Breathing      PCP Neyda Sanders MD  Admission Attending Abdiaziz Catherine MD    Hospital Day #20    Feels about the tab 650 mg, 650 mg, Oral, Q6H PRN      Allergies:  No Known Allergies        PHYSICAL EXAM:   Blood pressure (!) 86/63, pulse 90, temperature 97 °F (36.1 °C), resp. rate (!) 31, weight 170 lb 6.7 oz (77.3 kg), SpO2 91 %.     Intake/Output Summary (Last 24 h of ACS  Plan       ASA    Consider outpt stress    GIB  + occult blood  No change in H/H  Plan    PPI   Follow       [address] has diet                    DVT px  SQ lovenox     EOL  Full code              Homa Newby MD

## 2021-01-17 NOTE — OCCUPATIONAL THERAPY NOTE
OCCUPATIONAL THERAPY TREATMENT NOTE - INPATIENT        Room Number: 395/694-H    Presenting Problem: (body aches, SOB, cough, COVID-19)    Problem List  Principal Problem:    Acute respiratory failure with hypoxia (HCC)  Active Problems:    Pneumonia due t lower body clothing?: A Lot  -   Bathing (including washing, rinsing, drying)?: A Lot  -   Toileting, which includes using toilet, bedpan or urinal? : A Lot  -   Putting on and taking off regular upper body clothing?: A Little  -   Taking care of personal

## 2021-01-17 NOTE — PLAN OF CARE
Problem: Patient Centered Care  Goal: Patient preferences are identified and integrated in the patient's plan of care  Description: Interventions:  - What would you like us to know as we care for you? Luis veronica. I speak New Zealander.   - Provide timely, co maintained  Description: INTERVENTIONS:  - Monitor labs and assess for signs and symptoms of volume excess or deficit  - Monitor intake, output and patient weight  - Monitor urine specific gravity, serum osmolarity and serum sodium as indicated or ordered consults as needed  - Advance activity as appropriate  - Communicate ordered activity level and limitations with patient/family  Outcome: Progressing  Goal: Maintain proper alignment of affected body part  Description: INTERVENTIONS:  - Support and protect Problem: DISCHARGE PLANNING  Goal: Discharge to home or other facility with appropriate resources  Description: INTERVENTIONS:  - Identify barriers to discharge w/pt and caregiver  - Include patient/family/discharge partner in discharge Tony De Jesus

## 2021-01-18 PROCEDURE — 99233 SBSQ HOSP IP/OBS HIGH 50: CPT | Performed by: INTERNAL MEDICINE

## 2021-01-18 PROCEDURE — 99233 SBSQ HOSP IP/OBS HIGH 50: CPT | Performed by: HOSPITALIST

## 2021-01-18 RX ORDER — POLYETHYLENE GLYCOL 3350 17 G/17G
17 POWDER, FOR SOLUTION ORAL DAILY
Status: DISCONTINUED | OUTPATIENT
Start: 2021-01-18 | End: 2021-02-13

## 2021-01-18 NOTE — CM/SW NOTE
DAMEON self-referred to patient chart for dc planning. PT/OT recommend acute rehab for pt at discharge. DAMEON requested AISHWARYA/Deepa to order PM&R. Once completed, will need follow up if pt qualifies for acute rehab.      DAMEON requested Marian to send referrals

## 2021-01-18 NOTE — PROGRESS NOTES
Pulmonary/Critical Care Follow Up Note    HPI:   Crista Tong is a 76year old male with Patient presents with:  Difficulty Breathing      PCP Karey Galeazzi, MD  Admission Attending Benny Vieira MD    Hospital Day #21      C/o cough  Mu NaCl infusion, , Intravenous, Once  •  aspirin EC tab 81 mg, 81 mg, Oral, Daily  •  enalaprilat (VASOTEC) injection 1.25 mg, 1.25 mg, Intravenous, Q4H PRN  •  allopurinol (ZYLOPRIM) tab 100 mg, 100 mg, Oral, Daily  •  acetaminophen (TYLENOL) tab 650 mg, 65 change in H/H  Plan    PPI   Follow       [address] has diet                    DVT px  SQ lovenox     EOL  Full code    D/w Smooth Filter all Susy Mallory MD

## 2021-01-18 NOTE — DIETARY NOTE
ADULT NUTRITION REASSESSMENT     Pt is at moderate nutrition risk. Pt does not meet malnutrition criteria. RECOMMENDATIONS TO MD:  Recommend general diet (pt on soft low fiber diet per MD and is having constipation).       NUTRITION DIAGNOSIS/PROBLEM: intake is only 500 ml: poor. Wt loss of 8 lbs/8 days. Communicated with RN today for todays intake-Pt consumed 100% of breakfast today and currently eating lunch. . MD notes state pt told MD has an appetite. He is slowly improving/MD notes.   Will add Karen future weights for accuracy. BMI: Body mass index is 24.45 kg/m². BMI CLASSIFICATION: 25-29.9 kg/m2 - overweight  IBW: 166  lbs        113 % IBW on admit. Usual Body Wt: 198  lbs       94 % UBW on admit.      WEIGHT HISTORY:  Patient Weight(s) for the LORazepam, sodium chloride 0.9%, enalaprilat, acetaminophen    LABS: reviewed  Hyponatremia improving. K+ wnl.    Recent Labs     01/16/21  0355 01/17/21  0420   GLU 98 97   BUN 35* 34*   CREATSERUM 0.85 0.87   CA 9.4 9.3   * 132*   K 5.2* 4.7   CL 97

## 2021-01-18 NOTE — PLAN OF CARE
Pt A/Ox4. Up to chair for breakfast and lunch. On 13L HFNC. Used  services to assist in pt care.      Problem: Patient Centered Care  Goal: Patient preferences are identified and integrated in the patient's plan of care  Description: Interventions Problem: METABOLIC/FLUID AND ELECTROLYTES - ADULT  Goal: Hemodynamic stability and optimal renal function maintained  Description: INTERVENTIONS:  - Monitor labs and assess for signs and symptoms of volume excess or deficit  - Monitor intake, output and handling equipment as needed  - Ensure adequate protection for wounds/incisions during mobilization  - Obtain PT/OT consults as needed  - Advance activity as appropriate  - Communicate ordered activity level and limitations with patient/family  Outcome: Pr Consider OT/PT consult to assist with strengthening/mobility  - Encourage toileting schedule  Outcome: Progressing     Problem: DISCHARGE PLANNING  Goal: Discharge to home or other facility with appropriate resources  Description: INTERVENTIONS:  - Identif

## 2021-01-18 NOTE — PROGRESS NOTES
[facility]    Progress Note    Jose Pandey Patient Status:  Inpatient    1945 MRN [id number]   Location [facility] 2W/SW Attending Salena Braden, [address] Day # 21 PCP Amadou Kelley MD       Subjective:   Liam Webster Oral, Q4H PRN    •  losartan (COZAAR) tab 100 mg, 100 mg, Oral, Daily    •  tamsulosin HCl (FLOMAX) cap 0.4 mg, 0.4 mg, Oral, Daily    •  zinc sulfate (ZINCATE) cap 220 mg, 220 mg, Oral, BID    •  sodium chloride 0.9% IV bolus 500 mL, 500 mL, Intravenous, Assessment and Plan:     Covid PNA s/p actemra, CCP, and high procal ;  currently on 13 L of nasal cannula however desaturates with movement.   - PPE  - IS  - OOB--> chair as tolerated  - PT/OT as tolerated                 H/o PE  High D dimer in

## 2021-01-18 NOTE — PLAN OF CARE
Problem: RESPIRATORY - ADULT  Goal: Achieves optimal ventilation and oxygenation  Description: INTERVENTIONS:  - Assess for changes in respiratory status  - Assess for changes in mentation and behavior  - Position to facilitate oxygenation and minimize r medication administration  - Ensure safe mobilization of patient  - Hold pressure on venipuncture sites to achieve adequate hemostasis  - Assess for signs and symptoms of internal bleeding  - Monitor lab trends  - Patient is to report abnormal signs of ble up in chair for few hrs.

## 2021-01-19 ENCOUNTER — APPOINTMENT (OUTPATIENT)
Dept: GENERAL RADIOLOGY | Facility: HOSPITAL | Age: 76
DRG: 871 | End: 2021-01-19
Attending: INTERNAL MEDICINE
Payer: MEDICARE

## 2021-01-19 LAB
ANION GAP SERPL CALC-SCNC: 4 MMOL/L (ref 0–18)
BASOPHILS # BLD AUTO: 0.06 X10(3) UL (ref 0–0.2)
BASOPHILS NFR BLD AUTO: 0.4 %
BUN BLD-MCNC: 34 MG/DL (ref 7–18)
BUN/CREAT SERPL: 35.1 (ref 10–20)
CALCIUM BLD-MCNC: 9.1 MG/DL (ref 8.5–10.1)
CHLORIDE SERPL-SCNC: 98 MMOL/L (ref 98–112)
CO2 SERPL-SCNC: 30 MMOL/L (ref 21–32)
CREAT BLD-MCNC: 0.97 MG/DL
DEPRECATED RDW RBC AUTO: 44.2 FL (ref 35.1–46.3)
EOSINOPHIL # BLD AUTO: 0.96 X10(3) UL (ref 0–0.7)
EOSINOPHIL NFR BLD AUTO: 6.9 %
ERYTHROCYTE [DISTWIDTH] IN BLOOD BY AUTOMATED COUNT: 14.6 % (ref 11–15)
GLUCOSE BLD-MCNC: 74 MG/DL (ref 70–99)
HCT VFR BLD AUTO: 41.1 %
HGB BLD-MCNC: 14 G/DL
IMM GRANULOCYTES # BLD AUTO: 0.32 X10(3) UL (ref 0–1)
IMM GRANULOCYTES NFR BLD: 2.3 %
LYMPHOCYTES # BLD AUTO: 0.83 X10(3) UL (ref 1–4)
LYMPHOCYTES NFR BLD AUTO: 6 %
MCH RBC QN AUTO: 28.7 PG (ref 26–34)
MCHC RBC AUTO-ENTMCNC: 34.1 G/DL (ref 31–37)
MCV RBC AUTO: 84.4 FL
MONOCYTES # BLD AUTO: 1.06 X10(3) UL (ref 0.1–1)
MONOCYTES NFR BLD AUTO: 7.7 %
NEUTROPHILS # BLD AUTO: 10.61 X10 (3) UL (ref 1.5–7.7)
NEUTROPHILS # BLD AUTO: 10.61 X10(3) UL (ref 1.5–7.7)
NEUTROPHILS NFR BLD AUTO: 76.7 %
OSMOLALITY SERPL CALC.SUM OF ELEC: 280 MOSM/KG (ref 275–295)
PLATELET # BLD AUTO: 202 10(3)UL (ref 150–450)
POTASSIUM SERPL-SCNC: 4.7 MMOL/L (ref 3.5–5.1)
RBC # BLD AUTO: 4.87 X10(6)UL
SODIUM SERPL-SCNC: 132 MMOL/L (ref 136–145)
WBC # BLD AUTO: 13.8 X10(3) UL (ref 4–11)

## 2021-01-19 PROCEDURE — 71045 X-RAY EXAM CHEST 1 VIEW: CPT | Performed by: INTERNAL MEDICINE

## 2021-01-19 PROCEDURE — 99232 SBSQ HOSP IP/OBS MODERATE 35: CPT | Performed by: INTERNAL MEDICINE

## 2021-01-19 PROCEDURE — 99231 SBSQ HOSP IP/OBS SF/LOW 25: CPT | Performed by: HOSPITALIST

## 2021-01-19 NOTE — OCCUPATIONAL THERAPY NOTE
Attempt made for OT follow-up treatment. Pt on 15L/min high flow supplemental O2 --Pt with saturations at 87% at rest. Pt not stable to participate in activity --RN agreeable for therapy to follow-up tomorrow.

## 2021-01-19 NOTE — PLAN OF CARE
Problem: Patient Centered Care  Goal: Patient preferences are identified and integrated in the patient's plan of care  Description: Interventions:  - What would you like us to know as we care for you? Luis veronica. I speak Comoran.   - Provide timely, co maintained  Description: INTERVENTIONS:  - Monitor labs and assess for signs and symptoms of volume excess or deficit  - Monitor intake, output and patient weight  - Monitor urine specific gravity, serum osmolarity and serum sodium as indicated or ordered consults as needed  - Advance activity as appropriate  - Communicate ordered activity level and limitations with patient/family  Outcome: Progressing  Goal: Maintain proper alignment of affected body part  Description: INTERVENTIONS:  - Support and protect Problem: DISCHARGE PLANNING  Goal: Discharge to home or other facility with appropriate resources  Description: INTERVENTIONS:  - Identify barriers to discharge w/pt and caregiver  - Include patient/family/discharge partner in discharge Tony De Jesus

## 2021-01-19 NOTE — CONSULTS
[facility] Patient Status:  Inpatient    1945 MRN [id number]   Location [facility] 2W/SW Attending Tomás Mcallister, [address] [facility] Day # 25 PCP Miguel Butterfield MD     Patient Identification  Jaz Cardenas is a cancer [facility])    • Pulmonary embolus ([facility] [address] 07/2017       Past Surgical History:   Procedure Laterality Date   • COLONOSCOPY     • CYSTOSCOPY RETROGRADE Left 8/19/2017    Performed by Abdias Arenas MD at [address] [place] [address] OR         •  benzocaine-menthol (CEPACOL ( (Porcine) 1000 UNIT/ML injection 6,820 Units, 80 Units/kg, Intravenous, Once    •  [COMPLETED] heparin (PORCINE) 71313ryvwt/250mL infusion ED INITIAL DOSE, 18 Units/kg/hr, Intravenous, Once    •  [COMPLETED] cefTRIAXone Sodium (ROCEPHIN) 1 g in sodium chlo performed and negative except as that outlined in HPI. OBJECTIVE:    Blood pressure 105/61, pulse 109, temperature 96.5 °F (35.8 °C), temperature source Axillary, resp. rate 18, weight 168 lb 10.4 oz (76.5 kg), SpO2 (!) 87 %.     Intake/Output Summary Pt limited due to severe pulmonary deconditiong/hypoxemia. O2 to maintain sats > 90%.          Once weanded down to 5lts or less of O2 via NC with activity, based on patient's current functional status, pt would benefit from Acute Rehabilitation, working

## 2021-01-19 NOTE — PROGRESS NOTES
[facility] - [facility]    Progress Note    Pansy Sprain Patient Status:  Inpatient    1945 MRN [id number]   Location [facility] 2W/SW Attending Sergio Doss, [address] [facility] Day # 25 PCP Alejandrina Bryan MD       Subjective:   Luz Hogue sodium chloride 0.9% IV bolus 500 mL, 500 mL, Intravenous, PRN    •  0.9% NaCl infusion, , Intravenous, Once    •  aspirin EC tab 81 mg, 81 mg, Oral, Daily    •  enalaprilat (VASOTEC) injection 1.25 mg, 1.25 mg, Intravenous, Q4H PRN    •  allopurinol (ZYLO K 5.2* 4.7 4.7   CL 97* 100 98   CO2 30.0 28.0 30.0           Assessment and Plan:     Covid PNA s/p actemra, CCP, and high procal ;  currently on 15L of nasal cannula however desaturates with movement.   - PPE  - IS  - OOB--> chair as tolerated  - PT/OT

## 2021-01-19 NOTE — CM/SW NOTE
CM followed up with Acute rehab referral.  Per Arthurine Goods liaison Dr. Elva Person will be at [address] [place] [address] after 5pm today to complete consult.       SRAL:  Per SRAL messages in Aidin pt will need to be weaned down to 4 L or less at rest & during activities to be dc'd and a

## 2021-01-19 NOTE — PROGRESS NOTES
[facility]     Progress Note        Zachary Shows Patient Status:  Inpatient    1945 MRN [id number]   Location [facility] 2W/SW Attending Hair Gomez, [address] [facility]  Day # 25 PCP Adalberto Lynch MD       Subjective:   Pa (COZAAR) tab 100 mg, 100 mg, Oral, Daily    •  tamsulosin HCl (FLOMAX) cap 0.4 mg, 0.4 mg, Oral, Daily    •  zinc sulfate (ZINCATE) cap 220 mg, 220 mg, Oral, BID    •  sodium chloride 0.9% IV bolus 500 mL, 500 mL, Intravenous, PRN    •  0.9% NaCl infusion, time given symptom onset  -Status post Actemra  -Continue Decadron at this time  -Completed course of antibiotic therapy  -Lower extremity venous Doppler without evidence of DVT  -DVT prophylaxis: Nina Mota DO  Pulmonary Critical Care Med

## 2021-01-20 LAB
ANION GAP SERPL CALC-SCNC: 2 MMOL/L (ref 0–18)
BASOPHILS # BLD: 0 X10(3) UL (ref 0–0.2)
BASOPHILS NFR BLD: 0 %
BUN BLD-MCNC: 25 MG/DL (ref 7–18)
BUN/CREAT SERPL: 27.8 (ref 10–20)
CALCIUM BLD-MCNC: 9.2 MG/DL (ref 8.5–10.1)
CHLORIDE SERPL-SCNC: 101 MMOL/L (ref 98–112)
CO2 SERPL-SCNC: 31 MMOL/L (ref 21–32)
CREAT BLD-MCNC: 0.9 MG/DL
DEPRECATED RDW RBC AUTO: 43.8 FL (ref 35.1–46.3)
EOSINOPHIL # BLD: 0.26 X10(3) UL (ref 0–0.7)
EOSINOPHIL NFR BLD: 2 %
ERYTHROCYTE [DISTWIDTH] IN BLOOD BY AUTOMATED COUNT: 14.6 % (ref 11–15)
GLUCOSE BLD-MCNC: 85 MG/DL (ref 70–99)
HCT VFR BLD AUTO: 41.1 %
HGB BLD-MCNC: 13.9 G/DL
LYMPHOCYTES NFR BLD: 0.66 X10(3) UL (ref 1–4)
LYMPHOCYTES NFR BLD: 5 %
MCH RBC QN AUTO: 28.2 PG (ref 26–34)
MCHC RBC AUTO-ENTMCNC: 33.8 G/DL (ref 31–37)
MCV RBC AUTO: 83.4 FL
MONOCYTES # BLD: 0.66 X10(3) UL (ref 0.1–1)
MONOCYTES NFR BLD: 5 %
MORPHOLOGY: NORMAL
NEUTROPHILS # BLD AUTO: 9.95 X10 (3) UL (ref 1.5–7.7)
NEUTROPHILS NFR BLD: 80 %
NEUTS BAND NFR BLD: 8 %
NEUTS HYPERSEG # BLD: 11.53 X10(3) UL (ref 1.5–7.7)
OSMOLALITY SERPL CALC.SUM OF ELEC: 282 MOSM/KG (ref 275–295)
PLATELET # BLD AUTO: 204 10(3)UL (ref 150–450)
PLATELET MORPHOLOGY: NORMAL
POTASSIUM SERPL-SCNC: 4.7 MMOL/L (ref 3.5–5.1)
RBC # BLD AUTO: 4.93 X10(6)UL
SODIUM SERPL-SCNC: 134 MMOL/L (ref 136–145)
TOTAL CELLS COUNTED: 100
WBC # BLD AUTO: 13.1 X10(3) UL (ref 4–11)

## 2021-01-20 PROCEDURE — 99233 SBSQ HOSP IP/OBS HIGH 50: CPT | Performed by: HOSPITALIST

## 2021-01-20 PROCEDURE — 99233 SBSQ HOSP IP/OBS HIGH 50: CPT | Performed by: INTERNAL MEDICINE

## 2021-01-20 RX ORDER — LOSARTAN POTASSIUM 50 MG/1
50 TABLET ORAL DAILY
Status: DISCONTINUED | OUTPATIENT
Start: 2021-01-21 | End: 2021-01-20

## 2021-01-20 RX ORDER — LOSARTAN POTASSIUM 25 MG/1
25 TABLET ORAL DAILY
Status: DISCONTINUED | OUTPATIENT
Start: 2021-01-21 | End: 2021-02-13

## 2021-01-20 RX ORDER — FLUTICASONE PROPIONATE 50 MCG
2 SPRAY, SUSPENSION (ML) NASAL 2 TIMES DAILY
Status: DISCONTINUED | OUTPATIENT
Start: 2021-01-20 | End: 2021-02-13

## 2021-01-20 RX ORDER — ECHINACEA PURPUREA EXTRACT 125 MG
3 TABLET ORAL
Status: DISCONTINUED | OUTPATIENT
Start: 2021-01-20 | End: 2021-02-13

## 2021-01-20 NOTE — PLAN OF CARE
Problem: Patient Centered Care  Goal: Patient preferences are identified and integrated in the patient's plan of care  Description: Interventions:  - What would you like us to know as we care for you? Luis veronica. I speak Estonian.   - Provide timely, co maintained  Description: INTERVENTIONS:  - Monitor labs and assess for signs and symptoms of volume excess or deficit  - Monitor intake, output and patient weight  - Monitor urine specific gravity, serum osmolarity and serum sodium as indicated or ordered consults as needed  - Advance activity as appropriate  - Communicate ordered activity level and limitations with patient/family  Outcome: Progressing  Goal: Maintain proper alignment of affected body part  Description: INTERVENTIONS:  - Support and protect Problem: DISCHARGE PLANNING  Goal: Discharge to home or other facility with appropriate resources  Description: INTERVENTIONS:  - Identify barriers to discharge w/pt and caregiver  - Include patient/family/discharge partner in discharge Tony De Jesus

## 2021-01-20 NOTE — PHYSICAL THERAPY NOTE
Attempted PT treatment. Patient currently on vapotherm at 30L/min with FiO2 at 100% (per rehab protocol hold PT if FiO2 >80%). Patient's respiratory status is not stable to participate in therapeutic intervention at this time. Will reschedule.     Thank

## 2021-01-20 NOTE — PROGRESS NOTES
Pulmonary/Critical Care Follow Up Note    HPI:   Marcia Griggs is a 76year old male with Patient presents with:  Difficulty Breathing      PCP Vicenta Marquez MD  Admission Attending Erin Cook MD    Hospital Day #23      C/o cough  Yanna Parsons sodium chloride 0.9% IV bolus 500 mL, 500 mL, Intravenous, PRN  •  0.9% NaCl infusion, , Intravenous, Once  •  aspirin EC tab 81 mg, 81 mg, Oral, Daily  •  enalaprilat (VASOTEC) injection 1.25 mg, 1.25 mg, Intravenous, Q4H PRN  •  allopurinol (ZYLOPRIM) ta with RV pressure/volume overload  No Dowell's sign and d/w cards  Plan    Lovenox px dose                 PEEWEE  Covid and sepsis  Better  Plan IVF KVO     + trop  ? from covid  No CP  EKG w/o ischemic changes and ST  Not clear evidence of ACS  Plan

## 2021-01-20 NOTE — PROGRESS NOTES
[facility]    Progress Note    Aayush Harper Patient Status:  Inpatient    1945 MRN [id number]   [facility] 2W/SW Attending Jesus Floresissa Day # 21 PCP Jason Escobedo MD        Subjective: has diet                     DVT px  SQ lovenox     Code status Full     40 min spent on pt of which 25 min spent coordinating care with nurse and counseling pt and with his permission Azael Mendez [phone]        Results:     Lab Results   Component Value

## 2021-01-20 NOTE — OCCUPATIONAL THERAPY NOTE
Attempt made to see pt for f/u occupational therapy treatment session, however pt now on vapotherm 30L with fio2 100%. Pt with unstable respiratory status on this date. Will f/u 1/21.     Stephanie Loo OTR/L  [facility]   #[id number]

## 2021-01-20 NOTE — PLAN OF CARE
Up to chair most of morning, desats with activity. BM x1. Update given to daughter, Sybil Krishna.      Problem: Patient Centered Care  Goal: Patient preferences are identified and integrated in the patient's plan of care  Description: Interventions:  - What woul METABOLIC/FLUID AND ELECTROLYTES - ADULT  Goal: Hemodynamic stability and optimal renal function maintained  Description: INTERVENTIONS:  - Monitor labs and assess for signs and symptoms of volume excess or deficit  - Monitor intake, output and patient tesfaye as needed  - Ensure adequate protection for wounds/incisions during mobilization  - Obtain PT/OT consults as needed  - Advance activity as appropriate  - Communicate ordered activity level and limitations with patient/family  Outcome: Progressing  Goal: Ma consult to assist with strengthening/mobility  - Encourage toileting schedule  Outcome: Progressing     Problem: DISCHARGE PLANNING  Goal: Discharge to home or other facility with appropriate resources  Description: INTERVENTIONS:  - Identify barriers to d

## 2021-01-21 ENCOUNTER — APPOINTMENT (OUTPATIENT)
Dept: ULTRASOUND IMAGING | Facility: HOSPITAL | Age: 76
DRG: 871 | End: 2021-01-21
Attending: INTERNAL MEDICINE
Payer: MEDICARE

## 2021-01-21 ENCOUNTER — APPOINTMENT (OUTPATIENT)
Dept: CV DIAGNOSTICS | Facility: HOSPITAL | Age: 76
DRG: 871 | End: 2021-01-21
Attending: INTERNAL MEDICINE
Payer: MEDICARE

## 2021-01-21 PROBLEM — Z71.89 ADVANCE CARE PLANNING: Status: ACTIVE | Noted: 2021-01-21

## 2021-01-21 PROBLEM — Z71.89 GOALS OF CARE, COUNSELING/DISCUSSION: Status: ACTIVE | Noted: 2021-01-21

## 2021-01-21 LAB
ALBUMIN SERPL-MCNC: 2.3 G/DL (ref 3.4–5)
ALBUMIN/GLOB SERPL: 0.6 {RATIO} (ref 1–2)
ALP LIVER SERPL-CCNC: 107 U/L
ALT SERPL-CCNC: 37 U/L
ANION GAP SERPL CALC-SCNC: 3 MMOL/L (ref 0–18)
AST SERPL-CCNC: 25 U/L (ref 15–37)
BASOPHILS # BLD AUTO: 0.05 X10(3) UL (ref 0–0.2)
BASOPHILS NFR BLD AUTO: 0.4 %
BILIRUB SERPL-MCNC: 0.5 MG/DL (ref 0.1–2)
BUN BLD-MCNC: 18 MG/DL (ref 7–18)
BUN/CREAT SERPL: 24.3 (ref 10–20)
CALCIUM BLD-MCNC: 8.5 MG/DL (ref 8.5–10.1)
CHLORIDE SERPL-SCNC: 103 MMOL/L (ref 98–112)
CO2 SERPL-SCNC: 30 MMOL/L (ref 21–32)
CREAT BLD-MCNC: 0.74 MG/DL
D DIMER PPP FEU-MCNC: 1.93 UG/ML FEU (ref ?–0.75)
DEPRECATED RDW RBC AUTO: 44.8 FL (ref 35.1–46.3)
EOSINOPHIL # BLD AUTO: 0.89 X10(3) UL (ref 0–0.7)
EOSINOPHIL NFR BLD AUTO: 7.3 %
ERYTHROCYTE [DISTWIDTH] IN BLOOD BY AUTOMATED COUNT: 14.5 % (ref 11–15)
GLOBULIN PLAS-MCNC: 4 G/DL (ref 2.8–4.4)
GLUCOSE BLD-MCNC: 89 MG/DL (ref 70–99)
HAV IGM SER QL: 2.1 MG/DL (ref 1.6–2.6)
HCT VFR BLD AUTO: 38.3 %
HGB BLD-MCNC: 12.9 G/DL
IMM GRANULOCYTES # BLD AUTO: 0.2 X10(3) UL (ref 0–1)
IMM GRANULOCYTES NFR BLD: 1.6 %
LYMPHOCYTES # BLD AUTO: 0.65 X10(3) UL (ref 1–4)
LYMPHOCYTES NFR BLD AUTO: 5.3 %
M PROTEIN MFR SERPL ELPH: 6.3 G/DL (ref 6.4–8.2)
MCH RBC QN AUTO: 28.7 PG (ref 26–34)
MCHC RBC AUTO-ENTMCNC: 33.7 G/DL (ref 31–37)
MCV RBC AUTO: 85.3 FL
MONOCYTES # BLD AUTO: 0.8 X10(3) UL (ref 0.1–1)
MONOCYTES NFR BLD AUTO: 6.6 %
NEUTROPHILS # BLD AUTO: 9.62 X10 (3) UL (ref 1.5–7.7)
NEUTROPHILS # BLD AUTO: 9.62 X10(3) UL (ref 1.5–7.7)
NEUTROPHILS NFR BLD AUTO: 78.8 %
OSMOLALITY SERPL CALC.SUM OF ELEC: 283 MOSM/KG (ref 275–295)
PHOSPHATE SERPL-MCNC: 2.5 MG/DL (ref 2.5–4.9)
PLATELET # BLD AUTO: 199 10(3)UL (ref 150–450)
POTASSIUM SERPL-SCNC: 4.7 MMOL/L (ref 3.5–5.1)
RBC # BLD AUTO: 4.49 X10(6)UL
SODIUM SERPL-SCNC: 136 MMOL/L (ref 136–145)
WBC # BLD AUTO: 12.2 X10(3) UL (ref 4–11)

## 2021-01-21 PROCEDURE — 93308 TTE F-UP OR LMTD: CPT | Performed by: INTERNAL MEDICINE

## 2021-01-21 PROCEDURE — 99233 SBSQ HOSP IP/OBS HIGH 50: CPT | Performed by: HOSPITALIST

## 2021-01-21 PROCEDURE — 93970 EXTREMITY STUDY: CPT | Performed by: INTERNAL MEDICINE

## 2021-01-21 PROCEDURE — 99291 CRITICAL CARE FIRST HOUR: CPT | Performed by: INTERNAL MEDICINE

## 2021-01-21 PROCEDURE — 99222 1ST HOSP IP/OBS MODERATE 55: CPT | Performed by: REGISTERED NURSE

## 2021-01-21 NOTE — OCCUPATIONAL THERAPY NOTE
Attempt made to see pt for f/u occupational therapy treatment session, however pt now on vapotherm 30L with fio2 90% (per rehab protocol, activity is deferred when FiO2>80).  Pt with unstable respiratory status on this date and not appropriate to participat

## 2021-01-21 NOTE — CM/SW NOTE
CM received MDO for dc planning for for early next week. Pt will need to be weaned down to 4-5 LPM O2 via NC and tolerate that at rest and with activity to tolerate Acute rehab.      Acute Rehab referrals have been sent [facility] able to accept at multiple fa

## 2021-01-21 NOTE — PROGRESS NOTES
Pulmonary/Critical Care Follow Up Note    HPI:   Rossana Holley is a 76year old male with Patient presents with:  Difficulty Breathing      PCP Neyda Sanders MD  Admission Attending Abdiaziz Catherine MD    Hospital Day #24      C/o cough  [place] tamsulosin HCl (FLOMAX) cap 0.4 mg, 0.4 mg, Oral, Daily  •  zinc sulfate (ZINCATE) cap 220 mg, 220 mg, Oral, BID  •  sodium chloride 0.9% IV bolus 500 mL, 500 mL, Intravenous, PRN  •  0.9% NaCl infusion, , Intravenous, Once  •  aspirin EC tab 81 mg, 81 mg, CCP  S/p Dex  CXR stable  Worse oxygenation again  Suspect \"COVID lung\"  Plan       PPE                IS    Vapo and wean as tolerated     OOB--> chair as tolerated     PT/OT as tolerated      R side pain  pleuritic   ? from coughing  better  Plan   Fol

## 2021-01-21 NOTE — CONSULTS
[address] [facility] Patient Status:  Inpatient    1945 MRN [id number]   Location [facility] 2W/SW Attending Jesus Floresissa Day # 24 PCP Sheron Richard MD Procedure Laterality Date   • COLONOSCOPY     • CYSTOSCOPY RETROGRADE Left 8/19/2017    Performed by Jet Lorenzo MD at [address] [place] [address] MAIN OR       Palliative Care Social History:   Marital Status:   Children: 6 kids, 3 sons/3 dtrs  Living Situation Jasminmagdalena (FLOMAX) cap 0.4 mg, 0.4 mg, Oral, Daily  •  zinc sulfate (ZINCATE) cap 220 mg, 220 mg, Oral, BID  •  sodium chloride 0.9% IV bolus 500 mL, 500 mL, Intravenous, PRN  •  0.9% NaCl infusion, , Intravenous, Once  •  aspirin EC tab 81 mg, 81 mg, Oral, Daily  • Ap Portable  (cpt=71045)    Result Date: 1/19/2021  CONCLUSION:  Stable findings of acute viral pneumonia due to COVID-19.     Dictated by (CST): Ignacio Ontiveros MD on 1/19/2021 at 7:20 AM     Finalized by (CST): Ignacio Ontiveros MD on 1/19/2021 at 7:2 speak to pt and family  Patient's decision making preferences: speak to pt  Code status: Full code  Have advanced directives been discussed with patient or healthcare power of : Yes        Healthcare Agent Appointed: Yes  Healthcare Agent's Name: H with full aggressive care with full code, intubation if needed and continue supportive care. He is open to having palliative care following and discussed ongoing [address] will be needed through his course.     Alisa Ranks confirmed that family wants him to continuin and [facility]. I will continue to follow clinically.     Thank you for allowing Palliative Care services to participate in the care of Mr. Shahnaz Paulino, [facility] [id number]  1/21/2021  2:42 PM

## 2021-01-21 NOTE — PHYSICAL THERAPY NOTE
Chart reviewed pt and consulted with RN, Harsh Hayden. Pt currently on 30L/min with FiO2 at 90% (per rehab protocol hold PT if FiO2 >80%). Patient's respiratory status is not stable to participate in therapeutic intervention at this time.  Will re-attempt pt 1/2

## 2021-01-21 NOTE — PLAN OF CARE
Problem: RESPIRATORY - ADULT  Goal: Achieves optimal ventilation and oxygenation  Description: INTERVENTIONS:  - Assess for changes in respiratory status  - Assess for changes in mentation and behavior  - Position to facilitate oxygenation and minimize r Instruct pt to call for assistance with activity based on assessment  - Modify environment to reduce risk of injury  - Provide assistive devices as appropriate  - Consider OT/PT consult to assist with strengthening/mobility  - Encourage toileting schedule

## 2021-01-21 NOTE — PLAN OF CARE
Problem: Patient Centered Care  Goal: Patient preferences are identified and integrated in the patient's plan of care  Description: Interventions:  - What would you like us to know as we care for you? Luis veronica. I speak South Korean.   - Provide timely, co maintained  Description: INTERVENTIONS:  - Monitor labs and assess for signs and symptoms of volume excess or deficit  - Monitor intake, output and patient weight  - Monitor urine specific gravity, serum osmolarity and serum sodium as indicated or ordered consults as needed  - Advance activity as appropriate  - Communicate ordered activity level and limitations with patient/family  Outcome: Progressing  Goal: Maintain proper alignment of affected body part  Description: INTERVENTIONS:  - Support and protect Problem: DISCHARGE PLANNING  Goal: Discharge to home or other facility with appropriate resources  Description: INTERVENTIONS:  - Identify barriers to discharge w/pt and caregiver  - Include patient/family/discharge partner in discharge Tony De Jesus

## 2021-01-22 ENCOUNTER — APPOINTMENT (OUTPATIENT)
Dept: GENERAL RADIOLOGY | Facility: HOSPITAL | Age: 76
DRG: 871 | End: 2021-01-22
Attending: INTERNAL MEDICINE
Payer: MEDICARE

## 2021-01-22 LAB
ANION GAP SERPL CALC-SCNC: 3 MMOL/L (ref 0–18)
BASOPHILS # BLD AUTO: 0.04 X10(3) UL (ref 0–0.2)
BASOPHILS NFR BLD AUTO: 0.3 %
BUN BLD-MCNC: 18 MG/DL (ref 7–18)
BUN/CREAT SERPL: 25 (ref 10–20)
CALCIUM BLD-MCNC: 9.1 MG/DL (ref 8.5–10.1)
CHLORIDE SERPL-SCNC: 102 MMOL/L (ref 98–112)
CO2 SERPL-SCNC: 29 MMOL/L (ref 21–32)
CREAT BLD-MCNC: 0.72 MG/DL
D DIMER PPP FEU-MCNC: 2.64 UG/ML FEU (ref ?–0.75)
DEPRECATED RDW RBC AUTO: 44.2 FL (ref 35.1–46.3)
EOSINOPHIL # BLD AUTO: 0.81 X10(3) UL (ref 0–0.7)
EOSINOPHIL NFR BLD AUTO: 6.1 %
ERYTHROCYTE [DISTWIDTH] IN BLOOD BY AUTOMATED COUNT: 14.4 % (ref 11–15)
GLUCOSE BLD-MCNC: 93 MG/DL (ref 70–99)
HAV IGM SER QL: 2.1 MG/DL (ref 1.6–2.6)
HCT VFR BLD AUTO: 39.1 %
HGB BLD-MCNC: 13.2 G/DL
IMM GRANULOCYTES # BLD AUTO: 0.15 X10(3) UL (ref 0–1)
IMM GRANULOCYTES NFR BLD: 1.1 %
LYMPHOCYTES # BLD AUTO: 0.62 X10(3) UL (ref 1–4)
LYMPHOCYTES NFR BLD AUTO: 4.7 %
MCH RBC QN AUTO: 28.4 PG (ref 26–34)
MCHC RBC AUTO-ENTMCNC: 33.8 G/DL (ref 31–37)
MCV RBC AUTO: 84.3 FL
MONOCYTES # BLD AUTO: 0.89 X10(3) UL (ref 0.1–1)
MONOCYTES NFR BLD AUTO: 6.7 %
NEUTROPHILS # BLD AUTO: 10.81 X10 (3) UL (ref 1.5–7.7)
NEUTROPHILS # BLD AUTO: 10.81 X10(3) UL (ref 1.5–7.7)
NEUTROPHILS NFR BLD AUTO: 81.1 %
OSMOLALITY SERPL CALC.SUM OF ELEC: 280 MOSM/KG (ref 275–295)
PHOSPHATE SERPL-MCNC: 2.5 MG/DL (ref 2.5–4.9)
PLATELET # BLD AUTO: 232 10(3)UL (ref 150–450)
POTASSIUM SERPL-SCNC: 4.4 MMOL/L (ref 3.5–5.1)
RBC # BLD AUTO: 4.64 X10(6)UL
SODIUM SERPL-SCNC: 134 MMOL/L (ref 136–145)
WBC # BLD AUTO: 13.3 X10(3) UL (ref 4–11)

## 2021-01-22 PROCEDURE — 99233 SBSQ HOSP IP/OBS HIGH 50: CPT | Performed by: INTERNAL MEDICINE

## 2021-01-22 PROCEDURE — 99232 SBSQ HOSP IP/OBS MODERATE 35: CPT | Performed by: REGISTERED NURSE

## 2021-01-22 PROCEDURE — 71045 X-RAY EXAM CHEST 1 VIEW: CPT | Performed by: INTERNAL MEDICINE

## 2021-01-22 PROCEDURE — 99233 SBSQ HOSP IP/OBS HIGH 50: CPT | Performed by: HOSPITALIST

## 2021-01-22 NOTE — PHYSICAL THERAPY NOTE
PHYSICAL THERAPY TREATMENT NOTE - INPATIENT     Room Number: 961/828-A       Presenting Problem: +COVID-19 (12/22/2020); acute respiratory failure w/ hypoxia; pneumonia; sepsis    Problem List  Principal Problem:    Acute respiratory failure with hypoxia ( all mobility and max cues for safety with all mobility due to increased supplemental oxygen need. Pt has a strong family support system in place. End of session:  Pt finished session up in chair.  Call light in reach, chair alarm on, RN notified of pt' chair with arms (e.g., wheelchair, bedside commode, etc.): A Lot   -   Moving from lying on back to sitting on the side of the bed?: A Lot   How much help from another person does the patient currently need. ..   -   Moving to and from a bed to a chair (inc

## 2021-01-22 NOTE — PALLIATIVE CARE NOTE
[address] Patient Status:  Inpatient    1945 MRN [id number]   [facility] 2W/SW Attending Jesus Flores Za Day # 22 PCP Gertrudis Adams MD     Date o Jamese, Daily  •  Pantoprazole Sodium (PROTONIX) EC tab 40 mg, 40 mg, Oral, QAM AC  •  Alum & Mag Hydroxide-Simeth (MAALOX) oral suspension 30 mL, 30 mL, Oral, QID PRN  •  guaiFENesin (ROBITUSSIN) 100 MG/5ML solution 100 mg, 100 mg, Oral, Q4H PRN  •  multivi Imaging:  Us Venous Doppler Leg Bilat - Diag Img (cpt=93970)    Result Date: 1/21/2021  CONCLUSION:  Negative for sonographic evidence of deep venous thrombosis in either lower extremity.     Dictated by (CST): Lauren Rojo MD on 1/21/2021 at 7:08 Phone Number: [phone]          Spiritual needs addressed: Referral placed for Spiritual Care    Disposition: ongoing goals of care discussions      Assessment/Recommendations:    Acute respiratory failure with hypoxia ([address]  -remains on vapotherm fio2

## 2021-01-22 NOTE — PROGRESS NOTES
[facility]    Progress Note    [facility] Patient Status:  Inpatient    1945 MRN [id number]   [facility] 2W/SW Attending Jesus Flores Day # 25 PCP Alicia Quintanilla MD        Subjective: has diet                     DVT px  SQ lovenox     Code status Full code    44 min spent on pt of which 25 min spent coordinating care with nurse and counseling pt and with his permission Azael Mendez [phone]        Results:     Lab Results   Component Va

## 2021-01-22 NOTE — OCCUPATIONAL THERAPY NOTE
OCCUPATIONAL THERAPY TREATMENT NOTE - INPATIENT        Room Number: 226/226-A           Presenting Problem: (body aches, SOB, cough, COVID-19)    Problem List  Principal Problem:    Acute respiratory failure with hypoxia (HCC)  Active Problems:    Pneumoni supports that patients with this level of impairment may benefit from [address] [place] [address]  Spoke with Dr Marta Soliman --discussion on pt's goals for mobility.  Dr. Marta Soliman gave approval for therapy to work with pt \"activity as tolerated\" with FiO2 90% or below, SpO2 >l 90%   Comment:Ongoing    Patient will complete item retrieval with Mod I using energy conservation strategies PRN  Comment:Ongoing            Goals  on: 2021  Frequency: 3-5x'wk

## 2021-01-22 NOTE — PLAN OF CARE
Pt resting comfortably in chair. Pt JOHNNY Rice, North Korean speaking; interpretor used for translation. Pt on vapotherm; saturating in 90's. Pt desaturates with minimal activity; becomes SOB and tachypneic.  Pt up to the chair for all meals with assistance from stasoraya handout, if applicable  - Encourage broncho-pulmonary hygiene including cough, deep breathe, Incentive Spirometry  - Assess the need for suctioning and perform as needed  - Assess and instruct to report SOB or any respiratory difficulty  - Respiratory Ther tooth brush  - Limit straining and forceful nose blowing  Outcome: Progressing     Problem: MUSCULOSKELETAL - ADULT  Goal: Return mobility to safest level of function  Description: INTERVENTIONS:  - Assess patient stability and activity tolerance for stand Identify cognitive and physical deficits and behaviors that affect risk of falls.   - Cedar fall precautions as indicated by assessment.  - Educate pt/family on patient safety including physical limitations  - Instruct pt to call for assistance with act

## 2021-01-22 NOTE — PLAN OF CARE
Problem: Patient Centered Care  Goal: Patient preferences are identified and integrated in the patient's plan of care  Description: Interventions:  - What would you like us to know as we care for you? Luis veronica. I speak Jamaican.   - Provide timely, co maintained  Description: INTERVENTIONS:  - Monitor labs and assess for signs and symptoms of volume excess or deficit  - Monitor intake, output and patient weight  - Monitor urine specific gravity, serum osmolarity and serum sodium as indicated or ordered consults as needed  - Advance activity as appropriate  - Communicate ordered activity level and limitations with patient/family  Outcome: Progressing  Goal: Maintain proper alignment of affected body part  Description: INTERVENTIONS:  - Support and protect Problem: DISCHARGE PLANNING  Goal: Discharge to home or other facility with appropriate resources  Description: INTERVENTIONS:  - Identify barriers to discharge w/pt and caregiver  - Include patient/family/discharge partner in discharge Tony De Jesus

## 2021-01-22 NOTE — PROGRESS NOTES
Pt very gracious and said, \"I'm feeling better, bit by bit,\" ([place] and \"I can breathe now, I'm pleased with my progress. \" (again, [place] Pt expressed his thanks to God, and requested anointing.      Pt and loved one seem genuinely optimi

## 2021-01-22 NOTE — PROGRESS NOTES
Pulmonary/Critical Care Follow Up Note    HPI:   Jose David Rojo is a 76year old male with Patient presents with:  Difficulty Breathing      PCP Linda Mora MD  Admission Attending [employer], MD    Hospital Day #25      C/o cough  Edouard Monique PRN  •  LORazepam (ATIVAN) tab 0.5 mg, 0.5 mg, Oral, Q4H PRN  •  tamsulosin HCl (FLOMAX) cap 0.4 mg, 0.4 mg, Oral, Daily  •  zinc sulfate (ZINCATE) cap 220 mg, 220 mg, Oral, BID  •  sodium chloride 0.9% IV bolus 500 mL, 500 mL, Intravenous, PRN  •  0.9% Na 86%  Suspect \"COVID lung\"  No circumstantial evidence for PE  Plan       PPE                IS    Vapo and wean as tolerated     OOB--> chair as tolerated     PT/OT as tolerated     CTA when stable and safe for CT road trip      R side pain  pleuritic

## 2021-01-23 LAB
ALBUMIN SERPL-MCNC: 2.3 G/DL (ref 3.4–5)
ALBUMIN/GLOB SERPL: 0.5 {RATIO} (ref 1–2)
ALP LIVER SERPL-CCNC: 115 U/L
ALT SERPL-CCNC: 29 U/L
ANION GAP SERPL CALC-SCNC: 3 MMOL/L (ref 0–18)
AST SERPL-CCNC: 21 U/L (ref 15–37)
BASOPHILS # BLD AUTO: 0.05 X10(3) UL (ref 0–0.2)
BASOPHILS NFR BLD AUTO: 0.5 %
BILIRUB SERPL-MCNC: 0.6 MG/DL (ref 0.1–2)
BUN BLD-MCNC: 17 MG/DL (ref 7–18)
BUN/CREAT SERPL: 22.1 (ref 10–20)
CALCIUM BLD-MCNC: 8.8 MG/DL (ref 8.5–10.1)
CHLORIDE SERPL-SCNC: 102 MMOL/L (ref 98–112)
CO2 SERPL-SCNC: 30 MMOL/L (ref 21–32)
CREAT BLD-MCNC: 0.77 MG/DL
D DIMER PPP FEU-MCNC: 2.04 UG/ML FEU (ref ?–0.75)
DEPRECATED RDW RBC AUTO: 44.2 FL (ref 35.1–46.3)
EOSINOPHIL # BLD AUTO: 0.81 X10(3) UL (ref 0–0.7)
EOSINOPHIL NFR BLD AUTO: 7.4 %
ERYTHROCYTE [DISTWIDTH] IN BLOOD BY AUTOMATED COUNT: 14.5 % (ref 11–15)
GLOBULIN PLAS-MCNC: 4.2 G/DL (ref 2.8–4.4)
GLUCOSE BLD-MCNC: 92 MG/DL (ref 70–99)
HAV IGM SER QL: 2.2 MG/DL (ref 1.6–2.6)
HCT VFR BLD AUTO: 36.1 %
HGB BLD-MCNC: 12.4 G/DL
IMM GRANULOCYTES # BLD AUTO: 0.15 X10(3) UL (ref 0–1)
IMM GRANULOCYTES NFR BLD: 1.4 %
LYMPHOCYTES # BLD AUTO: 0.57 X10(3) UL (ref 1–4)
LYMPHOCYTES NFR BLD AUTO: 5.2 %
M PROTEIN MFR SERPL ELPH: 6.5 G/DL (ref 6.4–8.2)
MCH RBC QN AUTO: 29 PG (ref 26–34)
MCHC RBC AUTO-ENTMCNC: 34.3 G/DL (ref 31–37)
MCV RBC AUTO: 84.5 FL
MONOCYTES # BLD AUTO: 0.88 X10(3) UL (ref 0.1–1)
MONOCYTES NFR BLD AUTO: 8 %
NEUTROPHILS # BLD AUTO: 8.54 X10 (3) UL (ref 1.5–7.7)
NEUTROPHILS # BLD AUTO: 8.54 X10(3) UL (ref 1.5–7.7)
NEUTROPHILS NFR BLD AUTO: 77.5 %
OSMOLALITY SERPL CALC.SUM OF ELEC: 281 MOSM/KG (ref 275–295)
PHOSPHATE SERPL-MCNC: 2.7 MG/DL (ref 2.5–4.9)
PLATELET # BLD AUTO: 245 10(3)UL (ref 150–450)
POTASSIUM SERPL-SCNC: 4.4 MMOL/L (ref 3.5–5.1)
RBC # BLD AUTO: 4.27 X10(6)UL
SODIUM SERPL-SCNC: 135 MMOL/L (ref 136–145)
WBC # BLD AUTO: 11 X10(3) UL (ref 4–11)

## 2021-01-23 PROCEDURE — 99233 SBSQ HOSP IP/OBS HIGH 50: CPT | Performed by: HOSPITALIST

## 2021-01-23 PROCEDURE — 99233 SBSQ HOSP IP/OBS HIGH 50: CPT | Performed by: INTERNAL MEDICINE

## 2021-01-23 NOTE — OCCUPATIONAL THERAPY NOTE
Attempt made for occupational therapy treatment. PT and OT donned PPE and entered room. Pt seated in bedside chair with RN present.  RN reported that pt had recently transferred to bedside chair with one assist, that pt required increase of FiO2 from 70

## 2021-01-23 NOTE — PHYSICAL THERAPY NOTE
Attempted PT treatment. RN reported that pt had recently transferred to bedside chair and that he required increase of FiO2 from 70 to 100% to maintain O2 saturations.  Pt saturating between 86-88% at rest. Decision to defer-  RN agreeable and asking therap

## 2021-01-23 NOTE — PROGRESS NOTES
[facility]    Progress Note    Iban Sanders Patient Status:  Inpatient    1945 MRN [id number]   [facility] 2W/SW Attending Jesus Flores Za Day # 22 PCP Nj Strong MD        Subjective: softeners                     FEN  Plan has diet                     DVT px  SQ lovenox     Code status Full code    36 min spent on pt of which 20 min spent coordinating care with nurse and counseling pt and with his permission [place] [phone]

## 2021-01-23 NOTE — PROGRESS NOTES
[facility]    Progress Note    Brittany Shepard Patient Status:  Inpatient    1945 MRN [id number]   [facility] 2W/SW Attending Jesus Floresissa Day # 32 PCP Balbina Arevalo MD        Subjective: care           Results:     Lab Results   Component Value Date    WBC 11.0 01/23/2021    HGB 12.4 (L) 01/23/2021    HCT 36.1 (L) 01/23/2021    .0 01/23/2021    CREATSERUM 0.77 01/23/2021    BUN 17 01/23/2021     (L) 01/23/2021    K 4.4 01/23/2

## 2021-01-23 NOTE — PROGRESS NOTES
[facility]    Progress Note    Calvin Raza Patient Status:  Inpatient    1945 MRN [id number]   [facility] 2W/SW Attending Jesus Flores Day # 32 PCP Salvador Mahmood MD        Subjective: softeners                     FEN  Plan has diet                     DVT px  SQ lovenox     Code status Full code    36 min spent on pt of which 20 min spent coordinating care with nurse and counseling pt and with his permission Marquis Szymanski [phone]

## 2021-01-23 NOTE — PLAN OF CARE
Problem: HEMATOLOGIC - ADULT  Goal: Maintains hematologic stability  Description: INTERVENTIONS  - Assess for signs and symptoms of bleeding or hemorrhage  - Monitor labs and vital signs for trends  - Administer supportive blood products/factors, fluids integrity remains intact  Description: INTERVENTIONS  - Assess and document risk factors for pressure ulcer development  - Assess and document skin integrity  - Monitor for areas of redness and/or skin breakdown  - Initiate interventions, skin care algorit

## 2021-01-24 ENCOUNTER — APPOINTMENT (OUTPATIENT)
Dept: GENERAL RADIOLOGY | Facility: HOSPITAL | Age: 76
DRG: 871 | End: 2021-01-24
Attending: HOSPITALIST
Payer: MEDICARE

## 2021-01-24 LAB
ANION GAP SERPL CALC-SCNC: 5 MMOL/L (ref 0–18)
BASOPHILS # BLD AUTO: 0.06 X10(3) UL (ref 0–0.2)
BASOPHILS NFR BLD AUTO: 0.5 %
BUN BLD-MCNC: 19 MG/DL (ref 7–18)
BUN/CREAT SERPL: 26.4 (ref 10–20)
CALCIUM BLD-MCNC: 9 MG/DL (ref 8.5–10.1)
CHLORIDE SERPL-SCNC: 99 MMOL/L (ref 98–112)
CO2 SERPL-SCNC: 29 MMOL/L (ref 21–32)
CREAT BLD-MCNC: 0.72 MG/DL
DEPRECATED RDW RBC AUTO: 43.6 FL (ref 35.1–46.3)
EOSINOPHIL # BLD AUTO: 0.8 X10(3) UL (ref 0–0.7)
EOSINOPHIL NFR BLD AUTO: 6.7 %
ERYTHROCYTE [DISTWIDTH] IN BLOOD BY AUTOMATED COUNT: 14.2 % (ref 11–15)
GLUCOSE BLD-MCNC: 101 MG/DL (ref 70–99)
HAV IGM SER QL: 1.9 MG/DL (ref 1.6–2.6)
HCT VFR BLD AUTO: 39.1 %
HGB BLD-MCNC: 13.1 G/DL
IMM GRANULOCYTES # BLD AUTO: 0.15 X10(3) UL (ref 0–1)
IMM GRANULOCYTES NFR BLD: 1.3 %
LYMPHOCYTES # BLD AUTO: 0.71 X10(3) UL (ref 1–4)
LYMPHOCYTES NFR BLD AUTO: 5.9 %
MCH RBC QN AUTO: 28.3 PG (ref 26–34)
MCHC RBC AUTO-ENTMCNC: 33.5 G/DL (ref 31–37)
MCV RBC AUTO: 84.4 FL
MONOCYTES # BLD AUTO: 0.88 X10(3) UL (ref 0.1–1)
MONOCYTES NFR BLD AUTO: 7.4 %
NEUTROPHILS # BLD AUTO: 9.35 X10 (3) UL (ref 1.5–7.7)
NEUTROPHILS # BLD AUTO: 9.35 X10(3) UL (ref 1.5–7.7)
NEUTROPHILS NFR BLD AUTO: 78.2 %
OSMOLALITY SERPL CALC.SUM OF ELEC: 278 MOSM/KG (ref 275–295)
PLATELET # BLD AUTO: 284 10(3)UL (ref 150–450)
POTASSIUM SERPL-SCNC: 4.5 MMOL/L (ref 3.5–5.1)
RBC # BLD AUTO: 4.63 X10(6)UL
SODIUM SERPL-SCNC: 133 MMOL/L (ref 136–145)
WBC # BLD AUTO: 12 X10(3) UL (ref 4–11)

## 2021-01-24 PROCEDURE — 99233 SBSQ HOSP IP/OBS HIGH 50: CPT | Performed by: HOSPITALIST

## 2021-01-24 PROCEDURE — 99291 CRITICAL CARE FIRST HOUR: CPT | Performed by: INTERNAL MEDICINE

## 2021-01-24 PROCEDURE — 71045 X-RAY EXAM CHEST 1 VIEW: CPT | Performed by: HOSPITALIST

## 2021-01-24 RX ORDER — METHYLPREDNISOLONE SODIUM SUCCINATE 40 MG/ML
40 INJECTION, POWDER, LYOPHILIZED, FOR SOLUTION INTRAMUSCULAR; INTRAVENOUS DAILY
Status: DISCONTINUED | OUTPATIENT
Start: 2021-01-24 | End: 2021-02-01

## 2021-01-24 RX ORDER — ENOXAPARIN SODIUM 100 MG/ML
0.6 INJECTION SUBCUTANEOUS EVERY 12 HOURS SCHEDULED
Status: DISCONTINUED | OUTPATIENT
Start: 2021-01-24 | End: 2021-01-24

## 2021-01-24 RX ORDER — ENOXAPARIN SODIUM 100 MG/ML
0.6 INJECTION SUBCUTANEOUS EVERY 12 HOURS SCHEDULED
Status: DISCONTINUED | OUTPATIENT
Start: 2021-01-24 | End: 2021-02-13

## 2021-01-24 RX ORDER — SODIUM CHLORIDE 450 MG/100ML
INJECTION, SOLUTION INTRAVENOUS CONTINUOUS
Status: DISCONTINUED | OUTPATIENT
Start: 2021-01-24 | End: 2021-01-30

## 2021-01-24 NOTE — PLAN OF CARE
Note: Efren Swenson is alert/fatigued and oriented x4. PRN 0.5mg Ativan administered in the evening to help patient's anxiety level and assist him in sleeping. VapoTherm 25L, 90% FiO2.  PRN Robitussin administered overnight at patient's request. Normal sin hygiene including cough, deep breathe, Incentive Spirometry  - Assess the need for suctioning and perform as needed  - Assess and instruct to report SOB or any respiratory difficulty  - Respiratory Therapy support as indicated  - Manage/alleviate anxiety blowing  Outcome: Progressing     Problem: MUSCULOSKELETAL - ADULT  Goal: Return mobility to safest level of function  Description: INTERVENTIONS:  - Assess patient stability and activity tolerance for standing, transferring and ambulating w/ or w/o assist behaviors that affect risk of falls.   - [place] fall precautions as indicated by assessment.  - Educate pt/family on patient safety including physical limitations  - Instruct pt to call for assistance with activity based on assessment  - Modify environme

## 2021-01-24 NOTE — DIETARY NOTE
ADULT NUTRITION REASSESSMENT     Pt is at moderate nutrition risk. Pt does not meet malnutrition criteria.       RECOMMENDATIONS TO MD:  If pt cannot take po nutrition or continues to lose weight/have sub-optimal nutrition recommend adjunctive  tube feeds Today did not eat breakfast.  1/8: Update: po intake poor 1/5- 1/7. No documentation of meal intake 1/8. Fluid volume intake is only 500 ml: poor. Wt loss of 8 lbs/8 days.   Communicated with RN today for todays intake-Pt consumed 100% of breakfast today an RD liberalized diet as pt is not diabetic and BG acceptable on steroids)   - Medical Food Supplements- Ensure ENlive  TID (= 1050 kcals, 60 gms protein if 100% consumed) . Add magic cup daily.  (290 kcals, 9 gms protein)   - Vitamin and mineral supplements: Obtained    MEDICATIONS: reviewed  IVF started 1/24.    • sodium chloride 75 mL/hr at 01/24/21 1001     • sodium chloride 75 mL/hr at 01/24/21 1001     • sodium chloride 75 mL/hr at 01/24/21 1001     • enoxaparin  0.6 mg/kg Subcutaneous Q12H [address]   • MethylP wt loss, PO and supplement greater than 75% of needs, labs WNL and support body systems, begin adjunctive tube feeds within next 24-48 hrs if po intake remains poor/decreased. (or EN support asap if intubated).       DIETITIAN FOLLOW UP: RD to follow  Ese Crandall

## 2021-01-24 NOTE — PLAN OF CARE
Problem: METABOLIC/FLUID AND ELECTROLYTES - ADULT  Goal: Hemodynamic stability and optimal renal function maintained  Description: INTERVENTIONS:  - Monitor labs and assess for signs and symptoms of volume excess or deficit  - Monitor intake, output and algorithm/standards of care as needed  Outcome: Progressing     Problem: NEUROLOGICAL - ADULT  Goal: Achieves stable or improved neurological status  Description: INTERVENTIONS  - Assess for and report changes in neurological status  - Initiate measures to

## 2021-01-24 NOTE — PROGRESS NOTES
[facility]    Progress Note    Iban Sanders Patient Status:  Inpatient    1945 MRN [id number]   [facility] 2W/SW Attending Jesus Flores Za Day # 32 PCP Nj Strong MD        Subjective: miralax.  Add stool softeners                     FEN  Plan has diet                     DVT px  SQ lovenox phyllis dose     Code status Full code    Xray images reviewed    46 min spent on pt of which 30 min spent coordinating care with nurse/dr jacobo and

## 2021-01-24 NOTE — PHYSICAL THERAPY NOTE
Attempted to see patient for physical therapy session. Patient with decline in status at this time. RN states patient not appropriate for physical therapy at this time. Will attempt to see patient for physical therapy session tomorrow.  RN aware and agreeab

## 2021-01-24 NOTE — PROGRESS NOTES
[facility]    [facility] Note    Calvin Raza Patient Status:  Inpatient    1945 MRN [id number]   [facility] 2W/SW Attending Jesus Floresissa Day # 32 PCP Salvador Mahmood MD        Subjective: subcu high dose     Add cefepime   Add steroid   Increase Lovenox   Hydration since poor oral intake      Poor prognosis   D/w staff   D/w Dr Marybel Rosado   35 min cct           Results:     Lab Results   Component Value Date    WBC 12.0 (H) 01/24/2021    HGB

## 2021-01-25 LAB
ANION GAP SERPL CALC-SCNC: 6 MMOL/L (ref 0–18)
BASOPHILS # BLD AUTO: 0.02 X10(3) UL (ref 0–0.2)
BASOPHILS NFR BLD AUTO: 0.2 %
BUN BLD-MCNC: 21 MG/DL (ref 7–18)
BUN/CREAT SERPL: 30 (ref 10–20)
CALCIUM BLD-MCNC: 9.5 MG/DL (ref 8.5–10.1)
CHLORIDE SERPL-SCNC: 101 MMOL/L (ref 98–112)
CO2 SERPL-SCNC: 26 MMOL/L (ref 21–32)
CREAT BLD-MCNC: 0.7 MG/DL
DEPRECATED RDW RBC AUTO: 41.9 FL (ref 35.1–46.3)
EOSINOPHIL # BLD AUTO: 0 X10(3) UL (ref 0–0.7)
EOSINOPHIL NFR BLD AUTO: 0 %
ERYTHROCYTE [DISTWIDTH] IN BLOOD BY AUTOMATED COUNT: 13.8 % (ref 11–15)
GLUCOSE BLD-MCNC: 106 MG/DL (ref 70–99)
HCT VFR BLD AUTO: 37.1 %
HGB BLD-MCNC: 12.4 G/DL
IMM GRANULOCYTES # BLD AUTO: 0.08 X10(3) UL (ref 0–1)
IMM GRANULOCYTES NFR BLD: 0.7 %
LYMPHOCYTES # BLD AUTO: 0.55 X10(3) UL (ref 1–4)
LYMPHOCYTES NFR BLD AUTO: 4.5 %
MCH RBC QN AUTO: 27.8 PG (ref 26–34)
MCHC RBC AUTO-ENTMCNC: 33.4 G/DL (ref 31–37)
MCV RBC AUTO: 83.2 FL
MONOCYTES # BLD AUTO: 0.77 X10(3) UL (ref 0.1–1)
MONOCYTES NFR BLD AUTO: 6.4 %
NEUTROPHILS # BLD AUTO: 10.68 X10 (3) UL (ref 1.5–7.7)
NEUTROPHILS # BLD AUTO: 10.68 X10(3) UL (ref 1.5–7.7)
NEUTROPHILS NFR BLD AUTO: 88.2 %
OSMOLALITY SERPL CALC.SUM OF ELEC: 279 MOSM/KG (ref 275–295)
PLATELET # BLD AUTO: 286 10(3)UL (ref 150–450)
POTASSIUM SERPL-SCNC: 4.3 MMOL/L (ref 3.5–5.1)
RBC # BLD AUTO: 4.46 X10(6)UL
SODIUM SERPL-SCNC: 133 MMOL/L (ref 136–145)
WBC # BLD AUTO: 12.1 X10(3) UL (ref 4–11)

## 2021-01-25 PROCEDURE — 99233 SBSQ HOSP IP/OBS HIGH 50: CPT | Performed by: HOSPITALIST

## 2021-01-25 PROCEDURE — 99231 SBSQ HOSP IP/OBS SF/LOW 25: CPT | Performed by: REGISTERED NURSE

## 2021-01-25 PROCEDURE — 99291 CRITICAL CARE FIRST HOUR: CPT | Performed by: INTERNAL MEDICINE

## 2021-01-25 NOTE — PLAN OF CARE
Note: Domenico Mcginnis is remains fatigued and oriented x4. One dose of PRN 0.5mg Ativan administered in the evening to help patient's anxiety and promote sleep. VapoTherm 35L, 100% FiO2. Normal sinus rhythm, tachycardic at times.  Voids using urinal. He repo saturation or ABGs  - Provide Smoking Cessation handout, if applicable  - Encourage broncho-pulmonary hygiene including cough, deep breathe, Incentive Spirometry  - Assess the need for suctioning and perform as needed  - Assess and instruct to report SOB o electric shaver for shaving  - Use soft bristle tooth brush  - Limit straining and forceful nose blowing  Outcome: Progressing     Problem: MUSCULOSKELETAL - ADULT  Goal: Return mobility to safest level of function  Description: INTERVENTIONS:  - Assess pa INTERVENTIONS:  - Assess pt frequently for physical needs  - Identify cognitive and physical deficits and behaviors that affect risk of falls.   - [place] fall precautions as indicated by assessment.  - Educate pt/family on patient safety including physic

## 2021-01-25 NOTE — OCCUPATIONAL THERAPY NOTE
OCCUPATIONAL THERAPY TREATMENT NOTE - INPATIENT        Room Number: 226/226-A           Presenting Problem: (body aches, SOB, cough, COVID-19)    Problem List  Principal Problem:    Acute respiratory failure with hypoxia (HCC)  Active Problems:    Pneumoni medical progress)        PLAN  OT Treatment Plan: Balance activities; Energy conservation/work simplification techniques;ADL training;Functional transfer training; Endurance training; Compensatory technique education    SUBJECTIVE  Agreeable to activity     O

## 2021-01-25 NOTE — PALLIATIVE CARE NOTE
[address] Patient Status:  Inpatient    1945 MRN [id number]   Location [facility] 2W/SW Attending Mark, [facility] Day # 29 PCP Berenice Olmedo MD     Date o Nasal Spray (SALINE MIST) 3 spray, 3 spray, Each Nare, Q3H PRN  •  Fluticasone Propionate (FLONASE) 50 MCG/ACT nasal spray 2 spray, 2 spray, Each Nare, BID  •  benzocaine-menthol (CEPACOL (SUGAR-FREE)) 1 lozenge, 1 lozenge, Oral, PRN  •  PEG 3350 (MIRALAX) Chemistry:  Lab Results   Component Value Date    CREATSERUM 0.70 01/25/2021    BUN 21 (H) 01/25/2021     (L) 01/25/2021    K 4.3 01/25/2021     01/25/2021    CO2 26.0 01/25/2021     (H) 01/25/2021    CA 9.5 01/25/2021    ALB 2.3 ( sharing medical information: speak to pt and family  Patient's decision making preferences: speak to pt  Code status: Full code  Have advanced directives been discussed with patient or healthcare power of : Yes        Healthcare Agent Appointed: Dixie Reed this consult, which included all of the following:direct face to face contact, history taking, physical examination, and >50% was spent counseling and coordinating care.     Discussed today's visit with Shivani Owens, Dr. Nancie Bach and secure message to Dr. Montel Simmonds

## 2021-01-25 NOTE — PLAN OF CARE
Patient alert and oriented, on vapotherm, 35L O2 and 100%, with oxygen saturation dropping with activity. Patient up in chair throughout the day, up with standby assistance. Encouraged to practice using his IS.        Problem: Patient Centered Care  Goal: P platelets)  INTERVENTIONS:  - Avoid intramuscular injections, enemas and rectal medication administration  - Ensure safe mobilization of patient  - Hold pressure on venipuncture sites to achieve adequate hemostasis  - Assess for signs and symptoms of inter status  - Initiate measures to prevent increased intracranial pressure  - Maintain blood pressure and fluid volume within ordered parameters to optimize cerebral perfusion and minimize risk of hemorrhage  - Monitor temperature, glucose, and sodium.  Initiat suctioning and perform as needed  - Assess and instruct to report SOB or any respiratory difficulty  - Respiratory Therapy support as indicated  - Manage/alleviate anxiety  - Monitor for signs/symptoms of CO2 retention  Outcome: Not Progressing

## 2021-01-25 NOTE — RESPIRATORY THERAPY NOTE
Patient remains on vapotherm 35L, FIO2-90%, saturating well.  05:35am- SPO2 drop to 70's, increase FIO2 100%.

## 2021-01-25 NOTE — PROGRESS NOTES
[facility]    Progress Note    Remberto Smalls Patient Status:  Inpatient    1945 MRN [id number]   [facility] 2W/SW Attending Jesus Flores Day # 29 PCP Dedra Aviles MD        Subjective: stool softeners                     FEN  Plan has diet                     DVT px  SQ lovenox kigher dose     Code status Full code    Xray images reviewed    46 min spent on pt of which 30 min spent coordinating care with nurse/dr kelley and counseling pt

## 2021-01-25 NOTE — PHYSICAL THERAPY NOTE
PHYSICAL THERAPY TREATMENT NOTE - INPATIENT     Room Number: 872/752-V       Presenting Problem: +COVID-19 (12/22/2020); acute respiratory failure w/ hypoxia; pneumonia; sepsis    Problem List  Principal Problem:    Acute respiratory failure with hypoxia of motion;Stair training;Transfer training;Balance training    SUBJECTIVE  \"ok\"    OBJECTIVE  Precautions: (Contact & Droplet/Airborne precautions)      PAIN ASSESSMENT   Ratin  Location: R rib pain  Management Techniques: Repositioning;Relaxation; Erasto Buitrago reach;RN aware of session/findings    CURRENT GOALS   Goals to be met by: Goals updated 1/22/21, met by 2/12/21  Patient Goal Patient's self-stated goal is: not stated   Goal #1 Patient is able to demonstrate supine - sit EOB @ level: modified independent

## 2021-01-25 NOTE — PROGRESS NOTES
Pulmonary/Critical Care Follow Up Note    HPI:   Pansy Sprain is a 76year old male with Patient presents with:  Difficulty Breathing      PCP Alejandrina Bryan MD  Admission Attending Martha García MD    Hospital Day #28      Feeling sergio mg, Oral, Q4H PRN  •  multivitamin with minerals (ADULT) tab 1 tablet, 1 tablet, Oral, Daily  •  hydrALAzine HCl (APRESOLINE) injection 20 mg, 20 mg, Intravenous, Q4H PRN  •  tetrahydrozoline (OPTI-CLEAR/VISINE) 0.05 % ophthalmic solution 1 drop, 1 drop, B 01/22/2021    HGB 12.9 (L) 01/21/2021     CXR 2nd   Diffuse but mild GGO    ASSESSMENT/PLAN:     COVID-19  C/b AHRF  PCT high  S/p Actemra  S/p CCP  S/p Dex  CXR stable  Worse oxygenation again  Suspect \"COVID lung\"  No circumstantial evidence for PE  Ba

## 2021-01-26 ENCOUNTER — APPOINTMENT (OUTPATIENT)
Dept: CT IMAGING | Facility: HOSPITAL | Age: 76
DRG: 871 | End: 2021-01-26
Attending: INTERNAL MEDICINE
Payer: MEDICARE

## 2021-01-26 LAB
ANION GAP SERPL CALC-SCNC: 4 MMOL/L (ref 0–18)
BASOPHILS # BLD AUTO: 0.04 X10(3) UL (ref 0–0.2)
BASOPHILS NFR BLD AUTO: 0.3 %
BUN BLD-MCNC: 20 MG/DL (ref 7–18)
BUN/CREAT SERPL: 28.6 (ref 10–20)
CALCIUM BLD-MCNC: 9.3 MG/DL (ref 8.5–10.1)
CHLORIDE SERPL-SCNC: 101 MMOL/L (ref 98–112)
CO2 SERPL-SCNC: 29 MMOL/L (ref 21–32)
CREAT BLD-MCNC: 0.7 MG/DL
DEPRECATED RDW RBC AUTO: 43 FL (ref 35.1–46.3)
EOSINOPHIL # BLD AUTO: 0.14 X10(3) UL (ref 0–0.7)
EOSINOPHIL NFR BLD AUTO: 1 %
ERYTHROCYTE [DISTWIDTH] IN BLOOD BY AUTOMATED COUNT: 14 % (ref 11–15)
GLUCOSE BLD-MCNC: 84 MG/DL (ref 70–99)
HAV IGM SER QL: 2.1 MG/DL (ref 1.6–2.6)
HCT VFR BLD AUTO: 36.2 %
HGB BLD-MCNC: 12 G/DL
IMM GRANULOCYTES # BLD AUTO: 0.13 X10(3) UL (ref 0–1)
IMM GRANULOCYTES NFR BLD: 0.9 %
LYMPHOCYTES # BLD AUTO: 0.89 X10(3) UL (ref 1–4)
LYMPHOCYTES NFR BLD AUTO: 6.1 %
MCH RBC QN AUTO: 28 PG (ref 26–34)
MCHC RBC AUTO-ENTMCNC: 33.1 G/DL (ref 31–37)
MCV RBC AUTO: 84.6 FL
MONOCYTES # BLD AUTO: 1.06 X10(3) UL (ref 0.1–1)
MONOCYTES NFR BLD AUTO: 7.2 %
NEUTROPHILS # BLD AUTO: 12.37 X10 (3) UL (ref 1.5–7.7)
NEUTROPHILS # BLD AUTO: 12.37 X10(3) UL (ref 1.5–7.7)
NEUTROPHILS NFR BLD AUTO: 84.5 %
OSMOLALITY SERPL CALC.SUM OF ELEC: 280 MOSM/KG (ref 275–295)
PHOSPHATE SERPL-MCNC: 2.2 MG/DL (ref 2.5–4.9)
PLATELET # BLD AUTO: 315 10(3)UL (ref 150–450)
POTASSIUM SERPL-SCNC: 4 MMOL/L (ref 3.5–5.1)
RBC # BLD AUTO: 4.28 X10(6)UL
SODIUM SERPL-SCNC: 134 MMOL/L (ref 136–145)
WBC # BLD AUTO: 14.6 X10(3) UL (ref 4–11)

## 2021-01-26 PROCEDURE — 99233 SBSQ HOSP IP/OBS HIGH 50: CPT | Performed by: HOSPITALIST

## 2021-01-26 PROCEDURE — 71260 CT THORAX DX C+: CPT | Performed by: INTERNAL MEDICINE

## 2021-01-26 PROCEDURE — 99231 SBSQ HOSP IP/OBS SF/LOW 25: CPT | Performed by: REGISTERED NURSE

## 2021-01-26 PROCEDURE — 99232 SBSQ HOSP IP/OBS MODERATE 35: CPT | Performed by: INTERNAL MEDICINE

## 2021-01-26 NOTE — PLAN OF CARE
Patient alert and oriented. Patient states he is comfortable and denies pain. Patient on Vapotherm 35 L at 90% (decreased from 100%). Patient uses urinal at bedside. BM x1. SCDs on. Patient self prones and turns himself in bed. VSS.  Call light in reach and as needed  - Assess and instruct to report SOB or any respiratory difficulty  - Respiratory Therapy support as indicated  - Manage/alleviate anxiety  - Monitor for signs/symptoms of CO2 retention  Outcome: Progressing     Problem: METABOLIC/FLUID AND ELECT function  Description: INTERVENTIONS:  - Assess patient stability and activity tolerance for standing, transferring and ambulating w/ or w/o assistive devices  - Assist with transfers and ambulation using safe patient handling equipment as needed  - Ensure pt/family on patient safety including physical limitations  - Instruct pt to call for assistance with activity based on assessment  - Modify environment to reduce risk of injury  - Provide assistive devices as appropriate  - Consider OT/PT consult to rocael

## 2021-01-26 NOTE — PLAN OF CARE
Patient alert and oriented, occasionally resting in bed throughout the shift, pt states he has trouble sleeping at night. Up in chair for meals. Poor appetite, however consuming supplements. Adequate oxygenation on vapotherm 85% 35L. CT of chest performed. hygiene including cough, deep breathe, Incentive Spirometry  - Assess the need for suctioning and perform as needed  - Assess and instruct to report SOB or any respiratory difficulty  - Respiratory Therapy support as indicated  - Manage/alleviate anxiety blowing  Outcome: Progressing     Problem: MUSCULOSKELETAL - ADULT  Goal: Return mobility to safest level of function  Description: INTERVENTIONS:  - Assess patient stability and activity tolerance for standing, transferring and ambulating w/ or w/o assist behaviors that affect risk of falls.   - [place] fall precautions as indicated by assessment.  - Educate pt/family on patient safety including physical limitations  - Instruct pt to call for assistance with activity based on assessment  - Modify environme

## 2021-01-26 NOTE — PROGRESS NOTES
[facility]    Progress Note    Jaz Cardenas Patient Status:  Inpatient    1945 MRN [id number]   [facility] 2W/SW Attending Jesus Flores Day # 34 PCP Miguel Butterfield MD        Subjective: miralax.  Add stool softeners                     FEN  Plan has diet                     DVT px  SQ lovenox higher dose     Code status Full code    ct images reviewed    41 min spent on pt of which 30 min spent coordinating care with nurse/dr madison/viridiana bone

## 2021-01-26 NOTE — PROGRESS NOTES
[facility]     Progress Note        [facility] Patient Status:  Inpatient    1945 MRN [id number]   Location [facility] 2W/SW Attending Richard Kahn,  [facility] Day # 34 PCP Shaye Valencia MD       Subjective:   Pa oral suspension 30 mL, 30 mL, Oral, QID PRN    •  guaiFENesin (ROBITUSSIN) 100 MG/5ML solution 100 mg, 100 mg, Oral, Q4H PRN    •  multivitamin with minerals (ADULT) tab 1 tablet, 1 tablet, Oral, Daily    •  hydrALAzine HCl (APRESOLINE) injection 20 mg, 20 follow-up studies are advised. Dictated by (CST): Shaggy Davis MD on 1/24/2021 at 9:42 AM     Finalized by (CST): Shaggy Davis MD on 1/24/2021 at 9:44 AM                  Assessment   1. COVID-19 pneumonia  2.   Acute hypoxemic respiratory failure

## 2021-01-26 NOTE — PALLIATIVE CARE NOTE
[address] Patient Status:  Inpatient    1945 MRN [id number]   [facility] 2W/SW Attending Jesus Flores Za Day # 34 PCP Shayna Galindo MD     Date o Intravenous, Daily  •  Cefepime HCl (MAXIPIME) 1 g in sodium chloride 0.9% 100 mL MBP/add-vantage, 1 g, Intravenous, Q8H  •  Enoxaparin Sodium (LOVENOX) 60 MG/0.6ML injection 50 mg, 0.6 mg/kg, Subcutaneous, Q12H [facility]  •  Losartan Potassium (COZAAR) tab 25 m total) by mouth daily.         Hematology:  Lab Results   Component Value Date    WBC 14.6 (H) 01/26/2021    HGB 12.0 (L) 01/26/2021    HCT 36.2 (L) 01/26/2021    .0 01/26/2021       Coags:  Lab Results   Component Value Date    INR 1.23 (H) 12/28/20 Peripheral pulses are 2+. BLE Edema not present  Neurologic: Alert and oriented, normal affect. [place] speaking only  Skin: Warm and dry.     Palliative Performance Scale : 40%    Palliative Care Goals of Care:  Discussed with Patient and family: yes  Tonia adequately     Advance care planning  -see above for details  -Pt's says he would want his wife to be his HC surrogate, dtr Claire Finley is point person for family #[phone]     Palliative Performance Scale 40%     Emotion support provided to patient/family

## 2021-01-27 LAB
ANION GAP SERPL CALC-SCNC: 4 MMOL/L (ref 0–18)
BASOPHILS # BLD AUTO: 0.03 X10(3) UL (ref 0–0.2)
BASOPHILS NFR BLD AUTO: 0.3 %
BUN BLD-MCNC: 19 MG/DL (ref 7–18)
BUN/CREAT SERPL: 26.8 (ref 10–20)
CALCIUM BLD-MCNC: 9.1 MG/DL (ref 8.5–10.1)
CHLORIDE SERPL-SCNC: 100 MMOL/L (ref 98–112)
CO2 SERPL-SCNC: 32 MMOL/L (ref 21–32)
CREAT BLD-MCNC: 0.71 MG/DL
DEPRECATED RDW RBC AUTO: 41.4 FL (ref 35.1–46.3)
EOSINOPHIL # BLD AUTO: 0.04 X10(3) UL (ref 0–0.7)
EOSINOPHIL NFR BLD AUTO: 0.3 %
ERYTHROCYTE [DISTWIDTH] IN BLOOD BY AUTOMATED COUNT: 13.9 % (ref 11–15)
GLUCOSE BLD-MCNC: 83 MG/DL (ref 70–99)
HAV IGM SER QL: 2.2 MG/DL (ref 1.6–2.6)
HCT VFR BLD AUTO: 34 %
HGB BLD-MCNC: 11.6 G/DL
IMM GRANULOCYTES # BLD AUTO: 0.11 X10(3) UL (ref 0–1)
IMM GRANULOCYTES NFR BLD: 0.9 %
LYMPHOCYTES # BLD AUTO: 0.69 X10(3) UL (ref 1–4)
LYMPHOCYTES NFR BLD AUTO: 5.8 %
MCH RBC QN AUTO: 28.2 PG (ref 26–34)
MCHC RBC AUTO-ENTMCNC: 34.1 G/DL (ref 31–37)
MCV RBC AUTO: 82.7 FL
MONOCYTES # BLD AUTO: 0.89 X10(3) UL (ref 0.1–1)
MONOCYTES NFR BLD AUTO: 7.4 %
NEUTROPHILS # BLD AUTO: 10.22 X10 (3) UL (ref 1.5–7.7)
NEUTROPHILS # BLD AUTO: 10.22 X10(3) UL (ref 1.5–7.7)
NEUTROPHILS NFR BLD AUTO: 85.3 %
OSMOLALITY SERPL CALC.SUM OF ELEC: 283 MOSM/KG (ref 275–295)
PLATELET # BLD AUTO: 276 10(3)UL (ref 150–450)
POTASSIUM SERPL-SCNC: 4.3 MMOL/L (ref 3.5–5.1)
RBC # BLD AUTO: 4.11 X10(6)UL
SODIUM SERPL-SCNC: 136 MMOL/L (ref 136–145)
WBC # BLD AUTO: 12 X10(3) UL (ref 4–11)

## 2021-01-27 PROCEDURE — 99231 SBSQ HOSP IP/OBS SF/LOW 25: CPT | Performed by: REGISTERED NURSE

## 2021-01-27 PROCEDURE — 99291 CRITICAL CARE FIRST HOUR: CPT | Performed by: INTERNAL MEDICINE

## 2021-01-27 PROCEDURE — 99233 SBSQ HOSP IP/OBS HIGH 50: CPT | Performed by: HOSPITALIST

## 2021-01-27 NOTE — PROGRESS NOTES
Pulmonary/Critical Care Follow Up Note    HPI:   Maru Martin is a 76year old male with Patient presents with:  Difficulty Breathing      PCP Berenice Olmedo MD  Admission Attending Beto Antoine MD    Hospital Day #30      Feeling sergio mg, Oral, Q4H PRN  •  multivitamin with minerals (ADULT) tab 1 tablet, 1 tablet, Oral, Daily  •  hydrALAzine HCl (APRESOLINE) injection 20 mg, 20 mg, Intravenous, Q4H PRN  •  tetrahydrozoline (OPTI-CLEAR/VISINE) 0.05 % ophthalmic solution 1 drop, 1 drop, B 01/25/2021    HGB 13.1 01/24/2021    HGB 12.4 (L) 01/23/2021     CXR 2nd   Diffuse but mild GGO    ASSESSMENT/PLAN:     COVID-19  C/b AHRF  PCT high  S/p Actemra  S/p CCP  S/p Dex  CXR stable  Worse oxygenation again  Suspect \"COVID lung\"  No circumstant

## 2021-01-27 NOTE — PALLIATIVE CARE NOTE
[address] Patient Status:  Inpatient    1945 MRN [id number]   Location [facility] 2W/SW Attending Jesus Flores Za Day # 27 PCP Salvador Mahmood MD     Date o suppository 10 mg, 10 mg, Rectal, Daily PRN  •  Fluticasone Propionate (FLONASE) 50 MCG/ACT nasal spray 1 spray, 1 spray, Each Nare, Daily  •  Pantoprazole Sodium (PROTONIX) EC tab 40 mg, 40 mg, Oral, QAM AC  •  Alum & Mag Hydroxide-Simeth (MAALOX) oral velazquez (L) 01/26/2021    TROP 0.212 (HH) 12/29/2020       Imaging:  Ct Chest Pe Aorta (iv Only) (cpt=71260)    Result Date: 1/26/2021  CONCLUSION:  1. No pulmonary embolus. 2. Bilateral pulmonary abnormality compatible with severe COVID-19 and suggestive of ARDS. discussions      Assessment/Recommendations:    Acute respiratory failure with hypoxia ([facility] [address]  -remains on vapotherm fio2 85%, per pulmonary  -Pt would consider intubation if needed  -Consider low dose Morphine prn if dyspnea symptoms arise     Pneumonia du

## 2021-01-27 NOTE — PLAN OF CARE
Patient alert and oriented. O2 demands decreased throughout shift- Vapotherm lowered to 30L at 80%. Patient saturating 88-95%. Patient self-proning at times. Patient using the urinal appropriately- adequate urine output.  Patient denies pain and appears to breathe, Incentive Spirometry  - Assess the need for suctioning and perform as needed  - Assess and instruct to report SOB or any respiratory difficulty  - Respiratory Therapy support as indicated  - Manage/alleviate anxiety  - Monitor for signs/symptoms o Problem: MUSCULOSKELETAL - ADULT  Goal: Return mobility to safest level of function  Description: INTERVENTIONS:  - Assess patient stability and activity tolerance for standing, transferring and ambulating w/ or w/o assistive devices  - Assist with trans falls.  - [place] fall precautions as indicated by assessment.  - Educate pt/family on patient safety including physical limitations  - Instruct pt to call for assistance with activity based on assessment  - Modify environment to reduce risk of injury  -

## 2021-01-27 NOTE — PHYSICAL THERAPY NOTE
PHYSICAL THERAPY TREATMENT NOTE - INPATIENT     Room Number: 524/019-W       Presenting Problem: +COVID-19 (12/22/2020); acute respiratory failure w/ hypoxia; pneumonia; sepsis    Problem List  Principal Problem:    Acute respiratory failure with hypoxia from acute rehab to optimize functional outcomes. DISCHARGE RECOMMENDATIONS  PT Discharge Recommendations: Acute rehabilitation     PLAN  PT Treatment Plan: Bed mobility; Body mechanics; Endurance; Energy conservation;Patient education; Family education; Thelma CK    FUNCTIONAL ABILITY STATUS  Gait Assessment   Gait Assistance: Minimum assistance(Assist x 2)  Distance (ft): 5ft   Assistive Device: Rolling walker  Pattern: (increased time- assist with line/tube management)  Stoop/Curb Assistance: Not tested

## 2021-01-27 NOTE — PROGRESS NOTES
[facility]    [facility] Note    Aayush Harper Patient Status:  Inpatient    1945 MRN [id number]   Location [facility] 2W/SW Attending Ernestina Mitchell, [facility] Day # 27 PCP Jason Escobedo MD       Subjective:   Kwan Malin 40 mg, 40 mg, Oral, QAM AC    •  Alum & Mag Hydroxide-Simeth (MAALOX) oral suspension 30 mL, 30 mL, Oral, QID PRN    •  guaiFENesin (ROBITUSSIN) 100 MG/5ML solution 100 mg, 100 mg, Oral, Q4H PRN    •  multivitamin with minerals (ADULT) tab 1 tablet, 1 tabl Atherosclerosis including coronary artery calcification.     Dictated by (CST): Candace Velázquez MD on 1/26/2021 at 12:47 PM     Finalized by (CST): Candace Velázquez MD on 1/26/2021 at 12:56 PM                Results:     CBC:    Lab Results   Component Value D time was spent counseling patient, discussing plan of care, discussing labs and imaging findings. Spoke with consultant. All questions answered.          1/27/2021

## 2021-01-27 NOTE — PLAN OF CARE
Ailyn Dennis is alert and oriented, vital signs as charted. Vapotherm increased from 80% to 85% FiO2, remains on 30L. Up to chair most of day. Ambulated to bathroom x1, 2 bowel movements. Fluctuating appetite, but makes sure to drink his Ensure supplement.  Vo perform as needed  - Assess and instruct to report SOB or any respiratory difficulty  - Respiratory Therapy support as indicated  - Manage/alleviate anxiety  - Monitor for signs/symptoms of CO2 retention  Outcome: Progressing     Problem: METABOLIC/FLUID A of function  Description: INTERVENTIONS:  - Assess patient stability and activity tolerance for standing, transferring and ambulating w/ or w/o assistive devices  - Assist with transfers and ambulation using safe patient handling equipment as needed  - Ens pt/family on patient safety including physical limitations  - Instruct pt to call for assistance with activity based on assessment  - Modify environment to reduce risk of injury  - Provide assistive devices as appropriate  - Consider OT/PT consult to rocael

## 2021-01-27 NOTE — RESPIRATORY THERAPY NOTE
Pt on vapotherm 35L/85%, pt saturating well/.  @ 1650 FiO2 wean down to 80%, pt tolerating well.  @ 1930 FiO2 wean to 75% and 30L. RT will continue to monitor.

## 2021-01-27 NOTE — BH REHAB NOTE
OCCUPATIONAL THERAPY TREATMENT NOTE - INPATIENT        Room Number: 230/407-S      Presenting Problem: COVID    Problem List  Principal Problem:    Acute respiratory failure with hypoxia ([facility] [address]  Active Problems:    Pneumonia due to COVID-19 virus    Acute r Droplet)    PAIN ASSESSMENT  Ratin           ACTIVITY TOLERANCE  Pulse: 101(at rest, 87 post activity)        BP: 137/84  BP Location: Left arm          O2 SATURATIONS        SPO2 Ambulation on Oxygen: 93  Ambulation oxygen flow (liters per minute): 30

## 2021-01-28 LAB
ANION GAP SERPL CALC-SCNC: 3 MMOL/L (ref 0–18)
BASOPHILS # BLD AUTO: 0.04 X10(3) UL (ref 0–0.2)
BASOPHILS NFR BLD AUTO: 0.4 %
BUN BLD-MCNC: 19 MG/DL (ref 7–18)
BUN/CREAT SERPL: 28.4 (ref 10–20)
CALCIUM BLD-MCNC: 9.4 MG/DL (ref 8.5–10.1)
CHLORIDE SERPL-SCNC: 100 MMOL/L (ref 98–112)
CO2 SERPL-SCNC: 33 MMOL/L (ref 21–32)
CREAT BLD-MCNC: 0.67 MG/DL
DEPRECATED RDW RBC AUTO: 42.8 FL (ref 35.1–46.3)
EOSINOPHIL # BLD AUTO: 0.29 X10(3) UL (ref 0–0.7)
EOSINOPHIL NFR BLD AUTO: 3.1 %
ERYTHROCYTE [DISTWIDTH] IN BLOOD BY AUTOMATED COUNT: 13.9 % (ref 11–15)
GLUCOSE BLD-MCNC: 82 MG/DL (ref 70–99)
HCT VFR BLD AUTO: 34.5 %
HGB BLD-MCNC: 11.3 G/DL
IMM GRANULOCYTES # BLD AUTO: 0.09 X10(3) UL (ref 0–1)
IMM GRANULOCYTES NFR BLD: 1 %
LYMPHOCYTES # BLD AUTO: 0.89 X10(3) UL (ref 1–4)
LYMPHOCYTES NFR BLD AUTO: 9.6 %
MCH RBC QN AUTO: 27.6 PG (ref 26–34)
MCHC RBC AUTO-ENTMCNC: 32.8 G/DL (ref 31–37)
MCV RBC AUTO: 84.1 FL
MONOCYTES # BLD AUTO: 0.71 X10(3) UL (ref 0.1–1)
MONOCYTES NFR BLD AUTO: 7.6 %
NEUTROPHILS # BLD AUTO: 7.28 X10 (3) UL (ref 1.5–7.7)
NEUTROPHILS # BLD AUTO: 7.28 X10(3) UL (ref 1.5–7.7)
NEUTROPHILS NFR BLD AUTO: 78.3 %
OSMOLALITY SERPL CALC.SUM OF ELEC: 283 MOSM/KG (ref 275–295)
PLATELET # BLD AUTO: 273 10(3)UL (ref 150–450)
POTASSIUM SERPL-SCNC: 4.1 MMOL/L (ref 3.5–5.1)
RBC # BLD AUTO: 4.1 X10(6)UL
SODIUM SERPL-SCNC: 136 MMOL/L (ref 136–145)
WBC # BLD AUTO: 9.3 X10(3) UL (ref 4–11)

## 2021-01-28 PROCEDURE — 99233 SBSQ HOSP IP/OBS HIGH 50: CPT | Performed by: HOSPITALIST

## 2021-01-28 PROCEDURE — 99233 SBSQ HOSP IP/OBS HIGH 50: CPT | Performed by: INTERNAL MEDICINE

## 2021-01-28 PROCEDURE — 99231 SBSQ HOSP IP/OBS SF/LOW 25: CPT | Performed by: REGISTERED NURSE

## 2021-01-28 NOTE — PALLIATIVE CARE NOTE
[address] Patient Status:  Inpatient    1945 MRN [id number]   Location [facility] 2W/SW Attending Mark, [facility] Day # 32 PCP Shaye Valencia MD     Date o PRN  •  Fluticasone Propionate (FLONASE) 50 MCG/ACT nasal spray 2 spray, 2 spray, Each Nare, BID  •  benzocaine-menthol (CEPACOL (SUGAR-FREE)) 1 lozenge, 1 lozenge, Oral, PRN  •  PEG 3350 (MIRALAX) powder packet 17 g, 17 g, Oral, Daily  •  docusate sodium 01/02/2021       Chemistry:  Lab Results   Component Value Date    CREATSERUM 0.67 (L) 01/28/2021    BUN 19 (H) 01/28/2021     01/28/2021    K 4.1 01/28/2021     01/28/2021    CO2 33.0 (H) 01/28/2021    GLU 82 01/28/2021    CA 9.4 01/28/2021 family  Patient's decision making preferences: speak to pt  Code status: Full code  Have advanced directives been discussed with patient or healthcare power of : Yes        Healthcare Agent Appointed: Yes  Healthcare Agent's Name: His wife, but dtr following:direct face to face contact, history taking, physical examination, and >50% was spent counseling and coordinating care. Discussed today's visit with Lydia Luna RN    I will continue to follow clinically.     LEELA Louise-BC, [place] [id number]  1/2

## 2021-01-28 NOTE — PROGRESS NOTES
Pulmonary/Critical Care Follow Up Note    HPI:   Pansy Sprain is a 76year old male with Patient presents with:  Difficulty Breathing      PCP Alejandrina Bryan MD  Admission Attending Rebecca Kunz MD    Hospital Day #31      Feeling sergio Q4H PRN  •  multivitamin with minerals (ADULT) tab 1 tablet, 1 tablet, Oral, Daily  •  hydrALAzine HCl (APRESOLINE) injection 20 mg, 20 mg, Intravenous, Q4H PRN  •  tetrahydrozoline (OPTI-CLEAR/VISINE) 0.05 % ophthalmic solution 1 drop, 1 drop, Both Eyes, 01/25/2021    HGB 13.1 01/24/2021     CXR 2nd   Diffuse but mild GGO    ASSESSMENT/PLAN:     COVID-19  C/b AHRF/ARDS  PCT high  S/p Actemra  S/p CCP  S/p Dex  CXR stable  Suspect \"COVID lung\"-  This is presenting now like accelerated IPF or Hamman Rich S

## 2021-01-28 NOTE — PROGRESS NOTES
[facility]    Progress Note    Shola Jasmeet Patient Status:  Inpatient    1945 MRN [id number]   Location [facility] 2W/SW Attending Maryln Denver, [address] [facility] Day # 32 PCP Jose Ibarra MD       Subjective:   Rodolfo Morgan Sodium (PROTONIX) EC tab 40 mg, 40 mg, Oral, QAM AC    •  Alum & Mag Hydroxide-Simeth (MAALOX) oral suspension 30 mL, 30 mL, Oral, QID PRN    •  guaiFENesin (ROBITUSSIN) 100 MG/5ML solution 100 mg, 100 mg, Oral, Q4H PRN    •  multivitamin with minerals (AD and suggestive of ARDS. 3. Atherosclerosis including coronary artery calcification.     Dictated by (CST): Sal Mtz MD on 1/26/2021 at 12:47 PM     Finalized by (CST): Sal Mtz MD on 1/26/2021 at 12:56 PM                Results:     CBC:    Lab imaging findings. Spoke with consultant. All questions answered.

## 2021-01-28 NOTE — CM/SW NOTE
Kevin Gilbert notified this CM that they are attempting to obtain a medication (pirfenidone) for this patient that hospital does not supply. Per Rachelle Sanchez, if family able to obtain med, [facility] pharmacy will approve for pt to have the med while inpatient.     Daughter, Cuate Banks

## 2021-01-29 LAB
ANION GAP SERPL CALC-SCNC: 2 MMOL/L (ref 0–18)
BASOPHILS # BLD AUTO: 0.03 X10(3) UL (ref 0–0.2)
BASOPHILS NFR BLD AUTO: 0.3 %
BUN BLD-MCNC: 20 MG/DL (ref 7–18)
BUN/CREAT SERPL: 27.8 (ref 10–20)
CALCIUM BLD-MCNC: 9.4 MG/DL (ref 8.5–10.1)
CHLORIDE SERPL-SCNC: 99 MMOL/L (ref 98–112)
CO2 SERPL-SCNC: 34 MMOL/L (ref 21–32)
CREAT BLD-MCNC: 0.72 MG/DL
DEPRECATED RDW RBC AUTO: 42.5 FL (ref 35.1–46.3)
EOSINOPHIL # BLD AUTO: 0.35 X10(3) UL (ref 0–0.7)
EOSINOPHIL NFR BLD AUTO: 3.6 %
ERYTHROCYTE [DISTWIDTH] IN BLOOD BY AUTOMATED COUNT: 13.8 % (ref 11–15)
GLUCOSE BLD-MCNC: 80 MG/DL (ref 70–99)
HCT VFR BLD AUTO: 36.3 %
HGB BLD-MCNC: 12.2 G/DL
IMM GRANULOCYTES # BLD AUTO: 0.15 X10(3) UL (ref 0–1)
IMM GRANULOCYTES NFR BLD: 1.5 %
LYMPHOCYTES # BLD AUTO: 1.02 X10(3) UL (ref 1–4)
LYMPHOCYTES NFR BLD AUTO: 10.4 %
MCH RBC QN AUTO: 28.4 PG (ref 26–34)
MCHC RBC AUTO-ENTMCNC: 33.6 G/DL (ref 31–37)
MCV RBC AUTO: 84.6 FL
MONOCYTES # BLD AUTO: 0.58 X10(3) UL (ref 0.1–1)
MONOCYTES NFR BLD AUTO: 5.9 %
NEUTROPHILS # BLD AUTO: 7.69 X10 (3) UL (ref 1.5–7.7)
NEUTROPHILS # BLD AUTO: 7.69 X10(3) UL (ref 1.5–7.7)
NEUTROPHILS NFR BLD AUTO: 78.3 %
OSMOLALITY SERPL CALC.SUM OF ELEC: 282 MOSM/KG (ref 275–295)
PLATELET # BLD AUTO: 296 10(3)UL (ref 150–450)
POTASSIUM SERPL-SCNC: 3.9 MMOL/L (ref 3.5–5.1)
RBC # BLD AUTO: 4.29 X10(6)UL
SODIUM SERPL-SCNC: 135 MMOL/L (ref 136–145)
WBC # BLD AUTO: 9.8 X10(3) UL (ref 4–11)

## 2021-01-29 PROCEDURE — 99233 SBSQ HOSP IP/OBS HIGH 50: CPT | Performed by: HOSPITALIST

## 2021-01-29 PROCEDURE — 99231 SBSQ HOSP IP/OBS SF/LOW 25: CPT | Performed by: REGISTERED NURSE

## 2021-01-29 PROCEDURE — 99233 SBSQ HOSP IP/OBS HIGH 50: CPT | Performed by: INTERNAL MEDICINE

## 2021-01-29 NOTE — PLAN OF CARE
Pt received up in chair,  tele shows NSR.  VSS. Sats well on current vapotherm  settings. Attempting to wean fio2 as appropriate. Updated dtr on POC via telephone. Denies pain or discomfort at this time.  Bed locked and in lowest position, call light in report SOB or any respiratory difficulty  - Respiratory Therapy support as indicated  - Manage/alleviate anxiety  - Monitor for signs/symptoms of CO2 retention  Outcome: Progressing     Problem: METABOLIC/FLUID AND ELECTROLYTES - ADULT  Goal: Hemodynamic s Assess patient stability and activity tolerance for standing, transferring and ambulating w/ or w/o assistive devices  - Assist with transfers and ambulation using safe patient handling equipment as needed  - Ensure adequate protection for wounds/incisions limitations  - Instruct pt to call for assistance with activity based on assessment  - Modify environment to reduce risk of injury  - Provide assistive devices as appropriate  - Consider OT/PT consult to assist with strengthening/mobility  - Encourage toil

## 2021-01-29 NOTE — OCCUPATIONAL THERAPY NOTE
OCCUPATIONAL THERAPY TREATMENT NOTE - INPATIENT        Room Number: 399/729-P           Presenting Problem: COVID    Problem List  Principal Problem:    Acute respiratory failure with hypoxia ([facility] [address]  Active Problems:    Pneumonia due to COVID-19 virus    Ac language line - throughout     [address]  OT Discharge Recommendations: Acute rehabilitation        PLAN  OT Treatment Plan: Balance activities; ADL training;IADL training;UE strengthening/ROM; Endurance training;Patient/Fa maintaining O2 saturations > 90%   Comment: ongoing     Patient will complete item retrieval with Mod I using energy conservation strategies PRN  Comment:  ongoing             Goals  on: 2021  Frequency: 3-5x'wk

## 2021-01-29 NOTE — PROGRESS NOTES
[place] [facility]    Progress Note    Jordyn Lozoya Patient Status:  Inpatient    1945 MRN [id number]   Location [facility] 2W/SW Attending Collin Escobar, [address] [facility] Day # 28 PCP Jena Huang MD       Subjective:   Mary Paci spray, 1 spray, Each Nare, Daily    •  Pantoprazole Sodium (PROTONIX) EC tab 40 mg, 40 mg, Oral, QAM AC    •  Alum & Mag Hydroxide-Simeth (MAALOX) oral suspension 30 mL, 30 mL, Oral, QID PRN    •  guaiFENesin (ROBITUSSIN) 100 MG/5ML solution 100 mg, 100 mg 01/28/2021    WBC 12.0 (H) 01/27/2021     Lab Results   Component Value Date    HGB 12.2 (L) 01/29/2021    HGB 11.3 (L) 01/28/2021    HGB 11.6 (L) 01/27/2021      Lab Results   Component Value Date    .0 01/29/2021    .0 01/28/2021

## 2021-01-29 NOTE — PHYSICAL THERAPY NOTE
'PHYSICAL THERAPY TREATMENT NOTE - INPATIENT     Room Number: 346/310-F       Presenting Problem: +COVID-19 (12/22/2020); acute respiratory failure w/ hypoxia; pneumonia; sepsis    Problem List  Principal Problem:    Acute respiratory failure with hypoxia based on documentation in the [facility] '6 clicks' Inpatient Basic Mobility Short Form. Research supports that patients with this level of impairment may benefit from acute rehab.     DISCHARGE RECOMMENDATIONS  PT Discharge Recommendations: Acute rehabilitatio Scale): 40.78   CMS Modifier (G-Code): CK    FUNCTIONAL ABILITY STATUS  Gait Assessment   Gait Assistance: Not tested  Stoop/Curb Assistance: Not tested    THERAPEUTIC EXERCISES  Lower Extremity/ Trunk movement Ankle pumps  Trunk rotations    Position Sitt

## 2021-01-29 NOTE — RESPIRATORY THERAPY NOTE
Pt on settings 30L 55%  FiO2 weaned from 65% to 55% throughout the night. Pt tolerating and saturating appropriately.

## 2021-01-29 NOTE — PLAN OF CARE
Pt remains resting comfortably in chair. Pt is A. Ox4, primarily Jordanian speaking; interpretor used for translation. Pt remains on vapotherm; 30L, 55%, saturating in 90's. Pt up to chair 3x; for all meals. Pt desaturates to 70's with activity.  Poor appetite Incentive Spirometry  - Assess the need for suctioning and perform as needed  - Assess and instruct to report SOB or any respiratory difficulty  - Respiratory Therapy support as indicated  - Manage/alleviate anxiety  - Monitor for signs/symptoms of CO2 ret MUSCULOSKELETAL - ADULT  Goal: Return mobility to safest level of function  Description: INTERVENTIONS:  - Assess patient stability and activity tolerance for standing, transferring and ambulating w/ or w/o assistive devices  - Assist with transfers and am [place] fall precautions as indicated by assessment.  - Educate pt/family on patient safety including physical limitations  - Instruct pt to call for assistance with activity based on assessment  - Modify environment to reduce risk of injury  - Provide a

## 2021-01-29 NOTE — PALLIATIVE CARE NOTE
[address] Patient Status:  Inpatient    1945 MRN [id number]   Location [facility] 2W/SW Attending Bassem Flores Day # 28 PCP Jason Escobedo MD     Date o mg, Oral, BID  •  PEG 3350 (MIRALAX) powder packet 17 g, 17 g, Oral, Daily PRN  •  bisacodyl (DULCOLAX) rectal suppository 10 mg, 10 mg, Rectal, Daily PRN  •  Fluticasone Propionate (FLONASE) 50 MCG/ACT nasal spray 1 spray, 1 spray, Each Nare, Daily  •  Pa ALKPHO 115 01/23/2021    BILT 0.6 01/23/2021    TP 6.5 01/23/2021    AST 21 01/23/2021    ALT 29 01/23/2021    PSA 0.30 11/25/2020    DDIMER 2.04 (H) 01/23/2021    MG 2.2 01/27/2021    PHOS 2.2 (L) 01/26/2021    TROP 0.212 (HH) 12/29/2020       Imaging:  C family  Healthcare Agent's Phone Number: [phone]          Spiritual needs addressed: Referral placed for Spiritual Care    Disposition: ongoing goals of care discussions      Assessment/Recommendations:    Acute respiratory failure with hypoxia ([address] care.    Discussed today's visit with RN    I will follow up on Monday.  Please page Dr. Maxx Moreno over the weekend with any palliative care needs    [facility] [id number]  1/29/2021  8:56 AM

## 2021-01-29 NOTE — DIETARY NOTE
ADULT NUTRITION REASSESSMENT     Pt is at moderate nutrition risk. Pt does not meet malnutrition criteria. RECOMMENDATIONS TO MD:  CPM.  Requesting repeat scale weight (last on 1/20). 1/26 last phos 2.2- recommend re-check level next order for labs. breakfast.  1/8: Update: po intake poor 1/5- 1/7. No documentation of meal intake 1/8. Fluid volume intake is only 500 ml: poor. Wt loss of 8 lbs/8 days.   Communicated with RN today for todays intake-Pt consumed 100% of breakfast today and currently eating Tolerates diet well.       NUTRITION  PLAN/INTERVENTION:    NUTRITION PRESCRIPTION:  ESTIMATED NUTRITION NEEDS:  Calories: 2250+calories/day (30+ calories per kg Actual body wt (ABW))  Protein:  grams protein/day (1.2-2.0 grams protein per kg Actual b eating some and taking ONS well. See below. Intake: taking 100% Ensure Enlive TID= 1050 kcals, 60 g protein helping greatly to meet nutrition needs at ~50%.    Percent Meals Eaten (last 3 days)     Date/Time Percent Meals Eaten (%)    01/26/21 0800  10 % 2.2  --   --    * 136 136 135*   K 4.0 4.3 4.1 3.9    100 100 99   CO2 29.0 32.0 33.0* 34.0*   PHOS 2.2*  --   --   --    OSMOCALC 280 283 283 282       NUTRITION RELATED PHYSICAL FINDINGS:  - Body Fat/Muscle Mass: Unable to conduct NFPE with p

## 2021-01-29 NOTE — PROGRESS NOTES
Pulmonary/Critical Care Follow Up Note    HPI:   Usman Schaffer is a 76year old male with Patient presents with:  Difficulty Breathing      PCP Patt Farley MD  Admission Attending Macon Hammans, MD    Hospital Day #32      Feeling sergio Q4H PRN  •  multivitamin with minerals (ADULT) tab 1 tablet, 1 tablet, Oral, Daily  •  hydrALAzine HCl (APRESOLINE) injection 20 mg, 20 mg, Intravenous, Q4H PRN  •  tetrahydrozoline (OPTI-CLEAR/VISINE) 0.05 % ophthalmic solution 1 drop, 1 drop, Both Eyes, 01/26/2021    HGB 12.4 (L) 01/25/2021     CXR 2nd   Diffuse but mild GGO    ASSESSMENT/PLAN:     COVID-19  C/b AHRF/ARDS  PCT high  S/p Actemra  S/p CCP  S/p Dex  CXR stable  Suspect \"COVID lung\"-  This is presenting now like accelerated IPF or Hamman Ri

## 2021-01-30 LAB
ANION GAP SERPL CALC-SCNC: 3 MMOL/L (ref 0–18)
BASOPHILS # BLD AUTO: 0.05 X10(3) UL (ref 0–0.2)
BASOPHILS NFR BLD AUTO: 0.5 %
BUN BLD-MCNC: 22 MG/DL (ref 7–18)
BUN/CREAT SERPL: 33.8 (ref 10–20)
CALCIUM BLD-MCNC: 9.3 MG/DL (ref 8.5–10.1)
CHLORIDE SERPL-SCNC: 99 MMOL/L (ref 98–112)
CO2 SERPL-SCNC: 32 MMOL/L (ref 21–32)
CREAT BLD-MCNC: 0.65 MG/DL
DEPRECATED RDW RBC AUTO: 42.4 FL (ref 35.1–46.3)
EOSINOPHIL # BLD AUTO: 0.33 X10(3) UL (ref 0–0.7)
EOSINOPHIL NFR BLD AUTO: 3.4 %
ERYTHROCYTE [DISTWIDTH] IN BLOOD BY AUTOMATED COUNT: 13.9 % (ref 11–15)
GLUCOSE BLD-MCNC: 83 MG/DL (ref 70–99)
HCT VFR BLD AUTO: 36.6 %
HGB BLD-MCNC: 12.1 G/DL
IMM GRANULOCYTES # BLD AUTO: 0.15 X10(3) UL (ref 0–1)
IMM GRANULOCYTES NFR BLD: 1.6 %
LYMPHOCYTES # BLD AUTO: 1.12 X10(3) UL (ref 1–4)
LYMPHOCYTES NFR BLD AUTO: 11.6 %
MCH RBC QN AUTO: 27.7 PG (ref 26–34)
MCHC RBC AUTO-ENTMCNC: 33.1 G/DL (ref 31–37)
MCV RBC AUTO: 83.8 FL
MONOCYTES # BLD AUTO: 0.54 X10(3) UL (ref 0.1–1)
MONOCYTES NFR BLD AUTO: 5.6 %
NEUTROPHILS # BLD AUTO: 7.44 X10 (3) UL (ref 1.5–7.7)
NEUTROPHILS # BLD AUTO: 7.44 X10(3) UL (ref 1.5–7.7)
NEUTROPHILS NFR BLD AUTO: 77.3 %
OSMOLALITY SERPL CALC.SUM OF ELEC: 280 MOSM/KG (ref 275–295)
PLATELET # BLD AUTO: 312 10(3)UL (ref 150–450)
POTASSIUM SERPL-SCNC: 4.1 MMOL/L (ref 3.5–5.1)
RBC # BLD AUTO: 4.37 X10(6)UL
SODIUM SERPL-SCNC: 134 MMOL/L (ref 136–145)
WBC # BLD AUTO: 9.6 X10(3) UL (ref 4–11)

## 2021-01-30 PROCEDURE — 99233 SBSQ HOSP IP/OBS HIGH 50: CPT | Performed by: INTERNAL MEDICINE

## 2021-01-30 PROCEDURE — 99233 SBSQ HOSP IP/OBS HIGH 50: CPT | Performed by: HOSPITALIST

## 2021-01-30 NOTE — PROGRESS NOTES
[facility]    Progress Note    Marcia Griggs Patient Status:  Inpatient    1945 MRN [id number]   Location [facility] 2W/SW Attending Oscar Ratliff, [address] [facility] Day # 35 PCP Vicenta Marquez MD       Subjective:   Amy Kiser Pantoprazole Sodium (PROTONIX) EC tab 40 mg, 40 mg, Oral, QAM AC    •  Alum & Mag Hydroxide-Simeth (MAALOX) oral suspension 30 mL, 30 mL, Oral, QID PRN    •  guaiFENesin (ROBITUSSIN) 100 MG/5ML solution 100 mg, 100 mg, Oral, Q4H PRN    •  multivitamin with Component Value Date    HGB 12.1 (L) 01/30/2021    HGB 12.2 (L) 01/29/2021    HGB 11.3 (L) 01/28/2021      Lab Results   Component Value Date    .0 01/30/2021    .0 01/29/2021    .0 01/28/2021       Recent Labs   Lab 01/28/21  0432 0

## 2021-01-30 NOTE — PLAN OF CARE
Patient is alert and oriented x 4. Fluids infusing. Ativan given at bedtime. Remains on Vapotherm 30L 55%. Able to lay on side for short periods of time. Assist x 1 from chair to bed. Good urine output. Denies pain.  Daughter, Leita Mortimer, and wife updated on p

## 2021-01-30 NOTE — PROGRESS NOTES
[facility]    Progress Note      Assessment and Plan:   1. Coronavirus pneumonitis–patient still has substantial oxygen requirements but is now down to 65% vapotherm.     Recommendations: Steroids, cefepime, Lovenox, taper oxygen and will f

## 2021-01-30 NOTE — PLAN OF CARE
Taylor Ramirez remains alert and oriented. He follows commands appropriately. He remains on vapotherm 30L/55%. Satting >87% throughout day. He is resting comfortably in chair throughout day. BM x1.  Urine output adequate, is able to use urinal. Appetite remains saturation or ABGs  - Provide Smoking Cessation handout, if applicable  - Encourage broncho-pulmonary hygiene including cough, deep breathe, Incentive Spirometry  - Assess the need for suctioning and perform as needed  - Assess and instruct to report SOB o electric shaver for shaving  - Use soft bristle tooth brush  - Limit straining and forceful nose blowing  Outcome: Progressing     Problem: MUSCULOSKELETAL - ADULT  Goal: Return mobility to safest level of function  Description: INTERVENTIONS:  - Assess pa INTERVENTIONS:  - Assess pt frequently for physical needs  - Identify cognitive and physical deficits and behaviors that affect risk of falls.   - [place] fall precautions as indicated by assessment.  - Educate pt/family on patient safety including physic

## 2021-01-31 LAB
ANION GAP SERPL CALC-SCNC: 4 MMOL/L (ref 0–18)
BASOPHILS # BLD AUTO: 0.06 X10(3) UL (ref 0–0.2)
BASOPHILS NFR BLD AUTO: 0.5 %
BUN BLD-MCNC: 22 MG/DL (ref 7–18)
BUN/CREAT SERPL: 28.6 (ref 10–20)
CALCIUM BLD-MCNC: 9.8 MG/DL (ref 8.5–10.1)
CHLORIDE SERPL-SCNC: 100 MMOL/L (ref 98–112)
CO2 SERPL-SCNC: 32 MMOL/L (ref 21–32)
CREAT BLD-MCNC: 0.77 MG/DL
DEPRECATED RDW RBC AUTO: 42.9 FL (ref 35.1–46.3)
EOSINOPHIL # BLD AUTO: 0.54 X10(3) UL (ref 0–0.7)
EOSINOPHIL NFR BLD AUTO: 4.4 %
ERYTHROCYTE [DISTWIDTH] IN BLOOD BY AUTOMATED COUNT: 14.1 % (ref 11–15)
GLUCOSE BLD-MCNC: 86 MG/DL (ref 70–99)
HCT VFR BLD AUTO: 38.5 %
HGB BLD-MCNC: 13 G/DL
IMM GRANULOCYTES # BLD AUTO: 0.27 X10(3) UL (ref 0–1)
IMM GRANULOCYTES NFR BLD: 2.2 %
LYMPHOCYTES # BLD AUTO: 1.14 X10(3) UL (ref 1–4)
LYMPHOCYTES NFR BLD AUTO: 9.3 %
MCH RBC QN AUTO: 28.4 PG (ref 26–34)
MCHC RBC AUTO-ENTMCNC: 33.8 G/DL (ref 31–37)
MCV RBC AUTO: 84.1 FL
MONOCYTES # BLD AUTO: 0.6 X10(3) UL (ref 0.1–1)
MONOCYTES NFR BLD AUTO: 4.9 %
NEUTROPHILS # BLD AUTO: 9.59 X10 (3) UL (ref 1.5–7.7)
NEUTROPHILS # BLD AUTO: 9.59 X10(3) UL (ref 1.5–7.7)
NEUTROPHILS NFR BLD AUTO: 78.7 %
OSMOLALITY SERPL CALC.SUM OF ELEC: 285 MOSM/KG (ref 275–295)
PLATELET # BLD AUTO: 300 10(3)UL (ref 150–450)
POTASSIUM SERPL-SCNC: 4.2 MMOL/L (ref 3.5–5.1)
RBC # BLD AUTO: 4.58 X10(6)UL
SODIUM SERPL-SCNC: 136 MMOL/L (ref 136–145)
WBC # BLD AUTO: 12.2 X10(3) UL (ref 4–11)

## 2021-01-31 PROCEDURE — 99222 1ST HOSP IP/OBS MODERATE 55: CPT | Performed by: INTERNAL MEDICINE

## 2021-01-31 PROCEDURE — 99233 SBSQ HOSP IP/OBS HIGH 50: CPT | Performed by: INTERNAL MEDICINE

## 2021-01-31 PROCEDURE — 99233 SBSQ HOSP IP/OBS HIGH 50: CPT | Performed by: HOSPITALIST

## 2021-01-31 RX ORDER — FLUCONAZOLE 200 MG/1
200 TABLET ORAL DAILY
Status: COMPLETED | OUTPATIENT
Start: 2021-02-01 | End: 2021-02-13

## 2021-01-31 RX ORDER — FLUCONAZOLE 100 MG/1
400 TABLET ORAL ONCE
Status: COMPLETED | OUTPATIENT
Start: 2021-01-31 | End: 2021-01-31

## 2021-01-31 NOTE — PROGRESS NOTES
[place] [facility] - [facility]    Progress Note    Jordyn Lozoya Patient Status:  Inpatient    1945 MRN [id number]   Location [facility] 2W/SW Attending Collin Escobar, [address] [facility] Day # 29 PCP Jena Huang MD       Subjective:   Raleigh Paci QAM AC    •  Alum & Mag Hydroxide-Simeth (MAALOX) oral suspension 30 mL, 30 mL, Oral, QID PRN    •  guaiFENesin (ROBITUSSIN) 100 MG/5ML solution 100 mg, 100 mg, Oral, Q4H PRN    •  multivitamin with minerals (ADULT) tab 1 tablet, 1 tablet, Oral, Daily    • (L) 01/30/2021    HGB 12.2 (L) 01/29/2021      Lab Results   Component Value Date    .0 01/31/2021    .0 01/30/2021    .0 01/29/2021       Recent Labs   Lab 01/29/21  0518 01/30/21  0333 01/31/21  0646   GLU 80 83 86   BUN 20* 22* 22*

## 2021-01-31 NOTE — CONSULTS
Gastroenterology consultation note    Reason for consultation:  Difficulty swallowing      History of present illness:   The patient is a 76year old male who is seen at the bedside today with his critical care RN Kannan Curiel who is assisting with interpretatio 07/2017         No Known Allergies        •  methylPREDNISolone Sodium Succ (Solu-MEDROL) injection 40 mg, 40 mg, Intravenous, Daily    •  Cefepime HCl (MAXIPIME) 1 g in sodium chloride 0.9% 100 mL MBP/add-vantage, 1 g, Intravenous, Q8H    •  Enoxaparin So allopurinol (ZYLOPRIM) tab 100 mg, 100 mg, Oral, Daily    •  acetaminophen (TYLENOL) tab 650 mg, 650 mg, Oral, Q6H PRN        •  aspirin 81 MG Oral Tab EC, Take 1 tablet (81 mg total) by mouth daily. , Disp: 30 tablet, Rfl: 2    •  guaiFENesin-codeine (IVAN current or ex partner: Not on file        Emotionally abused: Not on file        Physically abused: Not on file        Forced sexual activity: Not on file    Other Topics      Concerns:        Not on file    Social History Narrative      Not on file has persistent respiratory compromise. He has been treated with antibiotics and dexamethasone. He has developed intermittent solid food dysphagia since being hospitalized. He is on PPI prophylaxis.   We discussed possibilities including Candida esophagit

## 2021-01-31 NOTE — PROGRESS NOTES
[facility]    Progress Note      Assessment and Plan:   1. Coronavirus pneumonitis–patient still has substantial oxygen requirements but is now down to 65% vapotherm. No new complaints. Pirfenidone is being entertained as an option.   Sta

## 2021-02-01 ENCOUNTER — TELEPHONE (OUTPATIENT)
Dept: PULMONOLOGY | Facility: CLINIC | Age: 76
End: 2021-02-01

## 2021-02-01 LAB
ANION GAP SERPL CALC-SCNC: 5 MMOL/L (ref 0–18)
BASOPHILS # BLD AUTO: 0.07 X10(3) UL (ref 0–0.2)
BASOPHILS NFR BLD AUTO: 0.6 %
BUN BLD-MCNC: 26 MG/DL (ref 7–18)
BUN/CREAT SERPL: 30.6 (ref 10–20)
CALCIUM BLD-MCNC: 9.6 MG/DL (ref 8.5–10.1)
CHLORIDE SERPL-SCNC: 98 MMOL/L (ref 98–112)
CO2 SERPL-SCNC: 32 MMOL/L (ref 21–32)
CREAT BLD-MCNC: 0.85 MG/DL
DEPRECATED RDW RBC AUTO: 43.7 FL (ref 35.1–46.3)
EOSINOPHIL # BLD AUTO: 0.4 X10(3) UL (ref 0–0.7)
EOSINOPHIL NFR BLD AUTO: 3.4 %
ERYTHROCYTE [DISTWIDTH] IN BLOOD BY AUTOMATED COUNT: 14.2 % (ref 11–15)
GLUCOSE BLD-MCNC: 87 MG/DL (ref 70–99)
HCT VFR BLD AUTO: 38.5 %
HGB BLD-MCNC: 12.7 G/DL
IMM GRANULOCYTES # BLD AUTO: 0.33 X10(3) UL (ref 0–1)
IMM GRANULOCYTES NFR BLD: 2.8 %
LYMPHOCYTES # BLD AUTO: 1.12 X10(3) UL (ref 1–4)
LYMPHOCYTES NFR BLD AUTO: 9.6 %
MCH RBC QN AUTO: 28 PG (ref 26–34)
MCHC RBC AUTO-ENTMCNC: 33 G/DL (ref 31–37)
MCV RBC AUTO: 85 FL
MONOCYTES # BLD AUTO: 0.69 X10(3) UL (ref 0.1–1)
MONOCYTES NFR BLD AUTO: 5.9 %
NEUTROPHILS # BLD AUTO: 9.07 X10 (3) UL (ref 1.5–7.7)
NEUTROPHILS # BLD AUTO: 9.07 X10(3) UL (ref 1.5–7.7)
NEUTROPHILS NFR BLD AUTO: 77.7 %
OSMOLALITY SERPL CALC.SUM OF ELEC: 284 MOSM/KG (ref 275–295)
PLATELET # BLD AUTO: 322 10(3)UL (ref 150–450)
POTASSIUM SERPL-SCNC: 4.3 MMOL/L (ref 3.5–5.1)
RBC # BLD AUTO: 4.53 X10(6)UL
SODIUM SERPL-SCNC: 135 MMOL/L (ref 136–145)
WBC # BLD AUTO: 11.7 X10(3) UL (ref 4–11)

## 2021-02-01 PROCEDURE — 99231 SBSQ HOSP IP/OBS SF/LOW 25: CPT | Performed by: REGISTERED NURSE

## 2021-02-01 PROCEDURE — 99233 SBSQ HOSP IP/OBS HIGH 50: CPT | Performed by: INTERNAL MEDICINE

## 2021-02-01 PROCEDURE — 99232 SBSQ HOSP IP/OBS MODERATE 35: CPT | Performed by: PHYSICIAN ASSISTANT

## 2021-02-01 PROCEDURE — 99233 SBSQ HOSP IP/OBS HIGH 50: CPT | Performed by: HOSPITALIST

## 2021-02-01 RX ORDER — FUROSEMIDE 10 MG/ML
20 INJECTION INTRAMUSCULAR; INTRAVENOUS ONCE
Status: COMPLETED | OUTPATIENT
Start: 2021-02-01 | End: 2021-02-01

## 2021-02-01 RX ORDER — PREDNISONE 20 MG/1
40 TABLET ORAL
Status: DISCONTINUED | OUTPATIENT
Start: 2021-02-02 | End: 2021-02-06

## 2021-02-01 NOTE — PROGRESS NOTES
[facility]    Progress Note    Lan Haile Patient Status:  Inpatient    1945 MRN [id number]   Location [facility] 2W/SW Attending Yue Thornton, [address] [facility] Day # 28 PCP Violetta Akins MD       Subjective:   Alex Quant PRN    •  bisacodyl (DULCOLAX) rectal suppository 10 mg, 10 mg, Rectal, Daily PRN    •  Fluticasone Propionate (FLONASE) 50 MCG/ACT nasal spray 1 spray, 1 spray, Each Nare, Daily    •  Pantoprazole Sodium (PROTONIX) EC tab 40 mg, 40 mg, Oral, QAM AC    •  12/28/2020       No results found.         Results:     CBC:    Lab Results   Component Value Date    WBC 11.7 (H) 02/01/2021    WBC 12.2 (H) 01/31/2021    WBC 9.6 01/30/2021     Lab Results   Component Value Date    HGB 12.7 (L) 02/01/2021    HGB 13

## 2021-02-01 NOTE — PROGRESS NOTES
[facility]     Gastroenterology Progress Note    Josesito Jimenez Patient Status:  Inpatient    1945 MRN [id number]   Location [facility] 2W/SW Attending Christina Savage, [address] [facility] Day # 28 PCP Shaina Mota MD significance of this given his clinical status. He has been advised regarding colonoscopy evaluation in the past however would recommend outpatient once the patient significant recovers from COVID-19 and possible sequelae.  There has been no report of lower

## 2021-02-01 NOTE — PLAN OF CARE
Patient remains alert and oriented, follows commands appropriately. Remains on vapotherm 30L/55%. BM x2. Urine output adequate. Patient has been resting in chair. Appetite is slowly getting better. Sybil Krishna, daughter, updated on plan of care.      Problem: P Respiratory Therapy support as indicated  - Manage/alleviate anxiety  - Monitor for signs/symptoms of CO2 retention  Outcome: Progressing     Problem: METABOLIC/FLUID AND ELECTROLYTES - ADULT  Goal: Hemodynamic stability and optimal renal function maintain tolerance for standing, transferring and ambulating w/ or w/o assistive devices  - Assist with transfers and ambulation using safe patient handling equipment as needed  - Ensure adequate protection for wounds/incisions during mobilization  - Obtain PT/OT c assistance with activity based on assessment  - Modify environment to reduce risk of injury  - Provide assistive devices as appropriate  - Consider OT/PT consult to assist with strengthening/mobility  - Encourage toileting schedule  Outcome: Progressing

## 2021-02-01 NOTE — CM/SW NOTE
Care Progression Note:  Active Acute Medical Issue:   Acute respiratory failure with hypoxia (HCC)     Other Contributing Medical Factors/Dx. : covid case    Length of stay: 35  GMLOS: 5.4  Avoidable Delays: NA  Discharge Barriers: O2 Needs  Expected discha

## 2021-02-01 NOTE — PROGRESS NOTES
Pulmonary/Critical Care Follow Up Note    HPI:   Mickey Flores is a 76year old male with Patient presents with:  Difficulty Breathing      PCP Nery Paniagua MD  Admission Attending Merna Wyman MD    Hospital Day #35      Feeling sergio guaiFENesin (ROBITUSSIN) 100 MG/5ML solution 100 mg, 100 mg, Oral, Q4H PRN  •  multivitamin with minerals (ADULT) tab 1 tablet, 1 tablet, Oral, Daily  •  hydrALAzine HCl (APRESOLINE) injection 20 mg, 20 mg, Intravenous, Q4H PRN  •  tetrahydrozoline (OPTI-C 01/31/2021    HGB 12.1 (L) 01/30/2021    HGB 12.2 (L) 01/29/2021    HGB 11.3 (L) 01/28/2021         ASSESSMENT/PLAN:     COVID-19  C/b AHRF/ARDS  PCT high  S/p Actemra  S/p CCP  S/p Dex  CXR stable  Suspect \"COVID lung\"-  This is presenting now like acce

## 2021-02-01 NOTE — PLAN OF CARE
Pt received at 0730. Alert and oriented x4. Saturating well on vapotherm settings. Able to wean to hfnc. Increased O2 needs with ambulation. NSR on tele. PIV patent. Denies pain or discomfort. Will continue to monitor.       Problem: Patient Centered Car as indicated  - Manage/alleviate anxiety  - Monitor for signs/symptoms of CO2 retention  Outcome: Progressing     Problem: METABOLIC/FLUID AND ELECTROLYTES - ADULT  Goal: Hemodynamic stability and optimal renal function maintained  Description: INTERVENTIO transferring and ambulating w/ or w/o assistive devices  - Assist with transfers and ambulation using safe patient handling equipment as needed  - Ensure adequate protection for wounds/incisions during mobilization  - Obtain PT/OT consults as needed  - Adv based on assessment  - Modify environment to reduce risk of injury  - Provide assistive devices as appropriate  - Consider OT/PT consult to assist with strengthening/mobility  - Encourage toileting schedule  Outcome: Progressing     Problem: DISCHARGE PLAN

## 2021-02-01 NOTE — PALLIATIVE CARE NOTE
[address] Patient Status:  Inpatient    1945 MRN [id number]   Location [facility] 2W/SW Attending Mark, [facility] Day # 28 PCP Alice Pacheco MD     Date o Daily  •  docusate sodium (COLACE) cap 100 mg, 100 mg, Oral, BID  •  PEG 3350 (MIRALAX) powder packet 17 g, 17 g, Oral, Daily PRN  •  bisacodyl (DULCOLAX) rectal suppository 10 mg, 10 mg, Rectal, Daily PRN  •  Fluticasone Propionate (FLONASE) 50 MCG/ACT na CA 9.6 02/01/2021    ALB 2.3 (L) 01/23/2021    ALKPHO 115 01/23/2021    BILT 0.6 01/23/2021    TP 6.5 01/23/2021    AST 21 01/23/2021    ALT 29 01/23/2021    PSA 0.30 11/25/2020    DDIMER 2.04 (H) 01/23/2021    MG 2.2 01/27/2021    PHOS 2.2 (L) 01/26/2021 prn if dyspnea symptoms arise  -Family is trying to get anti fibrotic medication pirfendione from outpt pharmacy     Pneumonia due to COVID-19 virus  -tx per MDs     PEEWEE  -improved, monitor     H/o PE  -tx per MDs     Elevated troponin     GIB/+OB stool  -

## 2021-02-02 ENCOUNTER — TELEPHONE (OUTPATIENT)
Dept: GASTROENTEROLOGY | Facility: CLINIC | Age: 76
End: 2021-02-02

## 2021-02-02 ENCOUNTER — APPOINTMENT (OUTPATIENT)
Dept: GENERAL RADIOLOGY | Facility: HOSPITAL | Age: 76
DRG: 871 | End: 2021-02-02
Attending: INTERNAL MEDICINE
Payer: MEDICARE

## 2021-02-02 LAB
ANION GAP SERPL CALC-SCNC: 2 MMOL/L (ref 0–18)
BASOPHILS # BLD AUTO: 0.05 X10(3) UL (ref 0–0.2)
BASOPHILS NFR BLD AUTO: 0.4 %
BUN BLD-MCNC: 29 MG/DL (ref 7–18)
BUN/CREAT SERPL: 34.1 (ref 10–20)
CALCIUM BLD-MCNC: 9.6 MG/DL (ref 8.5–10.1)
CHLORIDE SERPL-SCNC: 98 MMOL/L (ref 98–112)
CO2 SERPL-SCNC: 34 MMOL/L (ref 21–32)
CREAT BLD-MCNC: 0.85 MG/DL
D DIMER PPP FEU-MCNC: 0.86 UG/ML FEU (ref ?–0.75)
DEPRECATED RDW RBC AUTO: 43.8 FL (ref 35.1–46.3)
EOSINOPHIL # BLD AUTO: 0.08 X10(3) UL (ref 0–0.7)
EOSINOPHIL NFR BLD AUTO: 0.6 %
ERYTHROCYTE [DISTWIDTH] IN BLOOD BY AUTOMATED COUNT: 14.2 % (ref 11–15)
GLUCOSE BLD-MCNC: 90 MG/DL (ref 70–99)
HCT VFR BLD AUTO: 39 %
HGB BLD-MCNC: 13 G/DL
IMM GRANULOCYTES # BLD AUTO: 0.39 X10(3) UL (ref 0–1)
IMM GRANULOCYTES NFR BLD: 3 %
LYMPHOCYTES # BLD AUTO: 1 X10(3) UL (ref 1–4)
LYMPHOCYTES NFR BLD AUTO: 7.7 %
MCH RBC QN AUTO: 28.4 PG (ref 26–34)
MCHC RBC AUTO-ENTMCNC: 33.3 G/DL (ref 31–37)
MCV RBC AUTO: 85.3 FL
MONOCYTES # BLD AUTO: 0.74 X10(3) UL (ref 0.1–1)
MONOCYTES NFR BLD AUTO: 5.7 %
NEUTROPHILS # BLD AUTO: 10.79 X10 (3) UL (ref 1.5–7.7)
NEUTROPHILS # BLD AUTO: 10.79 X10(3) UL (ref 1.5–7.7)
NEUTROPHILS NFR BLD AUTO: 82.6 %
OSMOLALITY SERPL CALC.SUM OF ELEC: 283 MOSM/KG (ref 275–295)
PLATELET # BLD AUTO: 310 10(3)UL (ref 150–450)
POTASSIUM SERPL-SCNC: 4.3 MMOL/L (ref 3.5–5.1)
RBC # BLD AUTO: 4.57 X10(6)UL
SODIUM SERPL-SCNC: 134 MMOL/L (ref 136–145)
WBC # BLD AUTO: 13.1 X10(3) UL (ref 4–11)

## 2021-02-02 PROCEDURE — 99233 SBSQ HOSP IP/OBS HIGH 50: CPT | Performed by: PHYSICIAN ASSISTANT

## 2021-02-02 PROCEDURE — 99233 SBSQ HOSP IP/OBS HIGH 50: CPT | Performed by: INTERNAL MEDICINE

## 2021-02-02 PROCEDURE — 99233 SBSQ HOSP IP/OBS HIGH 50: CPT | Performed by: HOSPITALIST

## 2021-02-02 PROCEDURE — 71045 X-RAY EXAM CHEST 1 VIEW: CPT | Performed by: INTERNAL MEDICINE

## 2021-02-02 NOTE — PROGRESS NOTES
[facility]    Progress Note    Loujovita Orozco Patient Status:  Inpatient    1945 MRN [id number]   Location [facility] 2W/SW Attending Xochilt Polanco, [address] [facility] Day # 39 PCP Cr Hearn MD        Subjective: 310.0 02/02/2021    CREATSERUM 0.85 02/02/2021    BUN 29 (H) 02/02/2021     (L) 02/02/2021    K 4.3 02/02/2021    CL 98 02/02/2021    CO2 34.0 (H) 02/02/2021    GLU 90 02/02/2021    CA 9.6 02/02/2021    ALB 2.3 (L) 01/23/2021    ALKPHO 115 01/23/2021

## 2021-02-02 NOTE — TELEPHONE ENCOUNTER
INPATIENT ORDERS:  - please have the patient seen in the clinic prior to any procedures. He is currently admitted and his daughter Bobby Bergeron) reports she will call to make appt.  He had severe COVID and I would not recommend anesthesia prior to being seen in

## 2021-02-02 NOTE — OCCUPATIONAL THERAPY NOTE
OCCUPATIONAL THERAPY RE-EVALUATION - INPATIENT     Room Number: 226/226-A  Evaluation Date: 2/2/2021  Type of Evaluation: Re-evaluation  Presenting Problem: COVID    Physician Order: IP Consult to Occupational Therapy  Reason for Therapy: ADL/IADL Dysfunct toileting --due to de-saturation with exertion. Pt completes self-feeding, grooming, and UE dressing with set-up at seated level (for energy conservation). Functional mobility: pt requires up to Min A for mobility.   In prep for ADL participation, pt co COLONOSCOPY     • CYSTOSCOPY RETROGRADE Left 8/19/2017    Performed by Danial Zarate MD at [address] [place] [address]  Type of Home: House  Home Layout: Two level  Lives With: Spouse     Toilet and Equipment: Standard height toilet                D NT    Education Provided: role of OT, activity promotion   Patient End of Session: Up in chair;Needs met;Call light within reach;RN aware of session/findings; Alarm set    OT Goals  Patients self stated goal is: did not state     Patient will complete funct

## 2021-02-02 NOTE — PLAN OF CARE
Problem: Patient Centered Care  Goal: Patient preferences are identified and integrated in the patient's plan of care  Description: Interventions:  - What would you like us to know as we care for you? Luis veronica. I speak Citizen of [place].   - Provide timely, co maintained  Description: INTERVENTIONS:  - Monitor labs and assess for signs and symptoms of volume excess or deficit  - Monitor intake, output and patient weight  - Monitor urine specific gravity, serum osmolarity and serum sodium as indicated or ordered consults as needed  - Advance activity as appropriate  - Communicate ordered activity level and limitations with patient/family  Outcome: Progressing  Goal: Maintain proper alignment of affected body part  Description: INTERVENTIONS:  - Support and protect Problem: DISCHARGE PLANNING  Goal: Discharge to home or other facility with appropriate resources  Description: INTERVENTIONS:  - Identify barriers to discharge w/pt and caregiver  - Include patient/family/discharge partner in discharge Tony De Jesus

## 2021-02-02 NOTE — PROGRESS NOTES
[facility] - [facility]    Progress Note    Remberto Smalls Patient Status:  Inpatient    1945 MRN [id number]   Location [facility] 2W/SW Attending Ried Mari, [address] [facility] Day # 39 PCP Dedra Aviles MD       Subjective:   Salvador Robles PRN    •  guaiFENesin (ROBITUSSIN) 100 MG/5ML solution 100 mg, 100 mg, Oral, Q4H PRN    •  multivitamin with minerals (ADULT) tab 1 tablet, 1 tablet, Oral, Daily    •  hydrALAzine HCl (APRESOLINE) injection 20 mg, 20 mg, Intravenous, Q4H PRN    •  tetrahyd Results:     CBC:    Lab Results   Component Value Date    WBC 13.1 (H) 02/02/2021    WBC 11.7 (H) 02/01/2021    WBC 12.2 (H) 01/31/2021     Lab Results   Component Value Date    HGB 13.0 02/02/2021    HGB 12.7 (L) 02/01/2021    HGB 13.0 01/31/2021 findings. Spoke with consultant. All questions answered.

## 2021-02-02 NOTE — PROGRESS NOTES
[facility]     Gastroenterology Progress Note    Lan Haile Patient Status:  Inpatient    1945 MRN [id number]   Location [facility] 2W/SW Attending Yue Thornton, [address] [facility] Day # 39 PCP Violetta Akins MD past however would recommend outpatient once the patient significant recovers from COVID-19 and possible sequelae. There has been no report of lower GI bleeding today and his Hgb is stable.     Recommend:  -empiric fluconazole (will continue with 200 mg x 1

## 2021-02-02 NOTE — PLAN OF CARE
Problem: Patient Centered Care  Goal: Patient preferences are identified and integrated in the patient's plan of care  Description: Interventions:  - What would you like us to know as we care for you? Luis veronica. I speak Canadian.   - Provide timely, co maintained  Description: INTERVENTIONS:  - Monitor labs and assess for signs and symptoms of volume excess or deficit  - Monitor intake, output and patient weight  - Monitor urine specific gravity, serum osmolarity and serum sodium as indicated or ordered consults as needed  - Advance activity as appropriate  - Communicate ordered activity level and limitations with patient/family  Outcome: Progressing  Goal: Maintain proper alignment of affected body part  Description: INTERVENTIONS:  - Support and protect Problem: DISCHARGE PLANNING  Goal: Discharge to home or other facility with appropriate resources  Description: INTERVENTIONS:  - Identify barriers to discharge w/pt and caregiver  - Include patient/family/discharge partner in discharge Tony De Jesus

## 2021-02-02 NOTE — PHYSICAL THERAPY NOTE
PHYSICAL THERAPY TREATMENT NOTE - INPATIENT     Room Number: 407/460-D       Presenting Problem: +COVID-19 (12/22/2020); acute respiratory failure w/ hypoxia; pneumonia; sepsis    Problem List  Principal Problem:    Acute respiratory failure with hypoxia making progress toward IP PT goals! Improved respiratory response to activity this date. The patient's Approx Degree of Impairment: 50.57% has been calculated based on documentation in the [facility] '6 clicks' Inpatient Basic Mobility Short Form.   Research velazquez a chair (including a wheelchair)?: A Little   -   Need to walk in hospital room?: A Little   -   Climbing 3-5 steps with a railing?: A Lot     AM-PAC Score:  Raw Score: 17   Approx Degree of Impairment: 50.57%   Standardized Score (AM-PAC Scale): 42.13   C

## 2021-02-03 LAB
ANION GAP SERPL CALC-SCNC: 3 MMOL/L (ref 0–18)
BASOPHILS # BLD AUTO: 0.07 X10(3) UL (ref 0–0.2)
BASOPHILS NFR BLD AUTO: 0.5 %
BUN BLD-MCNC: 27 MG/DL (ref 7–18)
BUN/CREAT SERPL: 35.5 (ref 10–20)
CALCIUM BLD-MCNC: 9.5 MG/DL (ref 8.5–10.1)
CHLORIDE SERPL-SCNC: 98 MMOL/L (ref 98–112)
CO2 SERPL-SCNC: 32 MMOL/L (ref 21–32)
CREAT BLD-MCNC: 0.76 MG/DL
DEPRECATED RDW RBC AUTO: 44 FL (ref 35.1–46.3)
EOSINOPHIL # BLD AUTO: 0.2 X10(3) UL (ref 0–0.7)
EOSINOPHIL NFR BLD AUTO: 1.6 %
ERYTHROCYTE [DISTWIDTH] IN BLOOD BY AUTOMATED COUNT: 14.3 % (ref 11–15)
GLUCOSE BLD-MCNC: 84 MG/DL (ref 70–99)
HCT VFR BLD AUTO: 39.2 %
HGB BLD-MCNC: 13.1 G/DL
IMM GRANULOCYTES # BLD AUTO: 0.37 X10(3) UL (ref 0–1)
IMM GRANULOCYTES NFR BLD: 2.9 %
LYMPHOCYTES # BLD AUTO: 1.34 X10(3) UL (ref 1–4)
LYMPHOCYTES NFR BLD AUTO: 10.4 %
MCH RBC QN AUTO: 28.3 PG (ref 26–34)
MCHC RBC AUTO-ENTMCNC: 33.4 G/DL (ref 31–37)
MCV RBC AUTO: 84.7 FL
MONOCYTES # BLD AUTO: 0.85 X10(3) UL (ref 0.1–1)
MONOCYTES NFR BLD AUTO: 6.6 %
NEUTROPHILS # BLD AUTO: 10.03 X10 (3) UL (ref 1.5–7.7)
NEUTROPHILS # BLD AUTO: 10.03 X10(3) UL (ref 1.5–7.7)
NEUTROPHILS NFR BLD AUTO: 78 %
OSMOLALITY SERPL CALC.SUM OF ELEC: 280 MOSM/KG (ref 275–295)
PLATELET # BLD AUTO: 326 10(3)UL (ref 150–450)
POTASSIUM SERPL-SCNC: 4.2 MMOL/L (ref 3.5–5.1)
RBC # BLD AUTO: 4.63 X10(6)UL
SODIUM SERPL-SCNC: 133 MMOL/L (ref 136–145)
WBC # BLD AUTO: 12.9 X10(3) UL (ref 4–11)

## 2021-02-03 PROCEDURE — 99233 SBSQ HOSP IP/OBS HIGH 50: CPT | Performed by: INTERNAL MEDICINE

## 2021-02-03 NOTE — CM/SW NOTE
PT/OT re-evaluated patient and continue to recommend Acute Rehab. Discussed with daughter, Bessie Crabtree and they are agreeable to referrals and PMR consult. PMR consult entered and pending.     Referrals in [place] were sent earlier in hospital stay, reopened

## 2021-02-03 NOTE — PROGRESS NOTES
[facility]    Progress Note    Reginia Shall Patient Status:  Inpatient    1945 MRN [id number]   [facility] 2W/SW Attending Jesus Flores Day # 40 PCP Nimo Fregoso MD        Subjective: seems imprved with vaseline and high humidity               Acute renal failure - from sepsis and covid   - improved      + trop - Likely from covid  No CP  EKG w/o ischemic changes and ST  Not clear evidence of ACS  - cont asa   - outpt ST      GIB  + occ MD  2/3/2021

## 2021-02-03 NOTE — PHYSICAL THERAPY NOTE
[facility]. Patient having active nose bleed that requires gauze packing. And c/o decreased sleep the night prior. Will re-attempt this p.m. as schedule permits.      Thank you,  Landy Benítez, PT, DPT

## 2021-02-03 NOTE — PLAN OF CARE
Problem: Patient Centered Care  Goal: Patient preferences are identified and integrated in the patient's plan of care  Description: Interventions:  - What would you like us to know as we care for you? Luis veronica. I speak Citizen of [place].   - Provide timely, co maintained  Description: INTERVENTIONS:  - Monitor labs and assess for signs and symptoms of volume excess or deficit  - Monitor intake, output and patient weight  - Monitor urine specific gravity, serum osmolarity and serum sodium as indicated or ordered consults as needed  - Advance activity as appropriate  - Communicate ordered activity level and limitations with patient/family  Outcome: Progressing  Goal: Maintain proper alignment of affected body part  Description: INTERVENTIONS:  - Support and protect Problem: DISCHARGE PLANNING  Goal: Discharge to home or other facility with appropriate resources  Description: INTERVENTIONS:  - Identify barriers to discharge w/pt and caregiver  - Include patient/family/discharge partner in discharge Tony De Jesus

## 2021-02-03 NOTE — DIETARY NOTE
ADULT NUTRITION REASSESSMENT     Pt is at moderate nutrition risk. Pt does not meet malnutrition criteria. RECOMMENDATIONS TO MD:  CPM.  Requesting repeat scale weight (last on 1/20).  Repeat phos order for am.       NUTRITION DIAGNOSIS/PROBLEM:  Kenia Penta documentation of meal intake 1/8. Fluid volume intake is only 500 ml: poor. Wt loss of 8 lbs/8 days. Communicated with RN today for todays intake-Pt consumed 100% of breakfast today and currently eating lunch. . MD notes state pt told MD has an appetite. 25-75% at most meals (~50% usually) but is still taking nutrition supplements as able. This am poor breakfast per RN d/t nosebleed. Requested per MD order for repeat scaled weight (last on 1/20) to help assess adequacy of po intake.  Pt is receiving PT and HISTORY:  Appetite:  fair but eating some and taking ONS to help contribute to needs. .   Intake:  Ensure Enlive TID= 1050 kcals, 60 g protein helping greatly to meet nutrition needs at ~50%.  Of needs if taken  Percent Meals Eaten (last 3 days)     Date/Alexis due to their medical condition and isolation precautions. - Fluid Accumulation: none per RN documentation   - Skin Integrity: intact.       MONITOR AND EVALUATE/NUTRITION GOALS:  - Food and Nutrient Intake:      Monitor: adequacy of PO intake, tolerance o

## 2021-02-03 NOTE — PLAN OF CARE
Problem: Patient Centered Care  Goal: Patient preferences are identified and integrated in the patient's plan of care  Description: Interventions:  - What would you like us to know as we care for you? Luis veronica. I speak [place].   - Provide timely, co maintained  Description: INTERVENTIONS:  - Monitor labs and assess for signs and symptoms of volume excess or deficit  - Monitor intake, output and patient weight  - Monitor urine specific gravity, serum osmolarity and serum sodium as indicated or ordered consults as needed  - Advance activity as appropriate  - Communicate ordered activity level and limitations with patient/family  Outcome: Progressing  Goal: Maintain proper alignment of affected body part  Description: INTERVENTIONS:  - Support and protect Problem: DISCHARGE PLANNING  Goal: Discharge to home or other facility with appropriate resources  Description: INTERVENTIONS:  - Identify barriers to discharge w/pt and caregiver  - Include patient/family/discharge partner in discharge Tony De Jesus

## 2021-02-03 NOTE — PROGRESS NOTES
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

## 2021-02-04 LAB
ALBUMIN SERPL-MCNC: 2.9 G/DL (ref 3.4–5)
ALBUMIN/GLOB SERPL: 0.7 {RATIO} (ref 1–2)
ALP LIVER SERPL-CCNC: 100 U/L
ALT SERPL-CCNC: 59 U/L
ANION GAP SERPL CALC-SCNC: 3 MMOL/L (ref 0–18)
AST SERPL-CCNC: 23 U/L (ref 15–37)
BASOPHILS # BLD AUTO: 0.06 X10(3) UL (ref 0–0.2)
BASOPHILS NFR BLD AUTO: 0.5 %
BILIRUB SERPL-MCNC: 0.3 MG/DL (ref 0.1–2)
BUN BLD-MCNC: 25 MG/DL (ref 7–18)
BUN/CREAT SERPL: 33.8 (ref 10–20)
CALCIUM BLD-MCNC: 9.4 MG/DL (ref 8.5–10.1)
CHLORIDE SERPL-SCNC: 99 MMOL/L (ref 98–112)
CO2 SERPL-SCNC: 31 MMOL/L (ref 21–32)
CREAT BLD-MCNC: 0.74 MG/DL
DEPRECATED RDW RBC AUTO: 44.4 FL (ref 35.1–46.3)
EOSINOPHIL # BLD AUTO: 0.18 X10(3) UL (ref 0–0.7)
EOSINOPHIL NFR BLD AUTO: 1.6 %
ERYTHROCYTE [DISTWIDTH] IN BLOOD BY AUTOMATED COUNT: 14.5 % (ref 11–15)
GLOBULIN PLAS-MCNC: 4 G/DL (ref 2.8–4.4)
GLUCOSE BLD-MCNC: 82 MG/DL (ref 70–99)
HAV IGM SER QL: 2.2 MG/DL (ref 1.6–2.6)
HCT VFR BLD AUTO: 38.2 %
HGB BLD-MCNC: 12.9 G/DL
IMM GRANULOCYTES # BLD AUTO: 0.28 X10(3) UL (ref 0–1)
IMM GRANULOCYTES NFR BLD: 2.5 %
LYMPHOCYTES # BLD AUTO: 1.24 X10(3) UL (ref 1–4)
LYMPHOCYTES NFR BLD AUTO: 11.2 %
M PROTEIN MFR SERPL ELPH: 6.9 G/DL (ref 6.4–8.2)
MCH RBC QN AUTO: 28.4 PG (ref 26–34)
MCHC RBC AUTO-ENTMCNC: 33.8 G/DL (ref 31–37)
MCV RBC AUTO: 84.1 FL
MONOCYTES # BLD AUTO: 0.8 X10(3) UL (ref 0.1–1)
MONOCYTES NFR BLD AUTO: 7.2 %
NEUTROPHILS # BLD AUTO: 8.56 X10 (3) UL (ref 1.5–7.7)
NEUTROPHILS # BLD AUTO: 8.56 X10(3) UL (ref 1.5–7.7)
NEUTROPHILS NFR BLD AUTO: 77 %
OSMOLALITY SERPL CALC.SUM OF ELEC: 279 MOSM/KG (ref 275–295)
PHOSPHATE SERPL-MCNC: 3.1 MG/DL (ref 2.5–4.9)
PLATELET # BLD AUTO: 310 10(3)UL (ref 150–450)
POTASSIUM SERPL-SCNC: 4.1 MMOL/L (ref 3.5–5.1)
RBC # BLD AUTO: 4.54 X10(6)UL
SODIUM SERPL-SCNC: 133 MMOL/L (ref 136–145)
WBC # BLD AUTO: 11.1 X10(3) UL (ref 4–11)

## 2021-02-04 PROCEDURE — 99232 SBSQ HOSP IP/OBS MODERATE 35: CPT | Performed by: INTERNAL MEDICINE

## 2021-02-04 PROCEDURE — 99233 SBSQ HOSP IP/OBS HIGH 50: CPT | Performed by: HOSPITALIST

## 2021-02-04 NOTE — PROGRESS NOTES
Pulmonary/Critical Care Follow Up Note    HPI:   Jonathan Pittman is a 76year old male with Patient presents with:  Difficulty Breathing      PCP Veronica Lee MD  Admission Attending Esperanza Morgan MD    Hospital Day #38      Chart review (OPTI-CLEAR/VISINE) 0.05 % ophthalmic solution 1 drop, 1 drop, Both Eyes, QID PRN  •  LORazepam (ATIVAN) tab 0.5 mg, 0.5 mg, Oral, Q4H PRN  •  tamsulosin HCl (FLOMAX) cap 0.4 mg, 0.4 mg, Oral, Daily  •  zinc sulfate (ZINCATE) cap 220 mg, 220 mg, Oral, BID ASSESSMENT/PLAN:     COVID-19  C/b AHRF/ARDS  PCT high  S/p Actemra  S/p CCP  S/p Dex  CXR stable  Suspect \"COVID lung\"-  This is presenting now like accelerated IPF or Hamman Rich Syndrome  CTA neg PE  Oxygenation cont to slowly improve but c/b

## 2021-02-04 NOTE — PLAN OF CARE
VSS. A/Ox4. Speaks [place]ican but follows the simple commands and able to communicate [place]. Denies pain. On HF 7L tolerating well. Call light within reach. Will cont to monitor.        Problem: Patient Centered Care  Goal: Patient preferences are identifi anxiety  - Monitor for signs/symptoms of CO2 retention  Outcome: Progressing

## 2021-02-04 NOTE — PAYOR COMM NOTE
--------------  CONTINUED STAY REVIEW    [facility]  Subscriber #:  [id number]  Authorization Number: [id number]        2/3 PULM    COVID-19  C/b AHRF/ARDS  PCT high  S/p Actemra  S/p CCP  S/p Dex  CXR stable  Suspect \"COVID lung\"-  This is prese with RV pressure/volume overload  - repeat CT neg for PE   - cont lovenox higher dose     Epistaxis seems imprved with vaseline and high humidity               Acute renal failure - from sepsis and covid   - improved      + trop - Likely from covid  No CP DAY:  allopurinol (ZYLOPRIM) tab 100 mg     Date Action Dose Route User    2/3/2021 0805 Given 100 mg Oral Ranjit Gresham, RN      Cefepime HCl (MAXIPIME) 1 g in sodium chloride 0.9% 100 mL MBP/add-vantage     Date Action Dose Route User    2/4/2021 sulfate (ZINCATE) cap 220 mg     Date Action Dose Route User    2/3/2021 2040 Given 220 mg Oral Brent Soler RN    2/3/2021 0805 Given 220 mg Oral Ranjit Gresham RN          Procedures:      Plan:

## 2021-02-04 NOTE — OCCUPATIONAL THERAPY NOTE
OCCUPATIONAL THERAPY TREATMENT NOTE - INPATIENT        Room Number: 499/593-F           Presenting Problem: COVID    Problem List  Principal Problem:    Acute respiratory failure with hypoxia ([facility] [address]  Active Problems:    Pneumonia due to COVID-19 virus    Ac with CGA. Two bouts of ambulation 8 ft + 2 ft with RW and Min A. Pt and wife educated on pacing of activity, O2 saturations.      The patient's Approx Degree of Impairment: 56.46% has been calculated based on documentation in the [facility] '6 clicks' Inpati ASSESSMENT  Grooming: CGA for standing balance   Feeding: set-up in sitting     Education Provided: role of OT ,activity promotion   Patient End of Session: Up in chair;Needs met;Call light within reach;RN aware of session/findings; Alarm set    OT Goals:

## 2021-02-04 NOTE — PROGRESS NOTES
[facility]    Progress Note    Anastasia Leggett Patient Status:  Inpatient    1945 MRN [id number]   [facility] 2W/SW Attending Jesus Floresissa Day # 45 PCP Rosamaria Jasmine MD        Subjective: lovenox higher dose    Epistaxis seems imprved with vaseline and high humidity               Acute renal failure - from sepsis and covid   - improved      + trop - Likely from covid  No CP  EKG w/o ischemic changes and ST  Not clear evidence of ACS  - cont

## 2021-02-04 NOTE — PHYSICAL THERAPY NOTE
PHYSICAL THERAPY TREATMENT NOTE - INPATIENT     Room Number: 709/596-S       Presenting Problem: +COVID-19 (12/22/2020); acute respiratory failure w/ hypoxia; pneumonia; sepsis    Problem List  Principal Problem:    Acute respiratory failure with hypoxia rehabilitation     PLAN  PT Treatment Plan: Bed mobility; Body mechanics; Endurance; Energy conservation;Patient education;Gait training;Neuromuscular re-educate;Strengthening;Stoop training;Stair training;Transfer training;Balance training    SUBJECTIVE  Agr Assistance: Contact guard assist  Distance (ft): 8, 2 steps  Assistive Device: Rolling walker  Pattern: (slow, labored)  Stoop/Curb Assistance: Not tested       Patient End of Session: Up in chair;Needs met;Call light within reach;RN aware of session/findi

## 2021-02-04 NOTE — PLAN OF CARE
VSS. Pt tolerating 7 liters HF/NC. Pt destats with activity and rest at times. Pt is primarily Slovenian speaking, translation line used.   Problem: Patient Centered Care  Goal: Patient preferences are identified and integrated in the patient's plan of care retention  Outcome: Progressing     Problem: METABOLIC/FLUID AND ELECTROLYTES - ADULT  Goal: Hemodynamic stability and optimal renal function maintained  Description: INTERVENTIONS:  - Monitor labs and assess for signs and symptoms of volume excess or defi ambulation using safe patient handling equipment as needed  - Ensure adequate protection for wounds/incisions during mobilization  - Obtain PT/OT consults as needed  - Advance activity as appropriate  - Communicate ordered activity level and limitations wi assistive devices as appropriate  - Consider OT/PT consult to assist with strengthening/mobility  - Encourage toileting schedule  Outcome: Progressing     Problem: DISCHARGE PLANNING  Goal: Discharge to home or other facility with appropriate resources  Addy

## 2021-02-04 NOTE — DIETARY NOTE
NUTRITION NOTE UPDATE  Received consult today. Please see full assessment on 7/26. Today follow up: Visited pt with family present. Po intake fair. Pt states appetite still not good but is trying to eat.  Offered high zander/prot show

## 2021-02-05 LAB
ALBUMIN SERPL-MCNC: 3 G/DL (ref 3.4–5)
ALBUMIN/GLOB SERPL: 0.7 {RATIO} (ref 1–2)
ALP LIVER SERPL-CCNC: 104 U/L
ALT SERPL-CCNC: 56 U/L
ANION GAP SERPL CALC-SCNC: 3 MMOL/L (ref 0–18)
AST SERPL-CCNC: 19 U/L (ref 15–37)
BASOPHILS # BLD AUTO: 0.05 X10(3) UL (ref 0–0.2)
BASOPHILS NFR BLD AUTO: 0.4 %
BILIRUB SERPL-MCNC: 0.3 MG/DL (ref 0.1–2)
BUN BLD-MCNC: 26 MG/DL (ref 7–18)
BUN/CREAT SERPL: 34.7 (ref 10–20)
CALCIUM BLD-MCNC: 9.6 MG/DL (ref 8.5–10.1)
CHLORIDE SERPL-SCNC: 99 MMOL/L (ref 98–112)
CO2 SERPL-SCNC: 31 MMOL/L (ref 21–32)
CREAT BLD-MCNC: 0.75 MG/DL
DEPRECATED RDW RBC AUTO: 44 FL (ref 35.1–46.3)
EOSINOPHIL # BLD AUTO: 0.16 X10(3) UL (ref 0–0.7)
EOSINOPHIL NFR BLD AUTO: 1.4 %
ERYTHROCYTE [DISTWIDTH] IN BLOOD BY AUTOMATED COUNT: 14.4 % (ref 11–15)
GLOBULIN PLAS-MCNC: 4.1 G/DL (ref 2.8–4.4)
GLUCOSE BLD-MCNC: 95 MG/DL (ref 70–99)
HAV IGM SER QL: 2.3 MG/DL (ref 1.6–2.6)
HCT VFR BLD AUTO: 39.4 %
HGB BLD-MCNC: 12.9 G/DL
IMM GRANULOCYTES # BLD AUTO: 0.22 X10(3) UL (ref 0–1)
IMM GRANULOCYTES NFR BLD: 1.9 %
LYMPHOCYTES # BLD AUTO: 1.13 X10(3) UL (ref 1–4)
LYMPHOCYTES NFR BLD AUTO: 9.6 %
M PROTEIN MFR SERPL ELPH: 7.1 G/DL (ref 6.4–8.2)
MCH RBC QN AUTO: 27.6 PG (ref 26–34)
MCHC RBC AUTO-ENTMCNC: 32.7 G/DL (ref 31–37)
MCV RBC AUTO: 84.2 FL
MONOCYTES # BLD AUTO: 0.77 X10(3) UL (ref 0.1–1)
MONOCYTES NFR BLD AUTO: 6.5 %
NEUTROPHILS # BLD AUTO: 9.43 X10 (3) UL (ref 1.5–7.7)
NEUTROPHILS # BLD AUTO: 9.43 X10(3) UL (ref 1.5–7.7)
NEUTROPHILS NFR BLD AUTO: 80.2 %
OSMOLALITY SERPL CALC.SUM OF ELEC: 281 MOSM/KG (ref 275–295)
PHOSPHATE SERPL-MCNC: 2.9 MG/DL (ref 2.5–4.9)
PLATELET # BLD AUTO: 309 10(3)UL (ref 150–450)
POTASSIUM SERPL-SCNC: 4.2 MMOL/L (ref 3.5–5.1)
RBC # BLD AUTO: 4.68 X10(6)UL
SODIUM SERPL-SCNC: 133 MMOL/L (ref 136–145)
WBC # BLD AUTO: 11.8 X10(3) UL (ref 4–11)

## 2021-02-05 PROCEDURE — 99233 SBSQ HOSP IP/OBS HIGH 50: CPT | Performed by: HOSPITALIST

## 2021-02-05 PROCEDURE — 99232 SBSQ HOSP IP/OBS MODERATE 35: CPT | Performed by: INTERNAL MEDICINE

## 2021-02-05 NOTE — CM/SW NOTE
Per PT recommendation of acute rehab. Currently pt still desats w/activity. PMR consult order was placed on 2/3/21. CM/SW to remain available for support and/or discharge planning.     Angie Adams RN, [facility]    Ext. [phone]

## 2021-02-05 NOTE — PHYSICAL THERAPY NOTE
Attempted PT treatment. Patient had just ambulated back from bathroom with aide of RN and recovering. Per RN, pt desaturated with activity.  Communicated with RN, that in future, patient would benefit from rolling commode transfer to promote energy conserva

## 2021-02-05 NOTE — PROGRESS NOTES
Double RN skin check done prior to transfer off Unit. Skin check performed by this RN and Mary Jo Benito. Wounds are as follows: clean, dry and intact     Will remain available for any further questions or concerns.

## 2021-02-05 NOTE — PROGRESS NOTES
[facility]    Progress Note    Anastasia Leggett Patient Status:  Inpatient    1945 MRN [id number]   [facility] 2W/SW Attending Jesus Floresissa Day # 44 PCP Rosamaria Jasmine MD        Subjective: higher dose    Epistaxis seems imprved with vaseline and high humidity               Acute renal failure - from sepsis and covid   - improved      + trop - Likely from covid  No CP  EKG w/o ischemic changes and ST  Not clear evidence of ACS  - cont asa   -

## 2021-02-05 NOTE — PLAN OF CARE
Problem: Patient Centered Care  Goal: Patient preferences are identified and integrated in the patient's plan of care  Description: Interventions:  - What would you like us to know as we care for you? Luis veronica. I speak Serbian.   - Provide timely, co maintained  Description: INTERVENTIONS:  - Monitor labs and assess for signs and symptoms of volume excess or deficit  - Monitor intake, output and patient weight  - Monitor urine specific gravity, serum osmolarity and serum sodium as indicated or ordered consults as needed  - Advance activity as appropriate  - Communicate ordered activity level and limitations with patient/family  Outcome: Progressing  Goal: Maintain proper alignment of affected body part  Description: INTERVENTIONS:  - Support and protect Problem: DISCHARGE PLANNING  Goal: Discharge to home or other facility with appropriate resources  Description: INTERVENTIONS:  - Identify barriers to discharge w/pt and caregiver  - Include patient/family/discharge partner in discharge Tony De Jesus

## 2021-02-05 NOTE — PROGRESS NOTES
Pulmonary/Critical Care Follow Up Note    HPI:   Jordyn Lozoya is a 76year old male with Patient presents with:  Difficulty Breathing      PCP Jena Huang MD  Admission Attending Craig Salas MD    Hospital Day #39      Feeling sergio (ADULT) tab 1 tablet, 1 tablet, Oral, Daily  •  hydrALAzine HCl (APRESOLINE) injection 20 mg, 20 mg, Intravenous, Q4H PRN  •  tetrahydrozoline (OPTI-CLEAR/VISINE) 0.05 % ophthalmic solution 1 drop, 1 drop, Both Eyes, QID PRN  •  LORazepam (ATIVAN) tab 0.5 Component Value Date    HGB 12.9 (L) 02/05/2021    HGB 12.9 (L) 02/04/2021    HGB 13.1 02/03/2021    HGB 13.0 02/02/2021    HGB 12.7 (L) 02/01/2021         ASSESSMENT/PLAN:     COVID-19  C/b AHRF/ARDS  PCT high  S/p Actemra  S/p CCP  S/p Dex  CXR stable

## 2021-02-05 NOTE — PROGRESS NOTES
Covid positive- 12/22  Admitted- 12/28     CXR- 2/2- Diffuse bilateral pulmonary opacification with decrease since previous study     DVT prophylaxis- Lovenox 50mg BID     02- 8L, requiring 8-10L over past 24 hours    Afebrile    Continue to wean as tole

## 2021-02-05 NOTE — OCCUPATIONAL THERAPY NOTE
Communication Note to Team    Attempted OT/PT tx this am; pt had just returned to chair from toileting with RN assist. RN indicates pt just recovered, reports sig desat during activity.  Encouraged RN to utilize rolling commode for toileting considering con

## 2021-02-06 DIAGNOSIS — Z23 NEED FOR VACCINATION: ICD-10-CM

## 2021-02-06 LAB
ANION GAP SERPL CALC-SCNC: 4 MMOL/L (ref 0–18)
BASOPHILS # BLD AUTO: 0.07 X10(3) UL (ref 0–0.2)
BASOPHILS NFR BLD AUTO: 0.6 %
BUN BLD-MCNC: 23 MG/DL (ref 7–18)
BUN/CREAT SERPL: 29.1 (ref 10–20)
CALCIUM BLD-MCNC: 9.5 MG/DL (ref 8.5–10.1)
CHLORIDE SERPL-SCNC: 98 MMOL/L (ref 98–112)
CO2 SERPL-SCNC: 32 MMOL/L (ref 21–32)
CREAT BLD-MCNC: 0.79 MG/DL
DEPRECATED RDW RBC AUTO: 44.1 FL (ref 35.1–46.3)
EOSINOPHIL # BLD AUTO: 0.22 X10(3) UL (ref 0–0.7)
EOSINOPHIL NFR BLD AUTO: 1.8 %
ERYTHROCYTE [DISTWIDTH] IN BLOOD BY AUTOMATED COUNT: 14.4 % (ref 11–15)
GLUCOSE BLD-MCNC: 82 MG/DL (ref 70–99)
HAV IGM SER QL: 2.3 MG/DL (ref 1.6–2.6)
HCT VFR BLD AUTO: 38.1 %
HGB BLD-MCNC: 12.8 G/DL
IMM GRANULOCYTES # BLD AUTO: 0.17 X10(3) UL (ref 0–1)
IMM GRANULOCYTES NFR BLD: 1.4 %
LYMPHOCYTES # BLD AUTO: 1.45 X10(3) UL (ref 1–4)
LYMPHOCYTES NFR BLD AUTO: 11.7 %
MCH RBC QN AUTO: 28.5 PG (ref 26–34)
MCHC RBC AUTO-ENTMCNC: 33.6 G/DL (ref 31–37)
MCV RBC AUTO: 84.9 FL
MONOCYTES # BLD AUTO: 0.89 X10(3) UL (ref 0.1–1)
MONOCYTES NFR BLD AUTO: 7.2 %
NEUTROPHILS # BLD AUTO: 9.59 X10 (3) UL (ref 1.5–7.7)
NEUTROPHILS # BLD AUTO: 9.59 X10(3) UL (ref 1.5–7.7)
NEUTROPHILS NFR BLD AUTO: 77.3 %
OSMOLALITY SERPL CALC.SUM OF ELEC: 281 MOSM/KG (ref 275–295)
PLATELET # BLD AUTO: 293 10(3)UL (ref 150–450)
POTASSIUM SERPL-SCNC: 4.4 MMOL/L (ref 3.5–5.1)
RBC # BLD AUTO: 4.49 X10(6)UL
SODIUM SERPL-SCNC: 134 MMOL/L (ref 136–145)
WBC # BLD AUTO: 12.4 X10(3) UL (ref 4–11)

## 2021-02-06 PROCEDURE — 99233 SBSQ HOSP IP/OBS HIGH 50: CPT | Performed by: HOSPITALIST

## 2021-02-06 PROCEDURE — 99233 SBSQ HOSP IP/OBS HIGH 50: CPT | Performed by: INTERNAL MEDICINE

## 2021-02-06 NOTE — PROGRESS NOTES
[facility]    Progress Note    Calvin Raza Patient Status:  Inpatient    1945 MRN [id number]   [facility] 2W/SW Attending Jesus Flores Day # 36 PCP Salvador Mahmood MD        Subjective: higher dose    Epistaxis seems imprved with vaseline and high humidity               Acute renal failure - from sepsis and covid   - improved      + trop - Likely from covid  No CP  EKG w/o ischemic changes and ST  Not clear evidence of ACS  - cont asa   -

## 2021-02-06 NOTE — OCCUPATIONAL THERAPY NOTE
OCCUPATIONAL THERAPY TREATMENT NOTE - INPATIENT        Room Number: 927/751-M         Presenting Problem: COVID    Problem List  Principal Problem:    Acute respiratory failure with hypoxia (HCC)  Active Problems:    Pneumonia due to COVID-19 virus    Acut evaluation    SUBJECTIVE  Yes (let's walk again)    OBJECTIVE  Precautions: (Contact & Droplet)    WEIGHT BEARING RESTRICTION        PAIN ASSESSMENT  Ratin     ACTIVITIES OF DAILY LIVING ASSESSMENT  AM-PAC ‘6-Clicks’ Inpatient Daily Activity Short Form

## 2021-02-06 NOTE — PLAN OF CARE
Problem: Patient Centered Care  Goal: Patient preferences are identified and integrated in the patient's plan of care  Description: Interventions:  - What would you like us to know as we care for you? Luis veronica. I speak [place].   - Provide timely, co maintained  Description: INTERVENTIONS:  - Monitor labs and assess for signs and symptoms of volume excess or deficit  - Monitor intake, output and patient weight  - Monitor urine specific gravity, serum osmolarity and serum sodium as indicated or ordered consults as needed  - Advance activity as appropriate  - Communicate ordered activity level and limitations with patient/family  Outcome: Progressing  Goal: Maintain proper alignment of affected body part  Description: INTERVENTIONS:  - Support and protect Problem: DISCHARGE PLANNING  Goal: Discharge to home or other facility with appropriate resources  Description: INTERVENTIONS:  - Identify barriers to discharge w/pt and caregiver  - Include patient/family/discharge partner in discharge Tony De Jesus

## 2021-02-06 NOTE — PROGRESS NOTES
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

## 2021-02-06 NOTE — PLAN OF CARE
Problem: Patient Centered Care  Goal: Patient preferences are identified and integrated in the patient's plan of care  Description: Interventions:  - What would you like us to know as we care for you? Luis veronica. I speak Micronesian.   - Provide timely, co maintained  Description: INTERVENTIONS:  - Monitor labs and assess for signs and symptoms of volume excess or deficit  - Monitor intake, output and patient weight  - Monitor urine specific gravity, serum osmolarity and serum sodium as indicated or ordered consults as needed  - Advance activity as appropriate  - Communicate ordered activity level and limitations with patient/family  Outcome: Progressing  Goal: Maintain proper alignment of affected body part  Description: INTERVENTIONS:  - Support and protect Problem: DISCHARGE PLANNING  Goal: Discharge to home or other facility with appropriate resources  Description: INTERVENTIONS:  - Identify barriers to discharge w/pt and caregiver  - Include patient/family/discharge partner in discharge Tony De Jesus

## 2021-02-06 NOTE — DOWNTIME EVENT NOTE
The EMR was down for 2 hours on 2/6/2021.       The following information was re-entered into the system by Alyson PETERSON: Flowsheet data, Intake and output and JOSE Marquez  2/6/2021

## 2021-02-06 NOTE — PHYSICAL THERAPY NOTE
PHYSICAL THERAPY TREATMENT NOTE - INPATIENT     Room Number: 601/309-L       Presenting Problem: +COVID-19 (12/22/2020); acute respiratory failure w/ hypoxia; pneumonia; sepsis    Problem List  Principal Problem:    Acute respiratory failure with hypoxia ( mobility; Energy conservation;Patient education;Gait training;Strengthening;Transfer training;Balance training    SUBJECTIVE  Pt. Reported feeling tired but OK.     OBJECTIVE  Precautions: (Contact & Droplet)    PAIN ASSESSMENT   Ratin  Location: abdomen updated 1/22/21, met by 2/12/21  Patient Goal Patient's self-stated goal is: not stated   Goal #1 Patient is able to demonstrate supine - sit EOB @ level: modified independent      Goal #1   Current Status NT   Goal #2 Patient is able to demonstrate transf

## 2021-02-06 NOTE — PLAN OF CARE
Problem: Patient Centered Care  Goal: Patient preferences are identified and integrated in the patient's plan of care  Description: Interventions:  - What would you like us to know as we care for you? Luis veronica. I speak Nigerian.   - Provide timely, co maintained  Description: INTERVENTIONS:  - Monitor labs and assess for signs and symptoms of volume excess or deficit  - Monitor intake, output and patient weight  - Monitor urine specific gravity, serum osmolarity and serum sodium as indicated or ordered consults as needed  - Advance activity as appropriate  - Communicate ordered activity level and limitations with patient/family  Outcome: Progressing  Goal: Maintain proper alignment of affected body part  Description: INTERVENTIONS:  - Support and protect Problem: DISCHARGE PLANNING  Goal: Discharge to home or other facility with appropriate resources  Description: INTERVENTIONS:  - Identify barriers to discharge w/pt and caregiver  - Include patient/family/discharge partner in discharge Tony De Jesus

## 2021-02-07 LAB
BASOPHILS # BLD AUTO: 0.06 X10(3) UL (ref 0–0.2)
BASOPHILS NFR BLD AUTO: 0.5 %
DEPRECATED RDW RBC AUTO: 44.3 FL (ref 35.1–46.3)
EOSINOPHIL # BLD AUTO: 0.21 X10(3) UL (ref 0–0.7)
EOSINOPHIL NFR BLD AUTO: 1.6 %
ERYTHROCYTE [DISTWIDTH] IN BLOOD BY AUTOMATED COUNT: 14.3 % (ref 11–15)
HAV IGM SER QL: 2.2 MG/DL (ref 1.6–2.6)
HCT VFR BLD AUTO: 39.5 %
HGB BLD-MCNC: 13.1 G/DL
IMM GRANULOCYTES # BLD AUTO: 0.22 X10(3) UL (ref 0–1)
IMM GRANULOCYTES NFR BLD: 1.7 %
LYMPHOCYTES # BLD AUTO: 1.43 X10(3) UL (ref 1–4)
LYMPHOCYTES NFR BLD AUTO: 11 %
MCH RBC QN AUTO: 28.3 PG (ref 26–34)
MCHC RBC AUTO-ENTMCNC: 33.2 G/DL (ref 31–37)
MCV RBC AUTO: 85.3 FL
MONOCYTES # BLD AUTO: 0.86 X10(3) UL (ref 0.1–1)
MONOCYTES NFR BLD AUTO: 6.6 %
NEUTROPHILS # BLD AUTO: 10.19 X10 (3) UL (ref 1.5–7.7)
NEUTROPHILS # BLD AUTO: 10.19 X10(3) UL (ref 1.5–7.7)
NEUTROPHILS NFR BLD AUTO: 78.6 %
PLATELET # BLD AUTO: 291 10(3)UL (ref 150–450)
POTASSIUM SERPL-SCNC: 4.3 MMOL/L (ref 3.5–5.1)
RBC # BLD AUTO: 4.63 X10(6)UL
WBC # BLD AUTO: 13 X10(3) UL (ref 4–11)

## 2021-02-07 PROCEDURE — 99232 SBSQ HOSP IP/OBS MODERATE 35: CPT | Performed by: INTERNAL MEDICINE

## 2021-02-07 PROCEDURE — 99233 SBSQ HOSP IP/OBS HIGH 50: CPT | Performed by: HOSPITALIST

## 2021-02-07 NOTE — CM/SW NOTE
SW received call back from Patience/nathan regarding acute rehab referrals. Daughter/family agreeable to [facility] at discharge. Placement reserved in aidin. Patient will require prior insurance auth.      (Per dtrs request follow up with Jennifer/Sarina

## 2021-02-07 NOTE — PROGRESS NOTES
[facility]    Progress Note    Elisa Single Patient Status:  Inpatient    1945 MRN [id number]   [facility] 2W/SW Attending Jesus Flores Day # 39 PCP Phi Bentley MD        Subjective: higher dose    Epistaxis seems imprved with vaseline and high humidity               Acute renal failure - from sepsis and covid   - improved      + trop - Likely from covid  No CP  EKG w/o ischemic changes and ST  Not clear evidence of ACS  - cont asa   -

## 2021-02-07 NOTE — PLAN OF CARE
Problem: Patient Centered Care  Goal: Patient preferences are identified and integrated in the patient's plan of care  Description: Interventions:  - What would you like us to know as we care for you? Luis veronica. I speak Hong Konger.   - Provide timely, co maintained  Description: INTERVENTIONS:  - Monitor labs and assess for signs and symptoms of volume excess or deficit  - Monitor intake, output and patient weight  - Monitor urine specific gravity, serum osmolarity and serum sodium as indicated or ordered consults as needed  - Advance activity as appropriate  - Communicate ordered activity level and limitations with patient/family  Outcome: Progressing  Goal: Maintain proper alignment of affected body part  Description: INTERVENTIONS:  - Support and protect Problem: DISCHARGE PLANNING  Goal: Discharge to home or other facility with appropriate resources  Description: INTERVENTIONS:  - Identify barriers to discharge w/pt and caregiver  - Include patient/family/discharge partner in discharge Tony De Jesus

## 2021-02-07 NOTE — PROGRESS NOTES
[facility]     Progress Note        Brittany Shepard Patient Status:  Inpatient    1945 MRN [id number]   Location [facility] 2W/SW Attending Lesley Toth MD   Hosp Day # 39 PCP Balbina Arevalo MD       Subjective:   Pa (ROBITUSSIN) 100 MG/5ML solution 100 mg, 100 mg, Oral, Q4H PRN    •  multivitamin with minerals (ADULT) tab 1 tablet, 1 tablet, Oral, Daily    •  hydrALAzine HCl (APRESOLINE) injection 20 mg, 20 mg, Intravenous, Q4H PRN    •  tetrahydrozoline (OPTI-CLEAR/V Oxygenation requirements improving. Currently on 5 L. Wean as tolerated.  -Evaluated by ID.   Decision made to hold off remdesivir or CCP at this time given symptom onset  -Status post Actemra  -Continue prednisone for now.  -Continue second course of IV

## 2021-02-07 NOTE — PLAN OF CARE
Patient resting in bed. Alert, oriented x 4. Primarily Setswana speaking. Translation line Manish Aden #[id number]) used to explain plan of care. Vital signs stable.  Patient at 2.5 L of oxygen at beginning of night, increased to 5L due to desaturating during slee On interval followup, the left subclavian venous catheter site is clean, dry, non-inflamed and non-tender. Afebrile. Notes and cultures are followed. mentation and behavior  - Position to facilitate oxygenation and minimize respiratory effort  - Oxygen supplementation based on oxygen saturation or ABGs  - Provide Smoking Cessation handout, if applicable  - Encourage broncho-pulmonary hygiene including c bleeding  - Monitor lab trends  - Patient is to report abnormal signs of bleeding to staff  - Avoid use of toothpicks and dental floss  - Use electric shaver for shaving  - Use soft bristle tooth brush  - Limit straining and forceful nose blowing  Outcome: appropriate interventions as ordered  Outcome: Progressing     Problem: SAFETY ADULT - FALL  Goal: Free from fall injury  Description: INTERVENTIONS:  - Assess pt frequently for physical needs  - Identify cognitive and physical deficits and behaviors that

## 2021-02-08 PROCEDURE — 99232 SBSQ HOSP IP/OBS MODERATE 35: CPT | Performed by: INTERNAL MEDICINE

## 2021-02-08 PROCEDURE — 99233 SBSQ HOSP IP/OBS HIGH 50: CPT | Performed by: HOSPITALIST

## 2021-02-08 RX ORDER — PREDNISONE 20 MG/1
20 TABLET ORAL
Status: DISCONTINUED | OUTPATIENT
Start: 2021-02-09 | End: 2021-02-13

## 2021-02-08 NOTE — PROGRESS NOTES
Pulmonary/Critical Care Follow Up Note    HPI:   Stephan Pete is a 76year old male with Patient presents with:  Difficulty Breathing      PCP Kenrick Bojorquez MD  Admission Attending Dianne Marie 15 Day #42    Feeling ok  Occ PRN  •  LORazepam (ATIVAN) tab 0.5 mg, 0.5 mg, Oral, Q4H PRN  •  tamsulosin HCl (FLOMAX) cap 0.4 mg, 0.4 mg, Oral, Daily  •  zinc sulfate (ZINCATE) cap 220 mg, 220 mg, Oral, BID  •  sodium chloride 0.9% IV bolus 500 mL, 500 mL, Intravenous, PRN  •  0.9% Na prn lasix         H/o PE  High D dimer in setting of COVID  US doppler LE neg x 2 last 1/21  ECHO with RV pressure/volume overload  Repeat ECHO Mixed results with improved RV function but inc PAP  Plan    Cont Lovenox px dose                 PEEWEE  COVID and

## 2021-02-08 NOTE — OCCUPATIONAL THERAPY NOTE
OCCUPATIONAL THERAPY TREATMENT NOTE - INPATIENT        Room Number: 232/111-T           Presenting Problem: COVID    Problem List  Principal Problem:    Acute respiratory failure with hypoxia (HCC)  Active Problems:    Pneumonia due to COVID-19 virus    Ac wall expansion to yield improved oxygenation.   Following seated rest, pt returned to sit in bedside chair to participate in more therex with physical therapist.     The patient's Approx Degree of Impairment: 56.46% has been calculated based on documentatio fair  Dynamic Standing: NT    FUNCTIONAL ADL ASSESSMENT  Grooming: set-up in sitting   toileting /shaw care simulation : CGA for standing balance    Education Provided: role of OT, activity promotion   Patient End of Session: Up in chair;Needs met;Call lig

## 2021-02-08 NOTE — PROGRESS NOTES
[facility]    Progress Note    Jose David Rojo Patient Status:  Inpatient    1945 MRN [id number]   [facility] 2W/SW Attending Jesus Flores Day # 43 PCP Linda Mora MD        Subjective: higher dose    Epistaxis seems imprved with vaseline and high humidity               Acute renal failure - from sepsis and covid   - improved      + trop - Likely from covid  No CP  EKG w/o ischemic changes and ST  Not clear evidence of ACS  - cont asa   -

## 2021-02-08 NOTE — CM/SW NOTE
CM spoke w/RN/Neena who stated pt at rest is on O2 3L- 4L HFNC. Desats with ambulation/activity and required increase to 6L/HFNC. Recovers quickly and reduced back to 4L O2/HFNC. PT/OT NOTES OF 2/6/21 sent in aidin.     Need to send updates of PT/OT

## 2021-02-08 NOTE — PHYSICAL THERAPY NOTE
PHYSICAL THERAPY TREATMENT NOTE - INPATIENT     Room Number: 110/462-Z       Presenting Problem: +COVID-19 (12/22/2020); acute respiratory failure w/ hypoxia; pneumonia; sepsis    Problem List  Principal Problem:    Acute respiratory failure with hypoxia ( goals, however, continues to present with significant deconditioning. The patient's Approx Degree of Impairment: 50.57% has been calculated based on documentation in the [facility] '6 clicks' Inpatient Basic Mobility Short Form.   Research supports that patients Degree of Impairment: 50.57%   Standardized Score (AM-PAC Scale): 42.13   CMS Modifier (G-Code): CK    FUNCTIONAL ABILITY STATUS  Gait Assessment   Gait Assistance: Contact guard assist  Distance (ft): 30ft x 2  Assistive Device: Rolling walker  Pattern: S

## 2021-02-08 NOTE — PLAN OF CARE
Patient resting in bed. Alert, oriented x 4. Primarily English speaking. Translation line Alesha Marker #[id number]) used to explain plan of care. Vital signs stable. Patient on 2L oxygen, nasal cannula. Tolerating well at 95%. Continues on P.O diflucan & P. O predniso supplementation based on oxygen saturation or ABGs  - Provide Smoking Cessation handout, if applicable  - Encourage broncho-pulmonary hygiene including cough, deep breathe, Incentive Spirometry  - Assess the need for suctioning and perform as needed  - Ass toothpicks and dental floss  - Use electric shaver for shaving  - Use soft bristle tooth brush  - Limit straining and forceful nose blowing  Outcome: Progressing     Problem: MUSCULOSKELETAL - ADULT  Goal: Return mobility to safest level of function  Descr injury  Description: INTERVENTIONS:  - Assess pt frequently for physical needs  - Identify cognitive and physical deficits and behaviors that affect risk of falls.   - Alma fall precautions as indicated by assessment.  - Educate pt/family on patient sa

## 2021-02-08 NOTE — PLAN OF CARE
Problem: Patient Centered Care  Goal: Patient preferences are identified and integrated in the patient's plan of care  Description: Interventions:  - What would you like us to know as we care for you? Luis veronica. I speak Emirati.   - Provide timely, co maintained  Description: INTERVENTIONS:  - Monitor labs and assess for signs and symptoms of volume excess or deficit  - Monitor intake, output and patient weight  - Monitor urine specific gravity, serum osmolarity and serum sodium as indicated or ordered complex needs related to functional status, cognitive ability or social support system  Outcome: Progressing     Patient is alert and oriented x4, Anguillan speaking only.  He is currently requiring oxygen at 3L/min at rest and up to 7L/min with ambulation (h

## 2021-02-08 NOTE — PROGRESS NOTES
Pt covid+ 12/22. Symptoms started 12/13  Currently on 2L NC SpO2 93%, afebrile. Pt still continues to de-sat with activity requiring up to 6L HF NC to keep O2 sat > 88%. Not RDV or CCP candidate d/t sxs onset > 14 days at time of admission.    S/P Acte

## 2021-02-09 PROCEDURE — 99232 SBSQ HOSP IP/OBS MODERATE 35: CPT | Performed by: PHYSICIAN ASSISTANT

## 2021-02-09 PROCEDURE — 99233 SBSQ HOSP IP/OBS HIGH 50: CPT | Performed by: HOSPITALIST

## 2021-02-09 NOTE — PROGRESS NOTES
[facility]    Progress Note    Remberto Smalls Patient Status:  Inpatient    1945 MRN [id number]   [facility] 2W/SW Attending Jesus Flores Day # 37 PCP Dedra Aviles MD        Subjective: dose    Epistaxis seems imprved with vaseline and high humidity               Acute renal failure - from sepsis and covid   - improved      + trop - Likely from covid  No CP  EKG w/o ischemic changes and ST  Not clear evidence of ACS  - cont asa   - outpt

## 2021-02-09 NOTE — PROGRESS NOTES
Pt covid+ 12/22. Symptoms started 12/13  Currently on 3L NC SpO2 93%, afebrile.   Pt still continues to de-sat with activity requiring up to 4L HF NC to keep O2 sat > 88% with less than 1 min to recover    Not RDV or CCP candidate d/t sxs onset > 14 days at

## 2021-02-09 NOTE — PLAN OF CARE
Problem: Patient Centered Care  Goal: Patient preferences are identified and integrated in the patient's plan of care  Description: Interventions:  - What would you like us to know as we care for you? Luis veronica. I speak Papua [place] Guinean.   - Provide timely, co maintained  Description: INTERVENTIONS:  - Monitor labs and assess for signs and symptoms of volume excess or deficit  - Monitor intake, output and patient weight  - Monitor urine specific gravity, serum osmolarity and serum sodium as indicated or ordered consults as needed  - Advance activity as appropriate  - Communicate ordered activity level and limitations with patient/family  Outcome: Progressing  Goal: Maintain proper alignment of affected body part  Description: INTERVENTIONS:  - Support and protect Problem: DISCHARGE PLANNING  Goal: Discharge to home or other facility with appropriate resources  Description: INTERVENTIONS:  - Identify barriers to discharge w/pt and caregiver  - Include patient/family/discharge partner in discharge Tony De Jesus

## 2021-02-09 NOTE — OCCUPATIONAL THERAPY NOTE
OCCUPATIONAL THERAPY TREATMENT NOTE - INPATIENT        Room Number: 901/649-Z           Presenting Problem: COVID    Problem List  Principal Problem:    Acute respiratory failure with hypoxia (HCC)  Active Problems:    Pneumonia due to COVID-19 virus    Ac for self-pacing. In prep for toileting and activity in washroom, pt ambulated 30 ft x 2 with CGA and RW (seated recovery on toilet in between bouts). Toilet transfer and chair transfer with CGA.  During seated time on toilet, pt participated in LE dressi Little  -   Eating meals?: A Little    AM-PAC Score:  Score: 16  Approx Degree of Impairment: 53.32%  Standardized Score (AM-PAC Scale): 35.96  CMS Modifier (G-Code): CK    FUNCTIONAL TRANSFER ASSESSMENT  Supine to Sit : Not tested  Sit to Stand: [employer]

## 2021-02-09 NOTE — PROGRESS NOTES
Rehab Progress Note       SUBJECTIVE:    Reason for Consultation: Impaired ADL and mobility dysfuction due to Covid PNA  History of present illness:  Records reviewed, and patient examined. Consult Requested by: Emilia Howard        Patient is a 77 yo Ukrainian s L/min throughout activity; despite de-saturation , pt with recovery within 30 seconds of seated rest. Following recovery, pt motivated to continue activity. Patient sitting up in chair at start of session, agreeable to therapy.  Sit to stand with CGA at R Fluticasone Propionate  2 spray Each Nare BID   • PEG 3350  17 g Oral Daily   • docusate sodium  100 mg Oral BID   • Pantoprazole Sodium  40 mg Oral QAM AC   • multivitamin with minerals  1 tablet Oral Daily   • tamsulosin HCl  0.4 mg Oral Daily   • zinc s Component Value Date    INR 1.23 (H) 12/28/2020    INR 2.4 (H) 09/11/2017    INR 3.5 (H) 09/10/2017        ASSESSMENT/PLAN:    Principal Problem:    Acute respiratory failure with hypoxia (HCC)  Active Problems:    Pneumonia due to COVID-19 virus    Acut

## 2021-02-09 NOTE — PROGRESS NOTES
[facility]    Progress Note    Anastasia Leggett Patient Status:  Inpatient    1945 MRN [id number]   Location [address] [place] [address] Attending Jesus Flores Day # 37 PCP Rosamaria Jasmine MD        Subjective:   Abel Aguilar pressure  -Plan:   -Lovenox 0.6 mg/kg twice daily    # Elevated troponin  -? D/t COVID  -No clear evidence of ACS  -Plan:  -Continue ASA  -Consider outpatient stress test    DVT prophylaxis: Lovenox 0.6 mg/kg twice daily    D/w unit APN.     Results:     La

## 2021-02-09 NOTE — CM/SW NOTE
Per nursing rounds pt is on O2 3L/NC. Per Karuna/APRN to CM, [facility] can start insurance auth process. PT/OT notes of send updates via aidin.     CM spoke w/Jennifer liaison/MJ who stated benchmarks are 5L O2 or less with minimum 3 mins or less recovery

## 2021-02-09 NOTE — PLAN OF CARE
Patient alert and oriented x4. Faroese speaking. On 3L O2 saturating high 90s at rest. Having some anxiety treated per [facility]. Daughter Ami Parisi updated on patient condition. Call light in reach, bed alarm on.     Problem: Patient Centered Care  Goal: Patient p Manage/alleviate anxiety  - Monitor for signs/symptoms of CO2 retention  Outcome: Progressing     Problem: METABOLIC/FLUID AND ELECTROLYTES - ADULT  Goal: Hemodynamic stability and optimal renal function maintained  Description: INTERVENTIONS:  - Monitor l ambulating w/ or w/o assistive devices  - Assist with transfers and ambulation using safe patient handling equipment as needed  - Ensure adequate protection for wounds/incisions during mobilization  - Obtain PT/OT consults as needed  - Advance activity as assessment  - Modify environment to reduce risk of injury  - Provide assistive devices as appropriate  - Consider OT/PT consult to assist with strengthening/mobility  - Encourage toileting schedule  Outcome: Progressing     Problem: [address]

## 2021-02-09 NOTE — DIETARY NOTE
ADULT NUTRITION REASSESSMENT     Pt is at moderate nutrition risk. Pt does not meet malnutrition criteria.       RECOMMENDATIONS TO MD:  CPM.       NUTRITION DIAGNOSIS/PROBLEM:  Unintentional weight loss related to decreased appetite/intake in the setting Also may need adjunctive tube feeds now to prevent further nutritional deficits. RD to increase nutrient density of nutrition supplements (rx Enlive vs standard Ensure) to help increase nutrition intake when able to take po nutrition. Follow closely.   1/29 transfer of nutrition care to new setting or provider: monitor plans    ANTHROPOMETRICS:  12/28 admit wt: 187 lbs  HT:  5'10\"   WT: 75.2 kg (165 lb 12.6 oz) 1/20 weight. 1/29 requested repeat scale weight to assess.    Noted 22# wt loss since admit  BMI: B docusate sodium  100 mg Oral BID   • Pantoprazole Sodium  40 mg Oral QAM AC   • multivitamin with minerals  1 tablet Oral Daily   • tamsulosin HCl  0.4 mg Oral Daily   • zinc sulfate  220 mg Oral BID   • sodium chloride   Intravenous Once   • aspirin  81 m

## 2021-02-10 LAB
ANION GAP SERPL CALC-SCNC: 4 MMOL/L (ref 0–18)
BASOPHILS # BLD AUTO: 0.06 X10(3) UL (ref 0–0.2)
BASOPHILS NFR BLD AUTO: 0.6 %
BUN BLD-MCNC: 23 MG/DL (ref 7–18)
BUN/CREAT SERPL: 31.1 (ref 10–20)
CALCIUM BLD-MCNC: 9.5 MG/DL (ref 8.5–10.1)
CHLORIDE SERPL-SCNC: 99 MMOL/L (ref 98–112)
CO2 SERPL-SCNC: 32 MMOL/L (ref 21–32)
CREAT BLD-MCNC: 0.74 MG/DL
DEPRECATED RDW RBC AUTO: 44.5 FL (ref 35.1–46.3)
EOSINOPHIL # BLD AUTO: 0.28 X10(3) UL (ref 0–0.7)
EOSINOPHIL NFR BLD AUTO: 2.9 %
ERYTHROCYTE [DISTWIDTH] IN BLOOD BY AUTOMATED COUNT: 14.6 % (ref 11–15)
GLUCOSE BLD-MCNC: 86 MG/DL (ref 70–99)
HAV IGM SER QL: 2.2 MG/DL (ref 1.6–2.6)
HCT VFR BLD AUTO: 39 %
HGB BLD-MCNC: 13.1 G/DL
IMM GRANULOCYTES # BLD AUTO: 0.21 X10(3) UL (ref 0–1)
IMM GRANULOCYTES NFR BLD: 2.2 %
LYMPHOCYTES # BLD AUTO: 1.29 X10(3) UL (ref 1–4)
LYMPHOCYTES NFR BLD AUTO: 13.6 %
MCH RBC QN AUTO: 28.4 PG (ref 26–34)
MCHC RBC AUTO-ENTMCNC: 33.6 G/DL (ref 31–37)
MCV RBC AUTO: 84.6 FL
MONOCYTES # BLD AUTO: 0.66 X10(3) UL (ref 0.1–1)
MONOCYTES NFR BLD AUTO: 6.9 %
NEUTROPHILS # BLD AUTO: 7 X10 (3) UL (ref 1.5–7.7)
NEUTROPHILS # BLD AUTO: 7 X10(3) UL (ref 1.5–7.7)
NEUTROPHILS NFR BLD AUTO: 73.8 %
OSMOLALITY SERPL CALC.SUM OF ELEC: 283 MOSM/KG (ref 275–295)
PLATELET # BLD AUTO: 261 10(3)UL (ref 150–450)
POTASSIUM SERPL-SCNC: 4.1 MMOL/L (ref 3.5–5.1)
RBC # BLD AUTO: 4.61 X10(6)UL
SODIUM SERPL-SCNC: 135 MMOL/L (ref 136–145)
WBC # BLD AUTO: 9.5 X10(3) UL (ref 4–11)

## 2021-02-10 PROCEDURE — 99232 SBSQ HOSP IP/OBS MODERATE 35: CPT | Performed by: INTERNAL MEDICINE

## 2021-02-10 PROCEDURE — 99232 SBSQ HOSP IP/OBS MODERATE 35: CPT | Performed by: HOSPITALIST

## 2021-02-10 NOTE — PHYSICAL THERAPY NOTE
PHYSICAL THERAPY TREATMENT NOTE - INPATIENT     Room Number: 917/501-A       Presenting Problem: +COVID-19 (12/22/2020); acute respiratory failure w/ hypoxia; pneumonia; sepsis    Problem List  Principal Problem:    Acute respiratory failure with hypoxia -  Management Techniques: Relaxation;Repositioning; Activity promotion; Body mechanics;Breathing techniques    BALANCE                                                                                                                     Static Sitting: Good  D Status NT   Goal #2 Patient is able to demonstrate transfer sit<>stand requiring Supervision    Goal #2  Current Status CG   Goal #3 Patient is able to ambulate 50ft with RW requiring Supervision while maintaining SpO2>90% with activity   Goal #3   Current

## 2021-02-10 NOTE — CM/SW NOTE
Per [facility] MDO: DISCHARGE PLANNING  Comments: Medically ok for rehab    Updated clinical sent in aidin to [facility] Insurance auth requried and informed to Jennifer/liadario CHAND to yesterday to start insurance auth process.     Per Jessica/DAMEON supervisor eugenia

## 2021-02-10 NOTE — PLAN OF CARE
Patient alert and oriented x4. Hebrew speaking. Returned patient to bed from chair, desaturated to 84% but recovered quickly on 3L O2. Saturates mid to low 90s on 3L at rest. Updated daughter on patient condition.  Complains of indigestion with meals, will needed  - Assess and instruct to report SOB or any respiratory difficulty  - Respiratory Therapy support as indicated  - Manage/alleviate anxiety  - Monitor for signs/symptoms of CO2 retention  Outcome: Progressing     Problem: METABOLIC/FLUID AND ELECTROL function  Description: INTERVENTIONS:  - Assess patient stability and activity tolerance for standing, transferring and ambulating w/ or w/o assistive devices  - Assist with transfers and ambulation using safe patient handling equipment as needed  - Ensure pt/family on patient safety including physical limitations  - Instruct pt to call for assistance with activity based on assessment  - Modify environment to reduce risk of injury  - Provide assistive devices as appropriate  - Consider OT/PT consult to rocael

## 2021-02-10 NOTE — PLAN OF CARE
Problem: Patient Centered Care  Goal: Patient preferences are identified and integrated in the patient's plan of care  Description: Interventions:  - What would you like us to know as we care for you? Luis veronica. I speak Stateless.   - Provide timely, co maintained  Description: INTERVENTIONS:  - Monitor labs and assess for signs and symptoms of volume excess or deficit  - Monitor intake, output and patient weight  - Monitor urine specific gravity, serum osmolarity and serum sodium as indicated or ordered consults as needed  - Advance activity as appropriate  - Communicate ordered activity level and limitations with patient/family  Outcome: Progressing  Goal: Maintain proper alignment of affected body part  Description: INTERVENTIONS:  - Support and protect Problem: DISCHARGE PLANNING  Goal: Discharge to home or other facility with appropriate resources  Description: INTERVENTIONS:  - Identify barriers to discharge w/pt and caregiver  - Include patient/family/discharge partner in discharge Tony De Jesus

## 2021-02-10 NOTE — OCCUPATIONAL THERAPY NOTE
OCCUPATIONAL THERAPY TREATMENT NOTE - INPATIENT        Room Number: 112/683-W     Presenting Problem: +COVID    Problem List  Principal Problem:    Acute respiratory failure with hypoxia (HCC)  Active Problems:    Pneumonia due to COVID-19 virus    Acute r Oxygen: 86  Ambulation oxygen flow (liters per minute): 3    ACTIVITIES OF DAILY LIVING ASSESSMENT  AM-PAC ‘6-Clicks’ Inpatient Daily Activity Short Form  How much help from another person does the patient currently need…  -   Putting on and taking off reg

## 2021-02-10 NOTE — CM/SW NOTE
DAMEON spoke with Sara Brooke liaison with [facility] and discussed, PT, OT and PMR notes from yesterday 2/9. The pt. Is appropriate for rehab per [facility] requirements and they will submit for insurance authorization today.       Plan [facility]J when insurance auth received and b

## 2021-02-10 NOTE — PROGRESS NOTES
Pulmonary/Critical Care Follow Up Note    HPI:   Jono Pérez is a 76year old male with Patient presents with:  Difficulty Breathing      PCP Tiana Mai MD  Admission Attending Dianne Degroot 15 Day #44    Feeling ok  Occ 20 mg, Intravenous, Q4H PRN  •  tetrahydrozoline (OPTI-CLEAR/VISINE) 0.05 % ophthalmic solution 1 drop, 1 drop, Both Eyes, QID PRN  •  LORazepam (ATIVAN) tab 0.5 mg, 0.5 mg, Oral, Q4H PRN  •  tamsulosin HCl (FLOMAX) cap 0.4 mg, 0.4 mg, Oral, Daily  •  zinc COVID-19  C/b AHRF/ARDS  PCT high  S/p Actemra  S/p CCP  S/p Dex  CXR stable  Suspect \"COVID lung\"-  This is presenting now like accelerated IPF or Hamman Rich Syndrome  CTA neg PE  Oxygenation cont to slowly improve  Epistaxis resovled  Now on 3 L

## 2021-02-10 NOTE — PROGRESS NOTES
[facility]    Progress Note    [facility] Patient Status:  Inpatient    1945 MRN [id number]   Location [address] [facility] Attending Lesia Hale, [address] PCP Shaye Valencia MD       Subjective:   Mariam Murillo solution 100 mg, 100 mg, Oral, Q4H PRN    •  multivitamin with minerals (ADULT) tab 1 tablet, 1 tablet, Oral, Daily    •  hydrALAzine HCl (APRESOLINE) injection 20 mg, 20 mg, Intravenous, Q4H PRN    •  tetrahydrozoline (OPTI-CLEAR/VISINE) 0.05 % ophthalmic 02/06/2021       Recent Labs   Lab 02/05/21  0459 02/06/21  0443 02/07/21  0517 02/10/21  0450   GLU 95 82  --  86   BUN 26* 23*  --  23*   CREATSERUM 0.75 0.79  --  0.74   GFRAA 104 102  --  104   GFRNAA 90 88  --  90   CA 9.6 9.5  --  9.5   * 134*

## 2021-02-10 NOTE — PROGRESS NOTES
Patient medicated per orders. Patient free of falls. Patient voices no needs or concerns at this time. Bed in lowest position. Side rails up x2. Call light in reach.    Pt covid+ 12/22. Symptoms started 12/13  Currently on 3L NC SpO2 96%, afebrile.     Pt still continues to de-sat with activity requiring up to 4L HF NC to keep O2 sat > 88% with less than 1 min to recover    Not RDV or CCP candidate d/t sxs onset > 14 days

## 2021-02-11 LAB
SARS-COV-2 RNA RESP QL NAA+PROBE: NOT DETECTED
SARS-COV-2 RNA RESP QL NAA+PROBE: NOT DETECTED

## 2021-02-11 PROCEDURE — 99233 SBSQ HOSP IP/OBS HIGH 50: CPT | Performed by: HOSPITALIST

## 2021-02-11 NOTE — CM/SW NOTE
CM sent therapy PT updates w/ covid result taken 02/10/21 through [facility]. Per Karuna/APRN first repeat covid farshad is negative. CM/SW to remain available for support and/or discharge planning.     Angie Adams RN, [facility]    Ext. [phone]

## 2021-02-11 NOTE — PROGRESS NOTES
Pt covid+ 12/22. Symptoms started 12/13  Currently on 3L NC SpO2 97%, afebrile. Not RDV or CCP candidate d/t sxs onset > 14 days at time of admission. S/P Actemra 12/28  S/P 20 day course of Decadron. S/P 16 day course of Cefepime.    Continue Loveno

## 2021-02-11 NOTE — PHYSICAL THERAPY NOTE
PHYSICAL THERAPY TREATMENT NOTE - INPATIENT     Room Number: 412/511-M       Presenting Problem: +COVID-19 (12/22/2020); acute respiratory failure w/ hypoxia; pneumonia; sepsis    Problem List  Principal Problem:    Acute respiratory failure with hypoxia ( education;Gait training;Transfer training;Stair training;Balance training    SUBJECTIVE  \"I feel good\"    OBJECTIVE  Precautions: (Contact & Droplet)    PAIN ASSESSMENT   Ratin  Location: -  Management Techniques: Relaxation;Repositioning; Activity pr sit<>stand requiring Supervision    Goal #2  Current Status CGA with rolling walker    Goal #3 Patient is able to ambulate 50ft with RW requiring Supervision while maintaining SpO2>90% with activity   Goal #3   Current Status CGA with rolling walker 40ft x

## 2021-02-11 NOTE — PLAN OF CARE
Problem: Patient Centered Care  Goal: Patient preferences are identified and integrated in the patient's plan of care  Description: Interventions:  - What would you like us to know as we care for you? Luis veronica. I speak Surinamese.   - Provide timely, co maintained  Description: INTERVENTIONS:  - Monitor labs and assess for signs and symptoms of volume excess or deficit  - Monitor intake, output and patient weight  - Monitor urine specific gravity, serum osmolarity and serum sodium as indicated or ordered consults as needed  - Advance activity as appropriate  - Communicate ordered activity level and limitations with patient/family  Outcome: Progressing  Goal: Maintain proper alignment of affected body part  Description: INTERVENTIONS:  - Support and protect Problem: DISCHARGE PLANNING  Goal: Discharge to home or other facility with appropriate resources  Description: INTERVENTIONS:  - Identify barriers to discharge w/pt and caregiver  - Include patient/family/discharge partner in discharge Tony De Jesus

## 2021-02-11 NOTE — CM/SW NOTE
Care Progression Note:  Active Acute Medical Issue:   77 y/o male with OBWTF-53 complicate by acute heart failure and ARDS, currently on 3L, Vss.   PEEWEE  GIB  Goals of care discussions ongoing    Other Contributing Medical Factors/Dx.:   HTN, PE, gout, prost

## 2021-02-11 NOTE — PLAN OF CARE
Problem: Patient Centered Care  Goal: Patient preferences are identified and integrated in the patient's plan of care  Description: Interventions:  - What would you like us to know as we care for you? Luis veronica. I speak Ethiopian.   - Provide timely, co restrictions as appropriate  Outcome: Progressing     Problem: HEMATOLOGIC - ADULT  Goal: Maintains hematologic stability  Description: INTERVENTIONS  - Assess for signs and symptoms of bleeding or hemorrhage  - Monitor labs and vital signs for trends  - A Problem: SKIN/TISSUE INTEGRITY - ADULT  Goal: Skin integrity remains intact  Description: INTERVENTIONS  - Assess and document risk factors for pressure ulcer development  - Assess and document skin integrity  - Monitor for areas of redness and/or skin b preferences of patient/family/discharge partner  - Complete POLST form as appropriate  - Assess patient's ability to be responsible for managing their own health  - Refer to Case Management Department for coordinating discharge planning if the patient need

## 2021-02-12 PROCEDURE — 99233 SBSQ HOSP IP/OBS HIGH 50: CPT | Performed by: HOSPITALIST

## 2021-02-12 RX ORDER — PREDNISONE 20 MG/1
20 TABLET ORAL
Refills: 0 | Status: SHIPPED | COMMUNITY
Start: 2021-02-13 | End: 2021-02-13

## 2021-02-12 RX ORDER — LOSARTAN POTASSIUM 25 MG/1
25 TABLET ORAL DAILY
Refills: 0 | Status: SHIPPED | COMMUNITY
Start: 2021-02-13 | End: 2021-03-29

## 2021-02-12 RX ORDER — ENOXAPARIN SODIUM 100 MG/ML
0.6 INJECTION SUBCUTANEOUS EVERY 12 HOURS SCHEDULED
Refills: 0 | Status: SHIPPED | COMMUNITY
Start: 2021-02-12 | End: 2021-04-05

## 2021-02-12 RX ORDER — FLUCONAZOLE 200 MG/1
200 TABLET ORAL DAILY
Qty: 2 TABLET | Refills: 0 | Status: SHIPPED | OUTPATIENT
Start: 2021-02-13 | End: 2021-02-15

## 2021-02-12 RX ORDER — MULTIPLE VITAMINS W/ MINERALS TAB 9MG-400MCG
1 TAB ORAL DAILY
Qty: 30 TABLET | Refills: 0 | Status: SHIPPED | OUTPATIENT
Start: 2021-02-13

## 2021-02-12 NOTE — PROGRESS NOTES
[facility]    Progress Note    Manus Lazara Patient Status:  Inpatient    1945 MRN [id number]   Location [address] [facility] Attending Yue Thornton, [address] PCP Violetta Akins MD       Subjective:   J Carlos solution 100 mg, 100 mg, Oral, Q4H PRN    •  multivitamin with minerals (ADULT) tab 1 tablet, 1 tablet, Oral, Daily    •  hydrALAzine HCl (APRESOLINE) injection 20 mg, 20 mg, Intravenous, Q4H PRN    •  tetrahydrozoline (OPTI-CLEAR/VISINE) 0.05 % ophthalmic 02/06/2021       Recent Labs   Lab 02/06/21  0443 02/07/21  0517 02/10/21  0450   GLU 82  --  86   BUN 23*  --  23*   CREATSERUM 0.79  --  0.74   GFRAA 102  --  104   GFRNAA 88  --  90   CA 9.5  --  9.5   *  --  135*   K 4.4 4.3 4.1   CL 98  --  99

## 2021-02-12 NOTE — PLAN OF CARE
Problem: Patient Centered Care  Goal: Patient preferences are identified and integrated in the patient's plan of care  Description: Interventions:  - What would you like us to know as we care for you? Luis veronica. I speak Czech.   - Provide timely, co maintained  Description: INTERVENTIONS:  - Monitor labs and assess for signs and symptoms of volume excess or deficit  - Monitor intake, output and patient weight  - Monitor urine specific gravity, serum osmolarity and serum sodium as indicated or ordered consults as needed  - Advance activity as appropriate  - Communicate ordered activity level and limitations with patient/family  Outcome: Progressing  Goal: Maintain proper alignment of affected body part  Description: INTERVENTIONS:  - Support and protect Problem: DISCHARGE PLANNING  Goal: Discharge to home or other facility with appropriate resources  Description: INTERVENTIONS:  - Identify barriers to discharge w/pt and caregiver  - Include patient/family/discharge partner in discharge Tony De Jesus

## 2021-02-12 NOTE — CM/SW NOTE
3pm  Pt accepted to [facility] but can NOT accept until Saturday 2/13.   Bed: 9125  Call Report: [phone]  Esviret Medication- needs to bring that med from home with him to [facility]  MSW update Rn on above  MSW updated pt's dtr zahida to expect dc on

## 2021-02-12 NOTE — OCCUPATIONAL THERAPY NOTE
OCCUPATIONAL THERAPY TREATMENT NOTE - INPATIENT        Room Number: 297/509-F           Presenting Problem: +COVID    Problem List  Principal Problem:    Acute respiratory failure with hypoxia (HCC)  Active Problems:    Pneumonia due to COVID-19 virus    A with RW for support  · Maintained O2 >90%     Following the fourth activity, pt reported he was fatigued and needed to rest. Pt did well tolerating activity and continues to demonstrate improvements with O2 saturations and recovery.      Pt does not demonst clothing?: A Little  -   Taking care of personal grooming such as brushing teeth?: A Little  -   Eating meals?: A Little    AM-PAC Score:  Score: 17  Approx Degree of Impairment: 50.11%  Standardized Score (AM-PAC Scale): 37.26  CMS Modifier (G-Code): CK

## 2021-02-12 NOTE — PHYSICAL THERAPY NOTE
PHYSICAL THERAPY TREATMENT NOTE - INPATIENT     Room Number: 302/960-P       Presenting Problem: +COVID-19 (12/22/2020); acute respiratory failure w/ hypoxia; pneumonia; sepsis    Problem List  Principal Problem:    Acute respiratory failure with hypoxia ( promotion; Body mechanics;Breathing techniques    BALANCE                                                                                                                     Static Sitting: Good  Dynamic Sitting: Good           Static Standing: Fair +  Venessa Goal #3   Current Status CGA with rolling walker 40ft x 2 with rest breaks as needed, 85% at end of ambulation 4L NC    Goal #4 Patient will negotiate 3 stairs/one curb w/ assistive device and mod A   Goal #4   Current Status Not tested    Goal #5 Lukas

## 2021-02-12 NOTE — PROGRESS NOTES
Pt covid+ 12/22. Symptoms started 12/13  Currently on 32 NC SpO2 91%, afebrile. Not RDV or CCP candidate d/t sxs onset > 14 days at time of admission. S/P Actemra 12/28  S/P 20 day course of Decadron. S/P 16 day course of Cefepime.    Continue Loveno

## 2021-02-12 NOTE — PLAN OF CARE
Problem: Patient Centered Care  Goal: Patient preferences are identified and integrated in the patient's plan of care  Description: Interventions:  - What would you like us to know as we care for you? Luis veronica. I speak Moldovan.   - Provide timely, co maintained  Description: INTERVENTIONS:  - Monitor labs and assess for signs and symptoms of volume excess or deficit  - Monitor intake, output and patient weight  - Monitor urine specific gravity, serum osmolarity and serum sodium as indicated or ordered consults as needed  - Advance activity as appropriate  - Communicate ordered activity level and limitations with patient/family  Outcome: Progressing  Goal: Maintain proper alignment of affected body part  Description: INTERVENTIONS:  - Support and protect Problem: DISCHARGE PLANNING  Goal: Discharge to home or other facility with appropriate resources  Description: INTERVENTIONS:  - Identify barriers to discharge w/pt and caregiver  - Include patient/family/discharge partner in discharge Tony De Jesus

## 2021-02-12 NOTE — PROGRESS NOTES
[facility]    Progress Note    Aayush Harper Patient Status:  Inpatient    1945 MRN [id number]   Location [address] [facility] Attending Ernestina Mitchell, [address] PCP Jason Escobedo MD       Subjective:   J Carlos (ROBITUSSIN) 100 MG/5ML solution 100 mg, 100 mg, Oral, Q4H PRN    •  multivitamin with minerals (ADULT) tab 1 tablet, 1 tablet, Oral, Daily    •  hydrALAzine HCl (APRESOLINE) injection 20 mg, 20 mg, Intravenous, Q4H PRN    •  tetrahydrozoline (OPTI-CLEAR/V 02/07/2021    .0 02/06/2021       Recent Labs   Lab 02/05/21  0459 02/06/21  0443 02/07/21  0517 02/10/21  0450   GLU 95 82  --  86   BUN 26* 23*  --  23*   CREATSERUM 0.75 0.79  --  0.74   GFRAA 104 102  --  104   GFRNAA 90 88  --  90   CA 9.6 9.5

## 2021-02-12 NOTE — DIETARY NOTE
ADULT NUTRITION REASSESSMENT     Pt is at moderate nutrition risk. Pt does not meet malnutrition criteria.       RECOMMENDATIONS TO MD:  CPM.       NUTRITION DIAGNOSIS/PROBLEM:  Unintentional weight loss related to decreased appetite/intake in the setting Also may need adjunctive tube feeds now to prevent further nutritional deficits. RD to increase nutrient density of nutrition supplements (rx Enlive vs standard Ensure) to help increase nutrition intake when able to take po nutrition. Follow closely.   1/29 education needs in future.    - Coordination of nutrition care: collaboration with other providers  - Discharge and transfer of nutrition care to new setting or provider: monitor plans    ANTHROPOMETRICS:  12/28 admit wt: 187 lbs  HT:  5'10\"   WT: 75.2 kg Subcutaneous Q12H [address]   • losartan  25 mg Oral Daily   • Fluticasone Propionate  2 spray Each Nare BID   • PEG 3350  17 g Oral Daily   • docusate sodium  100 mg Oral BID   • Pantoprazole Sodium  40 mg Oral QAM AC   • multivitamin with minerals  1 tablet Or

## 2021-02-13 VITALS
SYSTOLIC BLOOD PRESSURE: 114 MMHG | TEMPERATURE: 98 F | WEIGHT: 165.81 LBS | RESPIRATION RATE: 18 BRPM | HEART RATE: 83 BPM | DIASTOLIC BLOOD PRESSURE: 78 MMHG | BODY MASS INDEX: 24 KG/M2 | OXYGEN SATURATION: 93 %

## 2021-02-13 PROCEDURE — 99239 HOSP IP/OBS DSCHRG MGMT >30: CPT | Performed by: HOSPITALIST

## 2021-02-13 RX ORDER — LORAZEPAM 0.5 MG/1
0.5 TABLET ORAL EVERY 6 HOURS PRN
Qty: 10 TABLET | Refills: 0 | Status: SHIPPED | OUTPATIENT
Start: 2021-02-13 | End: 2021-03-29

## 2021-02-13 RX ORDER — FLUTICASONE PROPIONATE 50 MCG
2 SPRAY, SUSPENSION (ML) NASAL 2 TIMES DAILY
Refills: 0 | Status: SHIPPED | COMMUNITY
Start: 2021-02-13 | End: 2021-03-29

## 2021-02-13 RX ORDER — PREDNISONE 20 MG/1
TABLET ORAL
Qty: 11 TABLET | Refills: 0 | Status: SHIPPED | OUTPATIENT
Start: 2021-02-13 | End: 2021-02-27

## 2021-02-13 NOTE — PLAN OF CARE
Problem: Patient Centered Care  Goal: Patient preferences are identified and integrated in the patient's plan of care  Description: Interventions:  - What would you like us to know as we care for you? Luis veronica. I speak Moroccan.   - Provide timely, co restrictions as appropriate  Outcome: Progressing     Problem: HEMATOLOGIC - ADULT  Goal: Maintains hematologic stability  Description: INTERVENTIONS  - Assess for signs and symptoms of bleeding or hemorrhage  - Monitor labs and vital signs for trends  - A Problem: SKIN/TISSUE INTEGRITY - ADULT  Goal: Skin integrity remains intact  Description: INTERVENTIONS  - Assess and document risk factors for pressure ulcer development  - Assess and document skin integrity  - Monitor for areas of redness and/or skin b preferences of patient/family/discharge partner  - Complete POLST form as appropriate  - Assess patient's ability to be responsible for managing their own health  - Refer to Case Management Department for coordinating discharge planning if the patient need

## 2021-02-13 NOTE — CM/SW NOTE
CM received call from liaison/Sulema stating able to accept pt at 1pm to [facility]. CM spoke w/RNLashonda of discharge to Osmar Rader  at [address] stated will inform pt and family of dc time today.     [place] [id number]

## 2021-02-13 NOTE — PLAN OF CARE
Problem: Patient Centered Care  Goal: Patient preferences are identified and integrated in the patient's plan of care  Description: Interventions:  - What would you like us to know as we care for you? Luis veronica. I speak Vincentian.   - Provide timely, co maintained  Description: INTERVENTIONS:  - Monitor labs and assess for signs and symptoms of volume excess or deficit  - Monitor intake, output and patient weight  - Monitor urine specific gravity, serum osmolarity and serum sodium as indicated or ordered consults as needed  - Advance activity as appropriate  - Communicate ordered activity level and limitations with patient/family  Outcome: Progressing  Goal: Maintain proper alignment of affected body part  Description: INTERVENTIONS:  - Support and protect Problem: DISCHARGE PLANNING  Goal: Discharge to home or other facility with appropriate resources  Description: INTERVENTIONS:  - Identify barriers to discharge w/pt and caregiver  - Include patient/family/discharge partner in discharge Tony De Jesus

## 2021-02-14 NOTE — PAYOR COMM NOTE
--------------  DISCHARGE REVIEW    [facility]  Subscriber #:  [id number]  Authorization Number: [id number]    Admit date: 12/28/20  Admit time:  2209  Discharge Date: 2/13/2021  1:25 PM     Admitting Physician: Stephon Narayan MD  Attend

## 2021-02-26 ENCOUNTER — TELEPHONE (OUTPATIENT)
Dept: PULMONOLOGY | Facility: CLINIC | Age: 76
End: 2021-02-26

## 2021-02-26 NOTE — TELEPHONE ENCOUNTER
Current Outpatient Medications   Medication Sig Dispense Refill   • Pirfenidone 267 MG Oral Tab Take 267 mg by mouth 2 (two) times a day.  60 tablet 0

## 2021-03-01 NOTE — TELEPHONE ENCOUNTER
Attempted to call patient but, his daughter answered and stated the patient is inpateint at a rehab facility [facility]).      Spoke to [facility] to inform them we have not seen the patient since 2017 and requests should be sent to his P

## 2021-03-01 NOTE — DISCHARGE SUMMARY
[facility]    Discharge Summary    Shola Alamo Patient Status:  Inpatient    1945 MRN [id number]   Location [address] [facility] Attending No att. providers found   [address] PCP Jose Ibarra MD     Date of Admissio of care, counseling/discussion     Advance care planning     Dysphagia     Occult blood positive stool      Reason for Admission: sob     Physical Exam:   General appearance: alert, appears stated age and cooperative  Pulmonary:  clear to auscultation bila consulted GI Dr Bhavin Gayle to see --> apprec input - cont fluconazole for empiric tx of candida esophagitis   - on soft diet               FEN  Plan has diet                        DVT px  SQ lovenox higher dose     Code status Full code     dispo to acu tablet (100 mg total) by mouth daily. Quantity: 90 tablet  Refills: 0     tamsulosin HCl 0.4 MG Caps  Commonly known as: FLOMAX      Take 1 capsule (0.4 mg total) by mouth daily.    Quantity: 90 capsule  Refills: 0        STOP taking these medications  your prescriptions at the location directed by your doctor or nurse    Bring a paper prescription for each of these medications  · LORazepam 0.5 MG Tabs         Follow up Visits:  Follow-up with Dr Richelle Keys  in 1 week    Follow up Labs: none     Oth

## 2021-03-02 NOTE — TELEPHONE ENCOUNTER
Valery Henao is calling for a refill for patient never filled by Dr. Estella Molina. Was refill by Dr. Murali Rowland.       Esbriet 267 MG tablets

## 2021-03-03 ENCOUNTER — TELEPHONE (OUTPATIENT)
Dept: PULMONOLOGY | Facility: CLINIC | Age: 76
End: 2021-03-03

## 2021-03-03 NOTE — TELEPHONE ENCOUNTER
Pt's daughter Enma Nash is calling with some questions about the medication    Disp Refills Start End    Pirfenidone 267 MG Oral Tab 180 tablet 0 3/3/2021 4/2/2021    Sig - Route: Take 267 mg by mouth 2 (two) times a day.  - Oral    Sent to pharmacy as: Gomez Ibarra

## 2021-03-03 NOTE — TELEPHONE ENCOUNTER
Patient seen by Dr. Emerita Johnson in hospital 2/10/21. Currently at [facility] rehab. Please advise on refill.

## 2021-03-03 NOTE — TELEPHONE ENCOUNTER
Dr. Yair Montemayor, is patient supposed to be on Pirfenidone long term? Daughter thought it may be only 30 days? Prescribed in hospital 2/10/21.

## 2021-03-04 ENCOUNTER — TELEPHONE (OUTPATIENT)
Dept: PULMONOLOGY | Facility: CLINIC | Age: 76
End: 2021-03-04

## 2021-03-04 NOTE — TELEPHONE ENCOUNTER
Arturo Bush 2 hours ago (9:49 AM)        [facility] states PA is needed for medication.  Key number for covermymeds is [id number]              Current Outpatient Medications:   •  Pirfenidone 267 MG Oral Tab, Take 267 mg by mouth 2 (

## 2021-03-04 NOTE — TELEPHONE ENCOUNTER
[facility] states PA is needed for medication.  Key number for covermymeds is [id number]          Current Outpatient Medications:   •  Pirfenidone 267 MG Oral Tab, Take 267 mg by mouth 2 (two) times a day., Disp: 180 tablet, Rfl: 0

## 2021-03-05 NOTE — TELEPHONE ENCOUNTER
I may need to see him to complete but I suspect he is still in rehab/[facility] but not sure  Maybe we can get 1 mo extension and then go from there? ?

## 2021-03-05 NOTE — TELEPHONE ENCOUNTER
Yes    We did this once already. This is a refill after initial month.   I have not ordered very often so not sure of protocol but yes to PA if needed again

## 2021-03-08 ENCOUNTER — TELEPHONE (OUTPATIENT)
Dept: GASTROENTEROLOGY | Facility: CLINIC | Age: 76
End: 2021-03-08

## 2021-03-08 DIAGNOSIS — R19.4 CHANGE IN BOWEL HABITS: ICD-10-CM

## 2021-03-08 DIAGNOSIS — Z85.46 HISTORY OF PROSTATE CANCER: ICD-10-CM

## 2021-03-08 DIAGNOSIS — R19.7 DIARRHEA, UNSPECIFIED TYPE: ICD-10-CM

## 2021-03-08 DIAGNOSIS — R10.9 ABDOMINAL CRAMPING: ICD-10-CM

## 2021-03-08 DIAGNOSIS — R63.4 UNINTENTIONAL WEIGHT LOSS: Primary | ICD-10-CM

## 2021-03-08 NOTE — TELEPHONE ENCOUNTER
Cancelled for:  Colonoscopy [address] and EGD [address]  Provider Name:  Dr. Nikhil Welsh  Date:  3/12/21  Location:    [facility]  Sedation:  MAC  Time:  0800  Prep:  [place]  Meds/Allergies Reconciled?:  Physician reviewed   Diagnosis with codes:  Unintentional weight los

## 2021-03-08 NOTE — TELEPHONE ENCOUNTER
PA started through Cover My Meds & it was approved. PA Case: [id number], Status: Approved    Dr. Jaquez Necessary- Daughter wants clarification regarding Esbriet medication. Notes from ED discharge state to stop taking on 2/28/21   Please advise. Is this long term?  P

## 2021-03-09 NOTE — TELEPHONE ENCOUNTER
Pt's daughter states pt has 12 pills left and [facility] is going to be sending a 30 day supply and should pt get rid of the 12 or still taking them before he starts the 30 day supply.  Please advise

## 2021-03-10 NOTE — TELEPHONE ENCOUNTER
MD Basia Stanley, RN 4 hours ago (8:43 AM)   MM  No. Keep taking as prescribed. No need to toss the remaining tablets . Latoya Jacobs ..unless told differently by the pharmacy.     Message text

## 2021-03-10 NOTE — TELEPHONE ENCOUNTER
Please let her know that I have placed the order for her.  She will be contacted by [facility]   So given the fact that he already stopped it (not on my orders) then I would stop the medicine and not restart.     zee

## 2021-03-10 NOTE — TELEPHONE ENCOUNTER
Daughter stts pt stopped taking Esbriet 2/28/21 at [facility] b/c they said he didn't need med anymore according to Dr. Isaiah Davis orders.  Explained per discharge summary that it stts ask your doctor about these medications- pirfenidone 267 mg tabs take 267 mg

## 2021-03-11 ENCOUNTER — TELEPHONE (OUTPATIENT)
Dept: FAMILY MEDICINE CLINIC | Facility: CLINIC | Age: 76
End: 2021-03-11

## 2021-03-11 NOTE — TELEPHONE ENCOUNTER
[facility] calling to let us know they say him for PT eval and he is doing well. He was discharged from home PT 3/11 and given a home care plan.  No callback required unless doctor has question's for home health

## 2021-03-11 NOTE — TELEPHONE ENCOUNTER
Notified daughter of Dr. Muse Salvage orders below. Stts pt already took 1 pill of Esbriet yesterday. Voices frustration. Reassured her to follow MD's orders & to further d/w him during pt's f/u appt.  Scheduled pt 3/18 3:30 pm [facility] per her request. Appt info gi

## 2021-03-11 NOTE — TELEPHONE ENCOUNTER
Shira Bojorquez calling from [place], Would like to continue to see his patient twice a week fro the next 2 weeks     If not approved by Dr. Marty Geronimo please call Manoj Bone at [phone]    Routing as Nayeli Cardona

## 2021-03-15 ENCOUNTER — OFFICE VISIT (OUTPATIENT)
Dept: FAMILY MEDICINE CLINIC | Facility: CLINIC | Age: 76
End: 2021-03-15
Payer: MEDICARE

## 2021-03-15 VITALS
BODY MASS INDEX: 23.48 KG/M2 | HEIGHT: 70 IN | WEIGHT: 164 LBS | HEART RATE: 95 BPM | OXYGEN SATURATION: 98 % | DIASTOLIC BLOOD PRESSURE: 81 MMHG | SYSTOLIC BLOOD PRESSURE: 128 MMHG

## 2021-03-15 DIAGNOSIS — U07.1 COVID-19 VIRUS INFECTION: Primary | ICD-10-CM

## 2021-03-15 PROCEDURE — 3074F SYST BP LT 130 MM HG: CPT | Performed by: FAMILY MEDICINE

## 2021-03-15 PROCEDURE — 99213 OFFICE O/P EST LOW 20 MIN: CPT | Performed by: FAMILY MEDICINE

## 2021-03-15 PROCEDURE — 3079F DIAST BP 80-89 MM HG: CPT | Performed by: FAMILY MEDICINE

## 2021-03-15 PROCEDURE — 3008F BODY MASS INDEX DOCD: CPT | Performed by: FAMILY MEDICINE

## 2021-03-15 PROCEDURE — 1111F DSCHRG MED/CURRENT MED MERGE: CPT | Performed by: FAMILY MEDICINE

## 2021-03-15 RX ORDER — PANTOPRAZOLE SODIUM 40 MG/1
40 TABLET, DELAYED RELEASE ORAL
COMMUNITY
Start: 2021-03-02 | End: 2021-04-05

## 2021-03-15 RX ORDER — ALBUTEROL SULFATE 90 UG/1
2 AEROSOL, METERED RESPIRATORY (INHALATION) EVERY 6 HOURS PRN
COMMUNITY
Start: 2021-03-02 | End: 2021-06-19

## 2021-03-15 RX ORDER — ECHINACEA PURPUREA EXTRACT 125 MG
2 TABLET ORAL 2 TIMES DAILY
COMMUNITY
Start: 2021-03-02 | End: 2021-06-19

## 2021-03-15 NOTE — PROGRESS NOTES
3/15/2021  2:28 PM    Stephan Pete is a 76year old male. Chief complaint(s): Patient presents with:  Hospital F/U: 2/13, diagnosed with pneumonia due to Covid. HPI:     Stephan Pete primary complaint is regarding COVID 19 infection. Danielle Fowler Immunization History  Administered            Date(s) Administered    FLUAD High Dose 65 yr and older (73444)                          10/02/2017      Pneumococcal (Prevnar 13)                          01/14/2020      Pneumovax 23          08/07/2017 and palpitations. Musculoskeletal: Negative for back pain. Skin: Negative for rash. Neurological: Negative for headaches.        PHYSICAL EXAM:   VS: /81 (BP Location: Right arm, Patient Position: Sitting, Cuff Size: adult)   Pulse 95   Ht 5' 10 Disp: 30 tablet, Rfl: 2  •  allopurinol 100 MG Oral Tab, Take 1 tablet (100 mg total) by mouth daily. , Disp: 90 tablet, Rfl: 0  •  Fluticasone Propionate 50 MCG/ACT Nasal Suspension, 2 sprays by Each Nare route 2 (two) times daily.  (Patient not taking: Rep

## 2021-03-18 ENCOUNTER — HOSPITAL ENCOUNTER (OUTPATIENT)
Dept: GENERAL RADIOLOGY | Facility: HOSPITAL | Age: 76
Discharge: HOME | End: 2021-03-18
Attending: INTERNAL MEDICINE
Payer: MEDICARE

## 2021-03-18 ENCOUNTER — TELEPHONE (OUTPATIENT)
Dept: FAMILY MEDICINE CLINIC | Facility: CLINIC | Age: 76
End: 2021-03-18

## 2021-03-18 ENCOUNTER — OFFICE VISIT (OUTPATIENT)
Dept: PULMONOLOGY | Facility: CLINIC | Age: 76
End: 2021-03-18
Payer: MEDICARE

## 2021-03-18 VITALS
HEIGHT: 69 IN | SYSTOLIC BLOOD PRESSURE: 130 MMHG | BODY MASS INDEX: 26.42 KG/M2 | WEIGHT: 178.38 LBS | DIASTOLIC BLOOD PRESSURE: 75 MMHG | TEMPERATURE: 98 F | RESPIRATION RATE: 18 BRPM | OXYGEN SATURATION: 99 % | HEART RATE: 95 BPM

## 2021-03-18 DIAGNOSIS — Z87.09 ARDS SURVIVOR: ICD-10-CM

## 2021-03-18 DIAGNOSIS — U07.1 COVID-19: ICD-10-CM

## 2021-03-18 DIAGNOSIS — R09.02 HYPOXEMIA: ICD-10-CM

## 2021-03-18 DIAGNOSIS — U07.1 COVID-19: Primary | ICD-10-CM

## 2021-03-18 DIAGNOSIS — R53.81 DEBILITATED PATIENT: ICD-10-CM

## 2021-03-18 DIAGNOSIS — U07.1 COVID-19 VIRUS INFECTION: Primary | ICD-10-CM

## 2021-03-18 PROBLEM — Z87.442 HISTORY OF KIDNEY STONES: Status: RESOLVED | Noted: 2020-01-14 | Resolved: 2021-03-18

## 2021-03-18 PROBLEM — N28.9 RENAL INSUFFICIENCY: Status: RESOLVED | Noted: 2017-08-15 | Resolved: 2021-03-18

## 2021-03-18 PROBLEM — Z86.711 HISTORY OF PULMONARY EMBOLISM: Status: RESOLVED | Noted: 2020-02-14 | Resolved: 2021-03-18

## 2021-03-18 PROBLEM — Z71.89 ADVANCE CARE PLANNING: Status: RESOLVED | Noted: 2021-01-21 | Resolved: 2021-03-18

## 2021-03-18 PROBLEM — J18.9 PNEUMONIA OF RIGHT LUNG DUE TO INFECTIOUS ORGANISM: Status: RESOLVED | Noted: 2017-09-05 | Resolved: 2021-03-18

## 2021-03-18 PROBLEM — R10.84 GENERALIZED ABDOMINAL PAIN: Status: RESOLVED | Noted: 2019-12-31 | Resolved: 2021-03-18

## 2021-03-18 PROBLEM — Z71.89 GOALS OF CARE, COUNSELING/DISCUSSION: Status: RESOLVED | Noted: 2021-01-21 | Resolved: 2021-03-18

## 2021-03-18 PROBLEM — R79.1 SUPRATHERAPEUTIC INR: Status: RESOLVED | Noted: 2017-08-15 | Resolved: 2021-03-18

## 2021-03-18 PROBLEM — K59.1 FUNCTIONAL DIARRHEA: Status: RESOLVED | Noted: 2019-12-31 | Resolved: 2021-03-18

## 2021-03-18 PROCEDURE — 71046 X-RAY EXAM CHEST 2 VIEWS: CPT | Performed by: INTERNAL MEDICINE

## 2021-03-18 PROCEDURE — 99214 OFFICE O/P EST MOD 30 MIN: CPT | Performed by: INTERNAL MEDICINE

## 2021-03-18 NOTE — PROGRESS NOTES
Pulmonary Follow Up Note    HPI:   Remberto Smalls is a 76year old male with Patient presents with:   Follow - Up: f/u    Dedra Aviles MD    Pt with COVID-19 ARDS  In hosp for 46 days  D c to rehab 2/12/21    He was started on pirfenidone while in p Tab Take 267 mg by mouth 2 (two) times a day. 180 tablet 0   • Fluticasone Propionate 50 MCG/ACT Nasal Suspension 2 sprays by Each Nare route 2 (two) times daily.  (Patient not taking: Reported on 3/15/2021 )  0   • Enoxaparin Sodium 60 MG/0.6ML Subcutaneou Partner Violence:       Fear of Current or Ex-Partner:       Emotionally Abused:       Physically Abused:       Sexually Abused:             PHYSICAL EXAM:   /75   Pulse 95   Temp 98.3 °F (36.8 °C)   Resp 18   Ht 5' 9\" (1.753 m)   Wt 178 lb 6.4 oz ( disease  DAISY Short acting antimuscarinic agent (ipratropium)  TBM Tracheobronchomalacia  VCD Vocal cord dysfunction

## 2021-03-23 ENCOUNTER — TELEPHONE (OUTPATIENT)
Dept: PULMONOLOGY | Facility: CLINIC | Age: 76
End: 2021-03-23

## 2021-03-23 NOTE — TELEPHONE ENCOUNTER
Patients daughter Marko Shah calling for patient Chest xray results, please call at:[phone],thanks.

## 2021-03-24 NOTE — TELEPHONE ENCOUNTER
Dr. Jaquez Necessary, spoke to patient and relayed message below regarding Xray results. Daughter inquiring if patient will have repeat Xray or CT later on. Please advise. Thank you.        The x ray is improved.     mjm   Written by Laurance Goodpasture, MD on 3/18/2021  8

## 2021-03-29 DIAGNOSIS — R35.1 BENIGN PROSTATIC HYPERPLASIA WITH NOCTURIA: ICD-10-CM

## 2021-03-29 DIAGNOSIS — N40.1 BENIGN PROSTATIC HYPERPLASIA WITH NOCTURIA: ICD-10-CM

## 2021-03-29 DIAGNOSIS — M1A.9XX0 CHRONIC GOUT WITHOUT TOPHUS, UNSPECIFIED CAUSE, UNSPECIFIED SITE: ICD-10-CM

## 2021-03-29 RX ORDER — FLUTICASONE PROPIONATE 50 MCG
2 SPRAY, SUSPENSION (ML) NASAL 2 TIMES DAILY
Qty: 1 INHALER | Refills: 1 | Status: SHIPPED | OUTPATIENT
Start: 2021-03-29 | End: 2021-03-29

## 2021-03-29 RX ORDER — LOSARTAN POTASSIUM 25 MG/1
25 TABLET ORAL DAILY
Qty: 90 TABLET | Refills: 0 | Status: SHIPPED | OUTPATIENT
Start: 2021-03-29 | End: 2021-08-24

## 2021-03-29 RX ORDER — ALLOPURINOL 100 MG/1
100 TABLET ORAL DAILY
Qty: 90 TABLET | Refills: 0 | Status: SHIPPED | OUTPATIENT
Start: 2021-03-29 | End: 2021-06-19

## 2021-03-29 RX ORDER — FLUTICASONE PROPIONATE 50 MCG
SPRAY, SUSPENSION (ML) NASAL
Qty: 48 G | Refills: 0 | Status: SHIPPED | OUTPATIENT
Start: 2021-03-29 | End: 2021-06-19

## 2021-03-29 RX ORDER — TAMSULOSIN HYDROCHLORIDE 0.4 MG/1
0.4 CAPSULE ORAL DAILY
Qty: 90 CAPSULE | Refills: 0 | Status: SHIPPED | OUTPATIENT
Start: 2021-03-29 | End: 2021-06-19

## 2021-03-29 RX ORDER — ENOXAPARIN SODIUM 100 MG/ML
0.6 INJECTION SUBCUTANEOUS EVERY 12 HOURS SCHEDULED
Refills: 0 | OUTPATIENT
Start: 2021-03-29

## 2021-03-30 RX ORDER — LORAZEPAM 0.5 MG/1
0.5 TABLET ORAL EVERY 6 HOURS PRN
Qty: 10 TABLET | Refills: 0 | Status: SHIPPED | OUTPATIENT
Start: 2021-03-30 | End: 2021-04-05

## 2021-04-02 ENCOUNTER — NURSE TRIAGE (OUTPATIENT)
Dept: FAMILY MEDICINE CLINIC | Facility: CLINIC | Age: 76
End: 2021-04-02

## 2021-04-02 RX ORDER — BENZONATATE 200 MG/1
200 CAPSULE ORAL 3 TIMES DAILY PRN
Qty: 30 CAPSULE | Refills: 0 | Status: SHIPPED | OUTPATIENT
Start: 2021-04-02 | End: 2021-04-14

## 2021-04-02 NOTE — TELEPHONE ENCOUNTER
Pt's daughter contacted. She verified Pt's name and . Informed her of Marily's message. She verbalized understanding.

## 2021-04-02 NOTE — TELEPHONE ENCOUNTER
Order placed for tessalon perles-further orders per pulmonology or Dr Jasmin Contreras.  If symptoms worsen, to ER

## 2021-04-02 NOTE — TELEPHONE ENCOUNTER
Action Requested: Summary for Provider     []  Critical Lab, Recommendations Needed  [] Need Additional Advice  []   FYI    []   Need Orders  [] Need Medications Sent to Pharmacy  []  Other     SUMMARY: Patient's dtg(Patience) called stated patient has at t

## 2021-04-02 NOTE — TELEPHONE ENCOUNTER
Per Dr. Marta Soliman office note 3/18/21:     ASSESSMENT/PLAN:      (U07.1) COVID-19  (primary encounter diagnosis)  C/b \"Covid lung\"  Long stay and d c from rehab 2 weeks ago'  Slowly improving  Off perfenidone  Plan:      Spoke to Volodymyr Carrillo at [address]

## 2021-04-03 ENCOUNTER — TELEPHONE (OUTPATIENT)
Dept: FAMILY MEDICINE CLINIC | Facility: CLINIC | Age: 76
End: 2021-04-03

## 2021-04-03 NOTE — TELEPHONE ENCOUNTER
Pharmacy is requesting alternative for the following medication. •  benzonatate 200 MG Oral Cap, Take 1 capsule (200 mg total) by mouth 3 (three) times daily as needed for cough. , Disp: 30 capsule, Rfl: 0    Message: Drug not covered by patient plan.

## 2021-04-05 ENCOUNTER — OFFICE VISIT (OUTPATIENT)
Dept: FAMILY MEDICINE CLINIC | Facility: CLINIC | Age: 76
End: 2021-04-05
Payer: MEDICARE

## 2021-04-05 VITALS
DIASTOLIC BLOOD PRESSURE: 78 MMHG | OXYGEN SATURATION: 80 % | WEIGHT: 179 LBS | BODY MASS INDEX: 26.51 KG/M2 | HEIGHT: 69 IN | SYSTOLIC BLOOD PRESSURE: 128 MMHG | HEART RATE: 97 BPM

## 2021-04-05 DIAGNOSIS — R06.00 DYSPNEA ON EXERTION: ICD-10-CM

## 2021-04-05 DIAGNOSIS — U07.1 COVID-19 VIRUS INFECTION: Primary | ICD-10-CM

## 2021-04-05 PROCEDURE — 99213 OFFICE O/P EST LOW 20 MIN: CPT | Performed by: FAMILY MEDICINE

## 2021-04-05 RX ORDER — LORAZEPAM 0.5 MG/1
0.5 TABLET ORAL NIGHTLY
Qty: 30 TABLET | Refills: 1 | Status: SHIPPED | OUTPATIENT
Start: 2021-04-05 | End: 2021-12-28

## 2021-04-05 RX ORDER — PANTOPRAZOLE SODIUM 40 MG/1
40 TABLET, DELAYED RELEASE ORAL
Qty: 30 TABLET | Refills: 3 | Status: SHIPPED | OUTPATIENT
Start: 2021-04-05 | End: 2021-12-28

## 2021-04-05 RX ORDER — PANCRELIPASE 60000; 12000; 38000 [USP'U]/1; [USP'U]/1; [USP'U]/1
1 CAPSULE, DELAYED RELEASE PELLETS ORAL DAILY
Qty: 90 CAPSULE | Refills: 1 | Status: SHIPPED | OUTPATIENT
Start: 2021-04-05 | End: 2021-05-05

## 2021-04-05 NOTE — PROGRESS NOTES
4/5/2021  4:56 PM    Iban Sanders is a 76year old male.     Chief complaint(s): Patient presents with:  Covid: f/u  Abdominal Pain: abdominal pain and diarrhea x5   Blood Pressure: questions regarding his bp in the mornings being high even after [place] History    Tobacco Use      Smoking status: Former Smoker        Packs/day: 1.00        Years: 30.00        Pack years: 30        Types: Cigarettes        Quit date: 1987        Years since quittin.6      Smokeless tobacco: Never Used    Vaping Us tablet (81 mg total) by mouth daily. 30 tablet 2       Allergies:  No Known Allergies      ROS:   Review of Systems   Constitutional: Negative for appetite change, fatigue and fever. Respiratory: Negative for cough, shortness of breath and wheezing.     C LUNGS/PLEURA: Coarse interstitial abnormality with some patchy ground-glass attenuation opacity has improved since previous. Biapical pleural scarring. BONES: No fracture or visible bony lesion.   Changes related to diffuse idiopathic skeletal hyperostosis mouth daily. , Disp: 30 tablet, Rfl: 0  •  aspirin 81 MG Oral Tab EC, Take 1 tablet (81 mg total) by mouth daily. , Disp: 30 tablet, Rfl: 2         Refills Given today:    Requested Prescriptions     Signed Prescriptions Disp Refills   • LORazepam 0.5 MG Ora

## 2021-04-14 RX ORDER — BENZONATATE 200 MG/1
200 CAPSULE ORAL 3 TIMES DAILY PRN
Qty: 30 CAPSULE | Refills: 0 | OUTPATIENT
Start: 2021-04-14

## 2021-04-15 RX ORDER — BENZONATATE 200 MG/1
200 CAPSULE ORAL 3 TIMES DAILY PRN
Qty: 30 CAPSULE | Refills: 0 | Status: SHIPPED | OUTPATIENT
Start: 2021-04-15 | End: 2021-08-24

## 2021-04-15 RX ORDER — BENZONATATE 200 MG/1
CAPSULE ORAL
Qty: 30 CAPSULE | Refills: 0 | OUTPATIENT
Start: 2021-04-15

## 2021-04-23 ENCOUNTER — TELEPHONE (OUTPATIENT)
Dept: PULMONOLOGY | Facility: CLINIC | Age: 76
End: 2021-04-23

## 2021-05-04 ENCOUNTER — LAB ENCOUNTER (OUTPATIENT)
Dept: LAB | Facility: HOSPITAL | Age: 76
End: 2021-05-04
Attending: INTERNAL MEDICINE
Payer: MEDICARE

## 2021-05-04 DIAGNOSIS — Z87.09 ARDS SURVIVOR: ICD-10-CM

## 2021-05-07 ENCOUNTER — HOSPITAL ENCOUNTER (OUTPATIENT)
Dept: RESPIRATORY THERAPY | Facility: HOSPITAL | Age: 76
Discharge: HOME | End: 2021-05-07
Attending: INTERNAL MEDICINE
Payer: MEDICARE

## 2021-05-07 DIAGNOSIS — U07.1 COVID-19: ICD-10-CM

## 2021-05-07 PROCEDURE — 94729 DIFFUSING CAPACITY: CPT | Performed by: INTERNAL MEDICINE

## 2021-05-07 PROCEDURE — 94726 PLETHYSMOGRAPHY LUNG VOLUMES: CPT | Performed by: INTERNAL MEDICINE

## 2021-05-07 PROCEDURE — 94060 EVALUATION OF WHEEZING: CPT | Performed by: INTERNAL MEDICINE

## 2021-05-08 ENCOUNTER — OFFICE VISIT (OUTPATIENT)
Dept: FAMILY MEDICINE CLINIC | Facility: CLINIC | Age: 76
End: 2021-05-08
Payer: MEDICARE

## 2021-05-08 VITALS
DIASTOLIC BLOOD PRESSURE: 90 MMHG | SYSTOLIC BLOOD PRESSURE: 160 MMHG | OXYGEN SATURATION: 98 % | HEIGHT: 69 IN | HEART RATE: 78 BPM | WEIGHT: 189.38 LBS | BODY MASS INDEX: 28.05 KG/M2

## 2021-05-08 DIAGNOSIS — I10 ESSENTIAL HYPERTENSION: ICD-10-CM

## 2021-05-08 DIAGNOSIS — R06.00 DYSPNEA ON EXERTION: ICD-10-CM

## 2021-05-08 DIAGNOSIS — U07.1 COVID-19 VIRUS INFECTION: Primary | ICD-10-CM

## 2021-05-08 PROCEDURE — 99213 OFFICE O/P EST LOW 20 MIN: CPT | Performed by: FAMILY MEDICINE

## 2021-05-08 NOTE — PROGRESS NOTES
5/8/2021  12:44 PM    Fatimah Wilson is a 76year old male. Chief complaint(s): Patient presents with:   Follow - Up: continues to have high blood pressure readings in the morning   Dyspnea/ HTN  HPI:     Fatimah Wilson primary complaint is regardi years: 30        Types: Cigarettes        Quit date: 1987        Years since quittin.6      Smokeless tobacco: Never Used    Vaping Use      Vaping Use: Never used    Alcohol use: Not Currently      Comment: occ- none recently    Drug use:  No for appetite change, fatigue and fever. Respiratory: Positive for shortness of breath. Cardiovascular: Negative for chest pain. Gastrointestinal: Negative for abdominal pain. Musculoskeletal: Negative for myalgias. Skin: Negative for rash.    Thelma Disp: 90 tablet, Rfl: 0  •  FLUTICASONE PROPIONATE 50 MCG/ACT Nasal Suspension, SHAKE LIQUID AND USE 2 SPRAYS IN EACH NOSTRIL TWICE DAILY, Disp: 48 g, Rfl: 0  •  Albuterol Sulfate  (90 Base) MCG/ACT Inhalation Aero Soln, Inhale 2 puffs into the lung

## 2021-05-11 NOTE — PROCEDURES
[address] [facility] 1945 MRN [id number]   Height  71 inh  Age 76year old   Weight  179 lbs  Sex Male         Spirometry:   FEV1 1.83 L which is 62%  FEV1/FVC 85% which is normal      FVL:

## 2021-05-11 NOTE — ADDENDUM NOTE
Encounter addended by: Guanako Padgett MD on: 5/11/2021 2:25 PM   Actions taken: Clinical Note Signed, Charge Capture section accepted

## 2021-05-17 ENCOUNTER — TELEPHONE (OUTPATIENT)
Dept: PULMONOLOGY | Facility: CLINIC | Age: 76
End: 2021-05-17

## 2021-05-17 NOTE — TELEPHONE ENCOUNTER
Pt's daughter Mick is calling to get results from pt's PFT test that was done on May 7th.  Please call

## 2021-05-18 NOTE — TELEPHONE ENCOUNTER
Spoke with daughter Jose Dickey (AVERY on file) informed her of Dr. Mackey message. Daughter verbalized understanding.

## 2021-05-18 NOTE — TELEPHONE ENCOUNTER
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 This is from his \"COVID lung fibrosis\". This is about what I expected to find on the PFTs. He should come see me in clinic next month to follow up.

## 2021-06-02 RX ORDER — MULTIPLE VITAMINS W/ MINERALS TAB 9MG-400MCG
1 TAB ORAL DAILY
Qty: 30 TABLET | Refills: 0 | OUTPATIENT
Start: 2021-06-02

## 2021-06-02 RX ORDER — LORAZEPAM 0.5 MG/1
0.5 TABLET ORAL EVERY 6 HOURS PRN
Qty: 10 TABLET | Refills: 0 | OUTPATIENT
Start: 2021-06-02

## 2021-06-17 ENCOUNTER — HOSPITAL ENCOUNTER (OUTPATIENT)
Dept: GENERAL RADIOLOGY | Facility: HOSPITAL | Age: 76
Discharge: HOME | End: 2021-06-17
Attending: INTERNAL MEDICINE
Payer: MEDICARE

## 2021-06-17 ENCOUNTER — OFFICE VISIT (OUTPATIENT)
Dept: PULMONOLOGY | Facility: CLINIC | Age: 76
End: 2021-06-17
Payer: MEDICARE

## 2021-06-17 VITALS
DIASTOLIC BLOOD PRESSURE: 87 MMHG | OXYGEN SATURATION: 95 % | HEART RATE: 89 BPM | SYSTOLIC BLOOD PRESSURE: 146 MMHG | RESPIRATION RATE: 18 BRPM | BODY MASS INDEX: 29.15 KG/M2 | WEIGHT: 196.81 LBS | HEIGHT: 69 IN

## 2021-06-17 DIAGNOSIS — I27.20 PULMONARY HYPERTENSION (HCC): ICD-10-CM

## 2021-06-17 DIAGNOSIS — F32.9 REACTIVE DEPRESSION: ICD-10-CM

## 2021-06-17 DIAGNOSIS — Z87.09 ARDS SURVIVOR: ICD-10-CM

## 2021-06-17 DIAGNOSIS — R09.02 HYPOXEMIA: Primary | ICD-10-CM

## 2021-06-17 DIAGNOSIS — R09.02 HYPOXEMIA: ICD-10-CM

## 2021-06-17 DIAGNOSIS — J80 ACUTE RESPIRATORY DISTRESS SYNDROME (ARDS) DUE TO COVID-19 VIRUS (HCC): ICD-10-CM

## 2021-06-17 DIAGNOSIS — U07.1 ACUTE RESPIRATORY DISTRESS SYNDROME (ARDS) DUE TO COVID-19 VIRUS (HCC): ICD-10-CM

## 2021-06-17 PROCEDURE — 71046 X-RAY EXAM CHEST 2 VIEWS: CPT | Performed by: INTERNAL MEDICINE

## 2021-06-17 PROCEDURE — 99214 OFFICE O/P EST MOD 30 MIN: CPT | Performed by: INTERNAL MEDICINE

## 2021-06-17 NOTE — PROGRESS NOTES
Pulmonary Follow Up Note    HPI:   Jose Pandey is a 76year old male with Patient presents with:   Follow - Up: Check up    Amadou Kelley MD    Doing well    Off O2 x 2 weeks    Walking 2 blocks every day   DUNNE 2 blocks  No cough  No wheeze    No Reported on 6/17/2021) 48 g 0   • Albuterol Sulfate  (90 Base) MCG/ACT Inhalation Aero Soln Inhale 2 puffs into the lungs every 6 (six) hours as needed.  (Patient not taking: Reported on 6/17/2021 )     • Saline Nasal Spray 0.65 % Nasal Solution 2 sp +   (needed 2 L, desat 87 5 on RA)    (Z87.09) ARDS survivor  Doing well now  Finished PT/OT  Walking daily  Exercise intolerance improving  Regained lost weight  Plan:     Cont daily exercise  Counseling and support    (F32.9) Reactive depression  better

## 2021-06-19 ENCOUNTER — OFFICE VISIT (OUTPATIENT)
Dept: FAMILY MEDICINE CLINIC | Facility: CLINIC | Age: 76
End: 2021-06-19
Payer: MEDICARE

## 2021-06-19 VITALS
HEART RATE: 69 BPM | DIASTOLIC BLOOD PRESSURE: 89 MMHG | WEIGHT: 193.63 LBS | SYSTOLIC BLOOD PRESSURE: 157 MMHG | HEIGHT: 69 IN | BODY MASS INDEX: 28.68 KG/M2

## 2021-06-19 DIAGNOSIS — R06.00 DYSPNEA ON EXERTION: ICD-10-CM

## 2021-06-19 DIAGNOSIS — U07.1 COVID-19 VIRUS INFECTION: Primary | ICD-10-CM

## 2021-06-19 DIAGNOSIS — I10 ESSENTIAL HYPERTENSION: ICD-10-CM

## 2021-06-19 PROCEDURE — 99213 OFFICE O/P EST LOW 20 MIN: CPT | Performed by: FAMILY MEDICINE

## 2021-06-19 RX ORDER — TAMSULOSIN HYDROCHLORIDE 0.4 MG/1
0.4 CAPSULE ORAL DAILY
Qty: 90 CAPSULE | Refills: 1 | Status: SHIPPED | OUTPATIENT
Start: 2021-06-19 | End: 2021-08-24

## 2021-06-19 RX ORDER — LOSARTAN POTASSIUM AND HYDROCHLOROTHIAZIDE 12.5; 1 MG/1; MG/1
1 TABLET ORAL DAILY
Qty: 30 TABLET | Refills: 3 | Status: SHIPPED | OUTPATIENT
Start: 2021-06-19 | End: 2021-08-24

## 2021-06-19 RX ORDER — ALLOPURINOL 100 MG/1
100 TABLET ORAL DAILY
Qty: 90 TABLET | Refills: 1 | Status: SHIPPED | OUTPATIENT
Start: 2021-06-19

## 2021-06-19 NOTE — PROGRESS NOTES
6/19/2021  9:22 AM    Maru Martin is a 76year old male. Chief complaint(s): Patient presents with:  Covid: f/u     HPI:     Maru Martin primary complaint is regarding as above.      Patient is a 55-year-old male with long history hypertensio Smokeless tobacco: Never Used    Vaping Use      Vaping Use: Never used    Alcohol use: Not Currently      Comment: occ- none recently    Drug use: No       Immunizations:     Immunization History  Administered            Date(s) Administered    Covid-19 V 157/89 (BP Location: Right arm, Patient Position: Sitting, Cuff Size: adult)   Pulse 69   Ht 5' 9\" (1.753 m)   Wt 193 lb 9.6 oz (87.8 kg)   BMI 28.59 kg/m²     Physical Exam  Vitals reviewed. Constitutional:       Appearance: Normal appearance.  He is we (primary encounter diagnosis)  Dyspnea on exertion  Essential hypertension    1. COVID-19 virus infection  2.  Dyspnea on exertion    MEDICATIONS:   CPM     REFERRALS: CPM with Pulmonologist     RECOMMENDATIONS given include: Patient was reassured of  his allopurinol 100 MG Oral Tab 90 tablet 1     Sig: Take 1 tablet (100 mg total) by mouth daily. • tamsulosin (FLOMAX) cap 90 capsule 1     Sig: Take 1 capsule (0.4 mg total) by mouth daily.        Imaging & Referrals:  None         Berenice Olmedo MD

## 2021-07-19 ENCOUNTER — TELEPHONE (OUTPATIENT)
Dept: PULMONOLOGY | Facility: CLINIC | Age: 76
End: 2021-07-19

## 2021-08-24 ENCOUNTER — LAB ENCOUNTER (OUTPATIENT)
Dept: LAB | Age: 76
End: 2021-08-24
Attending: FAMILY MEDICINE
Payer: MEDICARE

## 2021-08-24 ENCOUNTER — OFFICE VISIT (OUTPATIENT)
Dept: FAMILY MEDICINE CLINIC | Facility: CLINIC | Age: 76
End: 2021-08-24
Payer: MEDICARE

## 2021-08-24 VITALS
HEART RATE: 91 BPM | HEIGHT: 69 IN | SYSTOLIC BLOOD PRESSURE: 128 MMHG | WEIGHT: 188.63 LBS | DIASTOLIC BLOOD PRESSURE: 77 MMHG | BODY MASS INDEX: 27.94 KG/M2

## 2021-08-24 DIAGNOSIS — Z85.46 HISTORY OF PROSTATE CANCER: ICD-10-CM

## 2021-08-24 DIAGNOSIS — N40.1 BENIGN PROSTATIC HYPERPLASIA WITH NOCTURIA: ICD-10-CM

## 2021-08-24 DIAGNOSIS — I10 ESSENTIAL HYPERTENSION: Primary | ICD-10-CM

## 2021-08-24 DIAGNOSIS — R35.1 BENIGN PROSTATIC HYPERPLASIA WITH NOCTURIA: ICD-10-CM

## 2021-08-24 DIAGNOSIS — Z87.19 HISTORY OF PANCREATITIS: ICD-10-CM

## 2021-08-24 LAB — COMPLEXED PSA SERPL-MCNC: 0.32 NG/ML (ref ?–4)

## 2021-08-24 PROCEDURE — 36415 COLL VENOUS BLD VENIPUNCTURE: CPT

## 2021-08-24 PROCEDURE — 99213 OFFICE O/P EST LOW 20 MIN: CPT | Performed by: FAMILY MEDICINE

## 2021-08-24 RX ORDER — LOSARTAN POTASSIUM AND HYDROCHLOROTHIAZIDE 12.5; 1 MG/1; MG/1
1 TABLET ORAL DAILY
Qty: 30 TABLET | Refills: 3 | Status: SHIPPED | OUTPATIENT
Start: 2021-08-24 | End: 2021-12-28

## 2021-08-24 RX ORDER — TAMSULOSIN HYDROCHLORIDE 0.4 MG/1
0.4 CAPSULE ORAL DAILY
Qty: 90 CAPSULE | Refills: 1 | Status: SHIPPED | OUTPATIENT
Start: 2021-08-24

## 2021-08-24 RX ORDER — PANCRELIPASE 60000; 12000; 38000 [USP'U]/1; [USP'U]/1; [USP'U]/1
CAPSULE, DELAYED RELEASE PELLETS ORAL
COMMUNITY

## 2021-08-24 NOTE — PROGRESS NOTES
8/24/2021  1:02 PM    Shola Alamo is a 76year old male. Chief complaint(s): Patient presents with:  Dizziness    HPI:     Shola Alamo primary complaint is regarding multiple complaints.      Yanick Dorado a 76year old male presents with Problem Relation Age of Onset   • Blood Clotting Disorder Other    • Cancer Other       Social History: Social History    Tobacco Use      Smoking status: Former Smoker        Packs/day: 1.00        Years: 30.00        Pack years: 30        Types: Alfonso Butt Gastrointestinal: Negative for abdominal pain, constipation, diarrhea, nausea and vomiting. Genitourinary: Negative for dysuria and hematuria. Musculoskeletal: Negative for myalgias. Skin: Negative for rash. Neurological: Positive for dizziness. stress reduction. FOLLOW-UP: Schedule a follow-up visit in 6 months. 2. Benign prostatic hyperplasia with nocturia    3.  History of prostate cancer    MEDICATIONS:  CPM         LABORATORY & ORDERS: Orders Placed This Encounter      PSA (Screening

## 2021-09-04 ENCOUNTER — HOSPITAL ENCOUNTER (OUTPATIENT)
Dept: CV DIAGNOSTICS | Facility: HOSPITAL | Age: 76
Discharge: HOME | End: 2021-09-04
Attending: INTERNAL MEDICINE
Payer: MEDICARE

## 2021-09-04 DIAGNOSIS — I27.20 PULMONARY HYPERTENSION (HCC): ICD-10-CM

## 2021-09-04 PROCEDURE — 93306 TTE W/DOPPLER COMPLETE: CPT | Performed by: INTERNAL MEDICINE

## 2021-09-07 ENCOUNTER — HOSPITAL ENCOUNTER (OUTPATIENT)
Dept: ULTRASOUND IMAGING | Facility: HOSPITAL | Age: 76
Discharge: HOME | End: 2021-09-07
Attending: FAMILY MEDICINE
Payer: MEDICARE

## 2021-09-07 DIAGNOSIS — Z87.19 HISTORY OF PANCREATITIS: ICD-10-CM

## 2021-09-07 PROCEDURE — 76700 US EXAM ABDOM COMPLETE: CPT | Performed by: FAMILY MEDICINE

## 2021-09-08 ENCOUNTER — TELEPHONE (OUTPATIENT)
Dept: PULMONOLOGY | Facility: CLINIC | Age: 76
End: 2021-09-08

## 2021-09-16 ENCOUNTER — TELEPHONE (OUTPATIENT)
Dept: PULMONOLOGY | Facility: CLINIC | Age: 76
End: 2021-09-16

## 2021-09-16 ENCOUNTER — TELEPHONE (OUTPATIENT)
Dept: FAMILY MEDICINE CLINIC | Facility: CLINIC | Age: 76
End: 2021-09-16

## 2021-09-16 NOTE — TELEPHONE ENCOUNTER
Daughter, Endy Cabrera, calling for US results. HIPPA verified. Patient's next scheduled appt was at the end of December. appt scheduled for 10/4/21 to discuss results. Is this date ok, or do you want to see patient sooner? Please advise. Thanks.     Fut

## 2021-09-17 NOTE — TELEPHONE ENCOUNTER
Your echo test has improved. There is nearly normal pulmonary hypertension now. This is good news. You have something called diastolic dysfunction. This can be associated with heart failure but I do not think that is an issue at this time.  Happy to discuss

## 2021-09-17 NOTE — TELEPHONE ENCOUNTER
Yes, you can informed him that the ultrasound only showed fatty liver. Needs to decrease fatty foods intake, exercise and recheck in 1 year.

## 2021-09-17 NOTE — TELEPHONE ENCOUNTER
Spoke to Martín Leblanc, on AVERY. Went over Dr Guerrero Richards response with daughter. Advised to keep appt as scheduled.

## 2021-09-17 NOTE — TELEPHONE ENCOUNTER
Spoke with patient using language line [id number].  Relayed test results, patient wanted to know if Dr Kristen Guerrero felt he should follow up with cardiologist.

## 2021-09-27 NOTE — TELEPHONE ENCOUNTER
Discussed Dr. Keyonna Red orders below w/ pt's daughter. Reassured her that they may further discuss at upcoming appt. She voiced understanding.

## 2021-10-04 ENCOUNTER — OFFICE VISIT (OUTPATIENT)
Dept: FAMILY MEDICINE CLINIC | Facility: CLINIC | Age: 76
End: 2021-10-04
Payer: MEDICARE

## 2021-10-04 VITALS
HEIGHT: 69 IN | HEART RATE: 86 BPM | SYSTOLIC BLOOD PRESSURE: 128 MMHG | WEIGHT: 191 LBS | DIASTOLIC BLOOD PRESSURE: 70 MMHG | BODY MASS INDEX: 28.29 KG/M2

## 2021-10-04 DIAGNOSIS — N40.1 BENIGN PROSTATIC HYPERPLASIA WITH NOCTURIA: Primary | ICD-10-CM

## 2021-10-04 DIAGNOSIS — K76.0 FATTY LIVER: ICD-10-CM

## 2021-10-04 DIAGNOSIS — Z85.46 HISTORY OF PROSTATE CANCER: ICD-10-CM

## 2021-10-04 DIAGNOSIS — R35.1 BENIGN PROSTATIC HYPERPLASIA WITH NOCTURIA: Primary | ICD-10-CM

## 2021-10-04 PROCEDURE — 90662 IIV NO PRSV INCREASED AG IM: CPT | Performed by: FAMILY MEDICINE

## 2021-10-04 PROCEDURE — G0008 ADMIN INFLUENZA VIRUS VAC: HCPCS | Performed by: FAMILY MEDICINE

## 2021-10-04 PROCEDURE — 99213 OFFICE O/P EST LOW 20 MIN: CPT | Performed by: FAMILY MEDICINE

## 2021-10-04 NOTE — PROGRESS NOTES
10/4/2021  3:58 PM    Lan Haile is a 76year old male. Chief complaint(s): Patient presents with:  Test Results: U/S of the abd    HPI:     Manus Bowalcides primary complaint is regarding multiple issues.      Steven Velasco presents with a nilam PRSV Free (89765)                          10/04/2021      FLUAD High Dose 65 yr and older (63082)                          10/02/2017      Pneumococcal (Prevnar 13)                          01/14/2020      Pneumovax 23          08/07/2017      Medications effort is normal.   Musculoskeletal:      Cervical back: Neck supple. Skin:     Findings: No rash. Psychiatric:         Behavior: Behavior is cooperative.          LABORATORY RESULTS:   No results found for: FRANSICO Akers aneurysmal dilatation aorta iliacs.     Dictated by (CST): Apurva Baumann MD on 9/07/2021 at 1:18 PM     Finalized by (CST): Apurva Baumann MD on 9/07/2021 at 1:23 PM             ASSESSMENT/PLAN:   Assessment   Benign prostatic hyperplasia with no

## 2021-11-23 ENCOUNTER — LAB ENCOUNTER (OUTPATIENT)
Dept: LAB | Facility: HOSPITAL | Age: 76
End: 2021-11-23
Attending: INTERNAL MEDICINE
Payer: MEDICARE

## 2021-11-23 DIAGNOSIS — U07.1 ACUTE RESPIRATORY DISTRESS SYNDROME (ARDS) DUE TO COVID-19 VIRUS (HCC): ICD-10-CM

## 2021-11-23 DIAGNOSIS — J80 ACUTE RESPIRATORY DISTRESS SYNDROME (ARDS) DUE TO COVID-19 VIRUS (HCC): ICD-10-CM

## 2021-11-23 DIAGNOSIS — Z87.09 ARDS SURVIVOR: ICD-10-CM

## 2021-11-26 ENCOUNTER — HOSPITAL ENCOUNTER (OUTPATIENT)
Dept: RESPIRATORY THERAPY | Facility: HOSPITAL | Age: 76
Discharge: HOME | End: 2021-11-26
Attending: INTERNAL MEDICINE
Payer: MEDICARE

## 2021-11-26 DIAGNOSIS — Z87.09 ARDS SURVIVOR: ICD-10-CM

## 2021-11-26 PROCEDURE — 94726 PLETHYSMOGRAPHY LUNG VOLUMES: CPT | Performed by: INTERNAL MEDICINE

## 2021-11-26 PROCEDURE — 94010 BREATHING CAPACITY TEST: CPT | Performed by: INTERNAL MEDICINE

## 2021-11-26 PROCEDURE — 94729 DIFFUSING CAPACITY: CPT | Performed by: INTERNAL MEDICINE

## 2021-12-08 NOTE — PROCEDURES
[facility]     Pulmonary Function Test     [facility] Patient Status:  Outpatient    1945 MRN [id number]   Date of Exam 21 PCP Amadou Kelley MD           Spirometry   FEV1: 2.93 101%  FVC: 3.34 86%  FEV1/FVC: 0.88

## 2021-12-16 ENCOUNTER — HOSPITAL ENCOUNTER (OUTPATIENT)
Dept: GENERAL RADIOLOGY | Facility: HOSPITAL | Age: 76
Discharge: HOME | End: 2021-12-16
Attending: INTERNAL MEDICINE
Payer: MEDICARE

## 2021-12-16 ENCOUNTER — OFFICE VISIT (OUTPATIENT)
Dept: PULMONOLOGY | Facility: CLINIC | Age: 76
End: 2021-12-16
Payer: MEDICARE

## 2021-12-16 VITALS
BODY MASS INDEX: 28.73 KG/M2 | RESPIRATION RATE: 18 BRPM | WEIGHT: 194 LBS | OXYGEN SATURATION: 95 % | HEIGHT: 69 IN | DIASTOLIC BLOOD PRESSURE: 80 MMHG | SYSTOLIC BLOOD PRESSURE: 136 MMHG | TEMPERATURE: 98 F | HEART RATE: 86 BPM

## 2021-12-16 DIAGNOSIS — J84.10 PULMONARY FIBROSIS (HCC): ICD-10-CM

## 2021-12-16 DIAGNOSIS — R09.02 HYPOXEMIA: ICD-10-CM

## 2021-12-16 DIAGNOSIS — U07.1 COVID-19: ICD-10-CM

## 2021-12-16 DIAGNOSIS — U07.1 COVID-19: Primary | ICD-10-CM

## 2021-12-16 PROCEDURE — 94761 N-INVAS EAR/PLS OXIMETRY MLT: CPT | Performed by: INTERNAL MEDICINE

## 2021-12-16 PROCEDURE — 71046 X-RAY EXAM CHEST 2 VIEWS: CPT | Performed by: INTERNAL MEDICINE

## 2021-12-16 PROCEDURE — 99214 OFFICE O/P EST MOD 30 MIN: CPT | Performed by: INTERNAL MEDICINE

## 2021-12-16 NOTE — PROGRESS NOTES
Order for overnight oximetry on room air faxed to [facility] with demographics facesheet attached. Confirmation received. Patient and family were given phone number as well.

## 2021-12-18 NOTE — PROGRESS NOTES
Pulmonary Follow Up Note    HPI:   Teagan Wild is a 68year old male with Patient presents with:   Follow - Up: 6 months check up    Jose Ibarra MD    Doing well  Now feels nearly back to baseline  Mild DUNNE at times  No f/c  No n/v  No cough   No Vaping Use: Never used    Substance and Sexual Activity      Alcohol use: Not Currently        Comment: occ- none recently      Drug use:  No              PHYSICAL EXAM:   /80   Pulse 86   Temp 98.1 °F (36.7 °C)   Resp 18   Ht 5' 9\" (1.753 m)   Wt 19

## 2021-12-21 ENCOUNTER — PATIENT MESSAGE (OUTPATIENT)
Dept: FAMILY MEDICINE CLINIC | Facility: CLINIC | Age: 76
End: 2021-12-21

## 2021-12-22 ENCOUNTER — TELEPHONE (OUTPATIENT)
Dept: FAMILY MEDICINE CLINIC | Facility: CLINIC | Age: 76
End: 2021-12-22

## 2021-12-22 NOTE — TELEPHONE ENCOUNTER
See TE 12-22-21    Future Appointments   Date Time Provider Jesus Cuevas   12/27/2021 10:30 AM Jenni Larose MD [facility]

## 2021-12-22 NOTE — TELEPHONE ENCOUNTER
RN pls call pt and triage or offer ov if needed, thanks.        From: Zurdo Gilliam  To: Shaina Mota MD  Sent: 12/21/2021  9:48 PM CST  Subject: Lin Brown has been experiencing dizzy spells and weakened feeling

## 2021-12-22 NOTE — TELEPHONE ENCOUNTER
From: José Antonio Ayers  To: Nj Strong MD  Sent: 12/21/2021 9:48 PM CST  Subject: Harlee Mcburney has been experiencing dizzy spells and weakened feeling

## 2021-12-28 ENCOUNTER — OFFICE VISIT (OUTPATIENT)
Dept: FAMILY MEDICINE CLINIC | Facility: CLINIC | Age: 76
End: 2021-12-28
Payer: MEDICARE

## 2021-12-28 VITALS
WEIGHT: 190 LBS | BODY MASS INDEX: 28.14 KG/M2 | HEIGHT: 69 IN | HEART RATE: 85 BPM | DIASTOLIC BLOOD PRESSURE: 76 MMHG | SYSTOLIC BLOOD PRESSURE: 130 MMHG

## 2021-12-28 DIAGNOSIS — R35.1 BENIGN PROSTATIC HYPERPLASIA WITH NOCTURIA: ICD-10-CM

## 2021-12-28 DIAGNOSIS — Z85.46 HISTORY OF PROSTATE CANCER: ICD-10-CM

## 2021-12-28 DIAGNOSIS — I10 ESSENTIAL HYPERTENSION: Primary | ICD-10-CM

## 2021-12-28 DIAGNOSIS — N40.1 BENIGN PROSTATIC HYPERPLASIA WITH NOCTURIA: ICD-10-CM

## 2021-12-28 PROCEDURE — 99213 OFFICE O/P EST LOW 20 MIN: CPT | Performed by: FAMILY MEDICINE

## 2021-12-28 RX ORDER — LOSARTAN POTASSIUM AND HYDROCHLOROTHIAZIDE 12.5; 1 MG/1; MG/1
1 TABLET ORAL DAILY
Qty: 30 TABLET | Refills: 3 | Status: SHIPPED | OUTPATIENT
Start: 2021-12-28

## 2021-12-28 RX ORDER — ASPIRIN 81 MG/1
81 TABLET ORAL DAILY
Qty: 30 TABLET | Refills: 2 | Status: SHIPPED | OUTPATIENT
Start: 2021-12-28

## 2021-12-28 NOTE — PROGRESS NOTES
12/28/2021  1:34 PM    Heriberto Dickinson is a 68year old male. Chief complaint(s): Patient presents with:  HTN: f/u  Prostate Problem    HPI:     Heriberto Dickinson primary complaint is regarding as above.      Steven NAPOLES 83 year old male presents w years: 30        Types: Cigarettes        Quit date: 1987        Years since quittin.3      Smokeless tobacco: Never Used    Vaping Use      Vaping Use: Never used    Alcohol use: Not Currently      Comment: occ- none recently    Drug use:  No (86.2 kg)   BMI 28.06 kg/m²     Physical Exam  Vitals reviewed. Constitutional:       Appearance: Normal appearance. He is well-developed. HENT:      Head: Normocephalic. Eyes:      General: No scleral icterus.      Conjunctiva/sclera: Conjunctivae no aspirin 81 MG Oral Tab EC 30 tablet 2     Sig: Take 1 tablet (81 mg total) by mouth daily. LABORATORY & ORDERS: No orders of the defined types were placed in this encounter.     RECOMMENDATIONS given include: avoid pseudoephedrine or other stimulants/d

## 2022-01-11 ENCOUNTER — TELEPHONE (OUTPATIENT)
Dept: PULMONOLOGY | Facility: CLINIC | Age: 77
End: 2022-01-11

## 2022-02-11 ENCOUNTER — PATIENT MESSAGE (OUTPATIENT)
Dept: PULMONOLOGY | Facility: CLINIC | Age: 77
End: 2022-02-11

## 2022-02-11 ENCOUNTER — TELEPHONE (OUTPATIENT)
Dept: PULMONOLOGY | Facility: CLINIC | Age: 77
End: 2022-02-11

## 2022-02-11 NOTE — TELEPHONE ENCOUNTER
Dr. Berkley Landaverde, patient is looking for overnight oximetry test. It was scan on 1/25/22 under Device Report/Media

## 2022-02-11 NOTE — TELEPHONE ENCOUNTER
From: Uriah Stoddard  To: Angeli Verde MD  Sent: 2/11/2022 10:17 AM CST  Subject: Overnight oximetry test    Have you received the results for the overnight pulse oximetry test that was done ?

## 2022-02-11 NOTE — TELEPHONE ENCOUNTER
----- Message from [facility] sent at 2/11/2022 10:17 AM CST -----  Regarding: Overnight oximetry test  Have you received the results for the overnight pulse oximetry test that was done ?

## 2022-03-01 ENCOUNTER — OFFICE VISIT (OUTPATIENT)
Dept: SURGERY | Facility: CLINIC | Age: 77
End: 2022-03-01
Payer: MEDICARE

## 2022-03-01 VITALS
WEIGHT: 190 LBS | BODY MASS INDEX: 28 KG/M2 | DIASTOLIC BLOOD PRESSURE: 80 MMHG | SYSTOLIC BLOOD PRESSURE: 151 MMHG | HEART RATE: 77 BPM

## 2022-03-01 DIAGNOSIS — Z85.46 HISTORY OF PROSTATE CANCER: Primary | ICD-10-CM

## 2022-03-01 PROCEDURE — 99204 OFFICE O/P NEW MOD 45 MIN: CPT | Performed by: UROLOGY

## 2022-03-05 RX ORDER — TAMSULOSIN HYDROCHLORIDE 0.4 MG/1
0.4 CAPSULE ORAL DAILY
Qty: 90 CAPSULE | Refills: 1 | Status: SHIPPED | OUTPATIENT
Start: 2022-03-05

## 2022-03-05 NOTE — TELEPHONE ENCOUNTER
Please review refill protocol failed/ no protocol  Requested Prescriptions   Pending Prescriptions Disp Refills    tamsulosin (FLOMAX) cap [Pharmacy Med Name: TAMSULOSIN 0.4MG CAPSULES] 90 capsule 1     Sig: TAKE 1 CAPSULE(0.4 MG) BY MOUTH DAILY        There is no refill protocol information for this order

## 2022-03-14 NOTE — PHYSICAL THERAPY NOTE
PHYSICAL THERAPY TREATMENT NOTE - INPATIENT     Room Number: 659/196-K       Presenting Problem: +COVID-19 (12/22/2020); acute respiratory failure w/ hypoxia; pneumonia; sepsis    Problem List  Principal Problem:    Acute respiratory failure with hypoxia ( BEARING RESTRICTION      no restrictions              PAIN ASSESSMENT   Ratin  Location: abdomen/ribs  Management Techniques: Relaxation;Repositioning; Activity promotion; Body mechanics;Breathing techniques    BALANCE able to demonstrate transfer sit<>stand requiring Supervision    Goal #2  Current Status CG for 4 reps    Goal #3 Patient is able to ambulate 25 feet with assist device: walker - rolling at assistance level: Heather with spO2>95%  UPDATED: 2/8/21  Patient is No

## 2022-04-08 ENCOUNTER — TELEPHONE (OUTPATIENT)
Dept: PULMONOLOGY | Facility: CLINIC | Age: 77
End: 2022-04-08

## 2022-04-08 NOTE — TELEPHONE ENCOUNTER
Received fax from [facility] requesting recent notes and oxygen saturation results. Faxed all notes. Received fax, sent out for scanning.

## 2022-04-14 RX ORDER — ALLOPURINOL 100 MG/1
100 TABLET ORAL DAILY
Qty: 90 TABLET | Refills: 1 | Status: SHIPPED | OUTPATIENT
Start: 2022-04-14

## 2022-05-03 RX ORDER — LOSARTAN POTASSIUM AND HYDROCHLOROTHIAZIDE 12.5; 1 MG/1; MG/1
1 TABLET ORAL DAILY
Qty: 90 TABLET | Refills: 1 | Status: SHIPPED | OUTPATIENT
Start: 2022-05-03

## 2022-05-05 ENCOUNTER — PATIENT MESSAGE (OUTPATIENT)
Dept: PULMONOLOGY | Facility: CLINIC | Age: 77
End: 2022-05-05

## 2022-06-20 ENCOUNTER — TELEPHONE (OUTPATIENT)
Dept: CASE MANAGEMENT | Age: 77
End: 2022-06-20

## 2022-06-20 DIAGNOSIS — Z01.00 ROUTINE EYE EXAM: Primary | ICD-10-CM

## 2022-06-20 NOTE — TELEPHONE ENCOUNTER
Dr. Yevgeniy Oliver,    Dr Emilia Menais office requesting referral for routine eye exam.    Pended referral please review diagnosis and sign off if you agree. Thank you.   Remy Keen

## 2022-07-14 NOTE — RESPIRATORY THERAPY NOTE
Patient was received on [facility] Patient was weaned to a nasal canula at 8 LPM, tolerating well. Heart failure

## 2022-07-26 ENCOUNTER — MED REC SCAN ONLY (OUTPATIENT)
Dept: FAMILY MEDICINE CLINIC | Facility: CLINIC | Age: 77
End: 2022-07-26

## 2022-07-28 ENCOUNTER — OFFICE VISIT (OUTPATIENT)
Dept: PULMONOLOGY | Facility: CLINIC | Age: 77
End: 2022-07-28
Payer: MEDICARE

## 2022-07-28 VITALS
SYSTOLIC BLOOD PRESSURE: 149 MMHG | OXYGEN SATURATION: 96 % | HEART RATE: 69 BPM | BODY MASS INDEX: 29.03 KG/M2 | DIASTOLIC BLOOD PRESSURE: 95 MMHG | WEIGHT: 196 LBS | RESPIRATION RATE: 20 BRPM | HEIGHT: 69 IN

## 2022-07-28 DIAGNOSIS — Z87.09 ARDS SURVIVOR: ICD-10-CM

## 2022-07-28 DIAGNOSIS — U07.1 COVID-19: Primary | ICD-10-CM

## 2022-07-28 PROCEDURE — 99213 OFFICE O/P EST LOW 20 MIN: CPT | Performed by: INTERNAL MEDICINE

## 2022-08-18 ENCOUNTER — OFFICE VISIT (OUTPATIENT)
Dept: FAMILY MEDICINE CLINIC | Facility: CLINIC | Age: 77
End: 2022-08-18
Payer: MEDICARE

## 2022-08-18 VITALS
DIASTOLIC BLOOD PRESSURE: 78 MMHG | HEART RATE: 73 BPM | HEIGHT: 69 IN | WEIGHT: 196 LBS | SYSTOLIC BLOOD PRESSURE: 139 MMHG | BODY MASS INDEX: 29.03 KG/M2

## 2022-08-18 DIAGNOSIS — I10 ESSENTIAL HYPERTENSION: Primary | ICD-10-CM

## 2022-08-18 PROCEDURE — 99213 OFFICE O/P EST LOW 20 MIN: CPT | Performed by: FAMILY MEDICINE

## 2022-08-18 RX ORDER — AMOXICILLIN 500 MG/1
TABLET, FILM COATED ORAL
COMMUNITY
Start: 2022-04-13 | End: 2022-08-18

## 2022-08-18 RX ORDER — LOSARTAN POTASSIUM AND HYDROCHLOROTHIAZIDE 12.5; 1 MG/1; MG/1
1 TABLET ORAL DAILY
Qty: 90 TABLET | Refills: 1 | Status: SHIPPED | OUTPATIENT
Start: 2022-08-18

## 2022-08-18 RX ORDER — AMLODIPINE BESYLATE 5 MG/1
5 TABLET ORAL DAILY
Qty: 30 TABLET | Refills: 1 | Status: SHIPPED | OUTPATIENT
Start: 2022-08-18

## 2022-08-18 RX ORDER — PANTOPRAZOLE SODIUM 40 MG/1
TABLET, DELAYED RELEASE ORAL
COMMUNITY
Start: 2022-06-02

## 2022-08-18 RX ORDER — TAMSULOSIN HYDROCHLORIDE 0.4 MG/1
0.4 CAPSULE ORAL DAILY
Qty: 90 CAPSULE | Refills: 1 | Status: SHIPPED | OUTPATIENT
Start: 2022-08-18

## 2022-08-27 ENCOUNTER — PATIENT MESSAGE (OUTPATIENT)
Dept: FAMILY MEDICINE CLINIC | Facility: CLINIC | Age: 77
End: 2022-08-27

## 2022-08-27 RX ORDER — AMLODIPINE BESYLATE 5 MG/1
5 TABLET ORAL DAILY
Qty: 90 TABLET | Refills: 0 | Status: SHIPPED | OUTPATIENT
Start: 2022-08-27

## 2022-10-12 ENCOUNTER — OFFICE VISIT (OUTPATIENT)
Dept: FAMILY MEDICINE CLINIC | Facility: CLINIC | Age: 77
End: 2022-10-12
Payer: MEDICARE

## 2022-10-12 VITALS
SYSTOLIC BLOOD PRESSURE: 125 MMHG | HEIGHT: 69 IN | HEART RATE: 93 BPM | BODY MASS INDEX: 29.59 KG/M2 | WEIGHT: 199.81 LBS | DIASTOLIC BLOOD PRESSURE: 74 MMHG

## 2022-10-12 DIAGNOSIS — R35.1 BENIGN PROSTATIC HYPERPLASIA WITH NOCTURIA: ICD-10-CM

## 2022-10-12 DIAGNOSIS — K76.0 FATTY LIVER: ICD-10-CM

## 2022-10-12 DIAGNOSIS — N40.1 BENIGN PROSTATIC HYPERPLASIA WITH NOCTURIA: ICD-10-CM

## 2022-10-12 DIAGNOSIS — I10 ESSENTIAL HYPERTENSION: Primary | ICD-10-CM

## 2022-10-12 DIAGNOSIS — Z85.46 HISTORY OF PROSTATE CANCER: ICD-10-CM

## 2022-10-12 PROCEDURE — G0009 ADMIN PNEUMOCOCCAL VACCINE: HCPCS | Performed by: FAMILY MEDICINE

## 2022-10-12 PROCEDURE — G0008 ADMIN INFLUENZA VIRUS VAC: HCPCS | Performed by: FAMILY MEDICINE

## 2022-10-12 PROCEDURE — 90677 PCV20 VACCINE IM: CPT | Performed by: FAMILY MEDICINE

## 2022-10-12 PROCEDURE — 90662 IIV NO PRSV INCREASED AG IM: CPT | Performed by: FAMILY MEDICINE

## 2022-10-12 PROCEDURE — 99213 OFFICE O/P EST LOW 20 MIN: CPT | Performed by: FAMILY MEDICINE

## 2022-10-12 RX ORDER — TAMSULOSIN HYDROCHLORIDE 0.4 MG/1
0.4 CAPSULE ORAL DAILY
Qty: 90 CAPSULE | Refills: 2 | Status: SHIPPED | OUTPATIENT
Start: 2022-10-12

## 2022-10-12 RX ORDER — LOSARTAN POTASSIUM AND HYDROCHLOROTHIAZIDE 12.5; 1 MG/1; MG/1
1 TABLET ORAL DAILY
Qty: 90 TABLET | Refills: 2 | Status: SHIPPED | OUTPATIENT
Start: 2022-10-12

## 2022-10-12 RX ORDER — AMLODIPINE BESYLATE 5 MG/1
5 TABLET ORAL DAILY
Qty: 90 TABLET | Refills: 2 | Status: SHIPPED | OUTPATIENT
Start: 2022-10-12

## 2022-10-14 NOTE — TELEPHONE ENCOUNTER
Please review; protocol failed/no protocol.      Requested Prescriptions   Pending Prescriptions Disp Refills    ALLOPURINOL 100 MG Oral Tab [Pharmacy Med Name: ALLOPURINOL 100MG TABLETS] 90 tablet 1     Sig: TAKE 1 TABLET(100 MG) BY MOUTH DAILY        There is no refill protocol information for this order            Recent Outpatient Visits              2 days ago Essential hypertension    [address], Cordell Miller MD    Office Visit    1 month ago Essential hypertension    [address], Cordell Miller MD    Office Visit    2 months ago [facility], [address] [place] [address], Khari Diamond MD    Office Visit    7 months ago History of prostate cancer    [facility], [address], Erin Talbot MD    Office Visit    9 months ago Essential hypertension    [address], Manny Sewell MD    Office Visit             Future Appointments         Provider Department Appt Notes    In 6 months Sandip Miller MD [address] [place] Follow up

## 2022-10-16 RX ORDER — ALLOPURINOL 100 MG/1
100 TABLET ORAL DAILY
Qty: 90 TABLET | Refills: 1 | Status: SHIPPED | OUTPATIENT
Start: 2022-10-16

## 2022-10-18 ENCOUNTER — LAB ENCOUNTER (OUTPATIENT)
Dept: LAB | Age: 77
End: 2022-10-18
Attending: FAMILY MEDICINE
Payer: MEDICARE

## 2022-10-18 ENCOUNTER — PATIENT MESSAGE (OUTPATIENT)
Dept: FAMILY MEDICINE CLINIC | Facility: CLINIC | Age: 77
End: 2022-10-18

## 2022-10-18 DIAGNOSIS — I10 ESSENTIAL HYPERTENSION: ICD-10-CM

## 2022-10-18 DIAGNOSIS — K76.0 FATTY LIVER: ICD-10-CM

## 2022-10-18 LAB
ALBUMIN SERPL-MCNC: 4.1 G/DL (ref 3.4–5)
ALBUMIN/GLOB SERPL: 0.9 {RATIO} (ref 1–2)
ALP LIVER SERPL-CCNC: 96 U/L
ALT SERPL-CCNC: 35 U/L
ANION GAP SERPL CALC-SCNC: 8 MMOL/L (ref 0–18)
AST SERPL-CCNC: 24 U/L (ref 15–37)
BASOPHILS # BLD AUTO: 0.06 X10(3) UL (ref 0–0.2)
BASOPHILS NFR BLD AUTO: 0.9 %
BILIRUB SERPL-MCNC: 0.5 MG/DL (ref 0.1–2)
BUN BLD-MCNC: 22 MG/DL (ref 7–18)
BUN/CREAT SERPL: 15.7 (ref 10–20)
CALCIUM BLD-MCNC: 9.9 MG/DL (ref 8.5–10.1)
CHLORIDE SERPL-SCNC: 106 MMOL/L (ref 98–112)
CHOLEST SERPL-MCNC: 142 MG/DL (ref ?–200)
CO2 SERPL-SCNC: 25 MMOL/L (ref 21–32)
CREAT BLD-MCNC: 1.4 MG/DL
DEPRECATED RDW RBC AUTO: 40.7 FL (ref 35.1–46.3)
EOSINOPHIL # BLD AUTO: 0.39 X10(3) UL (ref 0–0.7)
EOSINOPHIL NFR BLD AUTO: 5.7 %
ERYTHROCYTE [DISTWIDTH] IN BLOOD BY AUTOMATED COUNT: 13.2 % (ref 11–15)
FASTING PATIENT LIPID ANSWER: YES
FASTING STATUS PATIENT QL REPORTED: YES
GFR SERPLBLD BASED ON 1.73 SQ M-ARVRAT: 52 ML/MIN/1.73M2 (ref 60–?)
GLOBULIN PLAS-MCNC: 4.4 G/DL (ref 2.8–4.4)
GLUCOSE BLD-MCNC: 91 MG/DL (ref 70–99)
HCT VFR BLD AUTO: 48.6 %
HDLC SERPL-MCNC: 32 MG/DL (ref 40–59)
HGB BLD-MCNC: 16.5 G/DL
IMM GRANULOCYTES # BLD AUTO: 0.06 X10(3) UL (ref 0–1)
IMM GRANULOCYTES NFR BLD: 0.9 %
LDLC SERPL CALC-MCNC: 77 MG/DL (ref ?–100)
LYMPHOCYTES # BLD AUTO: 1.59 X10(3) UL (ref 1–4)
LYMPHOCYTES NFR BLD AUTO: 23.2 %
MCH RBC QN AUTO: 28.8 PG (ref 26–34)
MCHC RBC AUTO-ENTMCNC: 34 G/DL (ref 31–37)
MCV RBC AUTO: 85 FL
MONOCYTES # BLD AUTO: 0.64 X10(3) UL (ref 0.1–1)
MONOCYTES NFR BLD AUTO: 9.3 %
NEUTROPHILS # BLD AUTO: 4.12 X10 (3) UL (ref 1.5–7.7)
NEUTROPHILS # BLD AUTO: 4.12 X10(3) UL (ref 1.5–7.7)
NEUTROPHILS NFR BLD AUTO: 60 %
NONHDLC SERPL-MCNC: 110 MG/DL (ref ?–130)
OSMOLALITY SERPL CALC.SUM OF ELEC: 291 MOSM/KG (ref 275–295)
PLATELET # BLD AUTO: 193 10(3)UL (ref 150–450)
POTASSIUM SERPL-SCNC: 3.8 MMOL/L (ref 3.5–5.1)
PROT SERPL-MCNC: 8.5 G/DL (ref 6.4–8.2)
PSA SERPL-MCNC: 0.35 NG/ML (ref ?–4)
RBC # BLD AUTO: 5.72 X10(6)UL
SODIUM SERPL-SCNC: 139 MMOL/L (ref 136–145)
TRIGL SERPL-MCNC: 193 MG/DL (ref 30–149)
VLDLC SERPL CALC-MCNC: 30 MG/DL (ref 0–30)
WBC # BLD AUTO: 6.9 X10(3) UL (ref 4–11)

## 2022-10-18 PROCEDURE — 84153 ASSAY OF PSA TOTAL: CPT | Performed by: FAMILY MEDICINE

## 2022-10-18 PROCEDURE — 80053 COMPREHEN METABOLIC PANEL: CPT

## 2022-10-18 PROCEDURE — 36415 COLL VENOUS BLD VENIPUNCTURE: CPT

## 2022-10-18 PROCEDURE — 85025 COMPLETE CBC W/AUTO DIFF WBC: CPT

## 2022-10-18 PROCEDURE — 80061 LIPID PANEL: CPT

## 2022-12-06 RX ORDER — PANCRELIPASE 60000; 12000; 38000 [USP'U]/1; [USP'U]/1; [USP'U]/1
CAPSULE, DELAYED RELEASE PELLETS ORAL
Qty: 90 CAPSULE | Refills: 0 | Status: SHIPPED | OUTPATIENT
Start: 2022-12-06

## 2022-12-06 RX ORDER — PANTOPRAZOLE SODIUM 40 MG/1
TABLET, DELAYED RELEASE ORAL
Qty: 30 TABLET | Refills: 3 | Status: SHIPPED | OUTPATIENT
Start: 2022-12-06

## 2022-12-06 NOTE — TELEPHONE ENCOUNTER
Please review.   Medications are reported external.   Requested Prescriptions   Pending Prescriptions Disp Refills    PANTOPRAZOLE 40 MG Oral Tab EC [Pharmacy Med Name: PANTOPRAZOLE 40MG TABLETS] 30 tablet 3     Sig: TAKE 1 TABLET(40 MG) BY MOUTH EVERY MORNING BEFORE BREAKFAST       Gastrointestional Medication Protocol Passed - 12/5/2022 12:31 PM        Passed - In person appointment or virtual visit in the past 12 mos or appointment in next 3 mos     Recent Outpatient Visits              1 month ago Essential hypertension    [facility]    Office Visit    3 months ago Essential hypertension    [facility] [place] Carol[facility] Easter[facility]    Office Visit    4 months ago [facility], [address] [place] [address], [place], Teresa Soliz MD    Office Visit    9 months ago History of prostate cancer    [facility], [address], Robert Sarkar MD    Office Visit    11 months ago Essential hypertension    [facility]    Office Visit          Future Appointments         Provider Department Appt Notes    In 4 months Radha Oconnor MD [facility] [place]                 CREON [id number] units Oral Cap DR [employer] [place] Med Name: CREON 12,000 CAPSULES] 90 capsule 0     Sig: TAKE 1 CAPSULE BY MOUTH DAILY       Gastrointestional Medication Protocol Passed - 12/5/2022 12:31 PM        Passed - In person appointment or virtual visit in the past 12 mos or appointment in next 3 mos     Recent Outpatient Visits              1 month ago Essential hypertension    [facility]    Office Visit    3 months ago Essential hypertension    [facility] [place]    Office Visit    [facility] months [facility] [facility], [address] [place] [address] Omid Dasilva MD    Office Visit    9 months ago History of prostate cancer    [facility], [address], Saul Anthony MD    Office Visit    11 months ago Essential hypertension    [facility], [address], Burgess Haris MD    Office Visit          Future Appointments         Provider Department Appt Notes    In 4 months Salas Puga MD [facility], [address], [place] Follow up                   Recent Outpatient Visits              1 month ago Essential hypertension    [facility], [address], Burgess Haris MD    Office Visit    3 months ago Essential hypertension    [facility], [address], Burgess Haris MD    Office Visit    4 months ago [address] [place] [address], [address] [place] [address] Omid Dasilva MD    Office Visit    9 months ago History of prostate cancer    [facility], [address], Saul Anthony MD    Office Visit    11 months ago Essential hypertension    [facility], [address], Burgess Haris MD    Office Visit           Future Appointments         Provider Department Appt Notes    In 4 months Salas Puga MD [facility], [facility], [address], [place] Follow up

## 2023-01-06 DIAGNOSIS — I10 ESSENTIAL HYPERTENSION: ICD-10-CM

## 2023-01-07 RX ORDER — LOSARTAN POTASSIUM AND HYDROCHLOROTHIAZIDE 12.5; 1 MG/1; MG/1
1 TABLET ORAL DAILY
Qty: 90 TABLET | Refills: 2 | OUTPATIENT
Start: 2023-01-07

## 2023-04-03 RX ORDER — ALLOPURINOL 100 MG/1
TABLET ORAL
Qty: 90 TABLET | Refills: 1 | Status: SHIPPED | OUTPATIENT
Start: 2023-04-03

## 2023-04-12 ENCOUNTER — OFFICE VISIT (OUTPATIENT)
Dept: FAMILY MEDICINE CLINIC | Facility: CLINIC | Age: 78
End: 2023-04-12

## 2023-04-12 ENCOUNTER — TELEPHONE (OUTPATIENT)
Dept: FAMILY MEDICINE CLINIC | Facility: CLINIC | Age: 78
End: 2023-04-12

## 2023-04-12 VITALS — SYSTOLIC BLOOD PRESSURE: 137 MMHG | WEIGHT: 204.19 LBS | DIASTOLIC BLOOD PRESSURE: 80 MMHG | BODY MASS INDEX: 30 KG/M2

## 2023-04-12 DIAGNOSIS — R73.9 HYPERGLYCEMIA: ICD-10-CM

## 2023-04-12 DIAGNOSIS — Z85.46 HISTORY OF PROSTATE CANCER: ICD-10-CM

## 2023-04-12 DIAGNOSIS — N40.1 BENIGN PROSTATIC HYPERPLASIA WITH NOCTURIA: ICD-10-CM

## 2023-04-12 DIAGNOSIS — H53.8 BLURRED VISION: ICD-10-CM

## 2023-04-12 DIAGNOSIS — I10 ESSENTIAL HYPERTENSION: ICD-10-CM

## 2023-04-12 DIAGNOSIS — I83.229 VARICOSE VEINS OF LEFT LOWER EXTREMITY WITH ULCER AND INFLAMMATION (HCC): ICD-10-CM

## 2023-04-12 DIAGNOSIS — K76.0 FATTY LIVER: ICD-10-CM

## 2023-04-12 DIAGNOSIS — Z91.81 RISK FOR FALLS: ICD-10-CM

## 2023-04-12 DIAGNOSIS — L97.929 VARICOSE VEINS OF LEFT LOWER EXTREMITY WITH ULCER AND INFLAMMATION (HCC): ICD-10-CM

## 2023-04-12 DIAGNOSIS — R35.1 BENIGN PROSTATIC HYPERPLASIA WITH NOCTURIA: ICD-10-CM

## 2023-04-12 DIAGNOSIS — N18.1 CHRONIC RENAL IMPAIRMENT, STAGE 1: ICD-10-CM

## 2023-04-12 DIAGNOSIS — N20.1 URETEROLITHIASIS: ICD-10-CM

## 2023-04-12 DIAGNOSIS — Z86.79 HISTORY OF PSVT (PAROXYSMAL SUPRAVENTRICULAR TACHYCARDIA): ICD-10-CM

## 2023-04-12 DIAGNOSIS — Z12.11 COLON CANCER SCREENING: ICD-10-CM

## 2023-04-12 DIAGNOSIS — Z00.00 MEDICARE ANNUAL WELLNESS VISIT, SUBSEQUENT: Primary | ICD-10-CM

## 2023-04-12 DIAGNOSIS — Z86.711 HISTORY OF PULMONARY EMBOLISM: ICD-10-CM

## 2023-04-12 DIAGNOSIS — E55.9 VITAMIN D DEFICIENCY: ICD-10-CM

## 2023-04-12 DIAGNOSIS — L43.9 LICHEN PLANUS: ICD-10-CM

## 2023-04-12 PROBLEM — A41.89 VIRAL SEPSIS: Status: RESOLVED | Noted: 2020-12-28 | Resolved: 2023-04-12

## 2023-04-12 PROBLEM — Z87.09 ARDS SURVIVOR: Status: RESOLVED | Noted: 2017-09-05 | Resolved: 2023-04-12

## 2023-04-12 PROBLEM — A41.89 VIRAL SEPSIS (HCC): Status: RESOLVED | Noted: 2020-12-28 | Resolved: 2023-04-12

## 2023-04-12 PROBLEM — N28.9 ACUTE RENAL INSUFFICIENCY: Status: RESOLVED | Noted: 2020-12-28 | Resolved: 2023-04-12

## 2023-04-12 PROBLEM — J96.01 ACUTE RESPIRATORY FAILURE WITH HYPOXIA (HCC): Status: RESOLVED | Noted: 2020-12-28 | Resolved: 2023-04-12

## 2023-04-12 PROBLEM — R93.3 ABNORMAL CT SCAN, SIGMOID COLON: Status: RESOLVED | Noted: 2020-02-14 | Resolved: 2023-04-12

## 2023-04-12 PROBLEM — B97.89 VIRAL SEPSIS (HCC): Status: RESOLVED | Noted: 2020-12-28 | Resolved: 2023-04-12

## 2023-04-12 PROBLEM — B97.89 VIRAL SEPSIS: Status: RESOLVED | Noted: 2020-12-28 | Resolved: 2023-04-12

## 2023-04-12 PROBLEM — R63.0 LOSS OF APPETITE: Status: RESOLVED | Noted: 2019-12-31 | Resolved: 2023-04-12

## 2023-04-12 PROBLEM — I47.10 PAROXYSMAL SVT (SUPRAVENTRICULAR TACHYCARDIA) (HCC): Status: RESOLVED | Noted: 2017-11-28 | Resolved: 2023-04-12

## 2023-04-12 PROBLEM — U07.1 COVID-19: Status: RESOLVED | Noted: 2020-12-28 | Resolved: 2023-04-12

## 2023-04-12 PROBLEM — I47.1 PAROXYSMAL SVT (SUPRAVENTRICULAR TACHYCARDIA) (HCC): Status: RESOLVED | Noted: 2017-11-28 | Resolved: 2023-04-12

## 2023-04-12 PROBLEM — R31.0 GROSS HEMATURIA: Status: RESOLVED | Noted: 2017-09-09 | Resolved: 2023-04-12

## 2023-04-12 PROBLEM — I47.10 PAROXYSMAL SVT (SUPRAVENTRICULAR TACHYCARDIA): Status: RESOLVED | Noted: 2017-11-28 | Resolved: 2023-04-12

## 2023-04-12 PROCEDURE — G0439 PPPS, SUBSEQ VISIT: HCPCS | Performed by: FAMILY MEDICINE

## 2023-04-12 NOTE — TELEPHONE ENCOUNTER
[id number] [facility] indicated that the fluocinonide cream is not covered see alternatives below. Cash price is $99.  Had them put in the goodrx. com coupon and came up to $36.36. With  Jesica Covarrubias #[id number], Left message to call back-transfer to triage.

## 2023-04-13 ENCOUNTER — LAB ENCOUNTER (OUTPATIENT)
Dept: LAB | Age: 78
End: 2023-04-13
Attending: FAMILY MEDICINE
Payer: MEDICARE

## 2023-04-13 DIAGNOSIS — E55.9 VITAMIN D DEFICIENCY: ICD-10-CM

## 2023-04-13 DIAGNOSIS — K76.0 FATTY LIVER: ICD-10-CM

## 2023-04-13 DIAGNOSIS — R73.9 HYPERGLYCEMIA: ICD-10-CM

## 2023-04-13 DIAGNOSIS — I10 ESSENTIAL HYPERTENSION: ICD-10-CM

## 2023-04-13 LAB
ALBUMIN SERPL-MCNC: 3.7 G/DL (ref 3.4–5)
ALBUMIN/GLOB SERPL: 0.9 {RATIO} (ref 1–2)
ALP LIVER SERPL-CCNC: 113 U/L
ALT SERPL-CCNC: 36 U/L
ANION GAP SERPL CALC-SCNC: 7 MMOL/L (ref 0–18)
AST SERPL-CCNC: 28 U/L (ref 15–37)
BILIRUB SERPL-MCNC: 0.5 MG/DL (ref 0.1–2)
BILIRUB UR QL: NEGATIVE
BUN BLD-MCNC: 15 MG/DL (ref 7–18)
BUN/CREAT SERPL: 11.9 (ref 10–20)
CALCIUM BLD-MCNC: 9.3 MG/DL (ref 8.5–10.1)
CHLORIDE SERPL-SCNC: 109 MMOL/L (ref 98–112)
CHOLEST SERPL-MCNC: 112 MG/DL (ref ?–200)
CLARITY UR: CLEAR
CO2 SERPL-SCNC: 24 MMOL/L (ref 21–32)
CREAT BLD-MCNC: 1.26 MG/DL
EST. AVERAGE GLUCOSE BLD GHB EST-MCNC: 120 MG/DL (ref 68–126)
FASTING PATIENT LIPID ANSWER: YES
FASTING STATUS PATIENT QL REPORTED: YES
GFR SERPLBLD BASED ON 1.73 SQ M-ARVRAT: 59 ML/MIN/1.73M2 (ref 60–?)
GLOBULIN PLAS-MCNC: 4.3 G/DL (ref 2.8–4.4)
GLUCOSE BLD-MCNC: 91 MG/DL (ref 70–99)
GLUCOSE UR-MCNC: NORMAL MG/DL
HBA1C MFR BLD: 5.8 % (ref ?–5.7)
HDLC SERPL-MCNC: 34 MG/DL (ref 40–59)
KETONES UR-MCNC: NEGATIVE MG/DL
LDLC SERPL CALC-MCNC: 54 MG/DL (ref ?–100)
LEUKOCYTE ESTERASE UR QL STRIP.AUTO: NEGATIVE
NITRITE UR QL STRIP.AUTO: NEGATIVE
NONHDLC SERPL-MCNC: 78 MG/DL (ref ?–130)
OSMOLALITY SERPL CALC.SUM OF ELEC: 290 MOSM/KG (ref 275–295)
PH UR: 5.5 [PH] (ref 5–8)
POTASSIUM SERPL-SCNC: 4 MMOL/L (ref 3.5–5.1)
PROT SERPL-MCNC: 8 G/DL (ref 6.4–8.2)
PROT UR-MCNC: NEGATIVE MG/DL
PSA SERPL-MCNC: 0.3 NG/ML (ref ?–4)
SODIUM SERPL-SCNC: 140 MMOL/L (ref 136–145)
SP GR UR STRIP: 1.02 (ref 1–1.03)
T4 FREE SERPL-MCNC: 0.9 NG/DL (ref 0.8–1.7)
TRIGL SERPL-MCNC: 138 MG/DL (ref 30–149)
TSI SER-ACNC: 3.92 MIU/ML (ref 0.36–3.74)
UROBILINOGEN UR STRIP-ACNC: NORMAL
VIT D+METAB SERPL-MCNC: 23.3 NG/ML (ref 30–100)
VLDLC SERPL CALC-MCNC: 20 MG/DL (ref 0–30)

## 2023-04-13 PROCEDURE — 82306 VITAMIN D 25 HYDROXY: CPT

## 2023-04-13 PROCEDURE — 81001 URINALYSIS AUTO W/SCOPE: CPT

## 2023-04-13 PROCEDURE — 80053 COMPREHEN METABOLIC PANEL: CPT

## 2023-04-13 PROCEDURE — 84443 ASSAY THYROID STIM HORMONE: CPT

## 2023-04-13 PROCEDURE — 36415 COLL VENOUS BLD VENIPUNCTURE: CPT

## 2023-04-13 PROCEDURE — 80061 LIPID PANEL: CPT

## 2023-04-13 PROCEDURE — 84153 ASSAY OF PSA TOTAL: CPT | Performed by: FAMILY MEDICINE

## 2023-04-13 PROCEDURE — 84439 ASSAY OF FREE THYROXINE: CPT

## 2023-04-13 PROCEDURE — 83036 HEMOGLOBIN GLYCOSYLATED A1C: CPT

## 2023-04-13 RX ORDER — ERGOCALCIFEROL 1.25 MG/1
50000 CAPSULE ORAL WEEKLY
Qty: 12 CAPSULE | Refills: 4 | Status: SHIPPED | OUTPATIENT
Start: 2023-04-13 | End: 2023-05-13

## 2023-04-13 NOTE — TELEPHONE ENCOUNTER
Please either send alternative or advise why alterative is not appropriate for PA completion       The preferred alternative is [facility]ac[facility] Verduzco

## 2023-04-13 NOTE — TELEPHONE ENCOUNTER
Advised daughter Patience(on hipaa) of notes below and of Dr. Kezia Chapman note. Daughter verbalized understanding.

## 2023-04-14 DIAGNOSIS — R79.89 ELEVATED TSH: Primary | ICD-10-CM

## 2023-04-15 NOTE — TELEPHONE ENCOUNTER
Daughter Shannon Gutiérrez stated that the cream was sent to the wrong pharmacy if it can be resent to [facility] in [place]. Medication was resent to [facility] in [place].

## 2023-04-18 ENCOUNTER — PATIENT MESSAGE (OUTPATIENT)
Dept: FAMILY MEDICINE CLINIC | Facility: CLINIC | Age: 78
End: 2023-04-18

## 2023-04-18 DIAGNOSIS — H53.8 BLURRED VISION: Primary | ICD-10-CM

## 2023-04-19 NOTE — TELEPHONE ENCOUNTER
From: Jeanne Slade  To: Filipe Flannery MD  Sent: 4/18/2023 4:07 PM CDT  Subject: Eye doctor referral    Mervat Salazar,    Dr Arlette Healy office [place] [facility] [place] : [phone] . I spoke to someone last week at your office and they stated an approved sheet was on file can you please send to them or do I have to call his insurance directly? Also his next available appointment is not until 8/8/23 . Is there another eye doctor he can refer him to as that is a long time to wait for the initial consult considering the blurry vision .   My phone number is [phone]    Thank you

## 2023-04-20 ENCOUNTER — TELEPHONE (OUTPATIENT)
Dept: FAMILY MEDICINE CLINIC | Facility: CLINIC | Age: 78
End: 2023-04-20

## 2023-04-20 RX ORDER — MOMETASONE FUROATE 1 MG/G
CREAM TOPICAL
Qty: 30 G | Refills: 1 | Status: SHIPPED | OUTPATIENT
Start: 2023-04-20

## 2023-04-20 NOTE — TELEPHONE ENCOUNTER
The preferred alternative is Damien Saldana,   [facility],   [address]      See encounter 4/12 that lists preferred

## 2023-04-20 NOTE — TELEPHONE ENCOUNTER
Message: Drug not covered by [employer]. Need alternative.    halobetasol 0.05 % External Cream  & or provide reason for use/ failed alternatives/ DX to process PA

## 2023-07-25 ENCOUNTER — OFFICE VISIT (OUTPATIENT)
Dept: FAMILY MEDICINE CLINIC | Facility: CLINIC | Age: 78
End: 2023-07-25

## 2023-07-25 VITALS
DIASTOLIC BLOOD PRESSURE: 76 MMHG | TEMPERATURE: 98 F | HEART RATE: 65 BPM | WEIGHT: 204.63 LBS | BODY MASS INDEX: 30 KG/M2 | SYSTOLIC BLOOD PRESSURE: 137 MMHG

## 2023-07-25 DIAGNOSIS — J18.9 COMMUNITY ACQUIRED PNEUMONIA, UNSPECIFIED LATERALITY: Primary | ICD-10-CM

## 2023-07-25 PROCEDURE — 99213 OFFICE O/P EST LOW 20 MIN: CPT | Performed by: FAMILY MEDICINE

## 2023-07-25 RX ORDER — CODEINE PHOSPHATE AND GUAIFENESIN 10; 100 MG/5ML; MG/5ML
5 SOLUTION ORAL EVERY 6 HOURS PRN
Qty: 120 ML | Refills: 1 | Status: SHIPPED | OUTPATIENT
Start: 2023-07-25

## 2023-07-25 RX ORDER — AZITHROMYCIN 250 MG/1
TABLET, FILM COATED ORAL
Qty: 6 TABLET | Refills: 0 | Status: SHIPPED | OUTPATIENT
Start: 2023-07-25 | End: 2023-07-29

## 2023-07-26 ENCOUNTER — TELEPHONE (OUTPATIENT)
Dept: FAMILY MEDICINE CLINIC | Facility: CLINIC | Age: 78
End: 2023-07-26

## 2023-07-26 ENCOUNTER — TELEPHONE (OUTPATIENT)
Dept: CASE MANAGEMENT | Age: 78
End: 2023-07-26

## 2023-07-26 DIAGNOSIS — H53.8 BLURRED VISION: Primary | ICD-10-CM

## 2023-07-26 NOTE — TELEPHONE ENCOUNTER
Dr. Lennie Atkins,     Daughter requesting referral to   Dr. Johnson Pink for follow up visits for blurred vision. Pended referral please review diagnosis and sign off if you agree. Thank you.   Remy Keen

## 2023-07-26 NOTE — TELEPHONE ENCOUNTER
Pt is requesting refill for the following medication        guaiFENesin-codeine (CHERATUSSIN AC) 100-10 MG/5ML Oral Solution, Take 5 mL by mouth every 6 (six) hours as needed for cough. , Disp: 120 mL, Rfl: 1

## 2023-07-26 NOTE — TELEPHONE ENCOUNTER
Patient's daughter states that patient has an appointment with Dr. Stephanie Savage from Ophthalmology but they are waiting for the referral to be approved. Routing to managed care. Referred to Provider Information:  Provider Address Phone   Chidi Wilson MD [address] [facility]  [address] [facility] [phone]

## 2023-08-21 ENCOUNTER — APPOINTMENT (OUTPATIENT)
Dept: GENERAL RADIOLOGY | Facility: HOSPITAL | Age: 78
End: 2023-08-21
Attending: EMERGENCY MEDICINE
Payer: MEDICARE

## 2023-08-21 ENCOUNTER — HOSPITAL ENCOUNTER (EMERGENCY)
Facility: HOSPITAL | Age: 78
Discharge: HOME | End: 2023-08-21
Attending: EMERGENCY MEDICINE
Payer: MEDICARE

## 2023-08-21 ENCOUNTER — MED REC SCAN ONLY (OUTPATIENT)
Dept: FAMILY MEDICINE CLINIC | Facility: CLINIC | Age: 78
End: 2023-08-21

## 2023-08-21 VITALS
WEIGHT: 200 LBS | DIASTOLIC BLOOD PRESSURE: 69 MMHG | SYSTOLIC BLOOD PRESSURE: 108 MMHG | RESPIRATION RATE: 16 BRPM | BODY MASS INDEX: 29.62 KG/M2 | HEART RATE: 90 BPM | OXYGEN SATURATION: 92 % | TEMPERATURE: 99 F | HEIGHT: 69 IN

## 2023-08-21 DIAGNOSIS — J40 BRONCHITIS: Primary | ICD-10-CM

## 2023-08-21 LAB
ANION GAP SERPL CALC-SCNC: 4 MMOL/L (ref 0–18)
BASOPHILS # BLD AUTO: 0.04 X10(3) UL (ref 0–0.2)
BASOPHILS NFR BLD AUTO: 0.4 %
BUN BLD-MCNC: 27 MG/DL (ref 7–18)
BUN/CREAT SERPL: 14 (ref 10–20)
CALCIUM BLD-MCNC: 9.3 MG/DL (ref 8.5–10.1)
CHLORIDE SERPL-SCNC: 106 MMOL/L (ref 98–112)
CO2 SERPL-SCNC: 29 MMOL/L (ref 21–32)
CREAT BLD-MCNC: 1.93 MG/DL
DEPRECATED RDW RBC AUTO: 45.2 FL (ref 35.1–46.3)
EGFRCR SERPLBLD CKD-EPI 2021: 35 ML/MIN/1.73M2 (ref 60–?)
EOSINOPHIL # BLD AUTO: 0.29 X10(3) UL (ref 0–0.7)
EOSINOPHIL NFR BLD AUTO: 2.7 %
ERYTHROCYTE [DISTWIDTH] IN BLOOD BY AUTOMATED COUNT: 14.4 % (ref 11–15)
GLUCOSE BLD-MCNC: 131 MG/DL (ref 70–99)
HCT VFR BLD AUTO: 46.1 %
HGB BLD-MCNC: 15.5 G/DL
IMM GRANULOCYTES # BLD AUTO: 0.06 X10(3) UL (ref 0–1)
IMM GRANULOCYTES NFR BLD: 0.6 %
LYMPHOCYTES # BLD AUTO: 0.54 X10(3) UL (ref 1–4)
LYMPHOCYTES NFR BLD AUTO: 5 %
MCH RBC QN AUTO: 29 PG (ref 26–34)
MCHC RBC AUTO-ENTMCNC: 33.6 G/DL (ref 31–37)
MCV RBC AUTO: 86.2 FL
MONOCYTES # BLD AUTO: 0.18 X10(3) UL (ref 0.1–1)
MONOCYTES NFR BLD AUTO: 1.7 %
NEUTROPHILS # BLD AUTO: 9.73 X10 (3) UL (ref 1.5–7.7)
NEUTROPHILS # BLD AUTO: 9.73 X10(3) UL (ref 1.5–7.7)
NEUTROPHILS NFR BLD AUTO: 89.6 %
OSMOLALITY SERPL CALC.SUM OF ELEC: 295 MOSM/KG (ref 275–295)
PLATELET # BLD AUTO: 147 10(3)UL (ref 150–450)
POTASSIUM SERPL-SCNC: 4.2 MMOL/L (ref 3.5–5.1)
RBC # BLD AUTO: 5.35 X10(6)UL
SARS-COV-2 RNA RESP QL NAA+PROBE: NOT DETECTED
SODIUM SERPL-SCNC: 139 MMOL/L (ref 136–145)
WBC # BLD AUTO: 10.8 X10(3) UL (ref 4–11)

## 2023-08-21 PROCEDURE — 80048 BASIC METABOLIC PNL TOTAL CA: CPT | Performed by: EMERGENCY MEDICINE

## 2023-08-21 PROCEDURE — 99283 EMERGENCY DEPT VISIT LOW MDM: CPT

## 2023-08-21 PROCEDURE — 99284 EMERGENCY DEPT VISIT MOD MDM: CPT

## 2023-08-21 PROCEDURE — 71045 X-RAY EXAM CHEST 1 VIEW: CPT | Performed by: EMERGENCY MEDICINE

## 2023-08-21 PROCEDURE — 36415 COLL VENOUS BLD VENIPUNCTURE: CPT

## 2023-08-21 PROCEDURE — 85025 COMPLETE CBC W/AUTO DIFF WBC: CPT | Performed by: EMERGENCY MEDICINE

## 2023-10-17 RX ORDER — ALLOPURINOL 100 MG/1
100 TABLET ORAL DAILY
Qty: 90 TABLET | Refills: 1 | Status: SHIPPED | OUTPATIENT
Start: 2023-10-17 | End: 2023-10-19

## 2023-10-17 NOTE — TELEPHONE ENCOUNTER
Please review. Protocol failed / Has no protocol.      Requested Prescriptions   Pending Prescriptions Disp Refills    ALLOPURINOL 100 MG Oral Tab [Pharmacy Med Name: ALLOPURINOL 100MG TABLETS] 90 tablet 1     Sig: TAKE 1 TABLET(100 MG) BY MOUTH DAILY       There is no refill protocol information for this order        Future Appointments         Provider Department Appt Notes    In 2 days Jung Teresa MD [address] [facility], [place] 6 month follow up, policy informed, daughter scheduled, scheduled with wife           Recent Outpatient Visits              2 months ago Community acquired pneumonia, unspecified laterality    Nicole Birch, Roselyn Habermann, MD    Office Visit    6 months ago Medicare annual wellness visit, subsequent    Nicole Birch, Roselyn Habermann, MD    Office Visit    1 year ago Essential hypertension    [facility] [address] [facility], Roselyn Habermann, MD    Office Visit    1 year ago Essential hypertension    [address] [facility], Roselyn Habermann, MD    Office Visit    1 year ago COVID-19    Mariela Ramirez MD    Office Visit

## 2023-10-19 ENCOUNTER — OFFICE VISIT (OUTPATIENT)
Dept: FAMILY MEDICINE CLINIC | Facility: CLINIC | Age: 78
End: 2023-10-19
Payer: MEDICARE

## 2023-10-19 VITALS
SYSTOLIC BLOOD PRESSURE: 116 MMHG | BODY MASS INDEX: 30.07 KG/M2 | WEIGHT: 203 LBS | HEART RATE: 75 BPM | HEIGHT: 69 IN | DIASTOLIC BLOOD PRESSURE: 72 MMHG

## 2023-10-19 DIAGNOSIS — L91.8 SKIN TAG: ICD-10-CM

## 2023-10-19 DIAGNOSIS — I10 ESSENTIAL HYPERTENSION: Primary | ICD-10-CM

## 2023-10-19 PROCEDURE — 99213 OFFICE O/P EST LOW 20 MIN: CPT | Performed by: FAMILY MEDICINE

## 2023-10-19 PROCEDURE — 90662 IIV NO PRSV INCREASED AG IM: CPT | Performed by: FAMILY MEDICINE

## 2023-10-19 PROCEDURE — G0008 ADMIN INFLUENZA VIRUS VAC: HCPCS | Performed by: FAMILY MEDICINE

## 2023-10-19 RX ORDER — LOSARTAN POTASSIUM AND HYDROCHLOROTHIAZIDE 12.5; 1 MG/1; MG/1
1 TABLET ORAL DAILY
Qty: 90 TABLET | Refills: 2 | Status: SHIPPED | OUTPATIENT
Start: 2023-10-19

## 2023-10-19 RX ORDER — ALLOPURINOL 100 MG/1
100 TABLET ORAL DAILY
Qty: 90 TABLET | Refills: 1 | Status: SHIPPED | OUTPATIENT
Start: 2023-10-19

## 2023-10-19 RX ORDER — TAMSULOSIN HYDROCHLORIDE 0.4 MG/1
0.4 CAPSULE ORAL DAILY
Qty: 90 CAPSULE | Refills: 2 | Status: SHIPPED | OUTPATIENT
Start: 2023-10-19

## 2024-02-26 ENCOUNTER — OFFICE VISIT (OUTPATIENT)
Dept: FAMILY MEDICINE CLINIC | Facility: CLINIC | Age: 79
End: 2024-02-26
Payer: COMMERCIAL

## 2024-02-26 ENCOUNTER — TELEPHONE (OUTPATIENT)
Dept: FAMILY MEDICINE CLINIC | Facility: CLINIC | Age: 79
End: 2024-02-26

## 2024-02-26 VITALS
HEART RATE: 76 BPM | SYSTOLIC BLOOD PRESSURE: 154 MMHG | BODY MASS INDEX: 30.36 KG/M2 | TEMPERATURE: 98 F | DIASTOLIC BLOOD PRESSURE: 87 MMHG | WEIGHT: 205 LBS | HEIGHT: 69 IN

## 2024-02-26 DIAGNOSIS — R05.8 COUGH DUE TO ACE INHIBITOR: ICD-10-CM

## 2024-02-26 DIAGNOSIS — I50.9 ACUTE ON CHRONIC CONGESTIVE HEART FAILURE, UNSPECIFIED HEART FAILURE TYPE (HCC): Primary | ICD-10-CM

## 2024-02-26 DIAGNOSIS — T46.4X5A COUGH DUE TO ACE INHIBITOR: ICD-10-CM

## 2024-02-26 DIAGNOSIS — I10 ESSENTIAL HYPERTENSION: ICD-10-CM

## 2024-02-26 PROBLEM — N18.30 CKD (CHRONIC KIDNEY DISEASE) STAGE 3, GFR 30-59 ML/MIN (HCC): Chronic | Status: ACTIVE | Noted: 2024-02-26

## 2024-02-26 PROBLEM — J41.0 SMOKERS' COUGH (HCC): Chronic | Status: ACTIVE | Noted: 2024-02-26

## 2024-02-26 PROBLEM — D69.6 THROMBOCYTOPENIA (HCC): Chronic | Status: ACTIVE | Noted: 2024-02-26

## 2024-02-26 PROBLEM — D69.6 THROMBOCYTOPENIA: Chronic | Status: ACTIVE | Noted: 2024-02-26

## 2024-02-26 RX ORDER — FUROSEMIDE 20 MG/1
20 TABLET ORAL DAILY
Qty: 21 TABLET | Refills: 0 | Status: SHIPPED | OUTPATIENT
Start: 2024-02-26

## 2024-02-26 RX ORDER — CHLORTHALIDONE 25 MG/1
25 TABLET ORAL DAILY
Qty: 30 TABLET | Refills: 3 | Status: SHIPPED | OUTPATIENT
Start: 2024-02-26

## 2024-02-26 NOTE — PROGRESS NOTES
2024  1:25 PM    Steven Velasco is a 78 year old male.    Chief complaint(s):   Chief Complaint   Patient presents with    Cough     HPI:     Steven Velasco primary complaint is regarding Cough.     Patient is a 78-year-old male with long history of hypertension who presents complaining of cough on and off for the past 3 months or more.  The cough is dry not associate with any shortness of breath.  Denies any recent fever, sore throat, nasal congestion or wheezing.  Positive for lower extremity swelling.  Patient has been on losartan for quite a while now.      HISTORY:  Past Medical History:   Diagnosis Date    CAP (community acquired pneumonia)     SCAP    Essential hypertension     Gout     Kidney stones     s/p stent    PNA (pneumonia)     Prostate cancer (HCC)     Pulmonary embolus (HCC) 2017      Past Surgical History:   Procedure Laterality Date    COLONOSCOPY        Family History   Problem Relation Age of Onset    Blood Clotting Disorder Other     Cancer Other       Social History:   Social History     Socioeconomic History    Marital status:    Tobacco Use    Smoking status: Former     Packs/day: 1.00     Years: 30.00     Additional pack years: 0.00     Total pack years: 30.00     Types: Cigarettes     Quit date: 1987     Years since quittin.5    Smokeless tobacco: Never   Vaping Use    Vaping Use: Never used   Substance and Sexual Activity    Alcohol use: Not Currently     Comment: occ- none recently    Drug use: No        Immunizations:   Immunization History   Administered Date(s) Administered    Covid-19 Vaccine Pfizer 30 mcg/0.3 ml 04/10/2021, 2021, 2021    FLU VAC High Dose 65 YRS & Older PRSV Free (73467) 10/04/2021, 10/12/2022, 10/19/2023    FLUAD High Dose 65 yr and older (16722) 10/02/2017    HIGH DOSE FLU 65 YRS AND OLDER PRSV FREE SINGLE D (41265) FLU CLINIC 10/04/2021    Pneumococcal (Prevnar 13) 2020    Pneumococcal Conjugate PCV20 10/12/2022     Pneumovax 23 08/07/2017       Medications (Active prior to today's visit):  Current Outpatient Medications   Medication Sig Dispense Refill    Metoprolol Succinate 100 MG Oral Capsule ER 24 Hour Sprinkle Take 1 tablet by mouth daily. 30 capsule 3    chlorthalidone 25 MG Oral Tab Take 1 tablet (25 mg total) by mouth daily. 30 tablet 3    furosemide (LASIX) 20 MG Oral Tab Take 1 tablet (20 mg total) by mouth daily. 21 tablet 0    Losartan Potassium-HCTZ 100-12.5 MG Oral Tab Take 1 tablet by mouth daily. 90 tablet 2    tamsulosin 0.4 MG Oral Cap Take 1 capsule (0.4 mg total) by mouth daily. 90 capsule 2    allopurinol 100 MG Oral Tab Take 1 tablet (100 mg total) by mouth daily. 90 tablet 1    PANTOPRAZOLE 40 MG Oral Tab EC TAKE 1 TABLET(40 MG) BY MOUTH EVERY MORNING BEFORE BREAKFAST 30 tablet 3    CREON 88143-20623 units Oral Cap DR Particles TAKE 1 CAPSULE BY MOUTH DAILY 90 capsule 0    aspirin 81 MG Oral Tab EC Take 1 tablet (81 mg total) by mouth daily. 30 tablet 2       Allergies:  No Known Allergies      ROS:   Review of Systems   Constitutional:  Negative for appetite change, fatigue and fever.   HENT:  Negative for congestion, ear pain and sore throat.    Respiratory:  Positive for cough. Negative for shortness of breath and wheezing.    Cardiovascular:  Positive for leg swelling. Negative for chest pain and palpitations.   Gastrointestinal:  Negative for abdominal pain.   Musculoskeletal:  Negative for myalgias.   Skin:  Negative for rash.   Neurological:  Negative for dizziness, weakness and headaches.       PHYSICAL EXAM:   VS: /87 (BP Location: Right arm, Patient Position: Sitting, Cuff Size: adult)   Pulse 76   Temp 98.2 °F (36.8 °C) (Tympanic)   Ht 5' 9\" (1.753 m)   Wt 205 lb (93 kg)   BMI 30.27 kg/m²     Physical Exam  Vitals reviewed.   Constitutional:       Appearance: Normal appearance. He is well-developed.   HENT:      Head: Normocephalic.   Eyes:      General: No scleral icterus.      Conjunctiva/sclera: Conjunctivae normal.   Cardiovascular:      Rate and Rhythm: Normal rate and regular rhythm.      Heart sounds: Murmur heard.      Systolic murmur is present with a grade of 1/6.   Pulmonary:      Effort: Pulmonary effort is normal.      Breath sounds: Examination of the right-lower field reveals rales. Examination of the left-lower field reveals rales. Rales present.   Musculoskeletal:      Cervical back: Neck supple.   Lymphadenopathy:      Comments: LEs +1 edema bilaterally    Skin:     Findings: No rash.   Psychiatric:         Mood and Affect: Mood normal.         LABORATORY RESULTS:     EKG / Spirometry : -     Radiology: No results found.     ASSESSMENT/PLAN:   Assessment   Encounter Diagnoses   Name Primary?    Acute on chronic congestive heart failure, unspecified heart failure type (HCC) Yes    Cough due to ACE inhibitor     Essential hypertension        MEDICATIONS:     Requested Prescriptions     Signed Prescriptions Disp Refills    Metoprolol Succinate 100 MG Oral Capsule ER 24 Hour Sprinkle 30 capsule 3     Sig: Take 1 tablet by mouth daily.    chlorthalidone 25 MG Oral Tab 30 tablet 3     Sig: Take 1 tablet (25 mg total) by mouth daily.    furosemide (LASIX) 20 MG Oral Tab 21 tablet 0     Sig: Take 1 tablet (20 mg total) by mouth daily.   Stop Losartan-HCTZ       RECOMMENDATIONS given include: Patient was reassured of  his medical condition and all questions and concerns were answered. Patient was informed to please, call our office with any new or further questions or concerns that may come up in the near future. Notify Dr England or the [facility] if there is a deterioration or worsening of the medical condition. Also, inform the doctor with any new symptoms or medications' side effects.      FOLLOW-UP: Schedule a follow-up visit in 4 weeks.            Orders This Visit:  No orders of the defined types were placed in this encounter.      Meds This Visit:  Requested  Prescriptions     Signed Prescriptions Disp Refills    Metoprolol Succinate 100 MG Oral Capsule ER 24 Hour Sprinkle 30 capsule 3     Sig: Take 1 tablet by mouth daily.    chlorthalidone 25 MG Oral Tab 30 tablet 3     Sig: Take 1 tablet (25 mg total) by mouth daily.    furosemide (LASIX) 20 MG Oral Tab 21 tablet 0     Sig: Take 1 tablet (20 mg total) by mouth daily.       Imaging & Referrals:  None         ROMI MICHELE MD

## 2024-02-26 NOTE — TELEPHONE ENCOUNTER
Jarod, would you mind looking into this.  I think you just saw this patient at 5:20 PM today and they state that the medication that is for his heart will not be ready until tomorrow and his daughter Patience was just wanting to make sure that is not an issue.    Michael

## 2024-02-27 NOTE — TELEPHONE ENCOUNTER
pt daughter Patience on AVERY was called and inform of your  message below. She will pickup the medication tomorrow but Patience also wanted to know how serous is pt condition. Does he need to f/u with a cardiologist does he need to have test done for his heart. Over all she wants to know how serious is his condition. Daughter will like if she can call her at [phone].   SEE her [employer] message below.  Daughter aware this will be addressed tomorrow and she is ok with waiting for Dr. England for tomorrow. Per daughter pt is is not having chest pain or sob at present moment.    Steven Velasco   to P Em Triage Support (supporting Jarod England MD)     2/26/24  5:25 PM  The medication ordered was not ready today is that a problem that might affect the condition?   Also how serious is this ? Heart failure ? Does he need to go to the ER?

## 2024-03-20 ENCOUNTER — TELEPHONE (OUTPATIENT)
Dept: FAMILY MEDICINE CLINIC | Facility: CLINIC | Age: 79
End: 2024-03-20

## 2024-03-20 ENCOUNTER — LAB ENCOUNTER (OUTPATIENT)
Dept: LAB | Age: 79
End: 2024-03-20
Attending: FAMILY MEDICINE
Payer: MEDICARE

## 2024-03-20 ENCOUNTER — OFFICE VISIT (OUTPATIENT)
Dept: FAMILY MEDICINE CLINIC | Facility: CLINIC | Age: 79
End: 2024-03-20
Payer: COMMERCIAL

## 2024-03-20 VITALS
WEIGHT: 204.63 LBS | BODY MASS INDEX: 30.31 KG/M2 | TEMPERATURE: 97 F | DIASTOLIC BLOOD PRESSURE: 60 MMHG | HEIGHT: 69 IN | HEART RATE: 80 BPM | SYSTOLIC BLOOD PRESSURE: 120 MMHG

## 2024-03-20 DIAGNOSIS — J43.9 PULMONARY EMPHYSEMA, UNSPECIFIED EMPHYSEMA TYPE (HCC): Primary | ICD-10-CM

## 2024-03-20 DIAGNOSIS — I10 ESSENTIAL HYPERTENSION: ICD-10-CM

## 2024-03-20 DIAGNOSIS — R79.89 ELEVATED TSH: ICD-10-CM

## 2024-03-20 DIAGNOSIS — I50.32 CHRONIC DIASTOLIC CONGESTIVE HEART FAILURE (HCC): ICD-10-CM

## 2024-03-20 LAB
ALBUMIN SERPL-MCNC: 4.7 G/DL (ref 3.2–4.8)
ALBUMIN/GLOB SERPL: 1.3 {RATIO} (ref 1–2)
ALP LIVER SERPL-CCNC: 97 U/L
ALT SERPL-CCNC: 24 U/L
ANION GAP SERPL CALC-SCNC: 3 MMOL/L (ref 0–18)
AST SERPL-CCNC: 30 U/L (ref ?–34)
BILIRUB SERPL-MCNC: 0.5 MG/DL (ref 0.2–1.1)
BUN BLD-MCNC: 20 MG/DL (ref 9–23)
BUN/CREAT SERPL: 13.2 (ref 10–20)
CALCIUM BLD-MCNC: 10.2 MG/DL (ref 8.7–10.4)
CHLORIDE SERPL-SCNC: 103 MMOL/L (ref 98–112)
CO2 SERPL-SCNC: 32 MMOL/L (ref 21–32)
CREAT BLD-MCNC: 1.51 MG/DL
EGFRCR SERPLBLD CKD-EPI 2021: 47 ML/MIN/1.73M2 (ref 60–?)
FASTING STATUS PATIENT QL REPORTED: NO
GLOBULIN PLAS-MCNC: 3.6 G/DL (ref 2.8–4.4)
GLUCOSE BLD-MCNC: 165 MG/DL (ref 70–99)
OSMOLALITY SERPL CALC.SUM OF ELEC: 292 MOSM/KG (ref 275–295)
POTASSIUM SERPL-SCNC: 3.4 MMOL/L (ref 3.5–5.1)
PROT SERPL-MCNC: 8.3 G/DL (ref 5.7–8.2)
SODIUM SERPL-SCNC: 138 MMOL/L (ref 136–145)

## 2024-03-20 PROCEDURE — 84443 ASSAY THYROID STIM HORMONE: CPT

## 2024-03-20 PROCEDURE — 1159F MED LIST DOCD IN RCRD: CPT | Performed by: FAMILY MEDICINE

## 2024-03-20 PROCEDURE — 85060 BLOOD SMEAR INTERPRETATION: CPT

## 2024-03-20 PROCEDURE — 84439 ASSAY OF FREE THYROXINE: CPT

## 2024-03-20 PROCEDURE — 3008F BODY MASS INDEX DOCD: CPT | Performed by: FAMILY MEDICINE

## 2024-03-20 PROCEDURE — 99213 OFFICE O/P EST LOW 20 MIN: CPT | Performed by: FAMILY MEDICINE

## 2024-03-20 PROCEDURE — 3078F DIAST BP <80 MM HG: CPT | Performed by: FAMILY MEDICINE

## 2024-03-20 PROCEDURE — 85025 COMPLETE CBC W/AUTO DIFF WBC: CPT

## 2024-03-20 PROCEDURE — 1125F AMNT PAIN NOTED PAIN PRSNT: CPT | Performed by: FAMILY MEDICINE

## 2024-03-20 PROCEDURE — 36415 COLL VENOUS BLD VENIPUNCTURE: CPT

## 2024-03-20 PROCEDURE — 80053 COMPREHEN METABOLIC PANEL: CPT

## 2024-03-20 PROCEDURE — 3074F SYST BP LT 130 MM HG: CPT | Performed by: FAMILY MEDICINE

## 2024-03-20 RX ORDER — TAMSULOSIN HYDROCHLORIDE 0.4 MG/1
0.4 CAPSULE ORAL DAILY
Qty: 90 CAPSULE | Refills: 2 | Status: SHIPPED | OUTPATIENT
Start: 2024-03-20

## 2024-03-20 RX ORDER — CARVEDILOL 3.12 MG/1
3.12 TABLET ORAL 2 TIMES DAILY WITH MEALS
Qty: 60 TABLET | Refills: 3 | Status: SHIPPED | OUTPATIENT
Start: 2024-03-20

## 2024-03-20 RX ORDER — BUDESONIDE AND FORMOTEROL FUMARATE DIHYDRATE 160; 4.5 UG/1; UG/1
2 AEROSOL RESPIRATORY (INHALATION) 2 TIMES DAILY
Qty: 10.2 G | Refills: 1 | Status: SHIPPED | OUTPATIENT
Start: 2024-03-20

## 2024-03-20 RX ORDER — BUDESONIDE AND FORMOTEROL FUMARATE DIHYDRATE 160; 4.5 UG/1; UG/1
2 AEROSOL RESPIRATORY (INHALATION) 2 TIMES DAILY
Qty: 10.2 G | Refills: 1 | Status: SHIPPED | OUTPATIENT
Start: 2024-03-20 | End: 2024-03-20

## 2024-03-20 RX ORDER — CHLORTHALIDONE 25 MG/1
25 TABLET ORAL DAILY
Qty: 30 TABLET | Refills: 3 | Status: SHIPPED | OUTPATIENT
Start: 2024-03-20

## 2024-03-20 RX ORDER — FUROSEMIDE 20 MG/1
20 TABLET ORAL DAILY
Qty: 21 TABLET | Refills: 0 | Status: SHIPPED | OUTPATIENT
Start: 2024-03-20

## 2024-03-20 NOTE — PROGRESS NOTES
3/20/2024  12:37 PM    Steven Velasco is a 78 year old male.    Chief complaint(s):   Chief Complaint   Patient presents with    Cough     F/u phlegm      HPI:     Steven Velasco primary complaint is regarding cough.     Patient is a 78-year-old male who is following up regarding hypertension and congestive heart failure.  Regarding CHF symptoms are much improved with no lower extremities edema.  However he is continue to have cough but is semidry not associate with any sputum production.  There has been no wheezing or shortness of breath.  Previous chest x-rays did confirm emphysema lung changes.  He is a non-smoker. Last Echo was done in  with EF 55%.      HISTORY:  Past Medical History:   Diagnosis Date    CAP (community acquired pneumonia)     SCAP    Essential hypertension     Gout     Kidney stones     s/p stent    PNA (pneumonia)     Prostate cancer (HCC)     Pulmonary embolus (HCC) 2017      Past Surgical History:   Procedure Laterality Date    COLONOSCOPY        Family History   Problem Relation Age of Onset    Blood Clotting Disorder Other     Cancer Other       Social History:   Social History     Socioeconomic History    Marital status:    Tobacco Use    Smoking status: Former     Packs/day: 1.00     Years: 30.00     Additional pack years: 0.00     Total pack years: 30.00     Types: Cigarettes     Quit date: 1987     Years since quittin.5    Smokeless tobacco: Never   Vaping Use    Vaping Use: Never used   Substance and Sexual Activity    Alcohol use: Not Currently     Comment: occ- none recently    Drug use: No        Immunizations:   Immunization History   Administered Date(s) Administered    Covid-19 Vaccine Pfizer 30 mcg/0.3 ml 04/10/2021, 2021, 2021    FLU VAC High Dose 65 YRS & Older PRSV Free (60374) 10/04/2021, 10/12/2022, 10/19/2023    FLUAD High Dose 65 yr and older (68027) 10/02/2017    HIGH DOSE FLU 65 YRS AND OLDER PRSV FREE SINGLE D (35414) FLU  CLINIC 10/04/2021    Pneumococcal (Prevnar 13) 01/14/2020    Pneumococcal Conjugate PCV20 10/12/2022    Pneumovax 23 08/07/2017       Medications (Active prior to today's visit):  Current Outpatient Medications   Medication Sig Dispense Refill    Budesonide-Formoterol Fumarate 160-4.5 MCG/ACT Inhalation Aerosol Inhale 2 puffs into the lungs 2 (two) times daily. 10.2 g 1    chlorthalidone 25 MG Oral Tab Take 1 tablet (25 mg total) by mouth daily. 30 tablet 3    furosemide (LASIX) 20 MG Oral Tab Take 1 tablet (20 mg total) by mouth daily. 21 tablet 0    tamsulosin 0.4 MG Oral Cap Take 1 capsule (0.4 mg total) by mouth daily. 90 capsule 2    carvedilol 3.125 MG Oral Tab Take 1 tablet (3.125 mg total) by mouth 2 (two) times daily with meals. 60 tablet 3    allopurinol 100 MG Oral Tab Take 1 tablet (100 mg total) by mouth daily. 90 tablet 1    aspirin 81 MG Oral Tab EC Take 1 tablet (81 mg total) by mouth daily. 30 tablet 2    PANTOPRAZOLE 40 MG Oral Tab EC TAKE 1 TABLET(40 MG) BY MOUTH EVERY MORNING BEFORE BREAKFAST (Patient not taking: Reported on 3/20/2024) 30 tablet 3    CREON 84337-16377 units Oral Cap DR Particles TAKE 1 CAPSULE BY MOUTH DAILY 90 capsule 0       Allergies:  No Known Allergies      ROS:   Review of Systems   Constitutional:  Negative for appetite change, fatigue and fever.   HENT:  Negative for sore throat.    Respiratory:  Positive for cough. Negative for shortness of breath and wheezing.    Cardiovascular:  Negative for chest pain.   Gastrointestinal:  Negative for abdominal pain.   Musculoskeletal:  Negative for myalgias.   Skin:  Negative for rash.   Neurological:  Negative for dizziness, weakness and headaches.       PHYSICAL EXAM:   VS: /60   Pulse 80   Temp 97.4 °F (36.3 °C)   Ht 5' 9\" (1.753 m)   Wt 204 lb 9.6 oz (92.8 kg)   BMI 30.21 kg/m²     Physical Exam  Vitals reviewed.   Constitutional:       Appearance: Normal appearance. He is well-developed.   HENT:      Head:  Normocephalic.   Eyes:      General: No scleral icterus.     Conjunctiva/sclera: Conjunctivae normal.   Cardiovascular:      Rate and Rhythm: Normal rate and regular rhythm.      Heart sounds: Murmur heard.      Systolic murmur is present with a grade of 1/6.   Pulmonary:      Effort: Pulmonary effort is normal.      Breath sounds: Wheezing and rhonchi present.   Musculoskeletal:      Cervical back: Neck supple.   Lymphadenopathy:      Comments: LEs no edema    Skin:     Findings: No rash.   Psychiatric:         Mood and Affect: Mood normal.         LABORATORY RESULTS:     EKG / Spirometry : -     Radiology: No results found.     ASSESSMENT/PLAN:   Assessment   Encounter Diagnoses   Name Primary?    Pulmonary emphysema, unspecified emphysema type (HCC) Yes    Essential hypertension     Chronic diastolic congestive heart failure (HCC)          MEDICATIONS:      Budesonide-Formoterol Fumarate 160-4.5 MCG/ACT Inhalation Aerosol, Inhale 2 puffs into the lungs 2 (two) times daily., Disp: 10.2 g, Rfl: 1    chlorthalidone 25 MG Oral Tab, Take 1 tablet (25 mg total) by mouth daily., Disp: 30 tablet, Rfl: 3    furosemide (LASIX) 20 MG Oral Tab, Take 1 tablet (20 mg total) by mouth daily., Disp: 21 tablet, Rfl: 0    tamsulosin 0.4 MG Oral Cap, Take 1 capsule (0.4 mg total) by mouth daily., Disp: 90 capsule, Rfl: 2    carvedilol 3.125 MG Oral Tab, Take 1 tablet (3.125 mg total) by mouth 2 (two) times daily with meals., Disp: 60 tablet, Rfl: 3    allopurinol 100 MG Oral Tab, Take 1 tablet (100 mg total) by mouth daily., Disp: 90 tablet, Rfl: 1    aspirin 81 MG Oral Tab EC, Take 1 tablet (81 mg total) by mouth daily., Disp: 30 tablet, Rfl: 2    PANTOPRAZOLE 40 MG Oral Tab EC, TAKE 1 TABLET(40 MG) BY MOUTH EVERY MORNING BEFORE BREAKFAST (Patient not taking: Reported on 3/20/2024), Disp: 30 tablet, Rfl: 3    CREON 69337-12348 units Oral Cap DR Particles, TAKE 1 CAPSULE BY MOUTH DAILY, Disp: 90 capsule, Rfl: 0           LABORATORY  & ORDERS:   Orders Placed This Encounter   Procedures    Comp Metabolic Panel (14)    CBC With Differential With Platelet       REFERRALS: Spirometery        RECOMMENDATIONS given include: Patient was reassured of  his medical condition and all questions and concerns were answered. Patient was informed to please, call our office with any new or further questions or concerns that may come up in the near future. Notify Dr Michele or the [facility] if there is a deterioration or worsening of the medical condition. Also, inform the doctor with any new symptoms or medications' side effects.      FOLLOW-UP: Schedule a follow-up visit in  3 weeks.             Orders This Visit:  Orders Placed This Encounter   Procedures    Comp Metabolic Panel (14)    CBC With Differential With Platelet       Meds This Visit:  Requested Prescriptions     Signed Prescriptions Disp Refills    Budesonide-Formoterol Fumarate 160-4.5 MCG/ACT Inhalation Aerosol 10.2 g 1     Sig: Inhale 2 puffs into the lungs 2 (two) times daily.    chlorthalidone 25 MG Oral Tab 30 tablet 3     Sig: Take 1 tablet (25 mg total) by mouth daily.    furosemide (LASIX) 20 MG Oral Tab 21 tablet 0     Sig: Take 1 tablet (20 mg total) by mouth daily.    tamsulosin 0.4 MG Oral Cap 90 capsule 2     Sig: Take 1 capsule (0.4 mg total) by mouth daily.    carvedilol 3.125 MG Oral Tab 60 tablet 3     Sig: Take 1 tablet (3.125 mg total) by mouth 2 (two) times daily with meals.       Imaging & Referrals:  None         ROMI MICHELE MD

## 2024-03-20 NOTE — TELEPHONE ENCOUNTER
Call from patient's daughter Patience, on [employer], verified name/ of patient.  His budesonide inhaler was sent to [facility], asking for it to go to local pharmacy, [facility] in [place].  Advised we will send script to local pharmacy.  Script sent.    This RN called [facility], spoke with Sharee and cancelled script sent today.

## 2024-03-21 DIAGNOSIS — I10 ESSENTIAL HYPERTENSION: ICD-10-CM

## 2024-03-21 LAB
BASOPHILS # BLD AUTO: 0.07 X10(3) UL (ref 0–0.2)
BASOPHILS NFR BLD AUTO: 0.7 %
DEPRECATED RDW RBC AUTO: 39.5 FL (ref 35.1–46.3)
EOSINOPHIL # BLD AUTO: 0.38 X10(3) UL (ref 0–0.7)
EOSINOPHIL NFR BLD AUTO: 3.9 %
ERYTHROCYTE [DISTWIDTH] IN BLOOD BY AUTOMATED COUNT: 13.7 % (ref 11–15)
HCT VFR BLD AUTO: 51.6 %
HGB BLD-MCNC: 17.3 G/DL
IMM GRANULOCYTES # BLD AUTO: 0.06 X10(3) UL (ref 0–1)
IMM GRANULOCYTES NFR BLD: 0.6 %
LYMPHOCYTES # BLD AUTO: 1.49 X10(3) UL (ref 1–4)
LYMPHOCYTES NFR BLD AUTO: 15.3 %
MCH RBC QN AUTO: 27.3 PG (ref 26–34)
MCHC RBC AUTO-ENTMCNC: 33.5 G/DL (ref 31–37)
MCV RBC AUTO: 81.4 FL
MONOCYTES # BLD AUTO: 0.83 X10(3) UL (ref 0.1–1)
MONOCYTES NFR BLD AUTO: 8.5 %
NEUTROPHILS # BLD AUTO: 6.89 X10 (3) UL (ref 1.5–7.7)
NEUTROPHILS # BLD AUTO: 6.89 X10(3) UL (ref 1.5–7.7)
NEUTROPHILS NFR BLD AUTO: 71 %
PLATELET # BLD AUTO: 167 10(3)UL (ref 150–450)
RBC # BLD AUTO: 6.34 X10(6)UL
T4 FREE SERPL-MCNC: 1 NG/DL (ref 0.8–1.7)
TSI SER-ACNC: 2.13 MIU/ML (ref 0.55–4.78)
WBC # BLD AUTO: 9.7 X10(3) UL (ref 4–11)

## 2024-03-22 RX ORDER — FUROSEMIDE 20 MG/1
20 TABLET ORAL DAILY
Qty: 21 TABLET | Refills: 0 | OUTPATIENT
Start: 2024-03-22

## 2024-03-25 ENCOUNTER — PATIENT MESSAGE (OUTPATIENT)
Dept: FAMILY MEDICINE CLINIC | Facility: CLINIC | Age: 79
End: 2024-03-25

## 2024-03-27 NOTE — TELEPHONE ENCOUNTER
Left message to pt to call back.       Future Appointments   Date Time Provider Department Center   3/29/2024 10:15 AM [facility] ROOM [facility] [place]   4/10/2024 12:30 PM Jarod England MD [facility]

## 2024-03-29 ENCOUNTER — HOSPITAL ENCOUNTER (OUTPATIENT)
Dept: RESPIRATORY THERAPY | Facility: HOSPITAL | Age: 79
Discharge: HOME | End: 2024-03-29
Attending: FAMILY MEDICINE
Payer: MEDICARE

## 2024-03-29 ENCOUNTER — TELEPHONE (OUTPATIENT)
Dept: FAMILY MEDICINE CLINIC | Facility: CLINIC | Age: 79
End: 2024-03-29

## 2024-03-29 DIAGNOSIS — J43.9 PULMONARY EMPHYSEMA, UNSPECIFIED EMPHYSEMA TYPE (HCC): ICD-10-CM

## 2024-03-29 DIAGNOSIS — I10 ESSENTIAL HYPERTENSION: ICD-10-CM

## 2024-03-29 PROCEDURE — 94060 EVALUATION OF WHEEZING: CPT | Performed by: INTERNAL MEDICINE

## 2024-03-29 NOTE — PROCEDURES
[facility]  part of [facility]     Pulmonary Function Test     Steven Velasco Patient Status:  Outpatient    1945 MRN [id number]   Date of Exam 3/29/24 PCP ROMI MICHELE MD           Spirometry   FEV1: 3.21 113%  FVC: 3.67 96%  FEV1/FVC: 0.88    Lung Volume   TLC:   RV :     Diffusion Capacity   DLCO:     Flow Volume Loop       Impression   No evidence of obstructive defect seen without significant postbronchodilator response observed.  Lung volumes and diffusion capacity not performed    Viktoriya Laboy DO  Pulmonary Critical Care Medicine  [facility]

## 2024-03-29 NOTE — TELEPHONE ENCOUNTER
KAPSPARGO SPRINKLE ER 100MG CAPS  TAKE 1 TABLET BY MOUTH DAILY   QTY:30        PLAN DOES NOT COVER THIS MEDICATION. PLEASE CALL PLAN [phone] TO INIATE PRIOR AUTH OR CALL/FAX PHARMACY TO CHANGE MEDICATION. PATIENT ID # IS [id number]

## 2024-03-31 NOTE — TELEPHONE ENCOUNTER
Metoprolol was discontinues on 03/20/24.     Provider Information    Authorizing Provider Encounter Provider   Jarod England MD Martinez, Ricardo, MD     Medication Detail    Medication Quantity Refills Start End   Metoprolol Succinate 100 MG Oral Capsule ER 24 Hour Sprinkle (Discontinued) 30 capsule 3 2/26/2024 3/20/2024   Sig:   Take 1 tablet by mouth daily.     Route:   Oral

## 2024-04-01 NOTE — TELEPHONE ENCOUNTER
Metoprolol Succinate 100 MG Oral Capsule ER 24 Hour Sprinkle --> Take 1 tablet by mouth daily. # 30 / 3 refills was discontinued by Dr. England on 3/20/24, per Rx order history. Patient is on other HTN medications [see 3/20/24 office visit].    Left message to call back in Faroese and [employer] message sent requesting a call back [1st attempt]    RN Triage - please check if patient read [employer] message and called back, if not please make 2nd attempt to call      ENGLISH TRANSLATION OF [employer] MESSAGE SENT IS BELOW:  Ghulam Harris,    I left you a voicemail message requesting a call back related to your medication, please call our office at [phone], ask to speak with a nurse. You may also ask for a .    Our phone hours are:  Monday - Friday 8 AM - 4:30 PM  Saturday  Closed  Sunday   Closed    Thank You,  Shanika RN-BSN

## 2024-04-02 ENCOUNTER — TELEPHONE (OUTPATIENT)
Dept: FAMILY MEDICINE CLINIC | Facility: CLINIC | Age: 79
End: 2024-04-02

## 2024-04-02 DIAGNOSIS — I50.32 CHRONIC DIASTOLIC HEART FAILURE (HCC): Primary | ICD-10-CM

## 2024-04-02 DIAGNOSIS — J43.9 PULMONARY EMPHYSEMA, UNSPECIFIED EMPHYSEMA TYPE (HCC): ICD-10-CM

## 2024-04-02 RX ORDER — FUROSEMIDE 20 MG/1
20 TABLET ORAL DAILY
Qty: 21 TABLET | Refills: 0 | OUTPATIENT
Start: 2024-04-02

## 2024-04-02 NOTE — TELEPHONE ENCOUNTER
Patient's daughter Patience called (on AVERY), verified patient's Name and . She wants to know why metoprolol was discontinued. Chart reviewed. Relayed that metoprolol was discontinued on 3/20 and was switched to a different blood pressure medication (carvedilol).     She wants to be proactive for the patient and is requesting referrals with Cardiology and Pulmonology. States she is not even aware that patient has a diagnosis of congestive heart failure. Patient used to see Dr. De La Torre (Pul) but is retired and wants to see somebody else.     Dr. England please see pended referrals for review and approval.

## 2024-04-10 ENCOUNTER — OFFICE VISIT (OUTPATIENT)
Dept: FAMILY MEDICINE CLINIC | Facility: CLINIC | Age: 79
End: 2024-04-10
Payer: COMMERCIAL

## 2024-04-10 VITALS
BODY MASS INDEX: 30.31 KG/M2 | SYSTOLIC BLOOD PRESSURE: 138 MMHG | HEART RATE: 88 BPM | WEIGHT: 204.63 LBS | HEIGHT: 69 IN | DIASTOLIC BLOOD PRESSURE: 83 MMHG

## 2024-04-10 DIAGNOSIS — I50.32 CHRONIC DIASTOLIC HEART FAILURE (HCC): ICD-10-CM

## 2024-04-10 DIAGNOSIS — I10 ESSENTIAL HYPERTENSION: ICD-10-CM

## 2024-04-10 DIAGNOSIS — J43.9 PULMONARY EMPHYSEMA, UNSPECIFIED EMPHYSEMA TYPE (HCC): Primary | ICD-10-CM

## 2024-04-10 DIAGNOSIS — N18.31 CHRONIC RENAL IMPAIRMENT, STAGE 3A (HCC): ICD-10-CM

## 2024-04-10 PROCEDURE — 1159F MED LIST DOCD IN RCRD: CPT | Performed by: FAMILY MEDICINE

## 2024-04-10 PROCEDURE — 3008F BODY MASS INDEX DOCD: CPT | Performed by: FAMILY MEDICINE

## 2024-04-10 PROCEDURE — 3075F SYST BP GE 130 - 139MM HG: CPT | Performed by: FAMILY MEDICINE

## 2024-04-10 PROCEDURE — 1126F AMNT PAIN NOTED NONE PRSNT: CPT | Performed by: FAMILY MEDICINE

## 2024-04-10 PROCEDURE — 3079F DIAST BP 80-89 MM HG: CPT | Performed by: FAMILY MEDICINE

## 2024-04-10 PROCEDURE — 99213 OFFICE O/P EST LOW 20 MIN: CPT | Performed by: FAMILY MEDICINE

## 2024-04-10 RX ORDER — CARVEDILOL 6.25 MG/1
6.25 TABLET ORAL 2 TIMES DAILY WITH MEALS
Qty: 60 TABLET | Refills: 1 | Status: SHIPPED | OUTPATIENT
Start: 2024-04-10

## 2024-04-10 NOTE — PROGRESS NOTES
4/10/2024  12:43 PM    Steven Velasco is a 78 year old male.    Chief complaint(s):   Chief Complaint   Patient presents with    Follow - Up     3 week f/u Pulmonary emphysema , Chronic diastolic congestive heart failure      HPI:     Steven Velasco primary complaint is regarding COPD.     Patient is a 78-year-old male who is following up regarding hypertension and congestive heart failure.  Regarding CHF symptoms are much improved with no lower extremities edema.  However he is continue to have cough which also improved with new inhalers.   There has been no wheezing or shortness of breath.  Previous chest x-rays did confirm emphysema lung changes.  He is a non-smoker. Last Echo was done in  with EF 55%.       HISTORY:  Past Medical History:    CAP (community acquired pneumonia)    SCAP    Essential hypertension    Gout    Kidney stones    s/p stent    PNA (pneumonia)    Prostate cancer (HCC)    Pulmonary embolus (HCC)      Past Surgical History:   Procedure Laterality Date    Colonoscopy        Family History   Problem Relation Age of Onset    Blood Clotting Disorder Other     Cancer Other       Social History:   Social History     Socioeconomic History    Marital status:    Tobacco Use    Smoking status: Former     Current packs/day: 0.00     Average packs/day: 1 pack/day for 30.0 years (30.0 ttl pk-yrs)     Types: Cigarettes     Start date: 1957     Quit date: 1987     Years since quittin.6    Smokeless tobacco: Never   Vaping Use    Vaping status: Never Used   Substance and Sexual Activity    Alcohol use: Not Currently     Comment: occ- none recently    Drug use: No        Immunizations:   Immunization History   Administered Date(s) Administered    Covid-19 Vaccine Pfizer 30 mcg/0.3 ml 04/10/2021, 2021, 2021    FLU VAC High Dose 65 YRS & Older PRSV Free (08411) 10/04/2021, 10/12/2022, 10/19/2023    FLUAD High Dose 65 yr and older (53752) 10/02/2017    HIGH DOSE FLU 65 YRS  AND OLDER PRSV FREE SINGLE D (54520) FLU CLINIC 10/04/2021    Pneumococcal (Prevnar 13) 01/14/2020    Pneumococcal Conjugate PCV20 10/12/2022    Pneumovax 23 08/07/2017       Medications (Active prior to today's visit):  Current Outpatient Medications   Medication Sig Dispense Refill    carvedilol 6.25 MG Oral Tab Take 1 tablet (6.25 mg total) by mouth 2 (two) times daily with meals. 60 tablet 1    chlorthalidone 25 MG Oral Tab Take 1 tablet (25 mg total) by mouth daily. 30 tablet 3    furosemide (LASIX) 20 MG Oral Tab Take 1 tablet (20 mg total) by mouth daily. 21 tablet 0    tamsulosin 0.4 MG Oral Cap Take 1 capsule (0.4 mg total) by mouth daily. 90 capsule 2    Budesonide-Formoterol Fumarate 160-4.5 MCG/ACT Inhalation Aerosol Inhale 2 puffs into the lungs 2 (two) times daily. 10.2 g 1    allopurinol 100 MG Oral Tab Take 1 tablet (100 mg total) by mouth daily. 90 tablet 1    CREON 27153-92913 units Oral Cap DR Particles TAKE 1 CAPSULE BY MOUTH DAILY 90 capsule 0    aspirin 81 MG Oral Tab EC Take 1 tablet (81 mg total) by mouth daily. 30 tablet 2    PANTOPRAZOLE 40 MG Oral Tab EC TAKE 1 TABLET(40 MG) BY MOUTH EVERY MORNING BEFORE BREAKFAST (Patient not taking: Reported on 3/20/2024) 30 tablet 3       Allergies:  No Known Allergies      ROS:   Review of Systems   Constitutional:  Negative for appetite change, fatigue and fever.   Respiratory:  Negative for cough and shortness of breath.    Cardiovascular:  Negative for chest pain and leg swelling.   Gastrointestinal:  Negative for abdominal pain.   Musculoskeletal:  Negative for myalgias.   Skin:  Negative for rash.   Neurological:  Negative for dizziness, weakness and headaches.       PHYSICAL EXAM:   VS: /83   Pulse 88   Ht 5' 9\" (1.753 m)   Wt 204 lb 9.6 oz (92.8 kg)   BMI 30.21 kg/m²     Physical Exam  Vitals reviewed.   Constitutional:       Appearance: Normal appearance. He is well-developed.   HENT:      Head: Normocephalic.   Eyes:      General: No  scleral icterus.     Conjunctiva/sclera: Conjunctivae normal.   Cardiovascular:      Rate and Rhythm: Normal rate and regular rhythm.      Heart sounds: Murmur heard.      Systolic murmur is present with a grade of 2/6.   Pulmonary:      Effort: Pulmonary effort is normal.      Breath sounds: Examination of the right-lower field reveals rales. Examination of the left-lower field reveals rales. Rales present.   Musculoskeletal:      Cervical back: Neck supple.   Lymphadenopathy:      Comments: LEs trace edema   Skin:     Findings: No rash.   Psychiatric:         Mood and Affect: Mood normal.         LABORATORY RESULTS:   No results found for: \"URCOLOR\", \"URCLA\", \"URINELEUK\", \"URINENITRITE\", \"URINEBLOOD\"   Results for orders placed or performed in visit on 03/20/24   TSH and Free T4 [E]   Result Value Ref Range    Free T4 1.0 0.8 - 1.7 ng/dL    TSH 2.129 0.550 - 4.780 mIU/mL   Comp Metabolic Panel (14)   Result Value Ref Range    Glucose 165 (H) 70 - 99 mg/dL    Sodium 138 136 - 145 mmol/L    Potassium 3.4 (L) 3.5 - 5.1 mmol/L    Chloride 103 98 - 112 mmol/L    CO2 32.0 21.0 - 32.0 mmol/L    Anion Gap 3 0 - 18 mmol/L    BUN 20 9 - 23 mg/dL    Creatinine 1.51 (H) 0.70 - 1.30 mg/dL    BUN/CREA Ratio 13.2 10.0 - 20.0    Calcium, Total 10.2 8.7 - 10.4 mg/dL    Calculated Osmolality 292 275 - 295 mOsm/kg    eGFR-Cr 47 (L) >=60 mL/min/1.73m2    ALT 24 10 - 49 U/L    AST 30 <=34 U/L    Alkaline Phosphatase 97 45 - 117 U/L    Bilirubin, Total 0.5 0.2 - 1.1 mg/dL    Total Protein 8.3 (H) 5.7 - 8.2 g/dL    Albumin 4.7 3.2 - 4.8 g/dL    Globulin  3.6 2.8 - 4.4 g/dL    A/G Ratio 1.3 1.0 - 2.0    Patient Fasting for CMP? No    MD BLOOD SMEAR CONSULT   Result Value Ref Range    MD Blood Smear Consult         Evaluation of CBC with differential data and the peripheral blood smear shows erythrocytosis.    Red blood cells show no significant morphologic abnormalities.    No significant morphologic and/or parametric abnormalities are  seen for the leukocyte subsets.    Platelets show anisocytosis with scattered large forms and occasional giant forms seen.    Causes of erythrocytosis/polycythemia may include exposure to high altitudes, chronic obstructive pulmonary disease, left-to-right cardiac shunt, renal disease (hydronephrosis, cystic renal disease), several tumors (uterine leiomyoma, adenocarcinoma of the kidney, hepatocellular carcinoma, pheochromocytoma, cerebellar hemangioblastoma), increased plasma volume and myeloproliferative neoplasms (Polycythemia Vera, etc.), especially if persistent and unexplained clinically.    Clinical correlation is recommended.    Reviewed by Clinton Stafford M.D.       CBC W/ DIFFERENTIAL   Result Value Ref Range    WBC 9.7 4.0 - 11.0 x10(3) uL    RBC 6.34 (H) 3.80 - 5.80 x10(6)uL    HGB 17.3 13.0 - 17.5 g/dL    HCT 51.6 39.0 - 53.0 %    MCV 81.4 80.0 - 100.0 fL    MCH 27.3 26.0 - 34.0 pg    MCHC 33.5 31.0 - 37.0 g/dL    RDW-SD 39.5 35.1 - 46.3 fL    RDW 13.7 11.0 - 15.0 %    .0 150.0 - 450.0 10(3)uL    Neutrophil Absolute Prelim 6.89 1.50 - 7.70 x10 (3) uL    Neutrophil Absolute 6.89 1.50 - 7.70 x10(3) uL    Lymphocyte Absolute 1.49 1.00 - 4.00 x10(3) uL    Monocyte Absolute 0.83 0.10 - 1.00 x10(3) uL    Eosinophil Absolute 0.38 0.00 - 0.70 x10(3) uL    Basophil Absolute 0.07 0.00 - 0.20 x10(3) uL    Immature Granulocyte Absolute 0.06 0.00 - 1.00 x10(3) uL    Neutrophil % 71.0 %    Lymphocyte % 15.3 %    Monocyte % 8.5 %    Eosinophil % 3.9 %    Basophil % 0.7 %    Immature Granulocyte % 0.6 %       EKG / Spirometry : -     Radiology: No results found.     ASSESSMENT/PLAN:   Assessment   Encounter Diagnoses   Name Primary?    Pulmonary emphysema, unspecified emphysema type (HCC) Yes    Chronic diastolic heart failure (HCC)     Chronic renal impairment, stage 3a (HCC)     Essential hypertension      Doing better    MEDICATIONS:  Increased Carvedilol     carvedilol 6.25 MG Oral Tab, Take 1 tablet (6.25  mg total) by mouth 2 (two) times daily with meals., Disp: 60 tablet, Rfl: 1    chlorthalidone 25 MG Oral Tab, Take 1 tablet (25 mg total) by mouth daily., Disp: 30 tablet, Rfl: 3    furosemide (LASIX) 20 MG Oral Tab, Take 1 tablet (20 mg total) by mouth daily., Disp: 21 tablet, Rfl: 0    tamsulosin 0.4 MG Oral Cap, Take 1 capsule (0.4 mg total) by mouth daily., Disp: 90 capsule, Rfl: 2    Budesonide-Formoterol Fumarate 160-4.5 MCG/ACT Inhalation Aerosol, Inhale 2 puffs into the lungs 2 (two) times daily., Disp: 10.2 g, Rfl: 1    allopurinol 100 MG Oral Tab, Take 1 tablet (100 mg total) by mouth daily., Disp: 90 tablet, Rfl: 1    CREON 01116-83041 units Oral Cap DR Particles, TAKE 1 CAPSULE BY MOUTH DAILY, Disp: 90 capsule, Rfl: 0    aspirin 81 MG Oral Tab EC, Take 1 tablet (81 mg total) by mouth daily., Disp: 30 tablet, Rfl: 2    PANTOPRAZOLE 40 MG Oral Tab EC, TAKE 1 TABLET(40 MG) BY MOUTH EVERY MORNING BEFORE BREAKFAST (Patient not taking: Reported on 3/20/2024), Disp: 30 tablet, Rfl: 3         Refills Given today:    Requested Prescriptions     Signed Prescriptions Disp Refills    carvedilol 6.25 MG Oral Tab 60 tablet 1     Sig: Take 1 tablet (6.25 mg total) by mouth 2 (two) times daily with meals.     REFERRALS: CARDIO - EXTERNAL,       Procedures    CARDIO - EXTERNAL        RECOMMENDATIONS given include: Patient was reassured of  his medical condition and all questions and concerns were answered. Patient was informed to please, call our office with any new or further questions or concerns that may come up in the near future. Notify Dr England or the [facility] if there is a deterioration or worsening of the medical condition. Also, inform the doctor with any new symptoms or medications' side effects.      FOLLOW-UP: Schedule a follow-up visit in 3 months/ prn.                 Orders This Visit:  No orders of the defined types were placed in this encounter.      Meds This Visit:  Requested Prescriptions      Signed Prescriptions Disp Refills    carvedilol 6.25 MG Oral Tab 60 tablet 1     Sig: Take 1 tablet (6.25 mg total) by mouth 2 (two) times daily with meals.       Imaging & Referrals:  CARDIO - EXTERNAL         ROMI MICHELE MD

## 2024-04-29 ENCOUNTER — OFFICE VISIT (OUTPATIENT)
Dept: PULMONOLOGY | Facility: CLINIC | Age: 79
End: 2024-04-29
Payer: COMMERCIAL

## 2024-04-29 VITALS
BODY MASS INDEX: 30.07 KG/M2 | SYSTOLIC BLOOD PRESSURE: 135 MMHG | DIASTOLIC BLOOD PRESSURE: 76 MMHG | TEMPERATURE: 98 F | OXYGEN SATURATION: 95 % | HEART RATE: 78 BPM | RESPIRATION RATE: 18 BRPM | HEIGHT: 69 IN | WEIGHT: 203 LBS

## 2024-04-29 DIAGNOSIS — Z86.16 HISTORY OF COVID-19: Primary | ICD-10-CM

## 2024-04-29 PROCEDURE — 3075F SYST BP GE 130 - 139MM HG: CPT | Performed by: INTERNAL MEDICINE

## 2024-04-29 PROCEDURE — 99214 OFFICE O/P EST MOD 30 MIN: CPT | Performed by: INTERNAL MEDICINE

## 2024-04-29 PROCEDURE — 96127 BRIEF EMOTIONAL/BEHAV ASSMT: CPT | Performed by: INTERNAL MEDICINE

## 2024-04-29 PROCEDURE — 1159F MED LIST DOCD IN RCRD: CPT | Performed by: INTERNAL MEDICINE

## 2024-04-29 PROCEDURE — 1160F RVW MEDS BY RX/DR IN RCRD: CPT | Performed by: INTERNAL MEDICINE

## 2024-04-29 PROCEDURE — 3008F BODY MASS INDEX DOCD: CPT | Performed by: INTERNAL MEDICINE

## 2024-04-29 PROCEDURE — 3078F DIAST BP <80 MM HG: CPT | Performed by: INTERNAL MEDICINE

## 2024-04-29 PROCEDURE — 1170F FXNL STATUS ASSESSED: CPT | Performed by: INTERNAL MEDICINE

## 2024-04-29 PROCEDURE — 1126F AMNT PAIN NOTED NONE PRSNT: CPT | Performed by: INTERNAL MEDICINE

## 2024-04-29 NOTE — PROGRESS NOTES
Subjective:   Patient ID: Steven Velasco is a 78 year old male.    HPI  Patient came with his daughter translating for him  Asymptomatic overall with no significant dyspnea or shortness of breath or cough or sputum  He had cold symptoms couple months ago and now better  Overall good appetite with no dyspnea and overall asymptomatic  History/Other:   Review of Systems   Constitutional:  Negative for fever and unexpected weight change.   HENT:  Negative for congestion.    Respiratory:  Negative for shortness of breath.    Cardiovascular: Negative.    Gastrointestinal: Negative.    Skin: Negative.    Neurological: Negative.    Hematological: Negative.    Psychiatric/Behavioral: Negative.       Current Outpatient Medications   Medication Sig Dispense Refill    carvedilol 6.25 MG Oral Tab Take 1 tablet (6.25 mg total) by mouth 2 (two) times daily with meals. 60 tablet 1    chlorthalidone 25 MG Oral Tab Take 1 tablet (25 mg total) by mouth daily. 30 tablet 3    furosemide (LASIX) 20 MG Oral Tab Take 1 tablet (20 mg total) by mouth daily. 21 tablet 0    tamsulosin 0.4 MG Oral Cap Take 1 capsule (0.4 mg total) by mouth daily. 90 capsule 2    Budesonide-Formoterol Fumarate 160-4.5 MCG/ACT Inhalation Aerosol Inhale 2 puffs into the lungs 2 (two) times daily. 10.2 g 1    allopurinol 100 MG Oral Tab Take 1 tablet (100 mg total) by mouth daily. 90 tablet 1    CREON 08342-60530 units Oral Cap DR Particles TAKE 1 CAPSULE BY MOUTH DAILY 90 capsule 0    aspirin 81 MG Oral Tab EC Take 1 tablet (81 mg total) by mouth daily. 30 tablet 2    PANTOPRAZOLE 40 MG Oral Tab EC TAKE 1 TABLET(40 MG) BY MOUTH EVERY MORNING BEFORE BREAKFAST (Patient not taking: Reported on 3/20/2024) 30 tablet 3     Allergies:No Known Allergies    Objective:   Physical Exam  Constitutional:       General: He is not in acute distress.     Appearance: Normal appearance.   HENT:      Head: Normocephalic and atraumatic.      Nose: Nose normal.      Mouth/Throat:       Mouth: Mucous membranes are moist.   Eyes:      General: No scleral icterus.     Pupils: Pupils are equal, round, and reactive to light.   Cardiovascular:      Rate and Rhythm: Normal rate.      Heart sounds: No murmur heard.     No gallop.   Pulmonary:      Comments: Good air exchange to both lungs  Very minimal basilar rales  No wheezes or rhonchi  Abdominal:      General: Abdomen is flat. Bowel sounds are normal.      Palpations: Abdomen is soft.      Tenderness: There is no guarding.   Musculoskeletal:      Cervical back: Normal range of motion.      Right lower leg: No edema.      Left lower leg: No edema.   Skin:     General: Skin is dry.   Neurological:      Mental Status: He is oriented to person, place, and time.         Assessment & Plan:   1. History of COVID-19      1-history of severe COVID-19 pneumonia with ARDS in 2021  Recovered on last chest x-ray on 8/21/2023 almost normal with minimal basilar changes  PFTs /spirometry on 3/29/2024 was normal  PFTs 2021 mild restrictive pattern  Minimal residual basal rales  Minimal history of smoking quit over 25 years  No obstructive pattern on PFTs and no COPD      Overall asymptomatic with normal O2 sat  No further intervention  No further testing recommended  Keep updated on vaccinations  Follow-up only as needed        Meds This Visit:  Requested Prescriptions      No prescriptions requested or ordered in this encounter       Imaging & Referrals:  None

## 2024-05-07 ENCOUNTER — LAB ENCOUNTER (OUTPATIENT)
Dept: LAB | Age: 79
End: 2024-05-07
Attending: FAMILY MEDICINE
Payer: MEDICARE

## 2024-05-07 ENCOUNTER — OFFICE VISIT (OUTPATIENT)
Dept: FAMILY MEDICINE CLINIC | Facility: CLINIC | Age: 79
End: 2024-05-07
Payer: COMMERCIAL

## 2024-05-07 VITALS
DIASTOLIC BLOOD PRESSURE: 84 MMHG | BODY MASS INDEX: 30.24 KG/M2 | SYSTOLIC BLOOD PRESSURE: 158 MMHG | HEIGHT: 69 IN | WEIGHT: 204.19 LBS | HEART RATE: 60 BPM

## 2024-05-07 DIAGNOSIS — I10 ESSENTIAL HYPERTENSION: ICD-10-CM

## 2024-05-07 DIAGNOSIS — R35.1 BENIGN PROSTATIC HYPERPLASIA WITH NOCTURIA: ICD-10-CM

## 2024-05-07 DIAGNOSIS — N40.1 BENIGN PROSTATIC HYPERPLASIA WITH NOCTURIA: ICD-10-CM

## 2024-05-07 DIAGNOSIS — I50.32 CHRONIC DIASTOLIC HEART FAILURE (HCC): ICD-10-CM

## 2024-05-07 DIAGNOSIS — J43.9 PULMONARY EMPHYSEMA, UNSPECIFIED EMPHYSEMA TYPE (HCC): ICD-10-CM

## 2024-05-07 DIAGNOSIS — E55.9 VITAMIN D DEFICIENCY: ICD-10-CM

## 2024-05-07 DIAGNOSIS — L97.929 VARICOSE VEINS OF LEFT LOWER EXTREMITY WITH ULCER AND INFLAMMATION (HCC): ICD-10-CM

## 2024-05-07 DIAGNOSIS — Z86.711 HISTORY OF PULMONARY EMBOLISM: ICD-10-CM

## 2024-05-07 DIAGNOSIS — I83.229 VARICOSE VEINS OF LEFT LOWER EXTREMITY WITH ULCER AND INFLAMMATION (HCC): ICD-10-CM

## 2024-05-07 DIAGNOSIS — Z86.79 HISTORY OF PSVT (PAROXYSMAL SUPRAVENTRICULAR TACHYCARDIA): ICD-10-CM

## 2024-05-07 DIAGNOSIS — N20.1 URETEROLITHIASIS: ICD-10-CM

## 2024-05-07 DIAGNOSIS — K76.0 FATTY LIVER: ICD-10-CM

## 2024-05-07 DIAGNOSIS — N18.31 CHRONIC RENAL IMPAIRMENT, STAGE 3A (HCC): ICD-10-CM

## 2024-05-07 DIAGNOSIS — Z12.11 COLON CANCER SCREENING: ICD-10-CM

## 2024-05-07 DIAGNOSIS — Z91.81 RISK FOR FALLS: ICD-10-CM

## 2024-05-07 DIAGNOSIS — J18.9 COMMUNITY ACQUIRED PNEUMONIA, UNSPECIFIED LATERALITY: ICD-10-CM

## 2024-05-07 DIAGNOSIS — Z00.00 MEDICARE ANNUAL WELLNESS VISIT, SUBSEQUENT: Primary | ICD-10-CM

## 2024-05-07 DIAGNOSIS — Z85.46 HISTORY OF PROSTATE CANCER: ICD-10-CM

## 2024-05-07 DIAGNOSIS — R73.9 HYPERGLYCEMIA: ICD-10-CM

## 2024-05-07 DIAGNOSIS — L43.9 LICHEN PLANUS: ICD-10-CM

## 2024-05-07 LAB
ALBUMIN SERPL-MCNC: 4.5 G/DL (ref 3.2–4.8)
ALBUMIN/GLOB SERPL: 1.3 {RATIO} (ref 1–2)
ALP LIVER SERPL-CCNC: 85 U/L
ALT SERPL-CCNC: 29 U/L
ANION GAP SERPL CALC-SCNC: 9 MMOL/L (ref 0–18)
AST SERPL-CCNC: 32 U/L (ref ?–34)
BASOPHILS # BLD AUTO: 0.05 X10(3) UL (ref 0–0.2)
BASOPHILS NFR BLD AUTO: 0.7 %
BILIRUB SERPL-MCNC: 0.9 MG/DL (ref 0.2–1.1)
BILIRUB UR QL: NEGATIVE
BUN BLD-MCNC: 18 MG/DL (ref 9–23)
BUN/CREAT SERPL: 13.7 (ref 10–20)
CALCIUM BLD-MCNC: 9.9 MG/DL (ref 8.7–10.4)
CHLORIDE SERPL-SCNC: 105 MMOL/L (ref 98–112)
CHOLEST SERPL-MCNC: 138 MG/DL (ref ?–200)
CLARITY UR: CLEAR
CO2 SERPL-SCNC: 25 MMOL/L (ref 21–32)
CREAT BLD-MCNC: 1.31 MG/DL
DEPRECATED RDW RBC AUTO: 43.5 FL (ref 35.1–46.3)
EGFRCR SERPLBLD CKD-EPI 2021: 56 ML/MIN/1.73M2 (ref 60–?)
EOSINOPHIL # BLD AUTO: 0.26 X10(3) UL (ref 0–0.7)
EOSINOPHIL NFR BLD AUTO: 3.5 %
ERYTHROCYTE [DISTWIDTH] IN BLOOD BY AUTOMATED COUNT: 15 % (ref 11–15)
EST. AVERAGE GLUCOSE BLD GHB EST-MCNC: 131 MG/DL (ref 68–126)
FASTING PATIENT LIPID ANSWER: YES
FASTING STATUS PATIENT QL REPORTED: YES
GLOBULIN PLAS-MCNC: 3.6 G/DL (ref 2–3.5)
GLUCOSE BLD-MCNC: 96 MG/DL (ref 70–99)
GLUCOSE UR-MCNC: NORMAL MG/DL
HBA1C MFR BLD: 6.2 % (ref ?–5.7)
HCT VFR BLD AUTO: 49.6 %
HDLC SERPL-MCNC: 34 MG/DL (ref 40–59)
HGB BLD-MCNC: 17.3 G/DL
IMM GRANULOCYTES # BLD AUTO: 0.05 X10(3) UL (ref 0–1)
IMM GRANULOCYTES NFR BLD: 0.7 %
KETONES UR-MCNC: NEGATIVE MG/DL
LDLC SERPL CALC-MCNC: 77 MG/DL (ref ?–100)
LEUKOCYTE ESTERASE UR QL STRIP.AUTO: NEGATIVE
LYMPHOCYTES # BLD AUTO: 1.41 X10(3) UL (ref 1–4)
LYMPHOCYTES NFR BLD AUTO: 18.9 %
MCH RBC QN AUTO: 28.5 PG (ref 26–34)
MCHC RBC AUTO-ENTMCNC: 34.9 G/DL (ref 31–37)
MCV RBC AUTO: 81.6 FL
MONOCYTES # BLD AUTO: 0.65 X10(3) UL (ref 0.1–1)
MONOCYTES NFR BLD AUTO: 8.7 %
NEUTROPHILS # BLD AUTO: 5.05 X10 (3) UL (ref 1.5–7.7)
NEUTROPHILS # BLD AUTO: 5.05 X10(3) UL (ref 1.5–7.7)
NEUTROPHILS NFR BLD AUTO: 67.5 %
NITRITE UR QL STRIP.AUTO: NEGATIVE
NONHDLC SERPL-MCNC: 104 MG/DL (ref ?–130)
OSMOLALITY SERPL CALC.SUM OF ELEC: 290 MOSM/KG (ref 275–295)
PH UR: 6 [PH] (ref 5–8)
PLATELET # BLD AUTO: 187 10(3)UL (ref 150–450)
POTASSIUM SERPL-SCNC: 3.7 MMOL/L (ref 3.5–5.1)
PROT SERPL-MCNC: 8.1 G/DL (ref 5.7–8.2)
PROT UR-MCNC: NEGATIVE MG/DL
PSA SERPL-MCNC: 0.33 NG/ML (ref ?–4)
RBC # BLD AUTO: 6.08 X10(6)UL
SODIUM SERPL-SCNC: 139 MMOL/L (ref 136–145)
SP GR UR STRIP: 1.02 (ref 1–1.03)
TRIGL SERPL-MCNC: 154 MG/DL (ref 30–149)
TSI SER-ACNC: 4.49 MIU/ML (ref 0.55–4.78)
UROBILINOGEN UR STRIP-ACNC: NORMAL
VIT D+METAB SERPL-MCNC: 18.1 NG/ML (ref 30–100)
VLDLC SERPL CALC-MCNC: 24 MG/DL (ref 0–30)
WBC # BLD AUTO: 7.5 X10(3) UL (ref 4–11)

## 2024-05-07 PROCEDURE — 1126F AMNT PAIN NOTED NONE PRSNT: CPT | Performed by: FAMILY MEDICINE

## 2024-05-07 PROCEDURE — 84153 ASSAY OF PSA TOTAL: CPT

## 2024-05-07 PROCEDURE — 80061 LIPID PANEL: CPT

## 2024-05-07 PROCEDURE — 1170F FXNL STATUS ASSESSED: CPT | Performed by: FAMILY MEDICINE

## 2024-05-07 PROCEDURE — 3008F BODY MASS INDEX DOCD: CPT | Performed by: FAMILY MEDICINE

## 2024-05-07 PROCEDURE — 99499 UNLISTED E&M SERVICE: CPT | Performed by: FAMILY MEDICINE

## 2024-05-07 PROCEDURE — 3079F DIAST BP 80-89 MM HG: CPT | Performed by: FAMILY MEDICINE

## 2024-05-07 PROCEDURE — 3077F SYST BP >= 140 MM HG: CPT | Performed by: FAMILY MEDICINE

## 2024-05-07 PROCEDURE — 36415 COLL VENOUS BLD VENIPUNCTURE: CPT

## 2024-05-07 PROCEDURE — 99213 OFFICE O/P EST LOW 20 MIN: CPT | Performed by: FAMILY MEDICINE

## 2024-05-07 PROCEDURE — 83036 HEMOGLOBIN GLYCOSYLATED A1C: CPT

## 2024-05-07 PROCEDURE — 80053 COMPREHEN METABOLIC PANEL: CPT

## 2024-05-07 PROCEDURE — 81001 URINALYSIS AUTO W/SCOPE: CPT

## 2024-05-07 PROCEDURE — 82306 VITAMIN D 25 HYDROXY: CPT

## 2024-05-07 PROCEDURE — 96160 PT-FOCUSED HLTH RISK ASSMT: CPT | Performed by: FAMILY MEDICINE

## 2024-05-07 PROCEDURE — G0439 PPPS, SUBSEQ VISIT: HCPCS | Performed by: FAMILY MEDICINE

## 2024-05-07 PROCEDURE — 84443 ASSAY THYROID STIM HORMONE: CPT

## 2024-05-07 PROCEDURE — 85025 COMPLETE CBC W/AUTO DIFF WBC: CPT

## 2024-05-07 RX ORDER — ERGOCALCIFEROL 1.25 MG/1
50000 CAPSULE ORAL WEEKLY
Qty: 12 CAPSULE | Refills: 3 | Status: SHIPPED | OUTPATIENT
Start: 2024-05-07 | End: 2025-05-07

## 2024-05-07 RX ORDER — ALLOPURINOL 100 MG/1
100 TABLET ORAL DAILY
Qty: 90 TABLET | Refills: 1 | Status: SHIPPED | OUTPATIENT
Start: 2024-05-07

## 2024-05-07 RX ORDER — KETOCONAZOLE 20 MG/G
CREAM TOPICAL DAILY
COMMUNITY
End: 2024-05-07

## 2024-05-07 RX ORDER — KETOCONAZOLE 20 MG/G
1 CREAM TOPICAL DAILY
Qty: 30 G | Refills: 1 | Status: SHIPPED | OUTPATIENT
Start: 2024-05-07

## 2024-05-07 NOTE — PROGRESS NOTES
Subjective:   Steven Velasco is a 78 year old male who presents for a Medicare Subsequent Annual Wellness visit (Pt already had Initial Annual Wellness) and scheduled follow up of multiple significant but stable problems.     Steven Velasco is a 78 year old male is here for routine periodic health screening and examination.  His last physical exam was 1 years ago.  His last ECG was 1 years ago and was normal. His last diabetes screening test was 1 years ago and was normal.   His last cholesterol test was 1 year ago and was normal.  Last dentist visit was many months ago.  He is not current with his Td immunization.  He is current with his Flu vaccination.  Patient last colonoscopy was many years ago. He is not a smoker.     History/Other:   Fall Risk Assessment:   He has been screened for Falls and is low risk.      Cognitive Assessment:   He had a completely normal cognitive assessment - see flowsheet entries     Functional Ability/Status:   Steven Velasco has some abnormal functions as listed below:  He has Vision problems based on screening of functional status.       Depression Screening (PHQ-2/PHQ-9): PHQ-2 SCORE: 0  , done 5/7/2024             Advanced Directives:   He does NOT have a Living Will. [Do you have a living will?: No]  He does NOT have a Power of  for Health Care. [Do you have a healthcare power of ?: No]  Discussed Advance Care Planning with patient (and family/surrogate if present). Standard forms made available to patient in After Visit Summary.      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    Current Medications:  Outpatient Medications Marked as Taking for the 24 encounter (Office Visit) with Jarod England MD   Medication Sig    ketoconazole 2 % External Cream Apply 1 Application topically daily.    fluocinonide 0.05 % External Cream Apply 1 Application topically 2 (two) times daily.    allopurinol 100 MG Oral Tab Take 1 tablet (100 mg total) by mouth daily.    carvedilol 6.25 MG Oral Tab Take 1 tablet (6.25 mg total) by mouth 2 (two) times daily with meals.    chlorthalidone 25 MG Oral Tab Take 1 tablet (25 mg total) by mouth daily.    tamsulosin 0.4 MG Oral Cap Take 1 capsule (0.4 mg total) by mouth daily.    aspirin 81 MG Oral Tab EC Take 1 tablet (81 mg total) by mouth daily.       Medical History:  He  has a past medical history of CAP (community acquired pneumonia) (), Essential hypertension, Gout, Kidney stones, PNA (pneumonia), Prostate cancer (HCC), and Pulmonary embolus (HCC) (2017).  Surgical History:  He  has a past surgical history that includes colonoscopy.   Family History:  His family history includes Blood Clotting Disorder in an other family member; Cancer in an other family member.  Social History:  He  reports that he quit smoking about 36 years ago. His smoking use included cigarettes. He started smoking about 66 years ago. He has a 30 pack-year smoking history. He has never used smokeless tobacco. He reports that he does not currently use alcohol. He reports that he does not use drugs.    Tobacco:  He smoked tobacco in the past but quit greater than 12 months ago.  Social History     Tobacco Use   Smoking Status Former    Current packs/day: 0.00    Average packs/day: 1 pack/day for 30.0 years (30.0 ttl pk-yrs)    Types: Cigarettes    Start date: 1957    Quit date: 1987    Years since quittin.6   Smokeless Tobacco Never        CAGE Alcohol Screen:   CAGE screening score of 0 on 2024, showing low risk of  alcohol abuse.      Patient Care Team:  Jarod England MD as PCP - General (Family Medicine)    Review of Systems   Constitutional:  Negative for appetite change, fatigue and fever.   HENT:  Negative for hearing loss and nosebleeds.    Eyes:  Negative for pain and visual disturbance.   Respiratory:  Negative for apnea and shortness of breath.    Cardiovascular:  Negative for chest pain, palpitations and leg swelling.   Gastrointestinal:  Negative for abdominal pain, blood in stool, constipation, diarrhea, nausea and vomiting.   Endocrine: Negative for polydipsia and polyuria.   Genitourinary:  Negative for decreased urine volume, frequency and hematuria.        No nocturia   Musculoskeletal:  Negative for arthralgias.   Skin:  Negative for rash.   Neurological:  Negative for dizziness, syncope and headaches.   Psychiatric/Behavioral:  Negative for dysphoric mood and sleep disturbance.           Objective:   Physical Exam  Vitals reviewed.   Constitutional:       Appearance: Normal appearance.      Comments:      HENT:      Head: Normocephalic.      Right Ear: Hearing, tympanic membrane and ear canal normal.      Left Ear: Hearing, tympanic membrane and ear canal normal.      Nose: Nose normal. No rhinorrhea.      Mouth/Throat:      Mouth: Mucous membranes are moist.      Pharynx: Oropharynx is clear.   Eyes:      Extraocular Movements: Extraocular movements intact.      Conjunctiva/sclera: Conjunctivae normal.      Pupils: Pupils are equal, round, and reactive to light.   Neck:      Thyroid: No thyroid mass.      Vascular: No carotid bruit.   Cardiovascular:      Rate and Rhythm: Normal rate and regular rhythm.      Heart sounds: S1 normal and S2 normal. Murmur heard.      Systolic murmur is present with a grade of 1/6.   Pulmonary:      Effort: Pulmonary effort is normal.      Breath sounds: Normal breath sounds.   Abdominal:      General: Abdomen is flat. Bowel sounds are normal.      Palpations: Abdomen is  soft. There is no hepatomegaly, splenomegaly or mass.      Tenderness: There is no abdominal tenderness.      Hernia: No hernia is present.   Musculoskeletal:      Cervical back: Neck supple.      Comments: Spinal exam without scoliosis.      Lymphadenopathy:      Cervical: No cervical adenopathy.      Comments: LEs trace edema    Skin:     General: Skin is warm.      Findings: No rash.   Neurological:      General: No focal deficit present.      Mental Status: He is alert.      Deep Tendon Reflexes:      Reflex Scores:       Patellar reflexes are 2+ on the right side and 2+ on the left side.  Psychiatric:         Attention and Perception: Attention normal.         Mood and Affect: Mood and affect normal.          /84   Pulse 60   Ht 5' 9\" (1.753 m)   Wt 204 lb 3.2 oz (92.6 kg)   BMI 30.16 kg/m²  Estimated body mass index is 30.16 kg/m² as calculated from the following:    Height as of this encounter: 5' 9\" (1.753 m).    Weight as of this encounter: 204 lb 3.2 oz (92.6 kg).    Medicare Hearing Assessment:   Hearing Screening    Screening Method: Questionnaire  I have a problem hearing over the telephone: No I have trouble following the conversations when two or more people are talking at the same time: No   I have trouble understanding things on the TV: No I have to strain to understand conversations: No   I have to worry about missing the telephone ring or doorbell: No I have trouble hearing conversations in a noisy background such as a crowded room or restaurant: No   I get confused about where sounds come from: No I misunderstand some words in a sentence and need to ask people to repeat themselves: No   I especially have trouble understanding the speech of women and children: No I have trouble understanding the speaker in a large room such as at a meeting or place of [employer]: No   Many people I talk to seem to mumble (or don't speak clearly): No People get annoyed because I misunderstand what they say:  No   I misunderstand what others are saying and make inappropriate responses: No I avoid social activities because I cannot hear well and fear I will reply improperly: No   Family members and friends have told me they think I may have hearing loss: No             Visual Acuity:   Right Eye Visual Acuity: Uncorrected Right Eye Chart Acuity: 20/70   Left Eye Visual Acuity: Uncorrected Left Eye Chart Acuity: 20/30   Both Eyes Visual Acuity: Uncorrected Both Eyes Chart Acuity: 20/30   Able To Tolerate Visual Acuity: Yes        Assessment & Plan:   Steven Velasco is a 78 year old male who presents for a Medicare Assessment.     1. Medicare annual wellness visit, subsequent (Primary)  2. History of pulmonary embolism  3. Pulmonary emphysema, unspecified emphysema type (HCC)  4. Essential hypertension  -     CBC With Differential With Platelet; Future; Expected date: 05/07/2024  -     Comp Metabolic Panel (14); Future; Expected date: 05/07/2024  -     Lipid Panel; Future; Expected date: 05/07/2024  5. History of PSVT (paroxysmal supraventricular tachycardia)  -     TSH W Reflex To Free T4; Future; Expected date: 05/07/2024  6. Chronic renal impairment, stage 3a (HCC)  7. Chronic diastolic heart failure (HCC)  8. Community acquired pneumonia, unspecified laterality  9. Benign prostatic hyperplasia with nocturia  -     Urinalysis with Culture Reflex; Future; Expected date: 05/07/2024  10. History of prostate cancer  -     PSA, Total W Reflex To Free; Future; Expected date: 05/07/2024  11. Ureterolithiasis  12. Fatty liver  13. Colon cancer screening  14. Hyperglycemia  -     Hemoglobin A1C; Future; Expected date: 05/07/2024  15. 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 Lichen planus  -     Ketoconazole; Apply 1 Application topically daily.  Dispense: 30 g; Refill: 1  -     Fluocinonide; Apply 1 Application topically 2 (two) times daily.  Dispense: 30 g; Refill: 1  17. Vitamin D  deficiency  -     Vitamin D; Future; Expected date: 05/07/2024  18. Risk for falls  Other orders  -     Allopurinol; Take 1 tablet (100 mg total) by mouth daily.  Dispense: 90 tablet; Refill: 1      1. Medicare annual wellness visit, subsequent    CPE PLAN:    LABS / TEST & ORDERS for today's visit :Blood test(s) ordered today for send out to [facility] lab:  Orders Placed This Encounter   Procedures    CBC With Differential With Platelet    Comp Metabolic Panel (14)    Hemoglobin A1C    Lipid Panel    PSA, Total W Reflex To Free    TSH W Reflex To Free T4    Vitamin D    Urinalysis with Culture Reflex   In-House; Urine dip.  Test/Procedures done today include: EKG.    IMMUNIZATIONS: none given today.    RECOMMENDATIONS given include: ANTICIPATORY GUIDANCE  topics covered today include: safety (i.e. seat belts, helmets, sunscreen, protective sports gear ), nutrition (i.e. healthy meals and snacks (i.e. avoid junk food and high-carbohydrate foods); athletic conditioning, fluids; low fat milk, limit to less than 20 oz. a day; dental care ), and Healthy habits& Social competence & Responsibilities: Recommendations on physical activity; exercise daily or at least 3 times a week for 30-60 minutes doing cardiovascular exercise. Encourage to maintain the best physical and dental hygiene possible.     FOLLOW-UP: Schedule a follow-up visit in 12 months.     2. History of pulmonary embolism  Doing well  CPM    3. Pulmonary emphysema, unspecified emphysema type (HCC)  Doing well    4. Essential hypertension  Stable   CPM  Follow up [facility]  Lab:  - CBC With Differential With Platelet; Future  - Comp Metabolic Panel (14); Future  - Lipid Panel; Future    5. History of PSVT (paroxysmal supraventricular tachycardia)  Stable   CPM  Follow up [facility]  Lab: TSH W Reflex To Free T4; Future    6. Chronic renal impairment, stage 3a (HCC)  Stable   CPM  Follow up [facility]  Lab:CMP    7. Chronic diastolic heart failure (HCC)  Stable    CPM  Follow up [facility]    8. Community acquired pneumonia, unspecified laterality  Resolved    9. Benign prostatic hyperplasia with nocturia  Stable   CPM  Follow up [facility]  Lab:  - Urinalysis with Culture Reflex; Future    10. History of prostate cancer  Stable   CPM  Follow up [facility] with urology  Lab: PSA, Total W Reflex To Free; Future    11. Ureterolithiasis  Resolved    12. Fatty liver  Lab: CMP    13. Colon cancer screening  S/p colonoscopy    14. Hyperglycemia  Lab:  Hemoglobin A1C; Future    15. 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 Lichen planus  Doing well  Refillls:   - fluocinonide 0.05 % External Cream; Apply 1 Application topically 2 (two) times daily.  Dispense: 30 g; Refill: 1    17. Vitamin D deficiency  Lab:  Vitamin D; Future    18. Risk for falls  Moderate risk, precautions given     The patient indicates understanding of these issues and agrees to the plan.  Further testing ordered.  Imaging studies ordered.  Lab work ordered.  Reinforced healthy diet, lifestyle, and exercise.      Return in about 6 months (around 11/7/2024).     ROMI MICHELE MD, 5/7/2024     Supplementary Documentation:   General Health:  In the past six months, have you lost more than 10 pounds without trying?: 2 - No  Has your appetite been poor?: No  Type of Diet: Balanced  How does the patient maintain a good energy level?: Appropriate Exercise;Daily Walks  How would you describe your daily physical activity?: Moderate  How would you describe your current health state?: Good  How do you maintain positive mental well-being?: Social Interaction  On a scale of 0 to 10, with 0 being no pain and 10 being severe pain, what is your pain level?: 0 - (None)  In the past six months, have you experienced urine leakage?: 0-No  At any time do you feel concerned for the safety/well-being of yourself and/or your children, in your home or elsewhere?: No  Have you had any immunizations at  another office such as Influenza, Hepatitis B, Tetanus, or Pneumococcal?: Samira Velasco's SCREENING SCHEDULE   Tests on this list are recommended by your physician but may not be covered, or covered at this frequency, by your insurer.   Please check with your insurance carrier before scheduling to verify coverage.   PREVENTATIVE SERVICES FREQUENCY &  COVERAGE DETAILS LAST COMPLETION DATE   Diabetes Screening    Fasting Blood Sugar / Glucose    One screening every 12 months if never tested or if previously tested but not diagnosed with pre-diabetes   One screening every 6 months if diagnosed with pre-diabetes Lab Results   Component Value Date     (H) 03/20/2024        Cardiovascular Disease Screening    Lipid Panel  Cholesterol  Lipoprotein (HDL)  Triglycerides Covered every 5 years for all Medicare beneficiaries without apparent signs or symptoms of cardiovascular disease Lab Results   Component Value Date    CHOLEST 112 04/13/2023    HDL 34 (L) 04/13/2023    LDL 54 04/13/2023    TRIG 138 04/13/2023         Electrocardiogram (EKG)   Covered if needed at Welcome to Medicare, and non-screening if indicated for medical reasons 12/28/2020      Ultrasound Screening for Abdominal Aortic Aneurysm (AAA) Covered once in a lifetime for one of the following risk factors    Men who are 65-75 years old and have ever smoked    Anyone with a family history -     Colorectal Cancer Screening  Covered for ages 50-85; only need ONE of the following:    Colonoscopy   Covered every 10 years    Covered every 2 years if patient is at high risk or previous colonoscopy was abnormal -    No recommendations at this time    Flexible Sigmoidoscopy   Covered every 4 years -    Fecal Occult Blood Test Covered annually -   Prostate Cancer Screening    Prostate-Specific Antigen (PSA) Annually Lab Results   Component Value Date    PSA 0.30 04/13/2023     There are no preventive care reminders to display for this patient.    Immunizations    Influenza Covered once per flu season  Please get every year 10/19/2023  No recommendations at this time    Pneumococcal Each vaccine ([id number] & [id number]) covered once after 65 Prevnar 13: 01/14/2020    [id number]: 08/07/2017     No recommendations at this time    Hepatitis B One screening covered for patients with certain risk factors   -  No recommendations at this time    Tetanus Toxoid Not covered by Medicare Part B unless medically necessary (cut with metal); may be covered with your pharmacy prescription benefits -    Tetanus, Diptheria and Pertusis TD and TDaP Not covered by Medicare Part B -  No recommendations at this time    Zoster Not covered by Medicare Part B; may be covered with your pharmacy  prescription benefits -  Zoster Vaccines(1 of 2) Never done     Annual Monitoring of Persistent Medications (ACE/ARB, digoxin diuretics, anticonvulsants)    Potassium Annually Lab Results   Component Value Date    K 3.4 (L) 03/20/2024         Creatinine   Annually Lab Results   Component Value Date    CREATSERUM 1.51 (H) 03/20/2024         BUN Annually Lab Results   Component Value Date    BUN 20 03/20/2024       Drug Serum Conc Annually No results found for: \"DIGOXIN\", \"DIG\", \"VALP\"           Chronic Obstructive Pulmonary Disease (COPD)    Spirometry Annually Spirometry date: 03/29/2024

## 2024-07-10 ENCOUNTER — OFFICE VISIT (OUTPATIENT)
Dept: FAMILY MEDICINE CLINIC | Facility: CLINIC | Age: 79
End: 2024-07-10
Payer: COMMERCIAL

## 2024-07-10 VITALS
HEIGHT: 69 IN | DIASTOLIC BLOOD PRESSURE: 77 MMHG | HEART RATE: 72 BPM | BODY MASS INDEX: 30.24 KG/M2 | SYSTOLIC BLOOD PRESSURE: 135 MMHG | WEIGHT: 204.19 LBS

## 2024-07-10 DIAGNOSIS — H10.13 ALLERGIC CONJUNCTIVITIS OF BOTH EYES: ICD-10-CM

## 2024-07-10 DIAGNOSIS — L20.84 INTRINSIC ECZEMA: Primary | ICD-10-CM

## 2024-07-10 PROCEDURE — 3078F DIAST BP <80 MM HG: CPT | Performed by: FAMILY MEDICINE

## 2024-07-10 PROCEDURE — 99213 OFFICE O/P EST LOW 20 MIN: CPT | Performed by: FAMILY MEDICINE

## 2024-07-10 PROCEDURE — 1126F AMNT PAIN NOTED NONE PRSNT: CPT | Performed by: FAMILY MEDICINE

## 2024-07-10 PROCEDURE — 3008F BODY MASS INDEX DOCD: CPT | Performed by: FAMILY MEDICINE

## 2024-07-10 PROCEDURE — 3075F SYST BP GE 130 - 139MM HG: CPT | Performed by: FAMILY MEDICINE

## 2024-07-10 RX ORDER — OLOPATADINE HYDROCHLORIDE 2 MG/ML
1 SOLUTION/ DROPS OPHTHALMIC DAILY
Qty: 2.5 ML | Refills: 2 | Status: SHIPPED | OUTPATIENT
Start: 2024-07-10

## 2024-07-10 RX ORDER — MOMETASONE FUROATE 1 MG/G
CREAM TOPICAL
Qty: 60 G | Refills: 1 | Status: SHIPPED | OUTPATIENT
Start: 2024-07-10

## 2024-07-10 NOTE — PROGRESS NOTES
7/10/2024  12:41 PM    Steven Velasco is a 78 year old male.    Chief complaint(s):   Chief Complaint   Patient presents with    Derm Problem     F/u facial bumps     HPI:     Steven Velasco primary complaint is regarding rash.       Steven Velasco is a 78 year old male who presents with a rash. Symptoms have been present for 1 week. This rash has appeared before. Previous dermatologic condition include eczema. The rash is located on the face and upper leg. Since then it has not spread. Parent has tried nothing for initial treatment and the rash has not changed. Itchiness and discomfort has been is mild. Patient does not have a fever. Recent illnesses: none. Sick contacts: none known. Possible contribution elements include uncertain.      HISTORY:  Past Medical History:    CAP (community acquired pneumonia)    SCAP    Essential hypertension    Gout    Kidney stones    s/p stent    PNA (pneumonia)    Prostate cancer (HCC)    Pulmonary embolus (HCC)      Past Surgical History:   Procedure Laterality Date    Colonoscopy        Family History   Problem Relation Age of Onset    Blood Clotting Disorder Other     Cancer Other       Social History:   Social History     Socioeconomic History    Marital status:    Tobacco Use    Smoking status: Former     Current packs/day: 0.00     Average packs/day: 1 pack/day for 30.0 years (30.0 ttl pk-yrs)     Types: Cigarettes     Start date: 1957     Quit date: 1987     Years since quittin.8    Smokeless tobacco: Never   Vaping Use    Vaping status: Never Used   Substance and Sexual Activity    Alcohol use: Not Currently     Comment: occ- none recently    Drug use: No        Immunizations:   Immunization History   Administered Date(s) Administered    Covid-19 Vaccine Pfizer 30 mcg/0.3 ml 04/10/2021, 2021, 2021    FLU VAC High Dose 65 YRS & Older PRSV Free (96921) 10/04/2021, 10/12/2022, 10/19/2023    FLUAD High Dose 65 yr and older (26442) 10/02/2017     HIGH DOSE FLU 65 YRS AND OLDER PRSV FREE SINGLE D (29070) FLU CLINIC 10/04/2021    Pneumococcal (Prevnar 13) 01/14/2020    Pneumococcal Conjugate PCV20 10/12/2022    Pneumovax 23 08/07/2017       Medications (Active prior to today's visit):  Current Outpatient Medications   Medication Sig Dispense Refill    Mometasone Furoate 0.1 % External Cream Apply to affected area(s) BID 60 g 1    Olopatadine HCl 0.2 % Ophthalmic Solution Apply 1 drop to eye daily. 2.5 mL 2    ketoconazole 2 % External Cream Apply 1 Application topically daily. 30 g 1    fluocinonide 0.05 % External Cream Apply 1 Application topically 2 (two) times daily. 30 g 1    allopurinol 100 MG Oral Tab Take 1 tablet (100 mg total) by mouth daily. 90 tablet 1    ergocalciferol 1.25 MG (05921 UT) Oral Cap Take 1 capsule (50,000 Units total) by mouth once a week. 12 capsule 3    carvedilol 6.25 MG Oral Tab Take 1 tablet (6.25 mg total) by mouth 2 (two) times daily with meals. 60 tablet 1    chlorthalidone 25 MG Oral Tab Take 1 tablet (25 mg total) by mouth daily. 30 tablet 3    tamsulosin 0.4 MG Oral Cap Take 1 capsule (0.4 mg total) by mouth daily. 90 capsule 2    aspirin 81 MG Oral Tab EC Take 1 tablet (81 mg total) by mouth daily. 30 tablet 2       Allergies:  No Known Allergies      ROS:   Review of Systems   Constitutional:  Negative for appetite change, fatigue and fever.   Eyes:  Positive for itching.   Respiratory:  Negative for shortness of breath.    Cardiovascular:  Negative for chest pain.   Gastrointestinal:  Negative for abdominal pain.   Musculoskeletal:  Negative for myalgias.   Skin:  Positive for rash (itchiness).   Neurological:  Negative for dizziness, weakness and headaches.       PHYSICAL EXAM:   VS: /77   Pulse 72   Ht 5' 9\" (1.753 m)   Wt 204 lb 3.2 oz (92.6 kg)   BMI 30.16 kg/m²     Physical Exam  Vitals reviewed.   Constitutional:       Appearance: Normal appearance. He is well-developed.   HENT:      Head:  Normocephalic.   Eyes:      General: No scleral icterus.     Conjunctiva/sclera:      Right eye: Right conjunctiva is injected.      Left eye: Left conjunctiva is injected.   Cardiovascular:      Rate and Rhythm: Normal rate.   Pulmonary:      Effort: Pulmonary effort is normal.   Musculoskeletal:      Cervical back: Neck supple.   Skin:     Findings: Rash (facial and LEs) present.   Psychiatric:         Mood and Affect: Mood normal.         LABORATORY RESULTS:     EKG / Spirometry : -     Radiology: No results found.     ASSESSMENT/PLAN:   Assessment   Encounter Diagnoses   Name Primary?    Intrinsic eczema Yes    Allergic conjunctivitis of both eyes        MEDICATIONS:     Requested Prescriptions     Signed Prescriptions Disp Refills    Mometasone Furoate 0.1 % External Cream 60 g 1     Sig: Apply to affected area(s) BID    Olopatadine HCl 0.2 % Ophthalmic Solution 2.5 mL 2     Sig: Apply 1 drop to eye daily.       RECOMMENDATIONS given include: Patient was reassured of  his medical condition and all questions and concerns were answered. Patient was informed to please, call our office with any new or further questions or concerns that may come up in the near future. Notify Dr Michele or the [facility] if there is a deterioration or worsening of the medical condition. Also, inform the doctor with any new symptoms or medications' side effects.      FOLLOW-UP: Schedule a follow-up visit in  prn.            Orders This Visit:  No orders of the defined types were placed in this encounter.      Meds This Visit:  Requested Prescriptions     Signed Prescriptions Disp Refills    Mometasone Furoate 0.1 % External Cream 60 g 1     Sig: Apply to affected area(s) BID    Olopatadine HCl 0.2 % Ophthalmic Solution 2.5 mL 2     Sig: Apply 1 drop to eye daily.       Imaging & Referrals:  None         ROMI MICHELE MD

## 2024-09-23 NOTE — PHYSICAL THERAPY NOTE
Attempted PT treatment. Patient on 15L/min at rest with Spo2 saturation at 87%. Due to unstable respiratory status- will defer PT treatment on this date- RN made aware and agreeable.  Will attempt on 1/20/21    Thank you,  Luh Howard, PT, DPT CC:  Jeanne Johnson is here today for Office Visit     Medications: medications verified and updated  Refills needed today?  NO  denies Latex allergy or sensitivity  Patient would like communication of their results via:    Cell Phone:   Telephone Information:   Mobile [phone]     Okay to leave a message containing results? Yes  Tobacco history: verified  Patient's current [facility] status: Active.    Pharmacy Verified?  Yes         Health Maintenance       Pneumococcal Vaccine 0-64 (1 of 2 - PCV)  Order placed this encounter    DTaP/Tdap/Td Vaccine (1 - Tdap)  Order placed this encounter    Diabetes Foot Exam (Yearly)  Overdue since 8/21/2018    Colorectal Cancer Screen (Cologuard - Every 3 Years)  Order placed this encounter    COVID-19 Vaccine (3 - Pfizer risk series)  Order placed this encounter    Diabetes Eye Exam (Yearly)  Overdue since 8/5/2022    DM/CKD Microalbumin Ratio (Yearly)  Overdue since 3/4/2023    Breast Cancer Screening (Every 2 Years)  Order placed this encounter    Influenza Vaccine (1)  Order placed this encounter           Following review of the above:  Pended orders    Note: Refer to final orders and clinician documentation.

## 2025-02-12 ENCOUNTER — TELEPHONE (OUTPATIENT)
Dept: FAMILY MEDICINE CLINIC | Facility: CLINIC | Age: 80
End: 2025-02-12

## 2025-02-12 ENCOUNTER — NURSE TRIAGE (OUTPATIENT)
Dept: FAMILY MEDICINE CLINIC | Facility: CLINIC | Age: 80
End: 2025-02-12

## 2025-02-12 NOTE — TELEPHONE ENCOUNTER
Action Requested: Summary for Provider     []  Critical Lab, Recommendations Needed  [] Need Additional Advice  []   FYI    []   Need Orders  [] Need Medications Sent to Pharmacy  []  Other     SUMMARY: Patient's daughter Patience called back, verified patient's name/.  States he has been having blood with stools for past 2 weeks but not with every stool.  Stool color is brown.  Denies large amount of blood in toilet, dizziness, shortness of breath.  Plan:  Will keep scheduled appointment on 2025.  Future Appointments   Date Time Provider Department Center   2025  9:15 AM Jarod England MD [facility]       Reason for call: blood in stool  Onset: 2 weeks.    Reason for Disposition   Patient wants to be seen    Protocols used: Rectal Bleeding-A-OH

## 2025-02-12 NOTE — TELEPHONE ENCOUNTER
[facility] the [facility]:    Blood in stool

## 2025-02-12 NOTE — TELEPHONE ENCOUNTER
Left message to call back. Transfer to triage.       Future Appointments   Date Time Provider Department Center   2/13/2025  9:15 AM Jarod England MD [facility]

## 2025-02-13 ENCOUNTER — OFFICE VISIT (OUTPATIENT)
Dept: FAMILY MEDICINE CLINIC | Facility: CLINIC | Age: 80
End: 2025-02-13
Payer: COMMERCIAL

## 2025-02-13 ENCOUNTER — TELEPHONE (OUTPATIENT)
Dept: FAMILY MEDICINE CLINIC | Facility: CLINIC | Age: 80
End: 2025-02-13

## 2025-02-13 ENCOUNTER — LAB ENCOUNTER (OUTPATIENT)
Dept: LAB | Age: 80
End: 2025-02-13
Attending: FAMILY MEDICINE
Payer: MEDICARE

## 2025-02-13 ENCOUNTER — EKG ENCOUNTER (OUTPATIENT)
Dept: LAB | Age: 80
End: 2025-02-13
Attending: FAMILY MEDICINE
Payer: MEDICARE

## 2025-02-13 VITALS
BODY MASS INDEX: 30.36 KG/M2 | SYSTOLIC BLOOD PRESSURE: 139 MMHG | HEIGHT: 69 IN | DIASTOLIC BLOOD PRESSURE: 80 MMHG | WEIGHT: 205 LBS | HEART RATE: 84 BPM

## 2025-02-13 DIAGNOSIS — E55.9 VITAMIN D DEFICIENCY: ICD-10-CM

## 2025-02-13 DIAGNOSIS — R73.9 HYPERGLYCEMIA: ICD-10-CM

## 2025-02-13 DIAGNOSIS — K76.0 FATTY LIVER: ICD-10-CM

## 2025-02-13 DIAGNOSIS — R19.5 POSITIVE FECAL OCCULT BLOOD TEST: ICD-10-CM

## 2025-02-13 DIAGNOSIS — Z91.81 RISK FOR FALLS: ICD-10-CM

## 2025-02-13 DIAGNOSIS — I10 ESSENTIAL HYPERTENSION: ICD-10-CM

## 2025-02-13 DIAGNOSIS — Z00.00 MEDICARE ANNUAL WELLNESS VISIT, SUBSEQUENT: Primary | ICD-10-CM

## 2025-02-13 DIAGNOSIS — Z86.79 HISTORY OF PSVT (PAROXYSMAL SUPRAVENTRICULAR TACHYCARDIA): ICD-10-CM

## 2025-02-13 DIAGNOSIS — L43.9 LICHEN PLANUS: ICD-10-CM

## 2025-02-13 DIAGNOSIS — Z85.46 HISTORY OF PROSTATE CANCER: ICD-10-CM

## 2025-02-13 DIAGNOSIS — Z86.711 HISTORY OF PULMONARY EMBOLISM: ICD-10-CM

## 2025-02-13 DIAGNOSIS — I50.32 CHRONIC DIASTOLIC HEART FAILURE (HCC): ICD-10-CM

## 2025-02-13 DIAGNOSIS — N20.1 URETEROLITHIASIS: ICD-10-CM

## 2025-02-13 DIAGNOSIS — L97.929: ICD-10-CM

## 2025-02-13 DIAGNOSIS — Z12.11 COLON CANCER SCREENING: ICD-10-CM

## 2025-02-13 DIAGNOSIS — N18.31 CHRONIC RENAL IMPAIRMENT, STAGE 3A (HCC): ICD-10-CM

## 2025-02-13 DIAGNOSIS — R35.1 BENIGN PROSTATIC HYPERPLASIA WITH NOCTURIA: ICD-10-CM

## 2025-02-13 DIAGNOSIS — I83.229: ICD-10-CM

## 2025-02-13 DIAGNOSIS — N40.1 BENIGN PROSTATIC HYPERPLASIA WITH NOCTURIA: ICD-10-CM

## 2025-02-13 DIAGNOSIS — Z00.00 MEDICARE ANNUAL WELLNESS VISIT, SUBSEQUENT: ICD-10-CM

## 2025-02-13 LAB
ALBUMIN SERPL-MCNC: 5.2 G/DL (ref 3.2–4.8)
ALBUMIN/GLOB SERPL: 1.5 {RATIO} (ref 1–2)
ALP LIVER SERPL-CCNC: 127 U/L
ALT SERPL-CCNC: 23 U/L
ANION GAP SERPL CALC-SCNC: 10 MMOL/L (ref 0–18)
AST SERPL-CCNC: 26 U/L (ref ?–34)
ATRIAL RATE: 80 BPM
BASOPHILS # BLD AUTO: 0.07 X10(3) UL (ref 0–0.2)
BASOPHILS NFR BLD AUTO: 0.8 %
BILIRUB SERPL-MCNC: 0.7 MG/DL (ref 0.2–1.1)
BILIRUB UR QL: NEGATIVE
BUN BLD-MCNC: 14 MG/DL (ref 9–23)
BUN/CREAT SERPL: 10.8 (ref 10–20)
CALCIUM BLD-MCNC: 10.1 MG/DL (ref 8.7–10.4)
CHLORIDE SERPL-SCNC: 104 MMOL/L (ref 98–112)
CHOLEST SERPL-MCNC: 152 MG/DL (ref ?–200)
CLARITY UR: CLEAR
CO2 SERPL-SCNC: 28 MMOL/L (ref 21–32)
CREAT BLD-MCNC: 1.3 MG/DL
DEPRECATED RDW RBC AUTO: 43.5 FL (ref 35.1–46.3)
EGFRCR SERPLBLD CKD-EPI 2021: 56 ML/MIN/1.73M2 (ref 60–?)
EOSINOPHIL # BLD AUTO: 0.35 X10(3) UL (ref 0–0.7)
EOSINOPHIL NFR BLD AUTO: 3.8 %
ERYTHROCYTE [DISTWIDTH] IN BLOOD BY AUTOMATED COUNT: 14.5 % (ref 11–15)
EST. AVERAGE GLUCOSE BLD GHB EST-MCNC: 126 MG/DL (ref 68–126)
FASTING PATIENT LIPID ANSWER: YES
FASTING STATUS PATIENT QL REPORTED: YES
GLOBULIN PLAS-MCNC: 3.4 G/DL (ref 2–3.5)
GLUCOSE BLD-MCNC: 89 MG/DL (ref 70–99)
GLUCOSE UR-MCNC: NORMAL MG/DL
HBA1C MFR BLD: 6 % (ref ?–5.7)
HCT VFR BLD AUTO: 53.2 %
HDLC SERPL-MCNC: 34 MG/DL (ref 40–59)
HGB BLD-MCNC: 18.3 G/DL
HGB UR QL STRIP.AUTO: NEGATIVE
IMM GRANULOCYTES # BLD AUTO: 0.03 X10(3) UL (ref 0–1)
IMM GRANULOCYTES NFR BLD: 0.3 %
KETONES UR-MCNC: NEGATIVE MG/DL
LDLC SERPL CALC-MCNC: 93 MG/DL (ref ?–100)
LEUKOCYTE ESTERASE UR QL STRIP.AUTO: NEGATIVE
LYMPHOCYTES # BLD AUTO: 1.33 X10(3) UL (ref 1–4)
LYMPHOCYTES NFR BLD AUTO: 14.5 %
MCH RBC QN AUTO: 28.6 PG (ref 26–34)
MCHC RBC AUTO-ENTMCNC: 34.4 G/DL (ref 31–37)
MCV RBC AUTO: 83.1 FL
MONOCYTES # BLD AUTO: 0.9 X10(3) UL (ref 0.1–1)
MONOCYTES NFR BLD AUTO: 9.8 %
NEUTROPHILS # BLD AUTO: 6.51 X10 (3) UL (ref 1.5–7.7)
NEUTROPHILS # BLD AUTO: 6.51 X10(3) UL (ref 1.5–7.7)
NEUTROPHILS NFR BLD AUTO: 70.8 %
NITRITE UR QL STRIP.AUTO: NEGATIVE
NONHDLC SERPL-MCNC: 118 MG/DL (ref ?–130)
OSMOLALITY SERPL CALC.SUM OF ELEC: 294 MOSM/KG (ref 275–295)
P AXIS: 33 DEGREES
P-R INTERVAL: 200 MS
PH UR: 6.5 [PH] (ref 5–8)
PLATELET # BLD AUTO: 188 10(3)UL (ref 150–450)
POTASSIUM SERPL-SCNC: 3.9 MMOL/L (ref 3.5–5.1)
PROT SERPL-MCNC: 8.6 G/DL (ref 5.7–8.2)
PROT UR-MCNC: NEGATIVE MG/DL
Q-T INTERVAL: 402 MS
QRS DURATION: 134 MS
QTC CALCULATION (BEZET): 463 MS
R AXIS: -48 DEGREES
RBC # BLD AUTO: 6.4 X10(6)UL
SODIUM SERPL-SCNC: 142 MMOL/L (ref 136–145)
SP GR UR STRIP: 1.01 (ref 1–1.03)
T AXIS: 39 DEGREES
TRIGL SERPL-MCNC: 137 MG/DL (ref 30–149)
TSI SER-ACNC: 4.17 UIU/ML (ref 0.55–4.78)
UROBILINOGEN UR STRIP-ACNC: NORMAL
VENTRICULAR RATE: 80 BPM
VIT D+METAB SERPL-MCNC: 12.6 NG/ML (ref 30–100)
VLDLC SERPL CALC-MCNC: 22 MG/DL (ref 0–30)
WBC # BLD AUTO: 9.2 X10(3) UL (ref 4–11)

## 2025-02-13 PROCEDURE — 80061 LIPID PANEL: CPT

## 2025-02-13 PROCEDURE — 1170F FXNL STATUS ASSESSED: CPT | Performed by: FAMILY MEDICINE

## 2025-02-13 PROCEDURE — 93005 ELECTROCARDIOGRAM TRACING: CPT

## 2025-02-13 PROCEDURE — 1159F MED LIST DOCD IN RCRD: CPT | Performed by: FAMILY MEDICINE

## 2025-02-13 PROCEDURE — 84443 ASSAY THYROID STIM HORMONE: CPT

## 2025-02-13 PROCEDURE — 81003 URINALYSIS AUTO W/O SCOPE: CPT

## 2025-02-13 PROCEDURE — 85025 COMPLETE CBC W/AUTO DIFF WBC: CPT

## 2025-02-13 PROCEDURE — 36415 COLL VENOUS BLD VENIPUNCTURE: CPT

## 2025-02-13 PROCEDURE — 3079F DIAST BP 80-89 MM HG: CPT | Performed by: FAMILY MEDICINE

## 2025-02-13 PROCEDURE — 3008F BODY MASS INDEX DOCD: CPT | Performed by: FAMILY MEDICINE

## 2025-02-13 PROCEDURE — 3075F SYST BP GE 130 - 139MM HG: CPT | Performed by: FAMILY MEDICINE

## 2025-02-13 PROCEDURE — 84153 ASSAY OF PSA TOTAL: CPT

## 2025-02-13 PROCEDURE — 99213 OFFICE O/P EST LOW 20 MIN: CPT | Performed by: FAMILY MEDICINE

## 2025-02-13 PROCEDURE — G0439 PPPS, SUBSEQ VISIT: HCPCS | Performed by: FAMILY MEDICINE

## 2025-02-13 PROCEDURE — 84154 ASSAY OF PSA FREE: CPT

## 2025-02-13 PROCEDURE — 80053 COMPREHEN METABOLIC PANEL: CPT

## 2025-02-13 PROCEDURE — 83036 HEMOGLOBIN GLYCOSYLATED A1C: CPT

## 2025-02-13 PROCEDURE — 99499 UNLISTED E&M SERVICE: CPT | Performed by: FAMILY MEDICINE

## 2025-02-13 PROCEDURE — 82306 VITAMIN D 25 HYDROXY: CPT

## 2025-02-13 PROCEDURE — 96160 PT-FOCUSED HLTH RISK ASSMT: CPT | Performed by: FAMILY MEDICINE

## 2025-02-13 PROCEDURE — 1126F AMNT PAIN NOTED NONE PRSNT: CPT | Performed by: FAMILY MEDICINE

## 2025-02-13 PROCEDURE — 93010 ELECTROCARDIOGRAM REPORT: CPT | Performed by: STUDENT IN AN ORGANIZED HEALTH CARE EDUCATION/TRAINING PROGRAM

## 2025-02-13 RX ORDER — POLYETHYLENE GLYCOL 3350 17 G/17G
17 POWDER, FOR SOLUTION ORAL DAILY
Qty: 30 EACH | Refills: 3 | Status: SHIPPED | OUTPATIENT
Start: 2025-02-13

## 2025-02-13 NOTE — TELEPHONE ENCOUNTER
Spoke with patient's daughter Patience about rersults notes and daughter states only [facility]    Chart updated and RX for Lula min D sent to [facility]

## 2025-02-13 NOTE — PROGRESS NOTES
Subjective:   Steven Velasco is a 79 year old male who presents for a MA AHA (Medicare Advantage Annual Health Assessment) and Subsequent Annual Wellness visit (Pt already had Initial Annual Wellness) and scheduled follow up of multiple significant but stable problems.     Steven Velasco is a 79 year old male is here for routine periodic Medicare health screening and examination.  His last physical exam was 1 years ago.  His last ECG was 1 years ago and was normal. His last diabetes screening test was 1 years ago and was normal.   His last cholesterol test was 1 year ago and was normal.  Last dentist visit was many months ago.  He is not current with his Td immunization.  He is current with his Flu vaccination.  Patient last colonoscopy was many years ago. He is not a smoker.       History/Other:   Fall Risk Assessment:   He has been screened for Falls and is low risk.      Cognitive Assessment:   He had a completely normal cognitive assessment - see flowsheet entries     Functional Ability/Status:   Steven Velasco has some abnormal functions as listed below:  He has Vision problems based on screening of functional status. He has Walking problems based on screening of functional status.       Depression Screening (PHQ):  PHQ-2 SCORE: 0  , done 2/13/2025             Advanced Directives:   He does NOT have a Living Will. [Do you have a living will?: No]  He does NOT have a Power of  for Health Care. [Do you have a healthcare power of ?: No]  Discussed Advance Care Planning with patient (and family/surrogate if present). Standard forms made available to patient in After Visit Summary.      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    Current Medications:  Outpatient Medications Marked as Taking for the 25 encounter (Office Visit) with Jarod England MD   Medication Sig    Polyethylene Glycol 3350 (MIRALAX) 17 g Oral Powd Pack Take 17 g by mouth daily.    Mometasone Furoate 0.1 % External Cream Apply to affected area(s) BID    ketoconazole 2 % External Cream Apply 1 Application topically daily.    fluocinonide 0.05 % External Cream Apply 1 Application topically 2 (two) times daily.    allopurinol 100 MG Oral Tab Take 1 tablet (100 mg total) by mouth daily.    tamsulosin 0.4 MG Oral Cap Take 1 capsule (0.4 mg total) by mouth daily.    aspirin 81 MG Oral Tab EC Take 1 tablet (81 mg total) by mouth daily.       Medical History:  He  has a past medical history of CAP (community acquired pneumonia) (), Essential hypertension, Gout, Kidney stones, PNA (pneumonia), Prostate cancer (HCC), and Pulmonary embolus (HCC) (2017).  Surgical History:  He  has a past surgical history that includes colonoscopy.   Family History:  His family history includes Blood Clotting Disorder in an other family member; Cancer in an other family member.  Social History:  He  reports that he quit smoking about 37 years ago. His smoking use included cigarettes. He started smoking about 67 years ago. He has a 30 pack-year smoking history. He has never used smokeless tobacco. He reports that he does not currently use alcohol. He reports that he does not use drugs.    Tobacco:  He smoked tobacco in the past but quit greater than 12 months ago.  Social History     Tobacco Use   Smoking Status Former    Current packs/day: 0.00    Average packs/day: 1 pack/day for 30.0 years (30.0 ttl pk-yrs)    Types: Cigarettes    Start date: 1957    Quit date: 1987    Years since quittin.4   Smokeless Tobacco Never         CAGE Alcohol Screen:   CAGE screening score of 0 on 2/13/2025, showing low risk of alcohol abuse.      Patient Care Team:  Jarod England MD as PCP - General (Family Medicine)    Review of Systems   Constitutional:  Negative for appetite change, fatigue and fever.   HENT:  Negative for hearing loss and nosebleeds.    Eyes:  Negative for pain and visual disturbance.   Respiratory:  Negative for apnea and shortness of breath.    Cardiovascular:  Negative for chest pain, palpitations and leg swelling.   Gastrointestinal:  Positive for constipation. Negative for abdominal pain, blood in stool, diarrhea, nausea and vomiting.   Endocrine: Negative for polydipsia and polyuria.   Genitourinary:  Negative for decreased urine volume, frequency and hematuria.        No nocturia   Musculoskeletal:  Negative for arthralgias.   Skin:  Negative for rash.   Neurological:  Negative for dizziness, syncope and headaches.   Psychiatric/Behavioral:  Negative for dysphoric mood and sleep disturbance.         Objective:   Physical Exam  Vitals reviewed.   Constitutional:       Appearance: Normal appearance.   Cardiovascular:      Rate and Rhythm: Normal rate and regular rhythm.      Heart sounds: No murmur heard.     No gallop.   Abdominal:      General: Bowel sounds are normal. There is no distension.      Palpations: Abdomen is soft. There is no mass.      Tenderness: There is no abdominal tenderness.      Hernia: There is no hernia in the left inguinal area or right inguinal area.   Genitourinary:     Penis: Normal.       Testes: Normal.      Prostate: Normal. Not enlarged, not tender and no nodules present.      Rectum: Guaiac result positive.   Musculoskeletal:      Cervical back: Neck supple.      Comments: Spinal exam without scoliosis.      Skin:     Comments: No rash   Neurological:      Sensory: No sensory deficit.      Deep Tendon Reflexes:      Reflex Scores:       Patellar reflexes are 2+ on the right side and 2+  on the left side.     Comments: Alert, pleasant male.  No focal neurological dificits.  Normal gait and coordination.   Psychiatric:      Comments: Appropriate affect and misdemeanor.          /80   Pulse 84   Ht 5' 9\" (1.753 m)   Wt 205 lb (93 kg)   BMI 30.27 kg/m²  Estimated body mass index is 30.27 kg/m² as calculated from the following:    Height as of this encounter: 5' 9\" (1.753 m).    Weight as of this encounter: 205 lb (93 kg).    Medicare Hearing Assessment:   Hearing Screening    Screening Method: Questionnaire  I have a problem hearing over the telephone: No I have trouble following the conversations when two or more people are talking at the same time: No   I have trouble understanding things on the TV: No I have to strain to understand conversations: No   I have to worry about missing the telephone ring or doorbell: No I have trouble hearing conversations in a noisy background such as a crowded room or restaurant: No   I get confused about where sounds come from: No I misunderstand some words in a sentence and need to ask people to repeat themselves: Sometimes   I especially have trouble understanding the speech of women and children: No I have trouble understanding the speaker in a large room such as at a meeting or place of Voodoo: No   Many people I talk to seem to mumble (or don't speak clearly): No People get annoyed because I misunderstand what they say: No   I misunderstand what others are saying and make inappropriate responses: No I avoid social activities because I cannot hear well and fear I will reply improperly: No   Family members and friends have told me they think I may have hearing loss: Sometimes                   Assessment & Plan:   Steven Velasco is a 79 year old male who presents for a Medicare Assessment.     1. Medicare annual wellness visit, subsequent (Primary)  -     EKG 12 Lead; Future; Expected date: 02/13/2025  2. Essential hypertension  -     CBC With  Differential With Platelet; Future; Expected date: 02/13/2025  -     Comp Metabolic Panel (14); Future; Expected date: 02/13/2025  -     Lipid Panel; Future; Expected date: 02/13/2025  3. Chronic diastolic heart failure (HCC)  4. History of PSVT (paroxysmal supraventricular tachycardia)  -     TSH W Reflex To Free T4; Future; Expected date: 02/13/2025  5. Chronic renal impairment, stage 3a (HCC)  6. Hyperglycemia  -     Hemoglobin A1C; Future; Expected date: 02/13/2025  7. History of pulmonary embolism  8. Benign prostatic hyperplasia with nocturia  9. History of prostate cancer  -     PSA, Total W Reflex To Free; Future; Expected date: 02/13/2025  -     Urinalysis with Culture Reflex; Future; Expected date: 02/13/2025  10. Ureterolithiasis  11. Colon cancer screening  -     Cancel: Gastro Referral - In Network  -     Gastro Referral - In Network  12. Positive fecal occult blood test  -     Gastro Referral - In Network  13. Fatty liver  14. Varicose veins of left lower extremity with both ulcer and inflammation, unspecified ulcer stage, unspecified ulceration site (HCC)  15. Lichen planus  16. Vitamin D deficiency  -     Vitamin D; Future; Expected date: 02/13/2025  17. Risk for falls  Other orders  -     Polyethylene Glycol 3350; Take 17 g by mouth daily.  Dispense: 30 each; Refill: 3    1. Medicare annual wellness visit, subsequent      CPE PLAN:    LABS / TEST & ORDERS for today's visit :Blood test(s) ordered today for send out to [facility] lab:  Orders Placed This Encounter   Procedures    CBC With Differential With Platelet    Comp Metabolic Panel (14)    Hemoglobin A1C    Lipid Panel    PSA, Total W Reflex To Free    TSH W Reflex To Free T4    Vitamin D    Urinalysis with Culture Reflex      Referrals: [facility]STRO - INTERNAL  EKG 12-LEAD  In-House; Urine dip.  Test/Procedures done today include: EKG.    IMMUNIZATIONS: none given today.    RECOMMENDATIONS given include: ANTICIPATORY GUIDANCE  topics covered  today include: safety (i.e. seat belts, helmets, sunscreen, protective sports gear ), nutrition (i.e. healthy meals and snacks (i.e. avoid junk food and high-carbohydrate foods); athletic conditioning, fluids; low fat milk, limit to less than 20 oz. a day; dental care ), and Healthy habits& Social competence & Responsibilities: Recommendations on physical activity; exercise daily or at least 3 times a week for 30-60 minutes doing cardiovascular exercise. Encourage to maintain the best physical and dental hygiene possible.    FOLLOW-UP: Schedule a follow-up visit in 12 months.   2. Essential hypertension  Stable  Doing well  Follow up   Lab:   - CBC With Differential With Platelet; Future  - Comp Metabolic Panel (14); Future  - Lipid Panel; Future    3. Chronic diastolic heart failure (HCC)  Stable  Doing well  Follow up     4. History of PSVT (paroxysmal supraventricular tachycardia)  Stable  Doing well  Follow up   Lab:  TSH W Reflex To Free T4; Future    5. Chronic renal impairment, stage 3a (HCC)  Lab: CMP    6. Hyperglycemia  Lab:  Hemoglobin A1C; Future    7. History of pulmonary embolism  Resolved    8. Benign prostatic hyperplasia with nocturia  9. History of prostate cancer  Stable  Doing well  Follow up   Lab:   - PSA, Total W Reflex To Free; Future  - Urinalysis with Culture Reflex; Future    10. Ureterolithiasis  stable    11. Colon cancer screening  12. Positive fecal occult blood test    Referral:  Gastro Referral - In Network    13. Fatty liver  Lab: CMP    14. Varicose veins of left lower extremity with both ulcer and inflammation, unspecified ulcer stage, unspecified ulceration site (HCC)  Stable    15. Lichen planus  Doing well  CPM    16. Vitamin D deficiency  Lab:  Vitamin D; Future    17. Risk for falls  Moderate risk, precautions given     The patient indicates understanding of these issues and agrees to the plan.  Further testing ordered.  Lab work ordered.  Reinforced healthy diet, lifestyle,  and exercise.      No follow-ups on file.     ROMI MICHELE MD, 2/13/2025     Supplementary Documentation:   General Health:  In the past six months, have you lost more than 10 pounds without trying?: 2 - No  Has your appetite been poor?: No  Type of Diet: Balanced  How does the patient maintain a good energy level?: Other  How would you describe your daily physical activity?: Light  How would you describe your current health state?: Good  How do you maintain positive mental well-being?: Social Interaction  On a scale of 0 to 10, with 0 being no pain and 10 being severe pain, what is your pain level?: 0 - (None)  In the past six months, have you experienced urine leakage?: 0-No  At any time do you feel concerned for the safety/well-being of yourself and/or your children, in your home or elsewhere?: No  Have you had any immunizations at another office such as Influenza, Hepatitis B, Tetanus, or Pneumococcal?: No    Health Maintenance   Topic Date Due    Zoster Vaccines (1 of 2) Never done    COVID-19 Vaccine (4 - 2024-25 season) 09/01/2024    Influenza Vaccine (1) 10/01/2024    Annual Well Visit  01/01/2025    Annual Depression Screening  01/01/2025    Fall Risk Screening (Annual)  Completed    Pneumococcal Vaccine: 50+ Years  Completed    Meningococcal B Vaccine  Aged Out

## 2025-02-14 ENCOUNTER — TELEPHONE (OUTPATIENT)
Facility: CLINIC | Age: 80
End: 2025-02-14

## 2025-02-14 ENCOUNTER — TELEPHONE (OUTPATIENT)
Dept: FAMILY MEDICINE CLINIC | Facility: CLINIC | Age: 80
End: 2025-02-14

## 2025-02-14 ENCOUNTER — OFFICE VISIT (OUTPATIENT)
Facility: CLINIC | Age: 80
End: 2025-02-14
Payer: COMMERCIAL

## 2025-02-14 VITALS
BODY MASS INDEX: 30.36 KG/M2 | SYSTOLIC BLOOD PRESSURE: 157 MMHG | WEIGHT: 205 LBS | HEIGHT: 69 IN | HEART RATE: 101 BPM | DIASTOLIC BLOOD PRESSURE: 97 MMHG

## 2025-02-14 DIAGNOSIS — K62.5 RECTAL BLEEDING: Primary | ICD-10-CM

## 2025-02-14 DIAGNOSIS — R19.5 POSITIVE FIT (FECAL IMMUNOCHEMICAL TEST): ICD-10-CM

## 2025-02-14 LAB
PROSTATE SPECIFIC AG: 0.3 NG/ML
PROSTATE SPECIFIC AG: 0.3 NG/ML

## 2025-02-14 PROCEDURE — 3008F BODY MASS INDEX DOCD: CPT | Performed by: NURSE PRACTITIONER

## 2025-02-14 PROCEDURE — 3077F SYST BP >= 140 MM HG: CPT | Performed by: NURSE PRACTITIONER

## 2025-02-14 PROCEDURE — 3080F DIAST BP >= 90 MM HG: CPT | Performed by: NURSE PRACTITIONER

## 2025-02-14 PROCEDURE — 1159F MED LIST DOCD IN RCRD: CPT | Performed by: NURSE PRACTITIONER

## 2025-02-14 PROCEDURE — 1160F RVW MEDS BY RX/DR IN RCRD: CPT | Performed by: NURSE PRACTITIONER

## 2025-02-14 PROCEDURE — 99204 OFFICE O/P NEW MOD 45 MIN: CPT | Performed by: NURSE PRACTITIONER

## 2025-02-14 RX ORDER — HYDROCORTISONE ACETATE 25 MG/1
25 SUPPOSITORY RECTAL 2 TIMES DAILY
Qty: 28 SUPPOSITORY | Refills: 0 | Status: SHIPPED | OUTPATIENT
Start: 2025-02-14 | End: 2025-02-28

## 2025-02-14 NOTE — PATIENT INSTRUCTIONS
1. Schedule colonoscopy with General Pool Endoscopist - urgent  Diagnosis: +FIT, rectal bleeding   Sedation: MAC  Prep: split dose golytely    2.  bowel prep from pharmacy   You can pick the bowel prep up now and store in a cool, dry place in your home until your scheduled bowel prep start date.    3. Continue all medications as normal for your procedure. DO NOT TAKE: Any form of alcohol, recreational drugs and any forms of erectile dysfunction medications 24 hours prior to procedure.    4. Read all bowel prep instructions carefully. Bowel prep instructions can also be found online at:  [email or web address]     5. AVOID seeds, nuts, popcorn, raw fruits and vegetables for 5 days before procedure    6. If you start any NEW medication after your visit today, please notify us. Certain medications (like iron or weight loss medications) will need to be held before the procedure, or the procedure cannot be performed safely.          -------------  Recomendaciones:  -Comience Miralax 17 g por día, ajuste hacia arriba o hacia abajo para obtener la frecuencia ideal  -Aumentar la ingesta de agua a 64 onzas de agua por día  -Aumentar la fibra a 20-30 g por día con frutas, verduras y cereales integrales  -Aumentar el ejercicio a 150 minutos/semana  -No ignorar las ganas de defecar  -Iniciar el uso de Squatty  -Evitar los azúcares artificiales  -Si los hábitos intestinales cambian o el estreñimiento empeora, llame a nuestra oficina lo antes posible  -seguimiento con Gilbreto en 6 semanas

## 2025-02-14 NOTE — TELEPHONE ENCOUNTER
----- Message from ROMI MICHELE sent at 2/14/2025  1:36 PM CST -----  Patient needs to make an appointment with GI, Dr Pollack for a colonoscopy due to + occult blood test.    MD

## 2025-02-14 NOTE — TELEPHONE ENCOUNTER
URGENT Pool Endoscopist     Scheduled for:  Colonoscopy [id number]   Location:  [facility]     Provider: BREE Berkowitz MD    Date:  2/18/2025 Tuesday  Time:   11:30am  (Patient made aware will receive phone call the day before with an arrival time)    Sedation:  MAC  Prep:  Split GoLytely    Diagnosis with codes:    ICD-10-CM   1. Rectal bleeding  K62.5   2. Positive FIT (fecal immunochemical test)  R19.5        Meds/Allergies Reconciled?:   Provider Reviewed   Was patient informed to call insurance with codes (Y/N):  Yes  Referral sent?: Referral was sent at the time of electronic surgical scheduling.  [facility] or [facility] notified?: I sent an electronic request to ENDO Scheduling and received a confirmation today.     Medication Orders:  Patient is aware to NOT take iron pills, herbal meds and diet supplements for 7 days before exam. Also to NOT take any form of alcohol, recreational drugs and any forms of ED meds 24 hours before exam.     Misc Orders:  N/A   Further instructions given by staff:  I Provided prep instructions to patient and reviewed date, time and location. Patient verbalized that  He / His understood and is aware to call with any questions.  Patient was informed about the new cancellation policy for He / His procedure. Patient was also given copy of the cancellation policy at the time of the appointment and verbalized understanding.

## 2025-02-14 NOTE — H&P
[facility] - Gastroenterology                                                                                                  Clinic History and Physical     Chief Complaint   Patient presents with    Constipation    Consult     + FIt        Requesting physician or provider: ROMI MICHELE MD    HPI:   Steven Velasco is a 79 year old year-old male with history of CKD, emphysema, hypertension, pulmonary embolism, prostate cancer. The patient presents for change in bowel habits.   Language Line [place], Tu[place]uan translation.     Has been seeing blood in stool for the past 2 weeks. Has been constipated, which is new for the patient.   Has not tried any treatment. PCP sent Miralax yesterday but he has not started it yet.   No history of hemorrhoids, no rectal pain.  Has some low back pain, wondering if it is due the kidneys.  Was seen in 2022 but did not follow up for colonoscopy, has had one over 10 years ago.   RN for PCP made appointment stating positive FIT test- no result in chart- was just seen yesterday?     Patient denies any GI symptoms of nausea, vomiting, heartburn, dyspepsia, dysphagia, hematemesis, abdominal pain,  diarrhea or melena.  Additionally there is no unintentional weight loss.    Last colonoscopy: unknown date  Last EGD: none    NSAIDS: none  Tobacco: former, quit over 20 years ago   Alcohol: none  Marijuana: none  Illicit drugs: none    FH GI malignancy:  none    No history of adverse reaction to sedation  No ANNE  No anticoagulants/antiplatelet  No pacemaker/defibrillator  No pain medications and/or sleep aides    Wt Readings from Last 6 Encounters:   02/14/25 205 lb (93 kg)   02/13/25 205 lb (93 kg)   07/10/24 204 lb 3.2 oz (92.6 kg)   05/07/24 204 lb 3.2 oz (92.6 kg)   04/29/24 203 lb (92.1 kg)   04/10/24 204 lb 9.6 oz (92.8 kg)          History, Medications, Allergies, ROS:      Past Medical History:    CAP (community acquired pneumonia)    SCAP    Essential hypertension    Gout     Kidney stones    s/p stent    PNA (pneumonia)    Prostate cancer (HCC)    Pulmonary embolus (HCC)      Past Surgical History:   Procedure Laterality Date    Colonoscopy        Family Hx:   Family History   Problem Relation Age of Onset    Blood Clotting Disorder Other     Cancer Other       Social History:   Social History     Socioeconomic History    Marital status:    Tobacco Use    Smoking status: Former     Current packs/day: 0.00     Average packs/day: 1 pack/day for 30.0 years (30.0 ttl pk-yrs)     Types: Cigarettes     Start date: 1957     Quit date: 1987     Years since quittin.4    Smokeless tobacco: Never   Vaping Use    Vaping status: Never Used   Substance and Sexual Activity    Alcohol use: Not Currently     Comment: occ- none recently    Drug use: No     Social Drivers of Health     Food Insecurity: No Food Insecurity (2025)    NCSS - Food Insecurity     Worried About Running Out of Food in the Last Year: No     Ran Out of Food in the Last Year: No   Transportation Needs: No Transportation Needs (2025)    NCSS - Transportation     Lack of Transportation: No   Housing Stability: Not At Risk (2025)    NCSS - Housing/Utilities     Has Housing: Yes     Worried About Losing Housing: No     Unable to Get Utilities: No        Medications (Active prior to today's visit):  Current Outpatient Medications   Medication Sig Dispense Refill    hydrocortisone 25 MG Rectal Suppos Place 1 suppository (25 mg total) rectally 2 (two) times daily for 14 days. 28 suppository 0    polyethylene glycol, PEG 3350-KCl-NaBcb-NaCl-NaSulf, 236 g Oral Recon Soln Take 4,000 mL by mouth As Directed. Take 2,000 mL the night before your procedure and 2,000 mL the morning of your procedure. 1 each 0    Polyethylene Glycol 3350 (MIRALAX) 17 g Oral Powd Pack Take 17 g by mouth daily. 30 each 3    Cholecalciferol (VITAMIN D3) 1.25 MG (91277 UT) Oral Tab Take 50,000 Units by mouth once a week. Take 1 tab po  Q week for 8 weeks then 1 tab po Q month for 10 months 10 tablet 3    Mometasone Furoate 0.1 % External Cream Apply to affected area(s) BID 60 g 1    ketoconazole 2 % External Cream Apply 1 Application topically daily. 30 g 1    fluocinonide 0.05 % External Cream Apply 1 Application topically 2 (two) times daily. 30 g 1    allopurinol 100 MG Oral Tab Take 1 tablet (100 mg total) by mouth daily. 90 tablet 1    carvedilol 6.25 MG Oral Tab Take 1 tablet (6.25 mg total) by mouth 2 (two) times daily with meals. 60 tablet 1    chlorthalidone 25 MG Oral Tab Take 1 tablet (25 mg total) by mouth daily. 30 tablet 3    tamsulosin 0.4 MG Oral Cap Take 1 capsule (0.4 mg total) by mouth daily. 90 capsule 2    aspirin 81 MG Oral Tab EC Take 1 tablet (81 mg total) by mouth daily. 30 tablet 2       Allergies:  Allergies[1]    ROS:   CONSTITUTIONAL: negative for fevers, chills, sweats  EYES Negative for scleral icterus or redness, and diplopia  HEENT: Negative for hoarseness  RESPIRATORY: Negative for cough and severe shortness of breath  CARDIOVASCULAR: Negative for crushing sub-sternal chest pain  GASTROINTESTINAL: See HPI  GENITOURINARY: Negative for dysuria  MUSCULOSKELETAL: Negative for arthralgias and myalgias  SKIN: Negative for jaundice, rash or pruritus  NEUROLOGICAL: Negative for dizziness and headaches  BEHAVIOR/PSYCH: Negative for psychotic behavior      PHYSICAL EXAM:   Blood pressure (!) 157/97, pulse 101, height 5' 9\" (1.753 m), weight 205 lb (93 kg).    GEN: Alert, no acute distress, well-nourished   ABDOMEN: Soft, symmetrical, non-tender without distention or guarding. No scars or lesions. Aorta is without bruit or visible pulsation. Umbilicus is midline without herniation. Normoactive bowel sounds are present, No masses, hepatomegaly or splenomegaly noted.  MSK: No erythema, no warmth, no swelling of joints  SKIN: No jaundice, no erythema, no rashes, no lesions  HEMATOLOGIC: No bleeding, no bruising  NEURO: Alert  and interactive, IRINA  PSYCH: appropriate mood & affect    Labs/Imaging:     Patient's labs and imaging were reviewed and discussed with patient today.    .  ASSESSMENT/PLAN:   Steven Velasco is a 79 year old year-old male with history of CKD, emphysema, hypertension, pulmonary embolism, prostate cancer. The patient presents for change in bowel habits/rectal bleeding.     #change in bowel habits  #constipation  #rectal bleeding  Discussed probable hemorrhoid due to constipation.   Will start Miralax as directed. Hydrocortisone suppositories x2 weeks.  Last cln >10 years ago per patient, will schedule.   No anemia noted on blood work.    -Anti-platelets and anti-coagulants: asa 81mg  -Diabetes meds: none    Patient Instructions   1. Schedule colonoscopy with General Pool Endoscopist - urgent  Diagnosis: +FIT, rectal bleeding   Sedation: MAC  Prep: split dose golytely    2.  bowel prep from pharmacy   You can pick the bowel prep up now and store in a cool, dry place in your home until your scheduled bowel prep start date.    3. Continue all medications as normal for your procedure. DO NOT TAKE: Any form of alcohol, recreational drugs and any forms of erectile dysfunction medications 24 hours prior to procedure.    4. Read all bowel prep instructions carefully. Bowel prep instructions can also be found online at:  www.[facility].org/giprep     5. AVOID seeds, nuts, popcorn, raw fruits and vegetables for 5 days before procedure    6. If you start any NEW medication after your visit today, please notify us. Certain medications (like iron or weight loss medications) will need to be held before the procedure, or the procedure cannot be performed safely.          -------------  Recomendaciones:  -Comience Miralax 17 g por día, ajuste hacia arriba o hacia abajo para obtener la frecuencia ideal  -Aumentar la ingesta de agua a 64 onzas de agua por día  -Aumentar la fibra a 20-30 g por día con frutas, verduras y cereales  integrales  -Aumentar el ejercicio a 150 minutos/semana  -No ignorar las ganas de defecar  -Iniciar el uso de Squatty  -Evitar los azúcares artificiales  -Si los hábitos intestinales cambian o el estreñimiento empeora, llame a nuestra oficina lo antes posible  -seguimiento con Gilberto en 6 semanas     Endoscopy risk/benefit discussion: I have thoroughly discussed the risks, benefits, and alternatives of endoscopic evaluation with the patient, who demonstrated understanding. This includes the potential risks of bleeding, infection, pain, anesthesia complications, and perforation, which may result in prolonged hospitalization or surgical intervention. All of the patient’s questions were addressed to their satisfaction. The patient has chosen to proceed with the endoscopic procedure, including any necessary interventions such as polypectomy, biopsy, control of bleeding.      Orders This Visit:  No orders of the defined types were placed in this encounter.      Meds This Visit:  Requested Prescriptions     Signed Prescriptions Disp Refills    hydrocortisone 25 MG Rectal Suppos 28 suppository 0     Sig: Place 1 suppository (25 mg total) rectally 2 (two) times daily for 14 days.    polyethylene glycol, PEG 3350-KCl-NaBcb-NaCl-NaSulf, 236 g Oral Recon Soln 1 each 0     Sig: Take 4,000 mL by mouth As Directed. Take 2,000 mL the night before your procedure and 2,000 mL the morning of your procedure.       Imaging & Referrals:  None       GENIA Aguilar    [facility]  2/14/2025               [1] No Known Allergies

## 2025-02-14 NOTE — TELEPHONE ENCOUNTER
Patient scheduled for Colonoscopy [address]31  on 2/18/2025. Routed message to Prior Auth Team.

## 2025-02-17 ENCOUNTER — TELEPHONE (OUTPATIENT)
Facility: CLINIC | Age: 80
End: 2025-02-17

## 2025-02-17 NOTE — TELEPHONE ENCOUNTER
Received a call from Berkley from pre admission testing department.    As per Berkley, the patient did not follow the recommend diet. He had garbanzos soup and fried potatoes and chicken tacos this afternoon. He is scheduled for colonoscopy tomorrow.    Dr Berkowitz was made aware by PAT and recommended to reschedule colonoscopy.  Patient's daughter notified of cancellation per PAT.    Canceled in appointment desk.

## 2025-02-19 NOTE — TELEPHONE ENCOUNTER
URGENT Pool Endoscopist     Rescheduled for:  Colonoscopy 70776   Location:  [facility] ([facility])    TO Dr. Vazquez   Provider: BREE Berkowitz MD    Date:  2/18/2025  TO 2/25/25 Tuesday  Time:   11:30am TO 9:45 (Patient made aware will receive phone call the day before with an arrival time)    Sedation:  MAC  Prep:  Split GoLytely    Diagnosis with codes:    ICD-10-CM   1. Rectal bleeding  K62.5   2. Positive FIT (fecal immunochemical test)  R19.5        Meds/Allergies Reconciled?:   Provider Reviewed   Was patient informed to call insurance with codes (Y/N):  Yes  Referral sent?: Referral was sent at the time of electronic surgical scheduling.  [facility] or [facility] notified?: I sent an electronic request to ENDO Scheduling and received a confirmation today.     Medication Orders:  Patient is aware to NOT take iron pills, herbal meds and diet supplements for 7 days before exam. Also to NOT take any form of alcohol, recreational drugs and any forms of ED meds 24 hours before exam.     Misc Orders:  N/A   Further instructions given by staff:  I Provided prep instructions to patient and reviewed date, time and location. Patient verbalized that  He / His understood and is aware to call with any questions.  Patient was informed about the new cancellation policy for He / His procedure. Patient was also given copy of the cancellation policy at the time of the appointment and verbalized understanding.

## 2025-02-21 RX ORDER — FLUOCINONIDE 0.5 MG/G
1 CREAM TOPICAL AS NEEDED
COMMUNITY

## 2025-02-25 ENCOUNTER — ANESTHESIA (OUTPATIENT)
Dept: ENDOSCOPY | Facility: HOSPITAL | Age: 80
End: 2025-02-25
Payer: MEDICARE

## 2025-02-25 ENCOUNTER — HOSPITAL ENCOUNTER (OUTPATIENT)
Facility: HOSPITAL | Age: 80
Setting detail: HOSPITAL OUTPATIENT SURGERY
Discharge: HOME | End: 2025-02-25
Attending: INTERNAL MEDICINE | Admitting: INTERNAL MEDICINE
Payer: MEDICARE

## 2025-02-25 ENCOUNTER — ANESTHESIA EVENT (OUTPATIENT)
Dept: ENDOSCOPY | Facility: HOSPITAL | Age: 80
End: 2025-02-25
Payer: MEDICARE

## 2025-02-25 VITALS
WEIGHT: 200 LBS | DIASTOLIC BLOOD PRESSURE: 83 MMHG | RESPIRATION RATE: 20 BRPM | OXYGEN SATURATION: 94 % | SYSTOLIC BLOOD PRESSURE: 144 MMHG | HEART RATE: 65 BPM | HEIGHT: 69 IN | BODY MASS INDEX: 29.62 KG/M2

## 2025-02-25 DIAGNOSIS — R19.5 POSITIVE FIT (FECAL IMMUNOCHEMICAL TEST): ICD-10-CM

## 2025-02-25 DIAGNOSIS — K62.5 RECTAL BLEEDING: ICD-10-CM

## 2025-02-25 DIAGNOSIS — K50.111 CROHN'S DISEASE OF COLON WITH RECTAL BLEEDING (HCC): Primary | ICD-10-CM

## 2025-02-25 PROBLEM — K64.8 INTERNAL HEMORRHOIDS: Status: ACTIVE | Noted: 2025-02-25

## 2025-02-25 PROCEDURE — 0DBB8ZX EXCISION OF ILEUM, VIA NATURAL OR ARTIFICIAL OPENING ENDOSCOPIC, DIAGNOSTIC: ICD-10-PCS | Performed by: INTERNAL MEDICINE

## 2025-02-25 PROCEDURE — 45380 COLONOSCOPY AND BIOPSY: CPT | Performed by: INTERNAL MEDICINE

## 2025-02-25 PROCEDURE — 0DBN8ZX EXCISION OF SIGMOID COLON, VIA NATURAL OR ARTIFICIAL OPENING ENDOSCOPIC, DIAGNOSTIC: ICD-10-PCS | Performed by: INTERNAL MEDICINE

## 2025-02-25 PROCEDURE — 0DBK8ZX EXCISION OF ASCENDING COLON, VIA NATURAL OR ARTIFICIAL OPENING ENDOSCOPIC, DIAGNOSTIC: ICD-10-PCS | Performed by: INTERNAL MEDICINE

## 2025-02-25 RX ORDER — SODIUM CHLORIDE, SODIUM LACTATE, POTASSIUM CHLORIDE, CALCIUM CHLORIDE 600; 310; 30; 20 MG/100ML; MG/100ML; MG/100ML; MG/100ML
INJECTION, SOLUTION INTRAVENOUS CONTINUOUS
Status: DISCONTINUED | OUTPATIENT
Start: 2025-02-25 | End: 2025-02-25

## 2025-02-25 RX ORDER — MORPHINE SULFATE 10 MG/ML
6 INJECTION, SOLUTION INTRAMUSCULAR; INTRAVENOUS EVERY 10 MIN PRN
Status: DISCONTINUED | OUTPATIENT
Start: 2025-02-25 | End: 2025-02-25

## 2025-02-25 RX ORDER — HYDROMORPHONE HYDROCHLORIDE 1 MG/ML
0.4 INJECTION, SOLUTION INTRAMUSCULAR; INTRAVENOUS; SUBCUTANEOUS EVERY 5 MIN PRN
Status: DISCONTINUED | OUTPATIENT
Start: 2025-02-25 | End: 2025-02-25

## 2025-02-25 RX ORDER — MORPHINE SULFATE 4 MG/ML
2 INJECTION, SOLUTION INTRAMUSCULAR; INTRAVENOUS EVERY 10 MIN PRN
Status: DISCONTINUED | OUTPATIENT
Start: 2025-02-25 | End: 2025-02-25

## 2025-02-25 RX ORDER — HYDROMORPHONE HYDROCHLORIDE 1 MG/ML
0.2 INJECTION, SOLUTION INTRAMUSCULAR; INTRAVENOUS; SUBCUTANEOUS EVERY 5 MIN PRN
Status: DISCONTINUED | OUTPATIENT
Start: 2025-02-25 | End: 2025-02-25

## 2025-02-25 RX ORDER — NALOXONE HYDROCHLORIDE 0.4 MG/ML
0.08 INJECTION, SOLUTION INTRAMUSCULAR; INTRAVENOUS; SUBCUTANEOUS AS NEEDED
Status: DISCONTINUED | OUTPATIENT
Start: 2025-02-25 | End: 2025-02-25

## 2025-02-25 RX ORDER — MORPHINE SULFATE 4 MG/ML
4 INJECTION, SOLUTION INTRAMUSCULAR; INTRAVENOUS EVERY 10 MIN PRN
Status: DISCONTINUED | OUTPATIENT
Start: 2025-02-25 | End: 2025-02-25

## 2025-02-25 RX ORDER — HYDROMORPHONE HYDROCHLORIDE 1 MG/ML
0.6 INJECTION, SOLUTION INTRAMUSCULAR; INTRAVENOUS; SUBCUTANEOUS EVERY 5 MIN PRN
Status: DISCONTINUED | OUTPATIENT
Start: 2025-02-25 | End: 2025-02-25

## 2025-02-25 RX ADMIN — SODIUM CHLORIDE, SODIUM LACTATE, POTASSIUM CHLORIDE, CALCIUM CHLORIDE: 600; 310; 30; 20 INJECTION, SOLUTION INTRAVENOUS at 10:30:00

## 2025-02-25 NOTE — ANESTHESIA PREPROCEDURE EVALUATION
Anesthesia PreOp Note    HPI:     Steven Velasco is a 79 year old male who presents for preoperative consultation requested by: Beth Vazquez MD    Date of Surgery: 2/25/2025    Procedure(s):  COLONOSCOPY  Indication: Rectal bleeding/ Positive FIT (fecal immunochemical test)    Relevant Problems   No relevant active problems       NPO:                         History Review:  Patient Active Problem List    Diagnosis Date Noted    Pulmonary emphysema (HCC) 03/29/2024    Thrombocytopenia 02/26/2024    CKD (chronic kidney disease) stage 3, GFR 30-59 ml/min (HCC) 02/26/2024    Smokers' cough (HCC) 02/26/2024    History of PSVT (paroxysmal supraventricular tachycardia) 01/14/2020    History of pulmonary embolism 01/14/2020    Varicose veins of left lower extremity with ulcer and inflammation (HCC) 11/28/2017    History of prostate cancer 09/05/2017    Essential hypertension 09/05/2017    Ureterolithiasis 08/15/2017       Past Medical History:    Arrhythmia    Back problem    Blood disorder    Calculus of kidney    CAP (community acquired pneumonia)    SCAP    Cataract    Cystic fibrosis (HCC)    Deep vein thrombosis (HCC)    BLE    Essential hypertension    no meds    Exposure to medical diagnostic radiation    Gout    High blood pressure    Kidney stones    s/p stent    PNA (pneumonia)    Pneumonia due to organism    HOSPITALIZED 3 MONTHS    Prostate cancer (HCC)    Pulmonary embolism (HCC)    Pulmonary embolus (HCC)    Pulmonary emphysema (HCC)    Renal disorder    CKD       Past Surgical History:   Procedure Laterality Date    Colonoscopy      Cystoscopy,insert ureteral stent         Prescriptions Prior to Admission[1]  Current Medications and Prescriptions Ordered in Epic[2]    Allergies[3]    Family History   Problem Relation Age of Onset    Blood Clotting Disorder Other     Cancer Other      Social History     Socioeconomic History    Marital status:    Tobacco Use    Smoking status: Former      Current packs/day: 0.00     Average packs/day: 1 pack/day for 30.0 years (30.0 ttl pk-yrs)     Types: Cigarettes     Start date: 1957     Quit date: 1987     Years since quittin.5    Smokeless tobacco: Never   Vaping Use    Vaping status: Never Used   Substance and Sexual Activity    Alcohol use: Not Currently     Comment: occ- none recently    Drug use: No       Available pre-op labs reviewed.  Lab Results   Component Value Date    WBC 9.2 2025    RBC 6.40 (H) 2025    HGB 18.3 (H) 2025    HCT 53.2 (H) 2025    MCV 83.1 2025    MCH 28.6 2025    MCHC 34.4 2025    RDW 14.5 2025    .0 2025     Lab Results   Component Value Date     2025    K 3.9 2025     2025    CO2 28.0 2025    BUN 14 2025    CREATSERUM 1.30 2025    GLU 89 2025    CA 10.1 2025          Vital Signs:  Body mass index is 27.26 kg/m².   height is 1.778 m (5' 10\") and weight is 86.2 kg (190 lb).   Vitals:    25 1620   Weight: 86.2 kg (190 lb)   Height: 1.778 m (5' 10\")        Anesthesia Evaluation     Patient summary reviewed and Nursing notes reviewed    Airway   Mallampati: III  TM distance: <3 FB  Neck ROM: full  Dental      Pulmonary - normal exam   (+) COPD    ROS comment: H/O Pulmonary emphysema  Smokers's cough  Cardiovascular - normal exam  (+) hypertension, dysrhythmias    Neuro/Psych - negative ROS     GI/Hepatic/Renal    (+) chronic renal disease CRI    Comments: H/O Prostrate Ca    Endo/Other    Abdominal  - normal exam                 Anesthesia Plan:   ASA:  3  Plan:   MAC  Post-op Pain Management: IV analgesics  Informed Consent Plan and Risks Discussed With:  Patient      I have informed Steven Velasco and/or legal guardian or family member of the nature of the anesthetic plan, benefits, risks including possible dental damage if relevant, major complications, and any alternative forms of anesthetic  management.   All of the patient's questions were answered to the best of my ability. The patient desires the anesthetic management as planned.  Rachel Washington MD  2/25/2025 9:03 AM  Present on Admission:  **None**           [1]   No medications prior to admission.   [2]   No current Epic-ordered facility-administered medications on file.     Current Outpatient Medications Ordered in Epic   Medication Sig Dispense Refill    fluocinonide 0.05 % External Cream Apply 1 Application topically as needed.      Mometasone Furoate 0.1 % External Cream Apply to affected area(s) BID (Patient taking differently: as needed. Apply to affected area(s) BID) 60 g 1    allopurinol 100 MG Oral Tab Take 1 tablet (100 mg total) by mouth daily. 90 tablet 1    tamsulosin 0.4 MG Oral Cap Take 1 capsule (0.4 mg total) by mouth daily. 90 capsule 2    aspirin 81 MG Oral Tab EC Take 1 tablet (81 mg total) by mouth daily. 30 tablet 2    polyethylene glycol, PEG 3350-KCl-NaBcb-NaCl-NaSulf, 236 g Oral Recon Soln Take 4,000 mL by mouth As Directed. Take 2,000 mL the night before your procedure and 2,000 mL the morning of your procedure. 1 each 0   [3] No Known Allergies

## 2025-02-25 NOTE — H&P
History & Physical Examination    Patient Name: Steven Velasco  MRN: [id number]  CSN: [id number]  YOB: 1948    Diagnosis: +FIT test, rectal bleeding      Prescriptions Prior to Admission[1]  Current Facility-Administered Medications   Medication Dose Route Frequency    lactated ringers infusion   Intravenous Continuous       Allergies: Allergies[2]    Past Medical History:    Arrhythmia    paroxsymal SVT -- per chart review from previous office visits    Back problem    Blood disorder    Calculus of kidney    CAP (community acquired pneumonia)    SCAP    Cataract    Cystic fibrosis (HCC)    Deep vein thrombosis (HCC)    BLE    Essential hypertension    no meds    Exposure to medical diagnostic radiation    Gout    High blood pressure    Kidney stones    s/p stent    PNA (pneumonia)    Pneumonia due to organism    HOSPITALIZED 3 MONTHS    Prostate cancer (HCC)    Pulmonary embolism (HCC)    Pulmonary embolus (HCC)    Pulmonary emphysema (HCC)    Renal disorder    CKD     Past Surgical History:   Procedure Laterality Date    Colonoscopy      Cystoscopy,insert ureteral stent       Family History   Problem Relation Age of Onset    Blood Clotting Disorder Other     Cancer Other      Social History     Tobacco Use    Smoking status: Former     Current packs/day: 0.00     Average packs/day: 1 pack/day for 30.0 years (30.0 ttl pk-yrs)     Types: Cigarettes     Start date: 1957     Quit date: 1987     Years since quittin.5    Smokeless tobacco: Never   Substance Use Topics    Alcohol use: Not Currently     Comment: occ- none recently       SYSTEM Check if Review is Normal Check if Physical Exam is Normal If not normal, please explain:   HEENT [X ] [ X]    NECK  [X ] [ X]    HEART [X ] [ X]    LUNGS [X ] [ X]    ABDOMEN [X ] [ X]    EXTREMITIES [X ] [ X]    OTHER        I have discussed the risks and benefits and alternatives of the procedure with the patient/family.  They understand and agree to  proceed with plan of care.   I have reviewed the History and Physical done within the last 30 days.  Any changes noted above.    Beth Vazquez MD                 [1]   Medications Prior to Admission   Medication Sig Dispense Refill Last Dose/Taking    fluocinonide 0.05 % External Cream Apply 1 Application topically as needed.   Taking As Needed    Mometasone Furoate 0.1 % External Cream Apply to affected area(s) BID (Patient taking differently: as needed. Apply to affected area(s) BID) 60 g 1 Taking Differently    allopurinol 100 MG Oral Tab Take 1 tablet (100 mg total) by mouth daily. 90 tablet 1 2/24/2025 Morning    tamsulosin 0.4 MG Oral Cap Take 1 capsule (0.4 mg total) by mouth daily. 90 capsule 2 2/24/2025 Morning    aspirin 81 MG Oral Tab EC Take 1 tablet (81 mg total) by mouth daily. 30 tablet 2 2/24/2025 Morning    polyethylene glycol, PEG 3350-KCl-NaBcb-NaCl-NaSulf, 236 g Oral Recon Soln Take 4,000 mL by mouth As Directed. Take 2,000 mL the night before your procedure and 2,000 mL the morning of your procedure. 1 each 0    [2] No Known Allergies

## 2025-02-25 NOTE — DISCHARGE INSTRUCTIONS
Home Care Instructions for Colonoscopy  Diet:  - Resume your regular diet as tolerated unless otherwise instructed.  - Start with light meals to minimize bloating.  - Do not drink alcohol today.    Medication:  - If you have questions about resuming your normal medications, please contact your Primary Care Physician.    Activities:  - Take it easy today. Do not return to work today.  - Do not drive today.  - Do not operate any machinery today (including kitchen equipment).    Colonoscopy:  - You may notice some rectal \"spotting\" (a little blood on the toilet tissue) for a day or two after the exam. This is normal.  - If you experience any rectal bleeding (not spotting), persistent tenderness or sharp severe abdominal pains, oral temperature over 100 degrees Fahrenheit, light-headedness or dizziness, or any other problems, contact your doctor.      **If unable to reach your doctor, please go to the [facility] Emergency Room**    - Your referring physician will receive a full report of your examination.  - If you do not hear from your doctor's office within two weeks of your biopsy, please call them for your results.    You may be able to see your laboratory results in [email or web address] between 4 and 7 business days.  In some cases, your physician may not have viewed the results before they are released to [email or web address].  If you have questions regarding your results contact the physician who ordered the test/exam by phone or via [email or web address] by choosing \"Ask a Medical Question.\"

## 2025-02-25 NOTE — ANESTHESIA POSTPROCEDURE EVALUATION
Patient: Steven Velasco    Procedure Summary       Date: 02/25/25 Room / Location: [facility] ENDOSCOPY 01 / [facility] ENDOSCOPY    Anesthesia Start: 1029 Anesthesia Stop: 1107    Procedure: COLONOSCOPY Diagnosis:       Rectal bleeding      Positive FIT (fecal immunochemical test)      (colotis, diverticulosis, hemorrhoids)    Surgeons: Beth Vazquez MD Anesthesiologist: Rachel Washington MD    Anesthesia Type: MAC ASA Status: 3            Anesthesia Type: MAC    Vitals Value Taken Time   BP 98/70 02/25/25 1105   Temp 97.6 02/25/25 1107   Pulse 69 02/25/25 1106   Resp 25 02/25/25 1106   SpO2 93 % 02/25/25 1106   Vitals shown include unfiled device data.    [facility] AN Post Evaluation:   Patient Evaluated in PACU  Patient Participation: complete - patient participated  Level of Consciousness: awake and alert  Pain Score: 0  Pain Management: adequate  Airway Patency:patent  Yes    Nausea/Vomiting: none  Cardiovascular Status: acceptable  Respiratory Status: acceptable  Postoperative Hydration acceptable      Rachel Washington MD  2/25/2025 11:07 AM

## 2025-02-25 NOTE — OPERATIVE REPORT
COLONOSCOPY REPORT    Steven Velasco     1945 Age 79 year old   PCP ROMI MICHELE MD Endoscopist Beth Vazquez MD       Date of procedure: 25    Procedure: Colonoscopy w/ biopsies     Pre-operative diagnosis: rectal bleeding, + FIT test     Post-operative diagnosis: colitis, hemorrhoids     Medications: MAC    Withdrawal time: 13 minutes    Procedure:  Informed consent was obtained from the patient after the risks of the procedure were discussed, including but not limited to bleeding, perforation, aspiration, infection, or possibility of a missed lesion. After discussions of the risks/benefits and alternatives to this procedure, as well as the planned sedation, the patient was placed in the left lateral decubitus position and begun on continuous blood pressure pulse oximetry and EKG monitoring and this was maintained throughout the procedure. Once an adequate level of sedation was obtained a digital rectal exam was completed. Then the lubricated tip of the [id number]-[id number] diagnostic video colonoscope was inserted and advanced without difficulty to the cecum using water immersion and CO2 insufflation technique. The cecum was identified by localizing the trifold, the appendix and the ileocecal valve. Withdrawal was begun with thorough washing and careful examination of the colonic walls and folds. A routine second examination of the cecum/ascending colon was performed. Photodocumentation was obtained. The bowel prep was good. Views of the colon were good with washing. I then carefully withdrew the instrument from the patient who tolerated the procedure well.     Complications: none.    Findings:   1. No polyps noted.    2. Diverticulosis: few, left-side of colon.    3. Ileocecal valve appeared healthy and normal. The examined portion of the terminal ileum appeared normal. Biopsies were taken with a cold forceps from the terminal ileum.     4. There was an area of moderate-severe colitis  with erythematous, inflamed mucosa with ulcerations and lack of vascular markings noted in the ascending colon and in the distal sigmoid colon. There was rectal sparing. The colonic mucosa between these areas appeared normal. Biopsies were taken with a cold forceps.     5. A retroflexed view of the rectum revealed small internal hemorrhoids.    6. RADHA: normal rectal tone, no masses palpated.     Impression:   Left-sided diverticulosis.   Normal terminal ileum. Biopsied.   Skip areas of moderate-severe colitis in the ascending colon and distal sigmoid colon. Suspect crohn's disease. Biopsied.     Recommend:  Await pathology. The interval for the next colonoscopy will be determined after reviewing pathology. If new signs or symptoms develop, colonoscopy may need to be repeated sooner.   Low fiber diet.  Monitor for blood in the stool. If having more than just tinge of blood, call office or go to the ER.  Get your labs done.   Schedule a CT scan of your abdomen and pelvis.       >>>If tissue was obtained and you have not received your pathology results either by phone or letter within 2 weeks, please call our office at [phone].    Specimens: terminal ileum biopsies, ascending colon biopsies, sigmoid colon biopsies     Blood loss: <1 ml

## 2025-02-28 ENCOUNTER — TELEPHONE (OUTPATIENT)
Facility: CLINIC | Age: 80
End: 2025-02-28

## 2025-02-28 NOTE — TELEPHONE ENCOUNTER
----- Message from Beth Taylor sent at 2/28/2025  2:19 PM CST -----  Swathi  - I attempted to call x2 with . Could not reach him, left VM to call office. His colonoscopy was notable for  2 areas of mod-severe colitis in the ascending colon and in the distal sigmoid colon with normal colon inbetween and normal rectum. The TI looked normal. Biopsies with mildly active colitis w/ mild architectural distortion. I suspect crohn's disease.      I ordered labs - HBV serologies, quant gold and CRP. Also needs to complete stool testing - fecal protectin.   Additionally, ordered a CTE to assess for small bowel disease.   He needs to complete all the above.     In the meantime, you could consider entocort therapy w/ prolonged taper since he isn't very symptomatic. I usually do 9 mg x 28 days, 6 mg x 28 days, 3 mg x 28 days then repeat fecal calpro. If he needs step-up therapy, could do entyvio.   You could start the entocort while awaiting the labs, stool testing and CTE but would encourage him to complete these ASAP.     I wasn't able to reach him so I didn't prescribe anything.   Let me know if any questions.

## 2025-02-28 NOTE — PROGRESS NOTES
Swathi  - I attempted to call x2 with . Could not reach him, left VM to call office. His colonoscopy was notable for  2 areas of mod-severe colitis in the ascending colon and in the distal sigmoid colon with normal colon inbetween and normal rectum. The TI looked normal. Biopsies with mildly active colitis w/ mild architectural distortion. I suspect crohn's disease.      I ordered labs - HBV serologies, quant gold and CRP. Also needs to complete stool testing - fecal protectin.   Additionally, ordered a CTE to assess for small bowel disease.   He needs to complete all the above.     In the meantime, you could consider entocort therapy w/ prolonged taper since he isn't very symptomatic. I usually do 9 mg x 28 days, 6 mg x 28 days, 3 mg x 28 days then repeat fecal calpro. If he needs step-up therapy, could do entyvio.   You could start the entocort while awaiting the labs, stool testing and CTE but would encourage him to complete these ASAP.     I wasn't able to reach him so I didn't prescribe anything.   Let me know if any questions.

## 2025-03-04 ENCOUNTER — TELEPHONE (OUTPATIENT)
Facility: CLINIC | Age: 80
End: 2025-03-04

## 2025-03-04 NOTE — TELEPHONE ENCOUNTER
Your Appointments      Thursday March 06, 2025 2:00 PM  Follow Up Visit with GENIA Aguilar  [facility] ([facility]) [address]  [place] [address]  [phone]        Monday March 10, 2025 6:15 PM  Follow Up Visit with Jarod England MD  [facility] ([facility]) [address]  [place] [address]  [phone]   Contact your primary care provider if your insurance requires a referral.    Please arrive 15 minutes prior to your scheduled appointment. Be sure to bring your current Insurance card, Photo ID, and medication bottles or a list of your current medications.      A 24 hour notice is required to cancel any appointment or you may be charged a $40 No Show Fee.     Important: 24 hour notice is required to cancel any appointment or you may be charged a $40 No Show Fee. Please notify your physician office.

## 2025-03-04 NOTE — TELEPHONE ENCOUNTER
----- Message from Swathi Galeana sent at 3/4/2025 11:35 AM CST -----  Rns,    Can you please see if this patient can be seen in clinic to go over these results? Ok to use res24 spot.    GENIA Aguilar  ----- Message -----  From: Beth Vazquez MD  Sent: 2/28/2025   2:19 PM CST  To: GENIA Aguilar; [facility] Clinical Staff    Swathi  - I attempted to call x2 with . Could not reach him, left VM to call office. His colonoscopy was notable for  2 areas of mod-severe colitis in the ascending colon and in the distal sigmoid colon with normal colon inbetween and normal rectum. The TI looked normal. Biopsies with mildly active colitis w/ mild architectural distortion. I suspect crohn's disease.      I ordered labs - HBV serologies, quant gold and CRP. Also needs to complete stool testing - fecal protectin.   Additionally, ordered a CTE to assess for small bowel disease.   He needs to complete all the above.     In the meantime, you could consider entocort therapy w/ prolonged taper since he isn't very symptomatic. I usually do 9 mg x 28 days, 6 mg x 28 days, 3 mg x 28 days then repeat fecal calpro. If he needs step-up therapy, could do entyvio.   You could start the entocort while awaiting the labs, stool testing and CTE but would encourage him to complete these ASAP.     I wasn't able to reach him so I didn't prescribe anything.   Let me know if any questions.

## 2025-03-06 ENCOUNTER — LAB ENCOUNTER (OUTPATIENT)
Dept: LAB | Facility: HOSPITAL | Age: 80
End: 2025-03-06
Attending: NURSE PRACTITIONER
Payer: MEDICARE

## 2025-03-06 ENCOUNTER — OFFICE VISIT (OUTPATIENT)
Facility: CLINIC | Age: 80
End: 2025-03-06
Payer: COMMERCIAL

## 2025-03-06 VITALS
BODY MASS INDEX: 30.36 KG/M2 | WEIGHT: 205 LBS | DIASTOLIC BLOOD PRESSURE: 89 MMHG | HEIGHT: 69 IN | SYSTOLIC BLOOD PRESSURE: 175 MMHG | HEART RATE: 79 BPM

## 2025-03-06 DIAGNOSIS — K50.111 CROHN'S DISEASE OF COLON WITH RECTAL BLEEDING (HCC): ICD-10-CM

## 2025-03-06 DIAGNOSIS — K50.111 CROHN'S DISEASE OF COLON WITH RECTAL BLEEDING (HCC): Primary | ICD-10-CM

## 2025-03-06 LAB
CRP SERPL-MCNC: 0.9 MG/DL (ref ?–1)
HBV SURFACE AB SER QL: NONREACTIVE
HBV SURFACE AB SERPL IA-ACNC: <3.1 MIU/ML
HBV SURFACE AG SER-ACNC: <0.1 [IU]/L
HBV SURFACE AG SERPL QL IA: NONREACTIVE

## 2025-03-06 PROCEDURE — 3008F BODY MASS INDEX DOCD: CPT | Performed by: NURSE PRACTITIONER

## 2025-03-06 PROCEDURE — 86140 C-REACTIVE PROTEIN: CPT

## 2025-03-06 PROCEDURE — 36415 COLL VENOUS BLD VENIPUNCTURE: CPT

## 2025-03-06 PROCEDURE — 1160F RVW MEDS BY RX/DR IN RCRD: CPT | Performed by: NURSE PRACTITIONER

## 2025-03-06 PROCEDURE — 99214 OFFICE O/P EST MOD 30 MIN: CPT | Performed by: NURSE PRACTITIONER

## 2025-03-06 PROCEDURE — 87340 HEPATITIS B SURFACE AG IA: CPT

## 2025-03-06 PROCEDURE — 1159F MED LIST DOCD IN RCRD: CPT | Performed by: NURSE PRACTITIONER

## 2025-03-06 PROCEDURE — 3079F DIAST BP 80-89 MM HG: CPT | Performed by: NURSE PRACTITIONER

## 2025-03-06 PROCEDURE — 86480 TB TEST CELL IMMUN MEASURE: CPT

## 2025-03-06 PROCEDURE — 3077F SYST BP >= 140 MM HG: CPT | Performed by: NURSE PRACTITIONER

## 2025-03-06 PROCEDURE — 86706 HEP B SURFACE ANTIBODY: CPT

## 2025-03-06 RX ORDER — FOLIC ACID 1 MG/1
1 TABLET ORAL WEEKLY
COMMUNITY
Start: 2025-02-13

## 2025-03-06 RX ORDER — BUDESONIDE 3 MG/1
CAPSULE, COATED PELLETS ORAL
Qty: 168 CAPSULE | Refills: 0 | Status: SHIPPED | OUTPATIENT
Start: 2025-03-06 | End: 2025-03-08

## 2025-03-06 NOTE — PATIENT INSTRUCTIONS
-Lab today for blood test and stool sample for inflammation  -Schedule CT--- To schedule an appointment for your radiology test please call [facility] Scheduling at [phone].  -start Budesonide taper- will be taken for 3 months  -follow up when return from your trip

## 2025-03-06 NOTE — PROGRESS NOTES
[facility] - Gastroenterology                                                                                                      Clinic Follow-up Visit    Chief Complaint   Patient presents with    Follow - Up     Review the results from colonoscopy.         Subjective/HPI:   Steven Velasco is a 79 year old year old male with a past medical history of CKD, hypertension,   Daughter presents with patient to translate Frisian.     He is here today for follow up after colonoscopy.   Per Dr. Vazquez:  His colonoscopy was notable for  2 areas of mod-severe colitis in the ascending colon and in the distal sigmoid colon with normal colon inbetween and normal rectum. The TI looked normal. Biopsies with mildly active colitis w/ mild architectural distortion. I suspect crohn's disease.       Has had some similar symptoms a couple years ago in [place] that ended up resolving on its own.  Feels pain and rectal bleeding has improved since last visit.   Leaving for [place] for the month.       PRIOR GI WORK UP:   Endoscopies:   Date of procedure: 02/25/25     Procedure: Colonoscopy w/ biopsies      Pre-operative diagnosis: rectal bleeding, + FIT test      Post-operative diagnosis: colitis, hemorrhoids      Medications: MAC     Withdrawal time: 13 minutes     Procedure:  Informed consent was obtained from the patient after the risks of the procedure were discussed, including but not limited to bleeding, perforation, aspiration, infection, or possibility of a missed lesion. After discussions of the risks/benefits and alternatives to this procedure, as well as the planned sedation, the patient was placed in the left lateral decubitus position and begun on continuous blood pressure pulse oximetry and EKG monitoring and this was maintained throughout the procedure. Once an adequate level of sedation was obtained a digital rectal exam was  completed. Then the lubricated tip of the [employer]hefg-[id number]-260 diagnostic video colonoscope was inserted and advanced without difficulty to the cecum using water immersion and CO2 insufflation technique. The cecum was identified by localizing the trifold, the appendix and the ileocecal valve. Withdrawal was begun with thorough washing and careful examination of the colonic walls and folds. A routine second examination of the cecum/ascending colon was performed. Photodocumentation was obtained. The bowel prep was good. Views of the colon were good with washing. I then carefully withdrew the instrument from the patient who tolerated the procedure well.      Complications: none.     Findings:   1. No polyps noted.     2. Diverticulosis: few, left-side of colon.     3. Ileocecal valve appeared healthy and normal. The examined portion of the terminal ileum appeared normal. Biopsies were taken with a cold forceps from the terminal ileum.      4. There was an area of moderate-severe colitis with erythematous, inflamed mucosa with ulcerations and lack of vascular markings noted in the ascending colon and in the distal sigmoid colon. There was rectal sparing. The colonic mucosa between these areas appeared normal. Biopsies were taken with a cold forceps.      5. A retroflexed view of the rectum revealed small internal hemorrhoids.     6. RADHA: normal rectal tone, no masses palpated.      Impression:   Left-sided diverticulosis.   Normal terminal ileum. Biopsied.   Skip areas of moderate-severe colitis in the ascending colon and distal sigmoid colon. Suspect crohn's disease. Biopsied.      Recommend:  Await pathology. The interval for the next colonoscopy will be determined after reviewing pathology. If new signs or symptoms develop, colonoscopy may need to be repeated sooner.   Low fiber diet.  Monitor for blood in the stool. If having more than just tinge of blood, call office or go to the ER.  Get your labs done.   Schedule a  CT scan of your abdomen and pelvis.      Specimens: terminal ileum biopsies, ascending colon biopsies, sigmoid colon biopsies     Final Diagnosis:      A. Terminal ileum; biopsy:  Small intestinal mucosa with no significant pathologic change.     B. Ascending colon; biopsy:  Colonic mucosa with mildly active colitis, lamina propria lymphoplasmacytosis, and mild architectural distortion, see comment.     C. Sigmoid colon; biopsy:  Colonic mucosa with mildly active colitis, lamina propria lymphoplasmacytosis, and mild architectural distortion, see comment.     Comment: These findings are consistent with inflammatory bowel disease in the appropriate clinical context, provided other etiologies including infection and vasculitis have been excluded.  There is no evidence of dysplasia or granulomata in any of these biopsies.          Wt Readings from Last 6 Encounters:   03/10/25 205 lb (93 kg)   03/06/25 205 lb (93 kg)   02/25/25 200 lb (90.7 kg)   02/17/25 190 lb (86.2 kg)   02/14/25 205 lb (93 kg)   02/13/25 205 lb (93 kg)        History, Medications, Allergies, ROS:      Past Medical History:    Arrhythmia    paroxsymal SVT -- per chart review from previous office visits    Back problem    Blood disorder    Calculus of kidney    CAP (community acquired pneumonia)    SCAP    Cataract    Cystic fibrosis (HCC)    Deep vein thrombosis (HCC)    BLE    Essential hypertension    no meds    Exposure to medical diagnostic radiation    Gout    High blood pressure    Kidney stones    s/p stent    PNA (pneumonia)    Pneumonia due to organism    HOSPITALIZED 3 MONTHS    Prostate cancer (HCC)    Pulmonary embolism (HCC)    Pulmonary embolus (HCC)    Pulmonary emphysema (HCC)    Renal disorder    CKD      Past Surgical History:   Procedure Laterality Date    Colonoscopy      Colonoscopy N/A 2/25/2025    Procedure: COLONOSCOPY;  Surgeon: Beth Vazquez MD;  Location: [facility] ENDOSCOPY    Cystoscopy,insert ureteral stent         Family History   Problem Relation Age of Onset    Blood Clotting Disorder Other     Cancer Other       Social History:   Social History     Socioeconomic History    Marital status:    Tobacco Use    Smoking status: Former     Current packs/day: 0.00     Average packs/day: 1 pack/day for 30.0 years (30.0 ttl pk-yrs)     Types: Cigarettes     Start date: 1957     Quit date: 1987     Years since quittin.5    Smokeless tobacco: Never   Vaping Use    Vaping status: Never Used   Substance and Sexual Activity    Alcohol use: Not Currently     Comment: occ- none recently    Drug use: No     Social Drivers of Health     Food Insecurity: No Food Insecurity (2025)    NCSS - Food Insecurity     Worried About Running Out of Food in the Last Year: No     Ran Out of Food in the Last Year: No   Transportation Needs: No Transportation Needs (2025)    NCSS - Transportation     Lack of Transportation: No   Housing Stability: Not At Risk (2025)    NCSS - Housing/Utilities     Has Housing: Yes     Worried About Losing Housing: No     Unable to Get Utilities: No        Medications (Active prior to today's visit):  Current Outpatient Medications   Medication Sig Dispense Refill    Cholecalciferol (VITAMIN D3) 1.25 MG (77253 UT) Oral Cap Take 1 capsule by mouth once a week.      fluocinonide 0.05 % External Cream Apply 1 Application topically as needed.      Mometasone Furoate 0.1 % External Cream Apply to affected area(s) BID 60 g 1    allopurinol 100 MG Oral Tab Take 1 tablet (100 mg total) by mouth daily. 90 tablet 1    aspirin 81 MG Oral Tab EC Take 1 tablet (81 mg total) by mouth daily. 30 tablet 2    budesonide 3 MG Oral Cap DR Particles Take 3 capsules (9 mg total) by mouth every morning for 28 days, THEN 2 capsules (6 mg total) every morning for 28 days, THEN 1 capsule (3 mg total) every morning for 28 days. 168 capsule 0    tamsulosin 0.4 MG Oral Cap Take 1 capsule (0.4 mg total) by mouth  daily. 90 capsule 2    temazepam 30 MG Oral Cap Take 1 capsule (30 mg total) by mouth nightly as needed for Sleep. 90 capsule 1    Blood Pressure Does not apply Kit Use daily 1 kit 0    Olmesartan Medoxomil (BENICAR) 40 MG Oral Tab Take 1 tablet (40 mg total) by mouth daily. 30 tablet 3       Allergies:  Allergies[1]    ROS:   CONSTITUTIONAL: negative for fevers, chills, sweats  EYES Negative for scleral icterus or redness, and diplopia  HEENT: Negative for hoarseness  RESPIRATORY: Negative for cough and severe shortness of breath  CARDIOVASCULAR: Negative for crushing sub-sternal chest pain  GASTROINTESTINAL: See HPI  GENITOURINARY: Negative for dysuria  MUSCULOSKELETAL: Negative for arthralgias and myalgias  SKIN: Negative for jaundice, rash or pruritus  NEUROLOGICAL: Negative for dizziness and headaches  BEHAVIOR/PSYCH: Negative for psychotic behavior    PHYSICAL EXAM:   Blood pressure (!) 175/89, pulse 79, height 5' 9\" (1.753 m), weight 205 lb (93 kg).    Gen- alert, no acute distress, well-nourished  HEENT: anicteric sclera, neck supple, trachea midline, MMM, no palpable or tender neck or supraclavicular lymph nodes  CV- RRR, the extremities are warm and well perfused   Lungs- No increased work of breathing. CTAB   Abdomen- Soft, symmetrical, non-tender without distention or guarding.   MSK: no erythema, warmth, no swelling of joints  Skin- No jaundice, erythema, rashes or lesions  Hematologic- no bleeding or bruising  Neuro- Alert and interactive, AREVALO   Psych - appropriate mood & affect    Labs/Imaging:     Patient's labs and imaging were reviewed and discussed with patient today.     .  ASSESSMENT/PLAN:   Steven Velasco is a 79 year old year old male with a past medical history of CKD, hypertension,   Daughter presents with patient to translate Bulgarian.     1. Crohn's disease of colon with rectal bleeding (HCC)     Discussed diagnosis/plan of care with daughter and patient.  Will schedule CTE.  Wants to try  Budesonide treatment- will be out of the country for the next couple of months.  Follow up when returns.    Recommendations:  Follow up with PCP regarding elevated BP    Patient Instructions   -Lab today for blood test and stool sample for inflammation  -Schedule CT--- To schedule an appointment for your radiology test please call [facility] Sched[facility] at [phone].  -start Budesonide taper- will be taken for 3 months  -follow up when return from your trip        Orders This Visit:  No orders of the defined types were placed in this encounter.      Meds This Visit:  Requested Prescriptions      No prescriptions requested or ordered in this encounter       Imaging & Referrals:  None     GENIA Aguilar    [facility]  3/6/2025    The dictation was partially prepared using Dragon Medical voice recognition software. As a result, errors may occur. When identified, these errors have been corrected. While every attempt is made to correct errors during dictation, discrepancies may still exist.            [1] No Known Allergies

## 2025-03-07 ENCOUNTER — LAB ENCOUNTER (OUTPATIENT)
Dept: LAB | Facility: HOSPITAL | Age: 80
End: 2025-03-07
Attending: INTERNAL MEDICINE
Payer: MEDICARE

## 2025-03-07 DIAGNOSIS — K50.111 CROHN'S DISEASE OF COLON WITH RECTAL BLEEDING (HCC): ICD-10-CM

## 2025-03-07 PROCEDURE — 83993 ASSAY FOR CALPROTECTIN FECAL: CPT

## 2025-03-09 LAB — CALPROTECTIN STL-MCNT: >800 ΜG/G (ref ?–50)

## 2025-03-10 ENCOUNTER — HOSPITAL ENCOUNTER (OUTPATIENT)
Dept: CT IMAGING | Age: 80
Discharge: HOME | End: 2025-03-10
Attending: INTERNAL MEDICINE
Payer: MEDICARE

## 2025-03-10 ENCOUNTER — OFFICE VISIT (OUTPATIENT)
Dept: FAMILY MEDICINE CLINIC | Facility: CLINIC | Age: 80
End: 2025-03-10
Payer: COMMERCIAL

## 2025-03-10 VITALS
BODY MASS INDEX: 30.36 KG/M2 | HEART RATE: 73 BPM | WEIGHT: 205 LBS | DIASTOLIC BLOOD PRESSURE: 90 MMHG | HEIGHT: 69 IN | SYSTOLIC BLOOD PRESSURE: 176 MMHG

## 2025-03-10 DIAGNOSIS — K50.111 CROHN'S DISEASE OF COLON WITH RECTAL BLEEDING (HCC): ICD-10-CM

## 2025-03-10 DIAGNOSIS — K86.89 PANCREATIC MASS (HCC): ICD-10-CM

## 2025-03-10 DIAGNOSIS — I10 ESSENTIAL HYPERTENSION: Primary | ICD-10-CM

## 2025-03-10 DIAGNOSIS — F51.01 PRIMARY INSOMNIA: ICD-10-CM

## 2025-03-10 DIAGNOSIS — K50.10 CROHN'S DISEASE OF LARGE INTESTINE WITHOUT COMPLICATION (HCC): ICD-10-CM

## 2025-03-10 LAB
M TB IFN-G CD4+ T-CELLS BLD-ACNC: 0.07 IU/ML
M TB TUBERC IFN-G BLD QL: NEGATIVE
M TB TUBERC IGNF/MITOGEN IGNF CONTROL: >10 IU/ML
QFT TB1 AG MINUS NIL: 0.02 IU/ML
QFT TB2 AG MINUS NIL: 0.01 IU/ML

## 2025-03-10 PROCEDURE — 1159F MED LIST DOCD IN RCRD: CPT | Performed by: FAMILY MEDICINE

## 2025-03-10 PROCEDURE — 1126F AMNT PAIN NOTED NONE PRSNT: CPT | Performed by: FAMILY MEDICINE

## 2025-03-10 PROCEDURE — 99214 OFFICE O/P EST MOD 30 MIN: CPT | Performed by: FAMILY MEDICINE

## 2025-03-10 PROCEDURE — 3008F BODY MASS INDEX DOCD: CPT | Performed by: FAMILY MEDICINE

## 2025-03-10 PROCEDURE — 3080F DIAST BP >= 90 MM HG: CPT | Performed by: FAMILY MEDICINE

## 2025-03-10 PROCEDURE — 74177 CT ABD & PELVIS W/CONTRAST: CPT | Performed by: INTERNAL MEDICINE

## 2025-03-10 PROCEDURE — 3077F SYST BP >= 140 MM HG: CPT | Performed by: FAMILY MEDICINE

## 2025-03-10 RX ORDER — OLMESARTAN MEDOXOMIL 40 MG/1
40 TABLET ORAL DAILY
Qty: 30 TABLET | Refills: 3 | Status: SHIPPED | OUTPATIENT
Start: 2025-03-10

## 2025-03-10 RX ORDER — TAMSULOSIN HYDROCHLORIDE 0.4 MG/1
0.4 CAPSULE ORAL DAILY
Qty: 90 CAPSULE | Refills: 2 | Status: SHIPPED | OUTPATIENT
Start: 2025-03-10

## 2025-03-10 RX ORDER — BLOOD PRESSURE TEST KIT
KIT MISCELLANEOUS
Qty: 1 KIT | Refills: 0 | Status: SHIPPED | OUTPATIENT
Start: 2025-03-10

## 2025-03-10 RX ORDER — TEMAZEPAM 30 MG/1
30 CAPSULE ORAL NIGHTLY PRN
Qty: 90 CAPSULE | Refills: 1 | Status: SHIPPED | OUTPATIENT
Start: 2025-03-10

## 2025-03-10 NOTE — TELEPHONE ENCOUNTER
Requested Prescriptions     Pending Prescriptions Disp Refills    budesonide 3 MG Oral Cap DR Particles 168 capsule 0     Sig: Take 3 capsules (9 mg total) by mouth every morning for 28 days, THEN 2 capsules (6 mg total) every morning for 28 days, THEN 1 capsule (3 mg total) every morning for 28 days.       LOV   3/06/2025  LR  3/06/2025      Gilberto, please clarify directions on how to take medication.

## 2025-03-10 NOTE — PROGRESS NOTES
3/10/2025  6:23 PM    Steven Velasco is a 79 year old male.    Chief complaint(s):   Chief Complaint   Patient presents with    Anxiety     States he feels anxious mostly at night and cannot sleep     Test Results     Would like to discuss colonoscopy results     Blood Pressure     HPI:     Steven Velasco primary complaint is regarding as above.     Patient is a 79-year-old male who is following up after a recent colonoscopy for blood in the stool.  Pathology report came back as positive for Crohn's disease.  Treatment has been initiated by gastroenterology.  A recent CT scan done today shows to have a pancreatic lesion at the tail consistent with a cyst.  Patient and daughter was advised to follow-up with GI for possible MRI.    In addition he is complaining of insomnia not so much anxiety or depression.  He is having difficulty sleeping in the middle of the night sometimes wakes up at 2 or 3 in the morning and is unable to go back to sleep.  Denies any snoring or stops breathing during the night when sleeping.  Denies any leg cramps.      Steven Gomez a 79 year old male presents with hypertension.  This was first diagnosed more than 7 years ago.  Current nonpharmacologic treatment includes low sodium diet, exercise, and meditation.  His current cardiac medication(s) regimen includes: NONE.  He has kept a blood pressure diary, but states that his blood pressures have been well controll.  He is tolerating his medication(s)  well without side effects.  Compliance with treatment has been good.          HISTORY:  Past Medical History:    Arrhythmia    paroxsymal SVT -- per chart review from previous office visits    Back problem    Blood disorder    Calculus of kidney    CAP (community acquired pneumonia)    SCAP    Cataract    Cystic fibrosis (HCC)    Deep vein thrombosis (HCC)    BLE    Essential hypertension    no meds    Exposure to medical diagnostic radiation    Gout    High blood pressure    Kidney stones     s/p stent    PNA (pneumonia)    Pneumonia due to organism    HOSPITALIZED 3 MONTHS    Prostate cancer (HCC)    Pulmonary embolism (HCC)    Pulmonary embolus (HCC)    Pulmonary emphysema (HCC)    Renal disorder    CKD      Past Surgical History:   Procedure Laterality Date    Colonoscopy      Colonoscopy N/A 2025    Procedure: COLONOSCOPY;  Surgeon: Beth Vazquez MD;  Location: [facility] ENDOSCOPY    Cystoscopy,insert ureteral stent        Family History   Problem Relation Age of Onset    Blood Clotting Disorder Other     Cancer Other       Social History:   Social History     Socioeconomic History    Marital status:    Tobacco Use    Smoking status: Former     Current packs/day: 0.00     Average packs/day: 1 pack/day for 30.0 years (30.0 ttl pk-yrs)     Types: Cigarettes     Start date: 1957     Quit date: 1987     Years since quittin.5    Smokeless tobacco: Never   Vaping Use    Vaping status: Never Used   Substance and Sexual Activity    Alcohol use: Not Currently     Comment: occ- none recently    Drug use: No     Social Drivers of Health     Food Insecurity: No Food Insecurity (2025)    NCSS - Food Insecurity     Worried About Running Out of Food in the Last Year: No     Ran Out of Food in the Last Year: No   Transportation Needs: No Transportation Needs (2025)    NCSS - Transportation     Lack of Transportation: No   Housing Stability: Not At Risk (2025)    NCSS - Housing/Utilities     Has Housing: Yes     Worried About Losing Housing: No     Unable to Get Utilities: No        Immunizations:   Immunization History   Administered Date(s) Administered    Covid-19 Vaccine Pfizer 30 mcg/0.3 ml 04/10/2021, 2021, 2021    FLU VAC High Dose 65 YRS & Older PRSV Free (88985) 10/04/2021, 10/12/2022, 10/19/2023    FLUAD High Dose 65 yr and older (33143) 10/02/2017    High Dose Fluzone Influenza Vaccine, 65yr+ PF 0.5mL (27444) 10/04/2021    Pneumococcal  (Prevnar 13) 01/14/2020    Pneumococcal Conjugate PCV20 10/12/2022    Pneumovax 23 08/07/2017       Medications (Active prior to today's visit):  Current Outpatient Medications   Medication Sig Dispense Refill    tamsulosin 0.4 MG Oral Cap Take 1 capsule (0.4 mg total) by mouth daily. 90 capsule 2    temazepam 30 MG Oral Cap Take 1 capsule (30 mg total) by mouth nightly as needed for Sleep. 90 capsule 1    Blood Pressure Does not apply Kit Use daily 1 kit 0    Olmesartan Medoxomil (BENICAR) 40 MG Oral Tab Take 1 tablet (40 mg total) by mouth daily. 30 tablet 3    Cholecalciferol (VITAMIN D3) 1.25 MG (09505 UT) Oral Cap Take 1 capsule by mouth once a week.      budesonide 3 MG Oral Cap DR Particles Take 3 capsules (9 mg total) by mouth every morning for 28 days, THEN 2 capsules (6 mg total) every morning for 28 days, THEN 1 capsule (3 mg total) every morning for 28 days. 168 capsule 0    fluocinonide 0.05 % External Cream Apply 1 Application topically as needed.      Mometasone Furoate 0.1 % External Cream Apply to affected area(s) BID 60 g 1    allopurinol 100 MG Oral Tab Take 1 tablet (100 mg total) by mouth daily. 90 tablet 1    aspirin 81 MG Oral Tab EC Take 1 tablet (81 mg total) by mouth daily. 30 tablet 2       Allergies:  Allergies[1]      ROS:   Review of Systems   Constitutional:  Negative for appetite change, fatigue and fever.   Respiratory:  Negative for shortness of breath.    Cardiovascular:  Negative for chest pain.   Gastrointestinal:  Negative for abdominal pain, blood in stool, diarrhea, nausea and vomiting.   Musculoskeletal:  Negative for myalgias.   Skin:  Negative for rash.   Neurological:  Negative for dizziness, weakness and headaches.   Psychiatric/Behavioral:  Positive for sleep disturbance. The patient is not nervous/anxious.        PHYSICAL EXAM:   VS: BP (!) 176/90 (BP Location: Right arm, Patient Position: Sitting, Cuff Size: adult)   Pulse 73   Ht 5' 9\" (1.753 m)   Wt 205 lb (93 kg)    BMI 30.27 kg/m²     Physical Exam  Vitals reviewed.   Constitutional:       Appearance: Normal appearance. He is well-developed.   HENT:      Head: Normocephalic.   Eyes:      General: No scleral icterus.     Conjunctiva/sclera: Conjunctivae normal.   Cardiovascular:      Rate and Rhythm: Normal rate and regular rhythm.      Heart sounds: Normal heart sounds.   Pulmonary:      Effort: Pulmonary effort is normal.      Breath sounds: Normal breath sounds.   Musculoskeletal:      Cervical back: Neck supple.   Skin:     Findings: No rash.   Psychiatric:         Mood and Affect: Mood normal.         LABORATORY RESULTS:       EKG / Spirometry : -     Radiology: CT ENTEROGRAPHY ABD/PEL W CONTRAST (CPT=74177)    Result Date: 3/10/2025  PROCEDURE: CT ENTEROGRAPHY ABD PEL W CONTRAST SH (CPT-40964)  COMPARISON: [facility], CT ABDOMEN + PELVIS (CONTRAST ONLY) (CPT=74177), 1/24/2020, 8:46 AM.  [facility], CT ABDOMEN + PELVIS (CPT=74176), 8/15/2017, 4:36 AM.  INDICATIONS: Crohn's disease of colon with rectal bleeding  TECHNIQUE: CT images of the abdomen and pelvis were obtained with non-ionic intravenous contrast material.  Automated exposure control for dose reduction was used. Adjustment of the mA and/or kV was done based on the patient's size. Use of iterative reconstruction technique for dose reduction was used.  Dose information is transmitted to the ACR (American College of Radiology) NRDR (National Radiology Data Registry) which includes the Dose Index Registry.  FINDINGS:   LUNG BASES: No focal consolidation.  Mild bibasilar linear scarring versus atelectasis and mild cystic change. LIVER: No enlargement, atrophy, abnormal density, or significant focal lesion.  SPLEEN: No enlargement or focal lesion.  GALLBLADDER: No cholelithiasis. PANCREAS: Diffuse pancreatic atrophy.  1 cm hypodensity pancreatic tail. ADRENALS: No defined mass or abnormal enlargement.  KIDNEYS: Enhance  symmetrically without hydronephrosis.  Hypodensities left kidney probably reflect small cysts.  Cortical scarring left kidney. AORTA/VASCULAR:   Mild atherosclerosis abdominal aorta which is normal in caliber. ABDOMINAL NODES: No mass or adenopathy.  BOWEL/MESENTERY:  No dilated loops of bowel.  There is colonic diverticulosis.  There is circumferential wall thickening of the mid sigmoid colon with question of some faint surrounding fat stranding.  No pneumatosis or pneumoperitoneum.  No evidence of an associated drainable fluid collection.  The appendix is unremarkable. ABDOMINAL WALL: Small fat containing bilateral inguinal hernias. URINARY BLADDER: No focal wall thickening or calculus.  PELVIC NODES: No adenopathy.   PELVIC ORGANS: No visible mass.  Pelvic organs appropriate for patient age.  BONES:   Spondylosis lower thoracic and lumbar spine. OTHER: Negative.          CONCLUSION:   Circumferential wall thickening of the mid sigmoid colon with associated diverticulosis and question of some adjacent fat stranding.  Findings could reflect a mild colitis or mild acute diverticulitis.  Underlying colonic lesion not excluded.  Short-term  follow-up correlation with colonoscopy advised.  1 cm hypodensity pancreatic tail potentially a small cystic lesion.  Follow-up nonemergent MRCP without with contrast recommended.  Lesser incidental findings as above.    Dictated by (CST): Mike Blanca MD on 3/10/2025 at 5:41 PM     Finalized by (CST): Mike Blanca MD on 3/10/2025 at 5:48 PM             ASSESSMENT/PLAN:   Assessment   Encounter Diagnoses   Name Primary?    Essential hypertension Yes    Crohn's disease of large intestine without complication (HCC)     Primary insomnia     Pancreatic mass (HCC)          1. Essential hypertension    MEDICATIONS:    Blood Pressure Does not apply Kit 1 kit 0     Sig: Use daily    Olmesartan Medoxomil (BENICAR) 40 MG Oral Tab 30 tablet 3     Sig: Take 1 tablet (40 mg total) by  mouth daily.      LABORATORY & ORDERS: No orders of the defined types were placed in this encounter.    RECOMMENDATIONS given include: avoid pseudoephedrine or other stimulants/decongestants in common cold remedies, decrease consumption of alcohol, perform routine monitoring of blood pressure with home blood pressure cuff, exercise, reduction of dietary salt intake, take medication as prescribed, try not to miss doses, smoking cessation, weight loss, and stress reduction.    FOLLOW-UP: Schedule a follow-up visit in 3 weeks.           2. Crohn's disease of large intestine without complication (HCC)    CPM, pre GI  Contact GI regarding recent CT scan  Follow up [facility]    3. Primary insomnia    MEDICATIONS:      temazepam 30 MG Oral Cap 90 capsule 1     Sig: Take 1 capsule (30 mg total) by mouth nightly as needed for Sleep.   RECOMMENDATIONS given include: avoid sleeping on back, avoid alcohol, avoid caffeine, adhere to a proper sleep hygiene schedule, get regular exercise, maintain a sleep diary, reduce stress.  FOLLOW-UP: Advised to call if there is no improvement in 7 day(s).   Schedule follow-up appointments on a p.r.n. basis. 6months.         4. Pancreatic mass (HCC)    As above  As above further work up per GI          Orders This Visit:  No orders of the defined types were placed in this encounter.      Meds This Visit:  Requested Prescriptions     Signed Prescriptions Disp Refills    tamsulosin 0.4 MG Oral Cap 90 capsule 2     Sig: Take 1 capsule (0.4 mg total) by mouth daily.    temazepam 30 MG Oral Cap 90 capsule 1     Sig: Take 1 capsule (30 mg total) by mouth nightly as needed for Sleep.    Blood Pressure Does not apply Kit 1 kit 0     Sig: Use daily    Olmesartan Medoxomil (BENICAR) 40 MG Oral Tab 30 tablet 3     Sig: Take 1 tablet (40 mg total) by mouth daily.       Imaging & Referrals:  None         ROMI MICHELE MD       [1] No Known Allergies

## 2025-03-10 NOTE — PROGRESS NOTES
Shared with OP GI provider, Swathi Galeana.   Needs HBV vaccination with PCP.   Can you please update him, Swathi?

## 2025-03-11 ENCOUNTER — TELEPHONE (OUTPATIENT)
Facility: CLINIC | Age: 80
End: 2025-03-11

## 2025-03-11 RX ORDER — BUDESONIDE 3 MG/1
CAPSULE, COATED PELLETS ORAL
Qty: 168 CAPSULE | Refills: 0 | Status: SHIPPED | OUTPATIENT
Start: 2025-03-11 | End: 2025-06-03

## 2025-03-12 DIAGNOSIS — K86.2 PANCREATIC CYST (HCC): Primary | ICD-10-CM

## 2025-03-12 NOTE — PROGRESS NOTES
Reviewed results with pt over telephone with . Suspect findings are related to crohn's disease, not diverticulitis. We discussed continuing budesonide as previously prescribed - notes already feeling better. Also discussed MRI to further evaluate panc cyst - ordered.     JEWELS Macias -

## 2025-03-31 ENCOUNTER — OFFICE VISIT (OUTPATIENT)
Dept: FAMILY MEDICINE CLINIC | Facility: CLINIC | Age: 80
End: 2025-03-31

## 2025-03-31 VITALS
DIASTOLIC BLOOD PRESSURE: 80 MMHG | BODY MASS INDEX: 29.47 KG/M2 | SYSTOLIC BLOOD PRESSURE: 139 MMHG | HEIGHT: 69 IN | HEART RATE: 82 BPM | WEIGHT: 199 LBS

## 2025-03-31 DIAGNOSIS — I10 ESSENTIAL HYPERTENSION: Primary | ICD-10-CM

## 2025-03-31 DIAGNOSIS — B35.6 TINEA CRURIS: ICD-10-CM

## 2025-03-31 PROCEDURE — 3075F SYST BP GE 130 - 139MM HG: CPT | Performed by: FAMILY MEDICINE

## 2025-03-31 PROCEDURE — 1126F AMNT PAIN NOTED NONE PRSNT: CPT | Performed by: FAMILY MEDICINE

## 2025-03-31 PROCEDURE — 3079F DIAST BP 80-89 MM HG: CPT | Performed by: FAMILY MEDICINE

## 2025-03-31 PROCEDURE — 99213 OFFICE O/P EST LOW 20 MIN: CPT | Performed by: FAMILY MEDICINE

## 2025-03-31 PROCEDURE — 1159F MED LIST DOCD IN RCRD: CPT | Performed by: FAMILY MEDICINE

## 2025-03-31 PROCEDURE — 3008F BODY MASS INDEX DOCD: CPT | Performed by: FAMILY MEDICINE

## 2025-03-31 RX ORDER — ECONAZOLE NITRATE 10 MG/G
CREAM TOPICAL
Qty: 30 G | Refills: 1 | Status: SHIPPED | OUTPATIENT
Start: 2025-03-31

## 2025-03-31 NOTE — PROGRESS NOTES
3/31/2025  4:46 PM    Steven Velasco is a 79 year old male.    Chief complaint(s):   Chief Complaint   Patient presents with    HTN     F/u     Derm Problem     F/u rash states that the cream (mometasone) works but once he stops using it they come back      HPI:     Steven Velasco primary complaint is regarding as above.       Steven Gomez a 79 year old male presents with hypertension.  This was first diagnosed more than 7 years ago.  Current nonpharmacologic treatment includes low sodium diet, exercise, and meditation.  His current cardiac medication(s) regimen includes: Olmesartan 40 mg.  He has kept a blood pressure diary, but states that his blood pressures have been well controll.  He is tolerating his medication(s)  well without side effects.  Compliance with treatment has been good.      In addition patient is complaining of having a inguinal rash on both inguinal areas.  It is associate with itchiness and erythematous.  Denies any open lesions.  This has been going on for a few months now.      HISTORY:  Past Medical History:    Arrhythmia    paroxsymal SVT -- per chart review from previous office visits    Back problem    Blood disorder    Calculus of kidney    CAP (community acquired pneumonia)    SCAP    Cataract    Cystic fibrosis (HCC)    Deep vein thrombosis (HCC)    BLE    Essential hypertension    no meds    Exposure to medical diagnostic radiation    Gout    High blood pressure    Kidney stones    s/p stent    PNA (pneumonia)    Pneumonia due to organism    HOSPITALIZED 3 MONTHS    Prostate cancer (HCC)    Pulmonary embolism (HCC)    Pulmonary embolus (HCC)    Pulmonary emphysema (HCC)    Renal disorder    CKD      Past Surgical History:   Procedure Laterality Date    Colonoscopy      Colonoscopy N/A 2/25/2025    Procedure: COLONOSCOPY;  Surgeon: Beth Vazquez MD;  Location: [facility] ENDOSCOPY    Cystoscopy,insert ureteral stent        Family History   Problem Relation Age of  Onset    Blood Clotting Disorder Other     Cancer Other       Social History:   Social History     Socioeconomic History    Marital status:    Tobacco Use    Smoking status: Former     Current packs/day: 0.00     Average packs/day: 1 pack/day for 30.0 years (30.0 ttl pk-yrs)     Types: Cigarettes     Start date: 1957     Quit date: 1987     Years since quittin.5    Smokeless tobacco: Never   Vaping Use    Vaping status: Never Used   Substance and Sexual Activity    Alcohol use: Not Currently     Comment: occ- none recently    Drug use: No     Social Drivers of Health     Food Insecurity: No Food Insecurity (2025)    NCSS - Food Insecurity     Worried About Running Out of Food in the Last Year: No     Ran Out of Food in the Last Year: No   Transportation Needs: No Transportation Needs (2025)    NCSS - Transportation     Lack of Transportation: No   Housing Stability: Not At Risk (2025)    NCSS - Housing/Utilities     Has Housing: Yes     Worried About Losing Housing: No     Unable to Get Utilities: No        Immunizations:   Immunization History   Administered Date(s) Administered    Covid-19 Vaccine Pfizer 30 mcg/0.3 ml 04/10/2021, 2021, 2021    FLU VAC High Dose 65 YRS & Older PRSV Free (90528) 10/04/2021, 10/12/2022, 10/19/2023    FLUAD High Dose 65 yr and older (72856) 10/02/2017    High Dose Fluzone Influenza Vaccine, 65yr+ PF 0.5mL (07781) 10/04/2021    Pneumococcal (Prevnar 13) 2020    Pneumococcal Conjugate PCV20 10/12/2022    Pneumovax 23 2017       Medications (Active prior to today's visit):  Current Outpatient Medications   Medication Sig Dispense Refill    Econazole Nitrate 1 % External Cream Apply to affected area(s) BID. 30 g 1    budesonide 3 MG Oral Cap DR Particles Take 3 capsules (9 mg total) by mouth every morning for 28 days, THEN 2 capsules (6 mg total) every morning for 28 days, THEN 1 capsule (3 mg total) every morning for 28 days. 168  capsule 0    tamsulosin 0.4 MG Oral Cap Take 1 capsule (0.4 mg total) by mouth daily. 90 capsule 2    temazepam 30 MG Oral Cap Take 1 capsule (30 mg total) by mouth nightly as needed for Sleep. 90 capsule 1    Blood Pressure Does not apply Kit Use daily 1 kit 0    Olmesartan Medoxomil (BENICAR) 40 MG Oral Tab Take 1 tablet (40 mg total) by mouth daily. 30 tablet 3    Cholecalciferol (VITAMIN D3) 1.25 MG (74563 UT) Oral Cap Take 1 capsule by mouth once a week.      Mometasone Furoate 0.1 % External Cream Apply to affected area(s) BID 60 g 1    allopurinol 100 MG Oral Tab Take 1 tablet (100 mg total) by mouth daily. 90 tablet 1    aspirin 81 MG Oral Tab EC Take 1 tablet (81 mg total) by mouth daily. 30 tablet 2       Allergies:  Allergies[1]      ROS:   Review of Systems   Constitutional:  Negative for appetite change, fatigue and fever.   HENT:  Negative for hearing loss and nosebleeds.    Eyes:  Negative for pain and visual disturbance.   Respiratory:  Negative for apnea and shortness of breath.    Cardiovascular:  Negative for chest pain, palpitations and leg swelling.   Gastrointestinal:  Negative for abdominal pain, blood in stool, constipation, diarrhea, nausea and vomiting.   Endocrine: Negative for polydipsia and polyuria.   Genitourinary:  Negative for decreased urine volume, frequency and hematuria.        No nocturia   Musculoskeletal:  Negative for arthralgias and myalgias.   Skin:  Positive for rash (inguinal).   Neurological:  Negative for dizziness, syncope, weakness and headaches.   Psychiatric/Behavioral:  Negative for dysphoric mood and sleep disturbance.        PHYSICAL EXAM:   VS: /80   Pulse 82   Ht 5' 9\" (1.753 m)   Wt 199 lb (90.3 kg)   BMI 29.39 kg/m²     Physical Exam  Vitals reviewed.   Constitutional:       Appearance: Normal appearance. He is well-developed.   HENT:      Head: Normocephalic.   Eyes:      General: No scleral icterus.     Conjunctiva/sclera: Conjunctivae normal.    Cardiovascular:      Rate and Rhythm: Normal rate.   Pulmonary:      Effort: Pulmonary effort is normal.   Musculoskeletal:      Cervical back: Neck supple.   Skin:     Findings: No rash.   Psychiatric:         Mood and Affect: Mood normal.         LABORATORY RESULTS:       EKG / Spirometry : -     Radiology:     ASSESSMENT/PLAN:   Assessment   Encounter Diagnoses   Name Primary?    Essential hypertension Yes    Tinea cruris        1. Essential hypertension    MEDICATIONS: CPM     LABORATORY & ORDERS:   Orders Placed This Encounter   Procedures    Basic Metabolic Panel (8)     RECOMMENDATIONS given include: avoid pseudoephedrine or other stimulants/decongestants in common cold remedies, decrease consumption of alcohol, perform routine monitoring of blood pressure with home blood pressure cuff, exercise, reduction of dietary salt intake, take medication as prescribed, try not to miss doses, smoking cessation, weight loss, and stress reduction.    FOLLOW-UP: Schedule a follow-up visit [facility].           2. Tinea cruris    MEDICATIONS:     Requested Prescriptions     Signed Prescriptions Disp Refills    Econazole Nitrate 1 % External Cream 30 g 1     Sig: Apply to affected area(s) BID.     RECOMMENDATIONS given include: Patient was reassured of  his medical condition and all questions and concerns were answered. Patient was informed to please, call our office with any new or further questions or concerns that may come up in the near future. Notify Dr England or the [facility] if there is a deterioration or worsening of the medical condition. Also, inform the doctor with any new symptoms or medications' side effects. Keep area clean and dry.      FOLLOW-UP: Schedule a follow-up visit in  [facility]/ [facility].             Orders This Visit:  Orders Placed This Encounter   Procedures    Basic Metabolic Panel (8)       Meds This Visit:  Requested Prescriptions     Signed Prescriptions Disp Refills    Econazole Nitrate 1 % External  Cream 30 g 1     Sig: Apply to affected area(s) BID.       Imaging & Referrals:  None         ROMI MICHELE MD         [1] No Known Allergies

## 2025-06-12 NOTE — TELEPHONE ENCOUNTER
Pharmacy requesting refill      allopurinol 100 MG Oral Tab, Take 1 tablet (100 mg total) by mouth daily., Disp: 90 tablet, Rfl: 1

## 2025-06-14 RX ORDER — ALLOPURINOL 100 MG/1
100 TABLET ORAL DAILY
Qty: 90 TABLET | Refills: 1 | Status: SHIPPED | OUTPATIENT
Start: 2025-06-14

## 2025-07-30 ENCOUNTER — OFFICE VISIT (OUTPATIENT)
Dept: FAMILY MEDICINE CLINIC | Facility: CLINIC | Age: 80
End: 2025-07-30
Payer: COMMERCIAL

## 2025-07-30 VITALS
BODY MASS INDEX: 30 KG/M2 | DIASTOLIC BLOOD PRESSURE: 80 MMHG | HEART RATE: 72 BPM | SYSTOLIC BLOOD PRESSURE: 139 MMHG | WEIGHT: 206.13 LBS

## 2025-07-30 DIAGNOSIS — I10 ESSENTIAL HYPERTENSION: Primary | ICD-10-CM

## 2025-07-30 PROCEDURE — 1159F MED LIST DOCD IN RCRD: CPT | Performed by: FAMILY MEDICINE

## 2025-07-30 PROCEDURE — 3075F SYST BP GE 130 - 139MM HG: CPT | Performed by: FAMILY MEDICINE

## 2025-07-30 PROCEDURE — 99213 OFFICE O/P EST LOW 20 MIN: CPT | Performed by: FAMILY MEDICINE

## 2025-07-30 PROCEDURE — 3079F DIAST BP 80-89 MM HG: CPT | Performed by: FAMILY MEDICINE

## 2025-07-30 RX ORDER — OLMESARTAN MEDOXOMIL 40 MG/1
40 TABLET ORAL DAILY
Qty: 90 TABLET | Refills: 3 | Status: SHIPPED | OUTPATIENT
Start: 2025-07-30

## 2025-07-30 RX ORDER — OLMESARTAN MEDOXOMIL 40 MG/1
40 TABLET ORAL DAILY
Qty: 90 TABLET | Refills: 3 | Status: SHIPPED | OUTPATIENT
Start: 2025-07-30 | End: 2025-07-30

## 2025-08-01 ENCOUNTER — TELEPHONE (OUTPATIENT)
Dept: FAMILY MEDICINE CLINIC | Facility: CLINIC | Age: 80
End: 2025-08-01

## 2025-08-04 DIAGNOSIS — I10 ESSENTIAL HYPERTENSION: Primary | ICD-10-CM

## 2025-08-19 ENCOUNTER — OFFICE VISIT (OUTPATIENT)
Dept: FAMILY MEDICINE CLINIC | Facility: CLINIC | Age: 80
End: 2025-08-19

## 2025-08-19 VITALS
BODY MASS INDEX: 30 KG/M2 | HEART RATE: 71 BPM | DIASTOLIC BLOOD PRESSURE: 88 MMHG | SYSTOLIC BLOOD PRESSURE: 158 MMHG | WEIGHT: 206.19 LBS

## 2025-08-19 DIAGNOSIS — I10 ESSENTIAL HYPERTENSION: ICD-10-CM

## 2025-08-19 DIAGNOSIS — M15.0 PRIMARY OSTEOARTHRITIS INVOLVING MULTIPLE JOINTS: Primary | ICD-10-CM

## 2025-08-19 PROCEDURE — 99213 OFFICE O/P EST LOW 20 MIN: CPT | Performed by: FAMILY MEDICINE

## 2025-08-19 PROCEDURE — 3079F DIAST BP 80-89 MM HG: CPT | Performed by: FAMILY MEDICINE

## 2025-08-19 PROCEDURE — 1159F MED LIST DOCD IN RCRD: CPT | Performed by: FAMILY MEDICINE

## 2025-08-19 PROCEDURE — 3077F SYST BP >= 140 MM HG: CPT | Performed by: FAMILY MEDICINE

## 2025-08-19 PROCEDURE — 1125F AMNT PAIN NOTED PAIN PRSNT: CPT | Performed by: FAMILY MEDICINE

## 2025-08-19 RX ORDER — NAPROXEN 250 MG/1
250 TABLET ORAL 2 TIMES DAILY WITH MEALS
Qty: 60 TABLET | Refills: 1 | Status: SHIPPED | OUTPATIENT
Start: 2025-08-19

## 2025-08-21 ENCOUNTER — MED REC SCAN ONLY (OUTPATIENT)
Dept: FAMILY MEDICINE CLINIC | Facility: CLINIC | Age: 80
End: 2025-08-21

## (undated) DEVICE — KIT ENDO ORCAPOD 160/180/190

## (undated) DEVICE — KIT CLEAN ENDOKIT 1.1OZ GOWNX2

## (undated) DEVICE — KENDALL SCD EXPRESS SLEEVES, KNEE LENGTH, MEDIUM: Brand: KENDALL SCD

## (undated) DEVICE — SOLUTION IRR BTL 250CC NACL

## (undated) DEVICE — SOL H2O 1000ML BTL

## (undated) DEVICE — FORCEPS BX LG 2.4MM X 240CM NDL RAD JAW 4

## (undated) DEVICE — Device

## (undated) DEVICE — GOWN SURG AERO BLUE PERF LG

## (undated) DEVICE — CATH URET CONE TIP 8FR 138008

## (undated) DEVICE — ISOVUE 300 10X100ML VIAL

## (undated) DEVICE — STERILE LATEX POWDER-FREE SURGICAL GLOVESWITH NITRILE COATING: Brand: PROTEXIS

## (undated) DEVICE — V2 SPECIMEN COLLECTION MANIFOLD KIT: Brand: NEPTUNE

## (undated) DEVICE — CYSTO PACK: Brand: MEDLINE INDUSTRIES, INC.

## (undated) DEVICE — 60 ML SYRINGE REGULAR TIP: Brand: MONOJECT

## (undated) DEVICE — NITINOL WIRE STR 035

## (undated) DEVICE — SOL H2O 3000ML IRRIG

## (undated) DEVICE — SOL  .9 3000ML

## (undated) NOTE — LETTER
[facility]   Administracion de Anestesia  Yo, Steven Velasco, acepto ser atendido por un anestesiólogo, quien está especialmente capacitado para monitorearme y darme medicamento para hacerme dormir o mantenerme cómodo octavia mi procedimiento.   Entiendo que mi anestesiólogo no es un empleado o agente de [facility]. Él o shantelle trabaja para [facility]  Pasquale el paciente que solicita los servicios de anestesia, estoy de acuerdo con lo siguiente:  Permitir al anestesiólogo (médico de anestesia) que me suministre el medicamento y hacer los procedimientos adicionales que paresh necesarios. Algunos ejemplos son: Al iniciar o utilizar promise “IV” para suministrarme medicamentos, fluidos o carlita octavia mi procedimiento, y colocarme promise sonda de respiración, me ayudará a respirar cuando esté dormido (intubación). En el roxanna de que mi corazón deje de funcionar correctamente, entiendo que mi anestesiólogo hará todo lo posible para salvar mi radha, a menos que los documentos de No resucitar de [facility] lo indiquen de otra manera.  Informar a mi anestesista antes del procedimiento:  Si estoy embarazada.  La última vez que comí o bebí algo.  Todos los medicamentos que brittany (incluyendo medicamentos recetados, suplementos herbales y pastillas que puedo comprar sin receta médica (incluyendo drogas en la pedro [medicamentos ilegales]). No informar a mi anestesiólogo acerca de estos medicamentos puede aumentar mi riesgo de complicaciones con la anestesia.  Si soy alérgico a cualquier cosa o he tenido previamente promise reacción adversa a la anestesia.  Entiendo cómo la anestesia me ayudará (beneficios).  Entiendo que con cualquier tipo de anestesia hay riesgos:  Los riesgos más comunes son: náuseas, vómitos, dolor de garganta, dolor muscular, daño a los ojos, la boca o los dientes (por la colocación de la sonda de respiración).  Los riesgos poco comunes incluyen: recordar  lo que sucedió octavia mi procedimiento, reacciones alérgicas a los medicamentos, lesiones en las vías respiratorias, el corazón, los pulmones, la visión, los nervios o músculos, y en casos sumamente raros, la muerte.  Mii médico me ha explicado otras opciones de atención disponibles para mí (alternativas).  Pacientes embarazadas (“epidural”):    Entiendo que los riesgos de recibir promise inyección epidural (medicamento que se aplica en la espalda para ayudar a controlar el dolor octavia el parto), incluyen picazón, presión arterial baja, dificultad para orinar, dolor de heladio o disminución en el ritmo del corazón del bebé. Los riesgos muy poco comunes incluyen infección, hemorragia, convulsiones, ritmo cardíaco irregular y lesión del nervio.  Anestesia regional (“columna vertebral”, “epidural” y “bloqueo de los nervios”):  Entiendo que hay complicaciones poco frecuentes manda posibles, e incluyen dolor de heladio, sangrado, infección, convulsiones, ritmo cardíaco irregular y la lesión del nervio.  .   Puedo cambiar de opinión acerca de recibir servicios de anestesia en cualquier momento antes de que se me administre el medicamento.         Paciente (o representante) Firma/Relación con el paciente  Fecha  Hora  Patient Signature/Signature of Responsible person Date Time           Nombre del intérprete (en velazquez roxanna)  Idioma/Organización  Hora  Name of  Language/Organization Time          Firma del anestesiólogo Fecha  Hora  Signature of anesthesiologist Date Time    He hablado del procedimiento y la información anterior con el paciente (o el representante del paciente) y respondí tramaine preguntas. El paciente o velazquez representante ha aceptado recibir los servicios de anestesia.         Testigo Fecha  Hora  Witness Date Time  He verificado que la firma es la del paciente o del representante del paciente, y que se firmó antes del procedimiento.    Nombre del paciente: Steven Arzola.: 11/18/1945                  En letra de imprenta: February 21, 2025  Expediente médico No. [id number]                         Página 1 de 2  ----------ANESTHESIA CONSENT----------

## (undated) NOTE — LETTER
1/17/2020              Steven Velasco        [address] [place] [id number]         Dear Maye Kelly,      It was a pleasure to see you at our [place] [address] office.   Your EKG test were normal.  There is no need

## (undated) NOTE — LETTER
8/29/2017              Azael Maxwell        [address] [place]         Dear Jan Skelton,    This letter is to inform you that our office has made several attempts to reach you by phone without success.   We were attempting to

## (undated) NOTE — LETTER
[place] DE [place] CAROLYNN    En el curso de [place], [place] ser necesario administrar promise transfusión sanguínea o de componentes de la Lori.  Kelly formulario proporciona i Si la pérdida de carlita representa un riesgo grave en el curso de velazquez tratamiento, NO HAY REGINO ALTERNATIVA  EFICAZ A LA TRANSFUSIÓN [address]  Sin embargo, si tiene Mar cammie [address], velazquez médico le explicará plenamente las alternativas si aún no lo

## (undated) NOTE — LETTER
January 17, 2020    [address] [address]      Dear Lupe Olivares:    The following are the results of your recent tests. Please review the list of test results.   Your result is the value on the left; we have also suppl Ketones Urine Negative Negative mg/dL    Blood Urine Small (A) Negative    pH Urine 5.0 5.0 - 8.0    Protein Urine Negative Negative mg/dL    Urobilinogen Urine <2.0 <2.0    Nitrite Urine Negative Negative    Leukocyte Esterase Urine Negative Negative

## (undated) NOTE — LETTER
[address]  [facility] [place], [place], [place]  [address]  INFORMED CONSENT FOR TRANSFUSION OF BLOOD OR BLOOD PRODUCTS  My physician has informed me of the nature, purpose, benefits and risks of transfusion for blood and blood components that __________________________________________          ______________________________________________  (Signature of Patient)                                                            (Responsible party in case of Minor,

## (undated) NOTE — IP AVS SNAPSHOT
[facility]            (For Outpatient Use Only) Initial Admit Date: 12/28/2020   Inpt/Obs Admit Date: Inpt: 12/28/20 / Obs: N/A   Discharge Date:    Marilyn Michelle:  [de-identified]   MRN: [de-identified]   CSN: [id number]   CEID: [id number]        E Subscriber Name:  Dorian Crandall :    Subscriber ID:  Pt Rel to Subscriber:    Hospital Account Financial Class: Medicare Advantage    2021

## (undated) NOTE — IP AVS SNAPSHOT
Patient Demographics     Address  [address] [address] Phone  [phone] [facility])  [phone] (Mobile) *Preferred* E-mail Address  [email or web address]      Emergency Contact(s)     Name Relation Home Work SLAVA [address] [facility] Next dose due: 02/14/21 tomorrow       Take 1 tablet (25 mg total) by mouth daily. Cyndia Penny, DO         multivitamin with minerals Tabs  Next dose due: 02/14/21 morning       Take 1 tablet by mouth daily.    Cyndia Ambler, DO         Pirfenidone [id number] Fluticasone Propionate (FLONASE) 50 MCG/ACT nasal spray 2 spray 02/12/21 2000 Given      [id number] Fluticasone Propionate (FLONASE) 50 MCG/ACT nasal spray 2 spray 02/13/21 0818 Given      [id number] LORazepam (ATIVAN) tab 0.5 mg 02/12/21 2005 Giv Rapid SARS-CoV-2 by PCR STAT [[id number]]  (Normal) Collected: 02/11/21 1730    Order Status: Completed Lab Status: Final result Updated: 02/11/21 1759    Specimen: Other from Nares      Rapid SARS-CoV-2 by PCR Not Detected    SARS-CoV-2 by PCR (Alinity) O Pt is a 77 yo with HTN, nephrolithiasis, h/o PE 2017 off A/C and gout dx with covid 12/22 and now presented with SOB and severe hypoxemia. Pt sx started 12/21 with fever, cough, chills and mylagias. Now inc sob x 3 days.  Severe hypoxemia and stared on vapo •  [COMPLETED] Heparin Sodium (Porcine) 1000 UNIT/ML injection 6,820 Units, 80 Units/kg, Intravenous, Once    •  heparin (PORCINE) [id number]/250mL infusion ED INITIAL DOSE, 18 Units/kg/hr, Intravenous, Once    •  [COMPLETED] cefTRIAXone Sodium (ROCEPHIN) CL 97 12/28/2020    CO2 16.0 12/28/2020     12/28/2020    CA 9.0 12/28/2020    ALB 3.1 12/28/2020    ALKPHO 106 12/28/2020    BILT 0.5 12/28/2020    TP 8.4 12/28/2020    AST 66 12/28/2020    ALT 35 12/28/2020    PTT 39.1 12/28/2020    INR 1.23 12/2 Pt is a 75 yo with HTN, nephrolithiasis, h/o PE 2017 off A/C and gout dx with covid 12/22 and now presented with SOB and severe hypoxemia. Pt sx started 12/21 with fever, cough, chills and mylagias. Now inc sob x 3 days.  Severe hypoxemia and stared on vapo •  [COMPLETED] Heparin Sodium (Porcine) 1000 UNIT/ML injection 6,820 Units, 80 Units/kg, Intravenous, Once    •  heparin (PORCINE) [id number]/250mL infusion ED INITIAL DOSE, 18 Units/kg/hr, Intravenous, Once    •  [COMPLETED] cefTRIAXone Sodium (ROCEPHIN) Mr. Maik Brown was diagnosed with COVID-19 on 12/22/2020. He was admitted through the emergency room on 12/28/2021 with progressive dyspnea, coughing and anorexia. He was found to be tachypneic, tachycardic with an oxygen saturation of 46%.   CT scanning revea •  Enoxaparin Sodium (LOVENOX) 60 MG/0.6ML injection 50 mg, 0.6 mg/kg, Subcutaneous, Q12H RAKEL    •  Losartan Potassium (COZAAR) tab 25 mg, 25 mg, Oral, Daily    •  Saline Nasal Spray (SALINE MIST) 3 spray, 3 spray, Each Nare, Q3H PRN    •  Fluticasone Prop •  guaiFENesin-codeine (CHERATUSSIN AC) 100-10 MG/5ML Oral Solution, Take 5 mL by mouth nightly as needed for cough. , Disp: 120 mL, Rfl: 0    •  tamsulosin (FLOMAX) cap, Take 1 capsule (0.4 mg total) by mouth daily. , Disp: 90 capsule, Rfl: 0    •  allopuri Social History Narrative      Not on file        Family History   Problem Relation Age of Onset   • Blood Clotting Disorder Other    • Cancer Other[GS. 3]        A comprehensive 10 point review of systems was completed.   Pertinent positives and negatives The patient presents to the hospital 34 days prior with severe COVID-19 pneumonia. He has persistent respiratory compromise. He has been treated with antibiotics and dexamethasone.   He has developed intermittent solid food dysphagia since being hospitali GS.4 Leonard Lynch MD on 1/31/2021  3:27 PM                     D/C Summary    No notes of this type exist for this encounter.         Physical Therapy Notes (last 72 hours) (Notes from 2/10/2021 12:30 PM through 2/13/2021 12:30 PM)      Physical The patient's Approx Degree of Impairment: 46.58% has been calculated based on documentation in the [facility] '6 clicks' Inpatient Basic Mobility Short Form.   Research supports that patients with this level of impairment may benefit from home with home care, h FUNCTIONAL ABILITY STATUS  Gait Assessment   Gait Assistance: Contact guard assist  Distance (ft): 40 ft x 2   Assistive Device: Rolling walker  Pattern: (guarded posture)  Stoop/Curb Assistance: Not tested  Comment : Simulated with x10 reps standing march For Activity:   4L NC - 94-96% at rest       85-87% 115 bpm  1 min 90%  4 RPE    3 Washcloths     90-95% for socks   83% 121 bpm Additional 30 ft  1 min to recover >90%    Delayed de-saturation    Standing lyndon static standing[RW.1]       PHYSICAL THER Precautions:  needed(contact & droplet precautions )    WEIGHT BEARING RESTRICTION      none              PAIN ASSESSMENT   Ratin  Location: -  Management Techniques: Relaxation;Repositioning; Activity promotion; Body mechanics;Breathing techn Goal #2 Patient is able to demonstrate transfer sit<>stand requiring Supervision    Goal #2  Current Status CGA with rolling walker    Goal #3 Patient is able to ambulate 50ft with RW requiring Supervision while maintaining SpO2>90% with activity   Goal #3 Patient received sitting in bedside chair, agreeable to physical therapy. AISHWARYA Walter approved participation in physical therapy, gait belt donned. PT and tech Chris wore mask, gloves, gown, N95, and face shield.  Patient currently on 3 liters of oxygen during [facility] '6-Clicks' INPATIENT SHORT FORM - BASIC MOBILITY  How much difficulty does the patient currently have. ..  -   Turning over in bed (including adjusting bedclothes, sheets and blankets)?: A Little   -   Sitting down on and Goal #5 Patient to demonstrate independence with home activity/exercise instructions provided to patient in preparation for discharge. Goal #5   Current Status In progress[JG.1]                Attribution Mccarthy    JG. 1 - Anna Marie Crabtree, PT on 2/11/2021 The patient's[TL.1] Approx Degree of Impairment: 50.57%[TL.2] has been calculated based on documentation in the [facility] '6 clicks' Inpatient Basic Mobility Short Form.   Research supports that patients with this level of impairment may benefit from acute reha Approx Degree of Impairment: 50.57%   Standardized Score (AM-PAC Scale): 42.13   CMS Modifier (G-Code): CK[TL.2]    FUNCTIONAL ABILITY STATUS  Gait Assessment[TL.1]   Gait Assistance: Contact guard assist;Minimum assistance  Distance (ft): 42ft  Assistive : Maria Victoria Maldonado OT (Occupational Therapist)       OCCUPATIONAL THERAPY TREATMENT NOTE - INPATIENT        Room Number: 516/516-A[KH.1]           Presenting Problem: +COVID[KH.2]    Problem List[KH. 1]  Principal Problem:    Acute respirator · Recovery at 1 min >90%    4.  Standing marches with PT (see physical therapist note), static standing x 2 min with RW for support  · Maintained O2 >90%     Following the fourth activity, pt reported he was fatigued and needed to rest. Pt did well tolerati -   Bathing (including washing, rinsing, drying)?: A Lot  -   Toileting, which includes using toilet, bedpan or urinal? : A Little  -   Putting on and taking off regular upper body clothing?: A Little  -   Taking care of personal grooming such as brushing Room Number: 516/516-A[RM.1]     Presenting Problem: +COVID[RM.2]    Problem List[RM. 1]  Principal Problem:    Acute respiratory failure with hypoxia (HCC)  Active Problems:    Pneumonia due to COVID-19 virus    Acute renal insufficiency    Viral sepsis (H Ratin[RM. 2]           ACTIVITY TOLERANCE[RM.1]  Pulse: 100[RM.2]        O2 SATURATIONS[RM.1]        SPO2% Ambulation on Oxygen: 86  Ambulation oxygen flow (liters per minute): 3[RM. 2]    ACTIVITIES OF DAILY LIVING ASSESSMENT  AM-PAC ‘6-Clicks’ [place] RM.2 - Nereyda Martin, OT on 2/10/2021  3:02 PM  RM. 3 - Nereyda Martin, OT on 2/10/2021  2:55 PM                     Video Swallow Study Notes    No notes of this type exist for this encounter. SLP Notes    No notes of this type exist for this encounter.

## (undated) NOTE — LETTER
Dylan Morrow [address], [address]       10/10/17        Patient: Dalton Parks   YOB: 1945   Date of Visit: 10/10/2017       Dear  Dr. Bijal Schneider Recipients,      Thank you for referring Dalton Parks to

## (undated) NOTE — LETTER
No referring provider defined for this encounter. 09/27/17        Patient: Thomas Rodriguez   YOB: 1945   Date of Visit: 9/27/2017       Dear  Dr. Fausto Seaman      Thank you for referring Thomas Rodriguez to my practice.   Please fi Rivaroxaban (XARELTO) 20 MG Oral Tab Take 1 tablet (20 mg total) by mouth daily with food. Disp: 30 tablet Rfl: 1   AmLODIPine Besylate 5 MG Oral Tab Take 1 tablet (5 mg total) by mouth daily.  Disp: 30 tablet Rfl: 2   allopurinol 100 MG Oral Tab Take 1 tab 2017 with acute onset renal colic. Patient denied previous history of kidney stones however he had non-embolic pulmonary emboli in July 2017.   CAT scan showed a 7 x 10 left proximal ureteral calculi which on KUB initially appeared to be radiolucent at the [address], [address] [address]    Document electronically generated by:  Jace Mora.  Cinel

## (undated) NOTE — LETTER
Patient Name: Steven Velasco : 1945  Medical Record #: [id number] Printed: 2025  Page 1 of 2                                          [facility]  [address], [place], [place]  Autorización para operación y procedimiento quirúrgico                                                                                                      Por la presente, autorizo a Beth Vazquez MD, mi médico y al asistente a realizar la operación/procedimiento quirúrgico a continuación, así asha a administrar la anestesia que determine necesaria mi médico Nombre (s) de la operación/procedimiento: COLONOSCOPY en Steven Velasco     Reconozco que octavia la operación/procedimiento quirúrgico, las condiciones imprevistas pueden requerir de procedimientos adicionales o diferentes a aquellos mencionados anteriormente.  Por lo tanto, autorizo y solicito además que el cirujano antes mencionado, los asistentes o las personas designadas realicen los procedimientos que, a velazquze juicio, paresh necesarios y convenientes.    Mi cirujano/médico pleitez discutido antes de mi cirugía los posibles beneficios, riesgos y efectos secundarios de christopher procedimiento, la probabilidad de alcanzar las metas y los posibles problemas que puedan ocurrir octavia la recuperación.  Ellos también conteh discutido las alternativas razonables al procedimiento, incluso los riesgos, beneficios y efectos secundarios relacionados con las alternativas y los riesgos relacionados con no realizar christopher procedimiento.  Conteh respondido a todas mis preguntas y confirmo que no se ha dado ninguna garantía en cuanto a los resultados que pueda obtener.    En roxanna de que surja la necesidad octavia mi operación o octavia el periodo postoperatorio, también autorizo se aplique carlita y/o productos sanguíneos.  Asimismo, entiendo que a pesar de las cuidadosas pruebas y análisis de carlita o de los productos sanguíneos que realizan las entidades  recolectoras, todavía puedo estar sujeto a efectos adversos asha resultado de recibir promise transfusión de carlita y/o productos sanguíneos.  A continuación se mencionan algunos, aunque no todos, los riesgos potenciales que pueden ocurrir: fiebre y reacciones alérgicas, reacciones hemolíticas, trasmisión de enfermedades asha hepatitis, SIDA y citomegalovirus (CMV), así asha sobrecarga de líquidos.   En roxanna de que desee tener promise transfusión autóloga de mi propia carlita o promise transfusión de un donante dirigido.  Lo discutiré con mi médico.  Check only if Refusing Blood or Blood Products  I understand refusal of blood or blood products as deemed necessary by my physician may have serious consequences to my condition to include possible death. I hereby assume responsibility for my refusal and release the hospital, its personnel, and my physicians from any responsibility for the consequences of my refusal.           o  Refuse       Autorizo el uso de cualquier muestra, órgano, tejido, parte del cuerpo u objeto extraño que pueda ser extraído de mi cuerpo octavia la operación/procedimiento para fines de diagnóstico, investigación o de enseñanza y velazquez desecho posterior por las autoridades del hospital.  También, autorizo la revelación de los resultados de las pruebas de muestras y/o los informes escritos al médico tratante asha personal médico del hospital u otros médicos de referencia o consulta involucrados en mi atención, a discreción del patólogo o de mi médico tratante.    Doy consentimiento para que se fotografíen o graben vídeos de las operaciones o procedimientos a realizarse, incluidas las partes de mi cuerpo que paresh adecuadas para propósitos médicos, científicos o educativos, en el entendido de que, mi identidad no sea revelada por las fotografías o por textos descriptivos que las acompañen.  Si el procedimiento es fotografiado o grabado en vídeo, el cirujano obtendrá la imagen, cinta de vídeo o CD original.  El  hospital no se hará responsable por el almacenamiento, la revelación o el mantenimiento de la imagen, cinta o CD.    Autorizo la presencia de un especialista de producto u observadores en el quirófano, según lo considere necesario mi médico o las personas que éste designe.     Reconozco que en roxanna de que mi procedimiento resulte en un tiempo prolongado de radiografía/fluoroscopia, puedo desarrollar promise reacción en la piel.    Si tengo promise orden de No intentar la reanimación (JOHN), veronica estado se suspenderá mientras esté en el quirófano, la madison de procedimientos y octavia el periodo de recuperación a menos que yo indique lo contrario explícitamente (o promise persona autorizada a geoff el consentimiento en mi nombre). El cirujano o mi médico tratante determinarán cuándo termina el periodo de recuperación aplicable a los efectos de restablecer la orden de JOHN.  Pacientes que se realizan un procedimiento de esterilización: Entiendo que si el procedimiento tiene éxito, los resultados serán permanentes y que, por lo tanto, me será imposible inseminar, concebir o tener hijos.  Asimismo, entiendo que el procedimiento tiene asha propósito la esterilidad, aunque el resultado no está garantizado.   Admito que mi médico me ha explicado la aplicación de sedación/analgesia, incluidos los riesgos y beneficios y, consiento a la administración de sedación/analgesia conforme sea necesario o conveniente a juicio de mi médico.      CERTIFICO QUE HE LEÍDO Y COMPRENDIDO EL CONSENTIMIENTO ANTERIOR PARA LA OPERACIÓN y/o PROCEDIMIENTO.             ______________________________________  _______________________________  Firma del paciente      Firma de la persona responsible  Signature of patient      Signature of responsible person                        ___________________________________                                   Nombre en imprenta de la persona responsible         Name of responsible  person          ___________________________________                 Relación con el paciente         Relationship to patient    _________________________________________  ______________ ________________  Firma del testigo          Fecha / Date Hora / Time  Signature of witness    DECLARACÓN DEL MÉDICO Mediante mi firma al calce, afirmo que antes de la hora del procedimiento, he explicadoal paciente y/o a velazquez representante legal, los riesgos y beneficios involucrados en el tratamiento propuesto, así comocualquier alternativa razonable al tratamiento propuesto. También le(s) he explicado los riesgos y beneficios involucradosen el rechazo del tratarniento propuesto y alternativas al tratamiento propuesto, y he respondido a las preguntas del(la) paciente(My signature below affirms that prior to the time of the procedure, I have explained to the patient and/or his/her guardian, therisks and benefits involved in the proposed treatment and any reasonable alternative to the proposed treatment. I have alsoexplained the risks and benefits involved in refusal of the proposed treatment and have answered the patient's questions.)    ________________________________________   _________________________   _____________   (Firma del médico/Signature of Physician)                                    (Fecha/Date)                                             (Hora/Time)      Patient Name: Steven Velasco     : 1945                 Printed: 2025      Medical Record #: [id number]                                              Page 2